# Patient Record
Sex: MALE | Race: WHITE | NOT HISPANIC OR LATINO | Employment: OTHER | URBAN - METROPOLITAN AREA
[De-identification: names, ages, dates, MRNs, and addresses within clinical notes are randomized per-mention and may not be internally consistent; named-entity substitution may affect disease eponyms.]

---

## 2023-06-14 LAB
ALANINE AMINOTRANSFERASE (SGPT) (U/L) IN SER/PLAS: 16 U/L (ref 10–52)
ALBUMIN (G/DL) IN SER/PLAS: 4 G/DL (ref 3.4–5)
ALKALINE PHOSPHATASE (U/L) IN SER/PLAS: 62 U/L (ref 33–136)
ANION GAP IN SER/PLAS: 14 MMOL/L (ref 10–20)
ASPARTATE AMINOTRANSFERASE (SGOT) (U/L) IN SER/PLAS: 18 U/L (ref 9–39)
BILIRUBIN TOTAL (MG/DL) IN SER/PLAS: 0.7 MG/DL (ref 0–1.2)
CALCIUM (MG/DL) IN SER/PLAS: 9.6 MG/DL (ref 8.6–10.6)
CARBON DIOXIDE, TOTAL (MMOL/L) IN SER/PLAS: 29 MMOL/L (ref 21–32)
CHLORIDE (MMOL/L) IN SER/PLAS: 103 MMOL/L (ref 98–107)
CHOLESTEROL (MG/DL) IN SER/PLAS: 139 MG/DL (ref 0–199)
CHOLESTEROL IN HDL (MG/DL) IN SER/PLAS: 35 MG/DL
CHOLESTEROL/HDL RATIO: 4
CREATININE (MG/DL) IN SER/PLAS: 1.12 MG/DL (ref 0.5–1.3)
ERYTHROCYTE DISTRIBUTION WIDTH (RATIO) BY AUTOMATED COUNT: 15.9 % (ref 11.5–14.5)
ERYTHROCYTE MEAN CORPUSCULAR HEMOGLOBIN CONCENTRATION (G/DL) BY AUTOMATED: 31 G/DL (ref 32–36)
ERYTHROCYTE MEAN CORPUSCULAR VOLUME (FL) BY AUTOMATED COUNT: 88 FL (ref 80–100)
ERYTHROCYTES (10*6/UL) IN BLOOD BY AUTOMATED COUNT: 4.75 X10E12/L (ref 4.5–5.9)
GFR MALE: 63 ML/MIN/1.73M2
GLUCOSE (MG/DL) IN SER/PLAS: 99 MG/DL (ref 74–99)
HEMATOCRIT (%) IN BLOOD BY AUTOMATED COUNT: 42 % (ref 41–52)
HEMOGLOBIN (G/DL) IN BLOOD: 13 G/DL (ref 13.5–17.5)
LDL: 80 MG/DL (ref 0–99)
LEUKOCYTES (10*3/UL) IN BLOOD BY AUTOMATED COUNT: 8 X10E9/L (ref 4.4–11.3)
MAGNESIUM (MG/DL) IN SER/PLAS: 1.96 MG/DL (ref 1.6–2.4)
NRBC (PER 100 WBCS) BY AUTOMATED COUNT: 0 /100 WBC (ref 0–0)
PLATELETS (10*3/UL) IN BLOOD AUTOMATED COUNT: 180 X10E9/L (ref 150–450)
POTASSIUM (MMOL/L) IN SER/PLAS: 4.1 MMOL/L (ref 3.5–5.3)
PROTEIN TOTAL: 6.9 G/DL (ref 6.4–8.2)
SODIUM (MMOL/L) IN SER/PLAS: 142 MMOL/L (ref 136–145)
THYROTROPIN (MIU/L) IN SER/PLAS BY DETECTION LIMIT <= 0.05 MIU/L: 4.13 MIU/L (ref 0.44–3.98)
TRIGLYCERIDE (MG/DL) IN SER/PLAS: 122 MG/DL (ref 0–149)
UREA NITROGEN (MG/DL) IN SER/PLAS: 29 MG/DL (ref 6–23)
VLDL: 24 MG/DL (ref 0–40)

## 2023-09-28 DIAGNOSIS — I48.91 ATRIAL FIBRILLATION, UNSPECIFIED TYPE (MULTI): Primary | ICD-10-CM

## 2023-09-28 LAB
INR IN PPP BY COAGULATION ASSAY EXTERNAL: 2.6
PROTHROMBIN TIME (PT) IN PPP BY COAGULATION ASSAY EXTERNAL: NORMAL SECONDS

## 2023-10-11 PROBLEM — E63.8 NUTRITIONAL INTAKE LESS THAN BODY REQUIREMENTS: Status: ACTIVE | Noted: 2023-10-11

## 2023-10-11 PROBLEM — C44.311 BASAL CELL CARCINOMA OF SKIN OF NOSE: Status: ACTIVE | Noted: 2023-03-15

## 2023-10-11 PROBLEM — J96.90 RESPIRATORY FAILURE (MULTI): Status: ACTIVE | Noted: 2023-10-11

## 2023-10-11 PROBLEM — J18.9 PNEUMONIA: Status: ACTIVE | Noted: 2023-10-11

## 2023-10-11 PROBLEM — I25.10 ATHEROSCLEROSIS OF NATIVE CORONARY ARTERY OF NATIVE HEART WITHOUT ANGINA PECTORIS: Status: ACTIVE | Noted: 2023-10-11

## 2023-10-11 PROBLEM — I42.9 CHF WITH CARDIOMYOPATHY (MULTI): Status: ACTIVE | Noted: 2023-10-11

## 2023-10-11 PROBLEM — I35.0 NONRHEUMATIC AORTIC VALVE STENOSIS: Status: ACTIVE | Noted: 2023-10-11

## 2023-10-11 PROBLEM — R04.2 HEMOPTYSIS: Status: ACTIVE | Noted: 2023-10-11

## 2023-10-11 PROBLEM — J44.9 CHRONIC OBSTRUCTIVE PULMONARY DISEASE (MULTI): Status: ACTIVE | Noted: 2023-10-11

## 2023-10-11 PROBLEM — L40.50 PSORIATIC ARTHRITIS (MULTI): Status: ACTIVE | Noted: 2023-10-11

## 2023-10-11 PROBLEM — N18.30 STAGE 3 CHRONIC KIDNEY DISEASE (MULTI): Status: ACTIVE | Noted: 2023-10-11

## 2023-10-11 PROBLEM — Z95.810 CARDIAC DEFIBRILLATOR IN PLACE: Status: ACTIVE | Noted: 2023-10-11

## 2023-10-11 PROBLEM — I99.9 CIRCULATION PROBLEM: Status: ACTIVE | Noted: 2023-10-11

## 2023-10-11 PROBLEM — R91.8 ABNORMAL FINDINGS ON DIAGNOSTIC IMAGING OF LUNG: Status: ACTIVE | Noted: 2023-10-11

## 2023-10-11 PROBLEM — R06.89 AIRWAY CLEARANCE IMPAIRMENT: Status: ACTIVE | Noted: 2023-10-11

## 2023-10-11 PROBLEM — I50.9 CHF WITH CARDIOMYOPATHY (MULTI): Status: ACTIVE | Noted: 2023-10-11

## 2023-10-11 PROBLEM — R06.00 DYSPNEA: Status: ACTIVE | Noted: 2023-10-11

## 2023-10-11 PROBLEM — R68.89 ACTIVITY INTOLERANCE: Status: ACTIVE | Noted: 2023-10-11

## 2023-10-11 PROBLEM — I42.9 CARDIOMYOPATHY (MULTI): Status: ACTIVE | Noted: 2023-10-11

## 2023-10-11 PROBLEM — Z95.2 S/P TAVR (TRANSCATHETER AORTIC VALVE REPLACEMENT): Status: ACTIVE | Noted: 2023-10-11

## 2023-10-11 PROBLEM — R52 PAIN: Status: ACTIVE | Noted: 2023-10-11

## 2023-10-11 PROBLEM — R09.02 SEVERE HYPOXEMIA: Status: ACTIVE | Noted: 2023-10-11

## 2023-10-11 PROBLEM — E11.9 TYPE 2 DIABETES MELLITUS (MULTI): Status: ACTIVE | Noted: 2023-10-11

## 2023-10-11 PROBLEM — E78.5 HYPERLIPIDEMIA: Status: ACTIVE | Noted: 2023-10-11

## 2023-10-11 PROBLEM — L03.119 CELLULITIS, LEG: Status: ACTIVE | Noted: 2023-10-11

## 2023-10-11 PROBLEM — E26.09: Status: ACTIVE | Noted: 2023-10-11

## 2023-10-11 PROBLEM — Z79.899 ON AMIODARONE THERAPY: Status: ACTIVE | Noted: 2023-10-11

## 2023-10-11 PROBLEM — K21.9 ACID REFLUX: Status: ACTIVE | Noted: 2023-10-11

## 2023-10-11 PROBLEM — I10 HYPERTENSION: Status: ACTIVE | Noted: 2023-10-11

## 2023-10-11 PROBLEM — E66.89 OTHER OBESITY: Status: ACTIVE | Noted: 2023-10-11

## 2023-10-11 PROBLEM — C44.41 BASAL CELL CARCINOMA OF SKIN OF SCALP AND NECK: Status: ACTIVE | Noted: 2023-03-15

## 2023-10-11 PROBLEM — I82.90 VENOUS THROMBOSIS: Status: ACTIVE | Noted: 2023-10-11

## 2023-10-11 PROBLEM — R09.02 HYPOXIA: Status: ACTIVE | Noted: 2023-10-11

## 2023-10-11 PROBLEM — R60.9 PERIPHERAL EDEMA: Status: ACTIVE | Noted: 2023-10-11

## 2023-10-11 PROBLEM — E87.8 FLUID VOLUME DISORDER: Status: ACTIVE | Noted: 2023-10-11

## 2023-10-11 PROBLEM — E66.8 OTHER OBESITY: Status: ACTIVE | Noted: 2023-10-11

## 2023-10-11 PROBLEM — J90 PLEURAL EFFUSION: Status: ACTIVE | Noted: 2023-10-11

## 2023-10-11 PROBLEM — R06.89 IMPAIRED GAS EXCHANGE: Status: ACTIVE | Noted: 2023-10-11

## 2023-10-11 PROBLEM — R06.03 RESPIRATORY DISTRESS: Status: ACTIVE | Noted: 2023-10-11

## 2023-10-11 PROBLEM — R60.0 PERIPHERAL EDEMA: Status: ACTIVE | Noted: 2023-10-11

## 2023-10-11 PROBLEM — Z91.89 AT RISK FOR BLEEDING: Status: ACTIVE | Noted: 2023-10-11

## 2023-10-11 PROBLEM — C44.319 BASAL CELL CARCINOMA OF SKIN OF OTHER PARTS OF FACE: Status: ACTIVE | Noted: 2023-03-15

## 2023-10-11 PROBLEM — R73.09 BLOOD GLUCOSE ABNORMAL: Status: ACTIVE | Noted: 2023-10-11

## 2023-10-11 RX ORDER — ATORVASTATIN CALCIUM 80 MG/1
80 TABLET, FILM COATED ORAL DAILY
COMMUNITY
Start: 2013-11-18 | End: 2023-10-19 | Stop reason: HOSPADM

## 2023-10-11 RX ORDER — WARFARIN 3 MG/1
6 TABLET ORAL
COMMUNITY
End: 2023-10-19 | Stop reason: HOSPADM

## 2023-10-11 RX ORDER — OMEPRAZOLE 20 MG/1
20 TABLET, DELAYED RELEASE ORAL
COMMUNITY
End: 2023-10-18 | Stop reason: ENTERED-IN-ERROR

## 2023-10-11 RX ORDER — TRIAMCINOLONE ACETONIDE 1 MG/G
CREAM TOPICAL
COMMUNITY
Start: 2022-04-18 | End: 2023-10-19 | Stop reason: HOSPADM

## 2023-10-11 RX ORDER — POLYETHYLENE GLYCOL 3350 17 G/17G
17 POWDER, FOR SOLUTION ORAL DAILY
COMMUNITY
Start: 2022-10-24 | End: 2023-10-18 | Stop reason: ENTERED-IN-ERROR

## 2023-10-11 RX ORDER — INSULIN ASPART 100 [IU]/ML
INJECTION, SOLUTION INTRAVENOUS; SUBCUTANEOUS
COMMUNITY
End: 2023-10-19 | Stop reason: HOSPADM

## 2023-10-11 RX ORDER — WARFARIN 3 MG/1
3 TABLET ORAL 2 TIMES WEEKLY
COMMUNITY
Start: 2014-12-01 | End: 2023-10-19 | Stop reason: HOSPADM

## 2023-10-11 RX ORDER — MULTIVITAMIN
1 TABLET ORAL DAILY
COMMUNITY
End: 2023-10-18 | Stop reason: ALTCHOICE

## 2023-10-11 RX ORDER — BISACODYL 10 MG/1
10 SUPPOSITORY RECTAL DAILY
COMMUNITY
End: 2023-10-18 | Stop reason: ENTERED-IN-ERROR

## 2023-10-11 RX ORDER — ASPIRIN 81 MG/1
81 TABLET ORAL DAILY
COMMUNITY
End: 2023-10-19 | Stop reason: HOSPADM

## 2023-10-11 RX ORDER — ATORVASTATIN CALCIUM 40 MG/1
80 TABLET, FILM COATED ORAL NIGHTLY
COMMUNITY
End: 2023-10-18 | Stop reason: ENTERED-IN-ERROR

## 2023-10-11 RX ORDER — GUAIFENESIN 100 MG/5ML
200 SOLUTION ORAL EVERY 4 HOURS PRN
COMMUNITY
End: 2023-10-18 | Stop reason: ENTERED-IN-ERROR

## 2023-10-11 RX ORDER — LISINOPRIL 10 MG/1
10 TABLET ORAL DAILY
COMMUNITY
End: 2023-10-18 | Stop reason: ENTERED-IN-ERROR

## 2023-10-11 RX ORDER — SALINE NASAL SPRAY 1.5 OZ
2 SOLUTION NASAL EVERY 4 HOURS PRN
COMMUNITY
End: 2023-10-19 | Stop reason: HOSPADM

## 2023-10-11 RX ORDER — PSEUDOEPHEDRINE HCL 30 MG
TABLET ORAL
COMMUNITY
End: 2023-10-18 | Stop reason: ENTERED-IN-ERROR

## 2023-10-11 RX ORDER — INSULIN GLARGINE 100 [IU]/ML
INJECTION, SOLUTION SUBCUTANEOUS
COMMUNITY
End: 2023-10-18 | Stop reason: ENTERED-IN-ERROR

## 2023-10-11 RX ORDER — TRAMADOL HYDROCHLORIDE 50 MG/1
50 TABLET ORAL EVERY 6 HOURS PRN
COMMUNITY
End: 2023-10-18 | Stop reason: ENTERED-IN-ERROR

## 2023-10-11 RX ORDER — ISOSORBIDE MONONITRATE 20 MG/1
20 TABLET ORAL 2 TIMES DAILY
COMMUNITY
End: 2023-10-18 | Stop reason: ENTERED-IN-ERROR

## 2023-10-11 RX ORDER — METRONIDAZOLE 500 MG/1
500 TABLET ORAL 3 TIMES DAILY
COMMUNITY
End: 2023-10-18 | Stop reason: ENTERED-IN-ERROR

## 2023-10-11 RX ORDER — DOCUSATE SODIUM 100 MG/1
100 CAPSULE, LIQUID FILLED ORAL DAILY
COMMUNITY
End: 2023-10-18 | Stop reason: ENTERED-IN-ERROR

## 2023-10-11 RX ORDER — OMEPRAZOLE 20 MG/1
20 CAPSULE, DELAYED RELEASE ORAL
COMMUNITY
Start: 2013-11-18 | End: 2023-10-19 | Stop reason: HOSPADM

## 2023-10-11 RX ORDER — ISOSORBIDE MONONITRATE 30 MG/1
1 TABLET, EXTENDED RELEASE ORAL DAILY
COMMUNITY
Start: 2013-11-18

## 2023-10-11 RX ORDER — METOPROLOL TARTRATE 25 MG/1
25 TABLET, FILM COATED ORAL 2 TIMES DAILY
COMMUNITY
Start: 2013-11-18

## 2023-10-11 RX ORDER — GABAPENTIN 300 MG/1
300 CAPSULE ORAL 3 TIMES DAILY
COMMUNITY
End: 2023-10-18 | Stop reason: ENTERED-IN-ERROR

## 2023-10-11 RX ORDER — ACETAMINOPHEN 325 MG/1
650 TABLET ORAL EVERY 6 HOURS PRN
COMMUNITY
End: 2023-10-18 | Stop reason: ENTERED-IN-ERROR

## 2023-10-11 RX ORDER — DOXYCYCLINE HYCLATE 100 MG
100 TABLET ORAL 2 TIMES DAILY
COMMUNITY
End: 2023-10-18 | Stop reason: ALTCHOICE

## 2023-10-11 RX ORDER — FUROSEMIDE 40 MG/1
40 TABLET ORAL 2 TIMES DAILY
COMMUNITY
Start: 2013-11-18 | End: 2023-10-19 | Stop reason: HOSPADM

## 2023-10-11 RX ORDER — ADHESIVE BANDAGE
5 BANDAGE TOPICAL EVERY 6 HOURS PRN
COMMUNITY
End: 2023-10-18 | Stop reason: ENTERED-IN-ERROR

## 2023-10-11 RX ORDER — SENNOSIDES 8.6 MG/1
2 TABLET ORAL NIGHTLY
COMMUNITY
End: 2023-10-18 | Stop reason: ENTERED-IN-ERROR

## 2023-10-11 RX ORDER — VIT C/E/ZN/COPPR/LUTEIN/ZEAXAN 250MG-90MG
1 CAPSULE ORAL DAILY
COMMUNITY
End: 2023-10-19 | Stop reason: HOSPADM

## 2023-10-11 RX ORDER — CARBOXYMETHYLCELLULOSE SODIUM 5 MG/ML
1 SOLUTION/ DROPS OPHTHALMIC 3 TIMES DAILY
COMMUNITY
End: 2023-10-19 | Stop reason: HOSPADM

## 2023-10-11 RX ORDER — IPRATROPIUM BROMIDE AND ALBUTEROL SULFATE 2.5; .5 MG/3ML; MG/3ML
1 SOLUTION RESPIRATORY (INHALATION)
COMMUNITY
End: 2023-10-18 | Stop reason: ENTERED-IN-ERROR

## 2023-10-11 RX ORDER — INSULIN ASPART 100 [IU]/ML
INJECTION, SOLUTION INTRAVENOUS; SUBCUTANEOUS
COMMUNITY
End: 2023-10-18 | Stop reason: ENTERED-IN-ERROR

## 2023-10-11 RX ORDER — LISINOPRIL 5 MG/1
1 TABLET ORAL DAILY
COMMUNITY
Start: 2013-11-18

## 2023-10-11 RX ORDER — PANTOPRAZOLE SODIUM 40 MG/1
1 TABLET, DELAYED RELEASE ORAL DAILY
COMMUNITY
End: 2023-10-18 | Stop reason: ENTERED-IN-ERROR

## 2023-10-11 RX ORDER — INSULIN GLARGINE 100 [IU]/ML
45 INJECTION, SOLUTION SUBCUTANEOUS NIGHTLY
COMMUNITY
Start: 2018-03-05

## 2023-10-11 RX ORDER — BIOTIN 10 MG
TABLET ORAL
COMMUNITY
End: 2023-10-18 | Stop reason: ENTERED-IN-ERROR

## 2023-10-11 RX ORDER — CALCIUM CARBONATE/VITAMIN D3 500-10/5ML
1 LIQUID (ML) ORAL DAILY
Status: ON HOLD | COMMUNITY
Start: 2013-11-18 | End: 2023-10-20 | Stop reason: SDUPTHER

## 2023-10-12 ENCOUNTER — ANTICOAGULATION - WARFARIN VISIT (OUTPATIENT)
Dept: CARDIOLOGY | Facility: CLINIC | Age: 88
End: 2023-10-12
Payer: MEDICARE

## 2023-10-12 DIAGNOSIS — I48.91 ATRIAL FIBRILLATION, UNSPECIFIED TYPE (MULTI): ICD-10-CM

## 2023-10-12 LAB
POC INR: 2.1 (ref 2–3)
POC PROTHROMBIN TIME: NORMAL

## 2023-10-12 PROCEDURE — 85610 PROTHROMBIN TIME: CPT | Mod: PO

## 2023-10-12 PROCEDURE — 99211 OFF/OP EST MAY X REQ PHY/QHP: CPT | Mod: 27 | Performed by: INTERNAL MEDICINE

## 2023-10-12 NOTE — PROGRESS NOTES
Patient identification verified with 2 identifiers.    Location: [facility] - [address] [address] [place], [place] [address] [phone]     Referring Physician: Melvin Bahena  Enrollment/ Re-enrollment date: 2024   INR Goal: 2.0-3.0  INR monitoring is per [facility] protocol.  Anticoagulation Medication: warfarin  Indication: atrial fibrillation    Subjective   Bleeding signs/symptoms: No    Bruising: No   Major bleeding event: No  Thrombosis signs/symptoms: No  Thromboembolic event: No  Missed doses: No  Extra doses: No  Medication changes: Yes  Tradjenta diabetic medication started 3 wks ago  Dietary changes: No  Change in health: No  Change in activity: No  Alcohol: No  Other concerns: No    Upcoming Surgeries:  Does the Patient Have any upcoming surgeries that require interruption in anticoagulation therapy? no  Does the patient require bridging? no      Anticoagulation Summary  As of 10/12/2023      INR goal:  2.0-3.0   TTR:  100.0 % (4 d)   INR used for dosin.10 (10/12/2023)   Weekly warfarin total:  39 mg               Assessment/Plan   Therapeutic     1. New dose: no change    2. Next INR: 1 month      Education provided to patient during the visit:  Patient instructed to call in interim with questions, concerns and changes.   Patient educated on interactions between medications and warfarin.

## 2023-10-18 ENCOUNTER — APPOINTMENT (OUTPATIENT)
Dept: RADIOLOGY | Facility: HOSPITAL | Age: 88
DRG: 291 | End: 2023-10-18
Payer: MEDICARE

## 2023-10-18 ENCOUNTER — APPOINTMENT (OUTPATIENT)
Dept: CARDIOLOGY | Facility: CLINIC | Age: 88
DRG: 291 | End: 2023-10-18
Payer: MEDICARE

## 2023-10-18 ENCOUNTER — HOSPITAL ENCOUNTER (INPATIENT)
Facility: HOSPITAL | Age: 88
LOS: 1 days | Discharge: NURSING, REHAB OR LONG-TERM CARE | DRG: 291 | End: 2023-10-19
Attending: EMERGENCY MEDICINE | Admitting: INTERNAL MEDICINE
Payer: MEDICARE

## 2023-10-18 DIAGNOSIS — Z95.810 PRESENCE OF AUTOMATIC (IMPLANTABLE) CARDIAC DEFIBRILLATOR: ICD-10-CM

## 2023-10-18 DIAGNOSIS — I50.9 ACUTE CONGESTIVE HEART FAILURE, UNSPECIFIED HEART FAILURE TYPE (MULTI): Primary | ICD-10-CM

## 2023-10-18 DIAGNOSIS — I42.0 DILATED CARDIOMYOPATHY (MULTI): ICD-10-CM

## 2023-10-18 DIAGNOSIS — R09.02 SEVERE HYPOXEMIA: ICD-10-CM

## 2023-10-18 DIAGNOSIS — I50.21 ACUTE SYSTOLIC CONGESTIVE HEART FAILURE (MULTI): ICD-10-CM

## 2023-10-18 LAB
ALBUMIN SERPL-MCNC: 3.4 G/DL (ref 3.5–5)
ALP BLD-CCNC: 65 U/L (ref 35–125)
ALT SERPL-CCNC: 14 U/L (ref 5–40)
ANION GAP SERPL CALC-SCNC: 11 MMOL/L
APTT PPP: 36.5 SECONDS (ref 22–32.5)
AST SERPL-CCNC: 18 U/L (ref 5–40)
BASOPHILS # BLD AUTO: 0.04 X10*3/UL (ref 0–0.1)
BASOPHILS NFR BLD AUTO: 0.4 %
BILIRUB SERPL-MCNC: 0.5 MG/DL (ref 0.1–1.2)
BUN SERPL-MCNC: 26 MG/DL (ref 8–25)
CALCIUM SERPL-MCNC: 8.6 MG/DL (ref 8.5–10.4)
CHLORIDE SERPL-SCNC: 106 MMOL/L (ref 97–107)
CO2 SERPL-SCNC: 24 MMOL/L (ref 24–31)
CREAT SERPL-MCNC: 1.2 MG/DL (ref 0.4–1.6)
EOSINOPHIL # BLD AUTO: 0.11 X10*3/UL (ref 0–0.4)
EOSINOPHIL NFR BLD AUTO: 1.2 %
ERYTHROCYTE [DISTWIDTH] IN BLOOD BY AUTOMATED COUNT: 15.9 % (ref 11.5–14.5)
GFR SERPL CREATININE-BSD FRML MDRD: 57 ML/MIN/1.73M*2
GLUCOSE BLD MANUAL STRIP-MCNC: 172 MG/DL (ref 74–99)
GLUCOSE SERPL-MCNC: 140 MG/DL (ref 65–99)
HCT VFR BLD AUTO: 34.2 % (ref 41–52)
HGB BLD-MCNC: 10.6 G/DL (ref 13.5–17.5)
IMM GRANULOCYTES # BLD AUTO: 0.04 X10*3/UL (ref 0–0.5)
IMM GRANULOCYTES NFR BLD AUTO: 0.4 % (ref 0–0.9)
INR PPP: 2.3 (ref 0.9–1.2)
LACTATE BLDV-SCNC: 1.9 MMOL/L (ref 0.4–2)
LYMPHOCYTES # BLD AUTO: 1.57 X10*3/UL (ref 0.8–3)
LYMPHOCYTES NFR BLD AUTO: 17.5 %
MCH RBC QN AUTO: 27.3 PG (ref 26–34)
MCHC RBC AUTO-ENTMCNC: 31 G/DL (ref 32–36)
MCV RBC AUTO: 88 FL (ref 80–100)
MONOCYTES # BLD AUTO: 0.71 X10*3/UL (ref 0.05–0.8)
MONOCYTES NFR BLD AUTO: 7.9 %
NEUTROPHILS # BLD AUTO: 6.48 X10*3/UL (ref 1.6–5.5)
NEUTROPHILS NFR BLD AUTO: 72.6 %
NRBC BLD-RTO: 0 /100 WBCS (ref 0–0)
NT-PROBNP SERPL-MCNC: 1268 PG/ML (ref 0–852)
PLATELET # BLD AUTO: 157 X10*3/UL (ref 150–450)
PMV BLD AUTO: 10.3 FL (ref 7.5–11.5)
POTASSIUM SERPL-SCNC: 3.9 MMOL/L (ref 3.4–5.1)
PROT SERPL-MCNC: 5.8 G/DL (ref 5.9–7.9)
PROTHROMBIN TIME: 23.5 SECONDS (ref 9.3–12.7)
RBC # BLD AUTO: 3.88 X10*6/UL (ref 4.5–5.9)
SODIUM SERPL-SCNC: 141 MMOL/L (ref 133–145)
TROPONIN T SERPL-MCNC: 44 NG/L
WBC # BLD AUTO: 9 X10*3/UL (ref 4.4–11.3)

## 2023-10-18 PROCEDURE — 1200000002 HC GENERAL ROOM WITH TELEMETRY DAILY

## 2023-10-18 PROCEDURE — 93010 ELECTROCARDIOGRAM REPORT: CPT | Performed by: INTERNAL MEDICINE

## 2023-10-18 PROCEDURE — 2500000002 HC RX 250 W HCPCS SELF ADMINISTERED DRUGS (ALT 637 FOR MEDICARE OP, ALT 636 FOR OP/ED): Performed by: EMERGENCY MEDICINE

## 2023-10-18 PROCEDURE — 36415 COLL VENOUS BLD VENIPUNCTURE: CPT | Performed by: EMERGENCY MEDICINE

## 2023-10-18 PROCEDURE — 85610 PROTHROMBIN TIME: CPT | Performed by: EMERGENCY MEDICINE

## 2023-10-18 PROCEDURE — 84484 ASSAY OF TROPONIN QUANT: CPT | Performed by: EMERGENCY MEDICINE

## 2023-10-18 PROCEDURE — 99285 EMERGENCY DEPT VISIT HI MDM: CPT | Performed by: EMERGENCY MEDICINE

## 2023-10-18 PROCEDURE — 85730 THROMBOPLASTIN TIME PARTIAL: CPT | Performed by: EMERGENCY MEDICINE

## 2023-10-18 PROCEDURE — 83880 ASSAY OF NATRIURETIC PEPTIDE: CPT | Performed by: EMERGENCY MEDICINE

## 2023-10-18 PROCEDURE — 83605 ASSAY OF LACTIC ACID: CPT | Performed by: EMERGENCY MEDICINE

## 2023-10-18 PROCEDURE — 82947 ASSAY GLUCOSE BLOOD QUANT: CPT

## 2023-10-18 PROCEDURE — 71045 X-RAY EXAM CHEST 1 VIEW: CPT | Mod: FY

## 2023-10-18 PROCEDURE — 84075 ASSAY ALKALINE PHOSPHATASE: CPT | Performed by: EMERGENCY MEDICINE

## 2023-10-18 PROCEDURE — 85025 COMPLETE CBC W/AUTO DIFF WBC: CPT | Performed by: EMERGENCY MEDICINE

## 2023-10-18 RX ORDER — INSULIN GLARGINE-YFGN 100 [IU]/ML
45 INJECTION, SOLUTION SUBCUTANEOUS NIGHTLY
COMMUNITY
Start: 2023-06-23 | End: 2023-10-18 | Stop reason: ENTERED-IN-ERROR

## 2023-10-18 RX ORDER — FLASH GLUCOSE SENSOR
KIT MISCELLANEOUS
COMMUNITY
Start: 2022-12-19

## 2023-10-18 RX ORDER — INSULIN GLARGINE 100 [IU]/ML
44 INJECTION, SOLUTION SUBCUTANEOUS ONCE
Status: COMPLETED | OUTPATIENT
Start: 2023-10-18 | End: 2023-10-18

## 2023-10-18 RX ORDER — INSULIN GLARGINE 100 [IU]/ML
45 INJECTION, SOLUTION SUBCUTANEOUS NIGHTLY
Status: DISCONTINUED | OUTPATIENT
Start: 2023-10-18 | End: 2023-10-19 | Stop reason: HOSPADM

## 2023-10-18 RX ORDER — TALC
3 POWDER (GRAM) TOPICAL NIGHTLY PRN
Status: DISCONTINUED | OUTPATIENT
Start: 2023-10-18 | End: 2023-10-19 | Stop reason: HOSPADM

## 2023-10-18 RX ORDER — ACETAMINOPHEN 325 MG/1
325 TABLET ORAL EVERY 6 HOURS PRN
COMMUNITY
Start: 2015-07-14 | End: 2023-10-18 | Stop reason: ENTERED-IN-ERROR

## 2023-10-18 RX ORDER — CHOLECALCIFEROL (VITAMIN D3) 25 MCG
1000 TABLET ORAL DAILY
Status: DISCONTINUED | OUTPATIENT
Start: 2023-10-19 | End: 2023-10-19 | Stop reason: HOSPADM

## 2023-10-18 RX ORDER — PANTOPRAZOLE SODIUM 20 MG/1
20 TABLET, DELAYED RELEASE ORAL
Status: DISCONTINUED | OUTPATIENT
Start: 2023-10-19 | End: 2023-10-18

## 2023-10-18 RX ORDER — METOPROLOL TARTRATE 25 MG/1
25 TABLET, FILM COATED ORAL 2 TIMES DAILY
Status: DISCONTINUED | OUTPATIENT
Start: 2023-10-18 | End: 2023-10-19 | Stop reason: HOSPADM

## 2023-10-18 RX ORDER — POLYETHYLENE GLYCOL 3350 17 G/17G
17 POWDER, FOR SOLUTION ORAL DAILY PRN
Status: DISCONTINUED | OUTPATIENT
Start: 2023-10-18 | End: 2023-10-19 | Stop reason: HOSPADM

## 2023-10-18 RX ORDER — ATORVASTATIN CALCIUM 80 MG/1
80 TABLET, FILM COATED ORAL DAILY
Status: DISCONTINUED | OUTPATIENT
Start: 2023-10-19 | End: 2023-10-19 | Stop reason: HOSPADM

## 2023-10-18 RX ORDER — ACETAMINOPHEN 325 MG/1
650 TABLET ORAL EVERY 6 HOURS PRN
Status: DISCONTINUED | OUTPATIENT
Start: 2023-10-18 | End: 2023-10-19 | Stop reason: HOSPADM

## 2023-10-18 RX ORDER — LISINOPRIL 5 MG/1
5 TABLET ORAL DAILY
Status: DISCONTINUED | OUTPATIENT
Start: 2023-10-19 | End: 2023-10-19 | Stop reason: HOSPADM

## 2023-10-18 RX ORDER — VIT C/E/ZN/COPPR/LUTEIN/ZEAXAN 250MG-90MG
25 CAPSULE ORAL DAILY
Status: DISCONTINUED | OUTPATIENT
Start: 2023-10-19 | End: 2023-10-18

## 2023-10-18 RX ORDER — DEXTROSE 50 % IN WATER (D50W) INTRAVENOUS SYRINGE
25
Status: DISCONTINUED | OUTPATIENT
Start: 2023-10-18 | End: 2023-10-19 | Stop reason: HOSPADM

## 2023-10-18 RX ORDER — PANTOPRAZOLE SODIUM 40 MG/1
40 TABLET, DELAYED RELEASE ORAL
Status: DISCONTINUED | OUTPATIENT
Start: 2023-10-19 | End: 2023-10-19 | Stop reason: HOSPADM

## 2023-10-18 RX ORDER — NEOMYCIN AND POLYMYXIN B SULFATES AND BACITRACIN ZINC 400; 3.5; 1 [USP'U]/G; MG/G; [USP'U]/G
OINTMENT OPHTHALMIC
COMMUNITY
Start: 2023-06-26 | End: 2023-10-19 | Stop reason: HOSPADM

## 2023-10-18 RX ORDER — PRAMOXINE HYDROCHLORIDE 10 MG/ML
LOTION TOPICAL
COMMUNITY
Start: 2023-06-02 | End: 2023-10-19 | Stop reason: HOSPADM

## 2023-10-18 RX ORDER — TRIAMCINOLONE ACETONIDE 1 MG/G
CREAM TOPICAL 2 TIMES DAILY
Status: DISCONTINUED | OUTPATIENT
Start: 2023-10-18 | End: 2023-10-19 | Stop reason: HOSPADM

## 2023-10-18 RX ORDER — ISOSORBIDE MONONITRATE 30 MG/1
30 TABLET, EXTENDED RELEASE ORAL DAILY
Status: DISCONTINUED | OUTPATIENT
Start: 2023-10-19 | End: 2023-10-19 | Stop reason: HOSPADM

## 2023-10-18 RX ORDER — WARFARIN 3 MG/1
3 TABLET ORAL
Status: DISCONTINUED | OUTPATIENT
Start: 2023-10-19 | End: 2023-10-19 | Stop reason: HOSPADM

## 2023-10-18 RX ORDER — LANOLIN ALCOHOL/MO/W.PET/CERES
400 CREAM (GRAM) TOPICAL DAILY
Status: DISCONTINUED | OUTPATIENT
Start: 2023-10-19 | End: 2023-10-19 | Stop reason: HOSPADM

## 2023-10-18 RX ORDER — KETOCONAZOLE 20 MG/G
CREAM TOPICAL
COMMUNITY
Start: 2023-01-09 | End: 2023-10-18 | Stop reason: ALTCHOICE

## 2023-10-18 RX ORDER — NEOMYCIN AND POLYMYXIN B SULFATES AND BACITRACIN ZINC 400; 3.5; 1 [USP'U]/G; MG/G; [USP'U]/G
0.5 OINTMENT OPHTHALMIC DAILY
Status: DISCONTINUED | OUTPATIENT
Start: 2023-10-19 | End: 2023-10-19 | Stop reason: HOSPADM

## 2023-10-18 RX ORDER — ASPIRIN 81 MG/1
81 TABLET ORAL DAILY
Status: DISCONTINUED | OUTPATIENT
Start: 2023-10-19 | End: 2023-10-19 | Stop reason: HOSPADM

## 2023-10-18 RX ORDER — INSULIN LISPRO 100 [IU]/ML
0-10 INJECTION, SOLUTION INTRAVENOUS; SUBCUTANEOUS
Status: DISCONTINUED | OUTPATIENT
Start: 2023-10-19 | End: 2023-10-19 | Stop reason: HOSPADM

## 2023-10-18 RX ORDER — WARFARIN 3 MG/1
6 TABLET ORAL
Status: DISCONTINUED | OUTPATIENT
Start: 2023-10-20 | End: 2023-10-19 | Stop reason: HOSPADM

## 2023-10-18 RX ORDER — FUROSEMIDE 10 MG/ML
40 INJECTION INTRAMUSCULAR; INTRAVENOUS EVERY 12 HOURS
Status: DISCONTINUED | OUTPATIENT
Start: 2023-10-18 | End: 2023-10-19 | Stop reason: HOSPADM

## 2023-10-18 RX ORDER — DEXTROSE MONOHYDRATE 100 MG/ML
0.3 INJECTION, SOLUTION INTRAVENOUS ONCE AS NEEDED
Status: DISCONTINUED | OUTPATIENT
Start: 2023-10-18 | End: 2023-10-19 | Stop reason: HOSPADM

## 2023-10-18 RX ADMIN — INSULIN GLARGINE 44 UNITS: 100 INJECTION, SOLUTION SUBCUTANEOUS at 21:09

## 2023-10-18 ASSESSMENT — ACTIVITIES OF DAILY LIVING (ADL)
GROOMING: NEEDS ASSISTANCE
ADEQUATE_TO_COMPLETE_ADL: YES
LACK_OF_TRANSPORTATION: NO
TOILETING: NEEDS ASSISTANCE
ASSISTIVE_DEVICE: WALKER
HEARING - LEFT EAR: FUNCTIONAL
DRESSING YOURSELF: NEEDS ASSISTANCE
JUDGMENT_ADEQUATE_SAFELY_COMPLETE_DAILY_ACTIVITIES: YES
WALKS IN HOME: NEEDS ASSISTANCE
FEEDING YOURSELF: INDEPENDENT
BATHING: NEEDS ASSISTANCE
PATIENT'S MEMORY ADEQUATE TO SAFELY COMPLETE DAILY ACTIVITIES?: YES
HEARING - RIGHT EAR: FUNCTIONAL

## 2023-10-18 ASSESSMENT — COLUMBIA-SUICIDE SEVERITY RATING SCALE - C-SSRS
1. IN THE PAST MONTH, HAVE YOU WISHED YOU WERE DEAD OR WISHED YOU COULD GO TO SLEEP AND NOT WAKE UP?: NO
2. HAVE YOU ACTUALLY HAD ANY THOUGHTS OF KILLING YOURSELF?: NO
6. HAVE YOU EVER DONE ANYTHING, STARTED TO DO ANYTHING, OR PREPARED TO DO ANYTHING TO END YOUR LIFE?: NO

## 2023-10-18 ASSESSMENT — COGNITIVE AND FUNCTIONAL STATUS - GENERAL
PATIENT BASELINE BEDBOUND: NO
DRESSING REGULAR LOWER BODY CLOTHING: A LITTLE
MOBILITY SCORE: 13
MOVING FROM LYING ON BACK TO SITTING ON SIDE OF FLAT BED WITH BEDRAILS: A LITTLE
STANDING UP FROM CHAIR USING ARMS: A LOT
TURNING FROM BACK TO SIDE WHILE IN FLAT BAD: A LITTLE
CLIMB 3 TO 5 STEPS WITH RAILING: TOTAL
TOILETING: A LOT
DAILY ACTIVITIY SCORE: 18
DRESSING REGULAR UPPER BODY CLOTHING: A LITTLE
WALKING IN HOSPITAL ROOM: A LOT
MOVING TO AND FROM BED TO CHAIR: A LOT
HELP NEEDED FOR BATHING: A LOT

## 2023-10-18 ASSESSMENT — LIFESTYLE VARIABLES
HAVE PEOPLE ANNOYED YOU BY CRITICIZING YOUR DRINKING: NO
EVER HAD A DRINK FIRST THING IN THE MORNING TO STEADY YOUR NERVES TO GET RID OF A HANGOVER: NO
HAVE YOU EVER FELT YOU SHOULD CUT DOWN ON YOUR DRINKING: NO
REASON UNABLE TO ASSESS: NO
EVER FELT BAD OR GUILTY ABOUT YOUR DRINKING: NO

## 2023-10-18 ASSESSMENT — PAIN SCALES - GENERAL
PAINLEVEL_OUTOF10: 0 - NO PAIN
PAINLEVEL_OUTOF10: 0 - NO PAIN

## 2023-10-18 ASSESSMENT — PAIN - FUNCTIONAL ASSESSMENT: PAIN_FUNCTIONAL_ASSESSMENT: 0-10

## 2023-10-18 NOTE — ED TRIAGE NOTES
Pt arrived to the ER with SOB for 3 months. Called EMS today because SOB is starting to become worse when walking and doing other activities. Pt states his HR went to 50 bph last night, and pacemaker is set to 70 bph. Pt denies any pain at this time. O2 level is 95% on RA

## 2023-10-18 NOTE — ED PROVIDER NOTES
HPI   Chief Complaint   Patient presents with    Shortness of Breath     Pt arrived to the ER with SOB for 3 months. Called EMS today because SOB is starting to become worse when walking. Pt states his HR went to 50 bph, and pacemaker is set to 70 bph. Pt denies any pain at this time. O2 level is 95% on RA        89-year-old male from home for chief complaint of shortness of breath with exertion.  Patient states has been going on for the last couple weeks but more so in the last couple days.  Patient states he cannot walk more than 10 steps without huffing and puffing.  He has a pacemaker and this is his third 1 and he states the battery is pretty low.  He sees Dr. Bahena.  Patient denies any chest pain fevers chills or night sweats or coughing.    Point review of systems was reviewed and is negative unless otherwise stated                          Belinda Coma Scale Score: 15         NIH Stroke Scale: 0          Patient History   Past Medical History:   Diagnosis Date    Atherosclerotic heart disease of native coronary artery without angina pectoris 12/12/2022    CAD (coronary artery disease)    Essential (primary) hypertension 12/12/2022    Hypertension    Presence of automatic (implantable) cardiac defibrillator 11/14/2022    Cardiac defibrillator in place     Past Surgical History:   Procedure Laterality Date    APPENDECTOMY  12/01/2014    Appendectomy    CORONARY ARTERY BYPASS GRAFT  12/01/2014    CABG    CT ABDOMEN PELVIS ANGIOGRAM W AND/OR WO IV CONTRAST  3/17/2020    CT ABDOMEN PELVIS ANGIOGRAM W AND/OR WO IV CONTRAST 3/17/2020 [facility]CILLARY LEGAC[facility]    OTHER SURGICAL HISTORY  12/01/2014    Cardio-defib Pulse Generator Implantation Date     Family History   Problem Relation Name Age of Onset    Drug abuse Mother          OVERDOSE     Social History     Tobacco Use    Smoking status: Not on file    Smokeless tobacco: Not on file   Substance Use Topics    Alcohol use: Not on file    Drug use: Not on file        Physical Exam   ED Triage Vitals [10/18/23 0836]   Temp Heart Rate Resp BP   36.8 °C (98.2 °F) 70 23 129/66      SpO2 Temp Source Heart Rate Source Patient Position   94 % Oral Monitor Lying      BP Location FiO2 (%)     Right arm --       Physical Exam  Vitals and nursing note reviewed.   Constitutional:       Appearance: Normal appearance.   HENT:      Head: Normocephalic and atraumatic.      Nose: Nose normal.      Mouth/Throat:      Mouth: Mucous membranes are moist.   Eyes:      Extraocular Movements: Extraocular movements intact.      Pupils: Pupils are equal, round, and reactive to light.   Cardiovascular:      Rate and Rhythm: Normal rate and regular rhythm.   Pulmonary:      Effort: Pulmonary effort is normal.      Breath sounds: Examination of the right-lower field reveals decreased breath sounds. Examination of the left-lower field reveals decreased breath sounds. Decreased breath sounds present.   Abdominal:      General: Abdomen is flat.      Palpations: Abdomen is soft.   Musculoskeletal:         General: Normal range of motion.      Cervical back: Normal range of motion.      Comments: Patient does have compression stockings on his lower extremities   Skin:     General: Skin is warm and dry.      Capillary Refill: Capillary refill takes less than 2 seconds.   Neurological:      General: No focal deficit present.      Mental Status: He is alert.   Psychiatric:         Mood and Affect: Mood normal.         ED Course & MDM   Diagnoses as of 10/18/23 1417   Acute congestive heart failure, unspecified heart failure type (CMS/HCC)       Medical Decision Making    Medical Decision Making: Patient was fully worked up and found to have a BNP over thousand, chest x-ray showing edema, BUN and creatinine are stable at this time, troponin is 44x2.  INR 2.3.  At this time I spoke with Dr. Villa who is on-call for Dr. Bahena.  We do feel this patient will have to be transferred over to [facility].  We are going  to hold Lasix at this time.  We will await a transfer bed.    EKG interpreted by myself (ED attending physician): Heart rate is 79 normal sinus rhythm ventricular paced rhythm with occasional PVCs, left axis deviation, QTc is 5 6 6 ms QRS duration 1 8 6 ms    Differential Diagnoses Considered: Heart failure COVID flu RSV pneumonia NSTEMI    Chronic Medical Conditions Significantly Affecting Care: Chronic pacemaker    External Records Reviewed: I reviewed recent and relevant outside records including: Previous labs    Social Determinants of Health Significantly Affecting Care: Lives with family    Diagnostic testing considered: CT chest but not indicated at this time    Carol Jones D.O.  Emergency Medicine          Procedure  Procedures     Carol Jones,   10/18/23 1413

## 2023-10-19 ENCOUNTER — TELEPHONE (OUTPATIENT)
Dept: CARDIOLOGY | Facility: CLINIC | Age: 88
End: 2023-10-19

## 2023-10-19 ENCOUNTER — HOSPITAL ENCOUNTER (INPATIENT)
Facility: HOSPITAL | Age: 88
LOS: 5 days | Discharge: NURSING, REHAB OR LONG-TERM CARE | DRG: 291 | End: 2023-10-24
Attending: INTERNAL MEDICINE | Admitting: INTERNAL MEDICINE
Payer: MEDICARE

## 2023-10-19 VITALS
WEIGHT: 192 LBS | HEIGHT: 70 IN | HEART RATE: 74 BPM | TEMPERATURE: 98.2 F | DIASTOLIC BLOOD PRESSURE: 70 MMHG | BODY MASS INDEX: 27.49 KG/M2 | SYSTOLIC BLOOD PRESSURE: 147 MMHG | RESPIRATION RATE: 18 BRPM | OXYGEN SATURATION: 96 %

## 2023-10-19 DIAGNOSIS — I50.31 ACUTE DIASTOLIC CONGESTIVE HEART FAILURE (MULTI): ICD-10-CM

## 2023-10-19 DIAGNOSIS — I50.9 ACUTE CONGESTIVE HEART FAILURE, UNSPECIFIED HEART FAILURE TYPE (MULTI): Primary | ICD-10-CM

## 2023-10-19 DIAGNOSIS — J96.22 ACUTE ON CHRONIC RESPIRATORY FAILURE WITH HYPOXIA AND HYPERCAPNIA (MULTI): ICD-10-CM

## 2023-10-19 DIAGNOSIS — I50.31 ACUTE DIASTOLIC (CONGESTIVE) HEART FAILURE (MULTI): ICD-10-CM

## 2023-10-19 DIAGNOSIS — K59.03 DRUG-INDUCED CONSTIPATION: ICD-10-CM

## 2023-10-19 DIAGNOSIS — R55 SYNCOPE AND COLLAPSE: ICD-10-CM

## 2023-10-19 DIAGNOSIS — R00.1 SYMPTOMATIC BRADYCARDIA: ICD-10-CM

## 2023-10-19 DIAGNOSIS — Z79.899 ON AMIODARONE THERAPY: ICD-10-CM

## 2023-10-19 DIAGNOSIS — K59.01 CONSTIPATION BY DELAYED COLONIC TRANSIT: ICD-10-CM

## 2023-10-19 DIAGNOSIS — K21.9 GASTROESOPHAGEAL REFLUX DISEASE, UNSPECIFIED WHETHER ESOPHAGITIS PRESENT: ICD-10-CM

## 2023-10-19 DIAGNOSIS — J96.21 ACUTE ON CHRONIC RESPIRATORY FAILURE WITH HYPOXIA AND HYPERCAPNIA (MULTI): ICD-10-CM

## 2023-10-19 DIAGNOSIS — E78.2 MODERATE MIXED HYPERLIPIDEMIA NOT REQUIRING STATIN THERAPY: ICD-10-CM

## 2023-10-19 LAB
ALBUMIN SERPL BCP-MCNC: 3.9 G/DL (ref 3.4–5)
ALP SERPL-CCNC: 72 U/L (ref 33–136)
ALT SERPL W P-5'-P-CCNC: 13 U/L (ref 10–52)
ANION GAP SERPL CALC-SCNC: 10 MMOL/L
ANION GAP SERPL CALC-SCNC: 16 MMOL/L (ref 10–20)
AST SERPL W P-5'-P-CCNC: 16 U/L (ref 9–39)
BILIRUB SERPL-MCNC: 0.8 MG/DL (ref 0–1.2)
BNP SERPL-MCNC: 275 PG/ML (ref 0–99)
BUN SERPL-MCNC: 23 MG/DL (ref 8–25)
BUN SERPL-MCNC: 25 MG/DL (ref 6–23)
CALCIUM SERPL-MCNC: 9.4 MG/DL (ref 8.5–10.4)
CALCIUM SERPL-MCNC: 9.5 MG/DL (ref 8.6–10.6)
CHLORIDE SERPL-SCNC: 104 MMOL/L (ref 98–107)
CHLORIDE SERPL-SCNC: 105 MMOL/L (ref 97–107)
CO2 SERPL-SCNC: 25 MMOL/L (ref 21–32)
CO2 SERPL-SCNC: 25 MMOL/L (ref 24–31)
CREAT SERPL-MCNC: 1.1 MG/DL (ref 0.4–1.6)
CREAT SERPL-MCNC: 1.4 MG/DL (ref 0.5–1.3)
ERYTHROCYTE [DISTWIDTH] IN BLOOD BY AUTOMATED COUNT: 15.9 % (ref 11.5–14.5)
ERYTHROCYTE [DISTWIDTH] IN BLOOD BY AUTOMATED COUNT: 16 % (ref 11.5–14.5)
EST. AVERAGE GLUCOSE BLD GHB EST-MCNC: 180 MG/DL
GFR SERPL CREATININE-BSD FRML MDRD: 48 ML/MIN/1.73M*2
GFR SERPL CREATININE-BSD FRML MDRD: 64 ML/MIN/1.73M*2
GLUCOSE BLD MANUAL STRIP-MCNC: 105 MG/DL (ref 74–99)
GLUCOSE BLD MANUAL STRIP-MCNC: 132 MG/DL (ref 74–99)
GLUCOSE BLD MANUAL STRIP-MCNC: 151 MG/DL (ref 74–99)
GLUCOSE BLD MANUAL STRIP-MCNC: 179 MG/DL (ref 74–99)
GLUCOSE SERPL-MCNC: 131 MG/DL (ref 74–99)
GLUCOSE SERPL-MCNC: 139 MG/DL (ref 65–99)
HBA1C MFR BLD: 7.9 %
HCT VFR BLD AUTO: 37.7 % (ref 41–52)
HCT VFR BLD AUTO: 39.1 % (ref 41–52)
HGB BLD-MCNC: 11.3 G/DL (ref 13.5–17.5)
HGB BLD-MCNC: 11.6 G/DL (ref 13.5–17.5)
INR PPP: 2.5 (ref 0.9–1.2)
INR PPP: 2.6 (ref 0.9–1.1)
MCH RBC QN AUTO: 26.9 PG (ref 26–34)
MCH RBC QN AUTO: 27.1 PG (ref 26–34)
MCHC RBC AUTO-ENTMCNC: 29.7 G/DL (ref 32–36)
MCHC RBC AUTO-ENTMCNC: 30 G/DL (ref 32–36)
MCV RBC AUTO: 90 FL (ref 80–100)
MCV RBC AUTO: 91 FL (ref 80–100)
NRBC BLD-RTO: 0 /100 WBCS (ref 0–0)
NRBC BLD-RTO: 0 /100 WBCS (ref 0–0)
PLATELET # BLD AUTO: 159 X10*3/UL (ref 150–450)
PLATELET # BLD AUTO: 169 X10*3/UL (ref 150–450)
PMV BLD AUTO: 10.6 FL (ref 7.5–11.5)
PMV BLD AUTO: 11.2 FL (ref 7.5–11.5)
POTASSIUM SERPL-SCNC: 4 MMOL/L (ref 3.5–5.3)
POTASSIUM SERPL-SCNC: 4.1 MMOL/L (ref 3.4–5.1)
PROT SERPL-MCNC: 6.6 G/DL (ref 6.4–8.2)
PROTHROMBIN TIME: 24.9 SECONDS (ref 9.3–12.7)
PROTHROMBIN TIME: 29.4 SECONDS (ref 9.8–12.8)
RBC # BLD AUTO: 4.17 X10*6/UL (ref 4.5–5.9)
RBC # BLD AUTO: 4.32 X10*6/UL (ref 4.5–5.9)
SODIUM SERPL-SCNC: 140 MMOL/L (ref 133–145)
SODIUM SERPL-SCNC: 141 MMOL/L (ref 136–145)
WBC # BLD AUTO: 8.7 X10*3/UL (ref 4.4–11.3)
WBC # BLD AUTO: 9.6 X10*3/UL (ref 4.4–11.3)

## 2023-10-19 PROCEDURE — 36415 COLL VENOUS BLD VENIPUNCTURE: CPT

## 2023-10-19 PROCEDURE — 4B02XSZ MEASUREMENT OF CARDIAC PACEMAKER, EXTERNAL APPROACH: ICD-10-PCS | Performed by: INTERNAL MEDICINE

## 2023-10-19 PROCEDURE — 80053 COMPREHEN METABOLIC PANEL: CPT | Performed by: INTERNAL MEDICINE

## 2023-10-19 PROCEDURE — 83880 ASSAY OF NATRIURETIC PEPTIDE: CPT | Mod: CMCLAB

## 2023-10-19 PROCEDURE — 36415 COLL VENOUS BLD VENIPUNCTURE: CPT | Performed by: INTERNAL MEDICINE

## 2023-10-19 PROCEDURE — 96372 THER/PROPH/DIAG INJ SC/IM: CPT

## 2023-10-19 PROCEDURE — 85610 PROTHROMBIN TIME: CPT | Performed by: INTERNAL MEDICINE

## 2023-10-19 PROCEDURE — 85610 PROTHROMBIN TIME: CPT | Mod: CMCLAB

## 2023-10-19 PROCEDURE — 85027 COMPLETE CBC AUTOMATED: CPT | Performed by: INTERNAL MEDICINE

## 2023-10-19 PROCEDURE — 80048 BASIC METABOLIC PNL TOTAL CA: CPT

## 2023-10-19 PROCEDURE — 2500000004 HC RX 250 GENERAL PHARMACY W/ HCPCS (ALT 636 FOR OP/ED): Performed by: INTERNAL MEDICINE

## 2023-10-19 PROCEDURE — 1100000001 HC PRIVATE ROOM DAILY

## 2023-10-19 PROCEDURE — 83036 HEMOGLOBIN GLYCOSYLATED A1C: CPT | Performed by: INTERNAL MEDICINE

## 2023-10-19 PROCEDURE — 85027 COMPLETE CBC AUTOMATED: CPT | Mod: CMCLAB

## 2023-10-19 PROCEDURE — 2500000001 HC RX 250 WO HCPCS SELF ADMINISTERED DRUGS (ALT 637 FOR MEDICARE OP)

## 2023-10-19 PROCEDURE — 2500000004 HC RX 250 GENERAL PHARMACY W/ HCPCS (ALT 636 FOR OP/ED)

## 2023-10-19 PROCEDURE — 82947 ASSAY GLUCOSE BLOOD QUANT: CPT

## 2023-10-19 PROCEDURE — 2500000002 HC RX 250 W HCPCS SELF ADMINISTERED DRUGS (ALT 637 FOR MEDICARE OP, ALT 636 FOR OP/ED)

## 2023-10-19 PROCEDURE — 93010 ELECTROCARDIOGRAM REPORT: CPT | Performed by: INTERNAL MEDICINE

## 2023-10-19 PROCEDURE — 82947 ASSAY GLUCOSE BLOOD QUANT: CPT | Mod: CMCLAB

## 2023-10-19 PROCEDURE — 2500000001 HC RX 250 WO HCPCS SELF ADMINISTERED DRUGS (ALT 637 FOR MEDICARE OP): Performed by: INTERNAL MEDICINE

## 2023-10-19 PROCEDURE — 99222 1ST HOSP IP/OBS MODERATE 55: CPT

## 2023-10-19 RX ORDER — WARFARIN 3 MG/1
3 TABLET ORAL
Status: CANCELLED | OUTPATIENT
Start: 2023-10-23

## 2023-10-19 RX ORDER — INSULIN GLARGINE 100 [IU]/ML
45 INJECTION, SOLUTION SUBCUTANEOUS NIGHTLY
Status: CANCELLED | OUTPATIENT
Start: 2023-10-19

## 2023-10-19 RX ORDER — ASPIRIN 81 MG/1
81 TABLET ORAL DAILY
Status: CANCELLED | OUTPATIENT
Start: 2023-10-20

## 2023-10-19 RX ORDER — DEXTROSE 50 % IN WATER (D50W) INTRAVENOUS SYRINGE
25
Status: DISCONTINUED | OUTPATIENT
Start: 2023-10-19 | End: 2023-10-24 | Stop reason: HOSPADM

## 2023-10-19 RX ORDER — FUROSEMIDE 10 MG/ML
40 INJECTION INTRAMUSCULAR; INTRAVENOUS EVERY 12 HOURS
Status: CANCELLED | OUTPATIENT
Start: 2023-10-19

## 2023-10-19 RX ORDER — DEXTROSE 50 % IN WATER (D50W) INTRAVENOUS SYRINGE
25
Status: CANCELLED | OUTPATIENT
Start: 2023-10-19

## 2023-10-19 RX ORDER — ENOXAPARIN SODIUM 100 MG/ML
40 INJECTION SUBCUTANEOUS EVERY 24 HOURS
Status: DISCONTINUED | OUTPATIENT
Start: 2023-10-19 | End: 2023-10-20

## 2023-10-19 RX ORDER — ACETAMINOPHEN 325 MG/1
650 TABLET ORAL EVERY 6 HOURS PRN
Status: CANCELLED | OUTPATIENT
Start: 2023-10-19

## 2023-10-19 RX ORDER — WARFARIN 3 MG/1
TABLET ORAL
Start: 2023-10-19 | End: 2024-10-18

## 2023-10-19 RX ORDER — ATORVASTATIN CALCIUM 80 MG/1
80 TABLET, FILM COATED ORAL DAILY
Status: CANCELLED | OUTPATIENT
Start: 2023-10-19

## 2023-10-19 RX ORDER — FUROSEMIDE 10 MG/ML
40 INJECTION INTRAMUSCULAR; INTRAVENOUS ONCE
Status: COMPLETED | OUTPATIENT
Start: 2023-10-19 | End: 2023-10-19

## 2023-10-19 RX ORDER — LANOLIN ALCOHOL/MO/W.PET/CERES
400 CREAM (GRAM) TOPICAL DAILY
Status: CANCELLED | OUTPATIENT
Start: 2023-10-20

## 2023-10-19 RX ORDER — POLYETHYLENE GLYCOL 3350 17 G/17G
17 POWDER, FOR SOLUTION ORAL DAILY PRN
Status: CANCELLED | OUTPATIENT
Start: 2023-10-19

## 2023-10-19 RX ORDER — POLYETHYLENE GLYCOL 3350 17 G/17G
17 POWDER, FOR SOLUTION ORAL DAILY
Status: DISCONTINUED | OUTPATIENT
Start: 2023-10-19 | End: 2023-10-24 | Stop reason: HOSPADM

## 2023-10-19 RX ORDER — PANTOPRAZOLE SODIUM 40 MG/1
40 TABLET, DELAYED RELEASE ORAL
Status: CANCELLED | OUTPATIENT
Start: 2023-10-20

## 2023-10-19 RX ORDER — ATORVASTATIN CALCIUM 80 MG/1
80 TABLET, FILM COATED ORAL NIGHTLY
Status: DISCONTINUED | OUTPATIENT
Start: 2023-10-19 | End: 2023-10-24 | Stop reason: HOSPADM

## 2023-10-19 RX ORDER — CHOLECALCIFEROL (VITAMIN D3) 25 MCG
1000 TABLET ORAL DAILY
Status: CANCELLED | OUTPATIENT
Start: 2023-10-20

## 2023-10-19 RX ORDER — NEOMYCIN AND POLYMYXIN B SULFATES AND BACITRACIN ZINC 400; 3.5; 1 [USP'U]/G; MG/G; [USP'U]/G
0.5 OINTMENT OPHTHALMIC DAILY
Start: 2023-10-19

## 2023-10-19 RX ORDER — DEXTROSE MONOHYDRATE 100 MG/ML
0.3 INJECTION, SOLUTION INTRAVENOUS ONCE AS NEEDED
Status: CANCELLED | OUTPATIENT
Start: 2023-10-19

## 2023-10-19 RX ORDER — CHOLECALCIFEROL (VITAMIN D3) 25 MCG
1000 TABLET ORAL DAILY
Start: 2023-10-20

## 2023-10-19 RX ORDER — FUROSEMIDE 10 MG/ML
40 INJECTION INTRAMUSCULAR; INTRAVENOUS EVERY 12 HOURS
Start: 2023-10-19 | End: 2023-10-24 | Stop reason: HOSPADM

## 2023-10-19 RX ORDER — WARFARIN 3 MG/1
TABLET ORAL
Status: ON HOLD
Start: 2023-10-20 | End: 2023-12-19 | Stop reason: SDUPTHER

## 2023-10-19 RX ORDER — DEXTROSE MONOHYDRATE 100 MG/ML
0.3 INJECTION, SOLUTION INTRAVENOUS ONCE AS NEEDED
Qty: 500 ML
Start: 2023-10-19 | End: 2023-10-24 | Stop reason: HOSPADM

## 2023-10-19 RX ORDER — TRIAMCINOLONE ACETONIDE 1 MG/G
CREAM TOPICAL 2 TIMES DAILY
Start: 2023-10-19

## 2023-10-19 RX ORDER — DEXTROSE MONOHYDRATE 100 MG/ML
0.3 INJECTION, SOLUTION INTRAVENOUS ONCE AS NEEDED
Status: DISCONTINUED | OUTPATIENT
Start: 2023-10-19 | End: 2023-10-24 | Stop reason: HOSPADM

## 2023-10-19 RX ORDER — ACETAMINOPHEN 325 MG/1
650 TABLET ORAL EVERY 6 HOURS PRN
Qty: 30 TABLET | Refills: 0
Start: 2023-10-19

## 2023-10-19 RX ORDER — TALC
3 POWDER (GRAM) TOPICAL NIGHTLY PRN
Refills: 0
Start: 2023-10-19

## 2023-10-19 RX ORDER — ISOSORBIDE MONONITRATE 30 MG/1
30 TABLET, EXTENDED RELEASE ORAL DAILY
Status: CANCELLED | OUTPATIENT
Start: 2023-10-20

## 2023-10-19 RX ORDER — INSULIN LISPRO 100 [IU]/ML
0-5 INJECTION, SOLUTION INTRAVENOUS; SUBCUTANEOUS
Status: DISCONTINUED | OUTPATIENT
Start: 2023-10-19 | End: 2023-10-24 | Stop reason: HOSPADM

## 2023-10-19 RX ORDER — PANTOPRAZOLE SODIUM 40 MG/1
40 TABLET, DELAYED RELEASE ORAL
Start: 2023-10-20 | End: 2023-10-24 | Stop reason: HOSPADM

## 2023-10-19 RX ORDER — TRIAMCINOLONE ACETONIDE 1 MG/G
CREAM TOPICAL 2 TIMES DAILY
Status: CANCELLED | OUTPATIENT
Start: 2023-10-19

## 2023-10-19 RX ORDER — POLYETHYLENE GLYCOL 3350 17 G/17G
17 POWDER, FOR SOLUTION ORAL DAILY
Start: 2023-10-19 | End: 2023-10-24 | Stop reason: HOSPADM

## 2023-10-19 RX ORDER — METOPROLOL TARTRATE 25 MG/1
25 TABLET, FILM COATED ORAL 2 TIMES DAILY
Status: CANCELLED | OUTPATIENT
Start: 2023-10-19

## 2023-10-19 RX ORDER — TALC
3 POWDER (GRAM) TOPICAL NIGHTLY PRN
Status: CANCELLED | OUTPATIENT
Start: 2023-10-19

## 2023-10-19 RX ORDER — LISINOPRIL 5 MG/1
5 TABLET ORAL DAILY
Status: CANCELLED | OUTPATIENT
Start: 2023-10-20

## 2023-10-19 RX ORDER — INSULIN LISPRO 100 [IU]/ML
0-10 INJECTION, SOLUTION INTRAVENOUS; SUBCUTANEOUS
Status: CANCELLED | OUTPATIENT
Start: 2023-10-19

## 2023-10-19 RX ORDER — LANOLIN ALCOHOL/MO/W.PET/CERES
400 CREAM (GRAM) TOPICAL DAILY
Start: 2023-10-20

## 2023-10-19 RX ADMIN — FUROSEMIDE 40 MG: 10 INJECTION, SOLUTION INTRAVENOUS at 19:17

## 2023-10-19 RX ADMIN — Medication 1000 UNITS: at 08:29

## 2023-10-19 RX ADMIN — INSULIN LISPRO 1 UNITS: 100 INJECTION, SOLUTION INTRAVENOUS; SUBCUTANEOUS at 18:40

## 2023-10-19 RX ADMIN — TRIAMCINOLONE ACETONIDE: 1 CREAM TOPICAL at 08:30

## 2023-10-19 RX ADMIN — ENOXAPARIN SODIUM 40 MG: 40 INJECTION SUBCUTANEOUS at 18:41

## 2023-10-19 RX ADMIN — TRIAMCINOLONE ACETONIDE: 1 CREAM TOPICAL at 00:29

## 2023-10-19 RX ADMIN — METOPROLOL TARTRATE 25 MG: 25 TABLET, FILM COATED ORAL at 00:18

## 2023-10-19 RX ADMIN — METOPROLOL TARTRATE 25 MG: 25 TABLET, FILM COATED ORAL at 08:29

## 2023-10-19 RX ADMIN — POLYETHYLENE GLYCOL 3350 17 G: 17 POWDER, FOR SOLUTION ORAL at 18:41

## 2023-10-19 RX ADMIN — ATORVASTATIN CALCIUM 80 MG: 80 TABLET, FILM COATED ORAL at 20:26

## 2023-10-19 RX ADMIN — PANTOPRAZOLE SODIUM 40 MG: 40 TABLET, DELAYED RELEASE ORAL at 06:31

## 2023-10-19 RX ADMIN — Medication 400 MG: at 08:29

## 2023-10-19 RX ADMIN — ISOSORBIDE MONONITRATE 30 MG: 30 TABLET, EXTENDED RELEASE ORAL at 08:29

## 2023-10-19 RX ADMIN — LISINOPRIL 5 MG: 5 TABLET ORAL at 08:29

## 2023-10-19 RX ADMIN — ASPIRIN 81 MG: 81 TABLET, COATED ORAL at 08:29

## 2023-10-19 RX ADMIN — FUROSEMIDE 40 MG: 10 INJECTION, SOLUTION INTRAMUSCULAR; INTRAVENOUS at 00:29

## 2023-10-19 SDOH — SOCIAL STABILITY: SOCIAL NETWORK: ARE YOU MARRIED, WIDOWED, DIVORCED, SEPARATED, NEVER MARRIED, OR LIVING WITH A PARTNER?: WIDOWED

## 2023-10-19 SDOH — HEALTH STABILITY: PHYSICAL HEALTH: ON AVERAGE, HOW MANY MINUTES DO YOU ENGAGE IN EXERCISE AT THIS LEVEL?: 0 MIN

## 2023-10-19 SDOH — SOCIAL STABILITY: SOCIAL INSECURITY: WITHIN THE LAST YEAR, HAVE YOU BEEN HUMILIATED OR EMOTIONALLY ABUSED IN OTHER WAYS BY YOUR PARTNER OR EX-PARTNER?: NO

## 2023-10-19 SDOH — HEALTH STABILITY: MENTAL HEALTH: HOW MANY STANDARD DRINKS CONTAINING ALCOHOL DO YOU HAVE ON A TYPICAL DAY?: 1 OR 2

## 2023-10-19 SDOH — SOCIAL STABILITY: SOCIAL NETWORK: HOW OFTEN DO YOU ATTEND CHURCH OR RELIGIOUS SERVICES?: NEVER

## 2023-10-19 SDOH — SOCIAL STABILITY: SOCIAL INSECURITY: DOES ANYONE TRY TO KEEP YOU FROM HAVING/CONTACTING OTHER FRIENDS OR DOING THINGS OUTSIDE YOUR HOME?: NO

## 2023-10-19 SDOH — SOCIAL STABILITY: SOCIAL NETWORK
DO YOU BELONG TO ANY CLUBS OR ORGANIZATIONS SUCH AS CHURCH GROUPS UNIONS, FRATERNAL OR ATHLETIC GROUPS, OR SCHOOL GROUPS?: NO

## 2023-10-19 SDOH — SOCIAL STABILITY: SOCIAL INSECURITY: ABUSE: ADULT

## 2023-10-19 SDOH — SOCIAL STABILITY: SOCIAL INSECURITY: HAVE YOU HAD THOUGHTS OF HARMING ANYONE ELSE?: NO

## 2023-10-19 SDOH — SOCIAL STABILITY: SOCIAL INSECURITY: HAS ANYONE EVER THREATENED TO HURT YOUR FAMILY OR YOUR PETS?: NO

## 2023-10-19 SDOH — HEALTH STABILITY: MENTAL HEALTH
STRESS IS WHEN SOMEONE FEELS TENSE, NERVOUS, ANXIOUS, OR CAN'T SLEEP AT NIGHT BECAUSE THEIR MIND IS TROUBLED. HOW STRESSED ARE YOU?: NOT AT ALL

## 2023-10-19 SDOH — SOCIAL STABILITY: SOCIAL INSECURITY: ARE YOU OR HAVE YOU BEEN THREATENED OR ABUSED PHYSICALLY, EMOTIONALLY, OR SEXUALLY BY ANYONE?: NO

## 2023-10-19 SDOH — ECONOMIC STABILITY: INCOME INSECURITY: IN THE LAST 12 MONTHS, WAS THERE A TIME WHEN YOU WERE NOT ABLE TO PAY THE MORTGAGE OR RENT ON TIME?: NO

## 2023-10-19 SDOH — SOCIAL STABILITY: SOCIAL INSECURITY
WITHIN THE LAST YEAR, HAVE YOU BEEN KICKED, HIT, SLAPPED, OR OTHERWISE PHYSICALLY HURT BY YOUR PARTNER OR EX-PARTNER?: NO

## 2023-10-19 SDOH — SOCIAL STABILITY: SOCIAL INSECURITY
WITHIN THE LAST YEAR, HAVE TO BEEN RAPED OR FORCED TO HAVE ANY KIND OF SEXUAL ACTIVITY BY YOUR PARTNER OR EX-PARTNER?: NO

## 2023-10-19 SDOH — SOCIAL STABILITY: SOCIAL INSECURITY: DO YOU FEEL ANYONE HAS EXPLOITED OR TAKEN ADVANTAGE OF YOU FINANCIALLY OR OF YOUR PERSONAL PROPERTY?: NO

## 2023-10-19 SDOH — SOCIAL STABILITY: SOCIAL INSECURITY: WITHIN THE LAST YEAR, HAVE YOU BEEN AFRAID OF YOUR PARTNER OR EX-PARTNER?: NO

## 2023-10-19 SDOH — ECONOMIC STABILITY: FOOD INSECURITY: WITHIN THE PAST 12 MONTHS, THE FOOD YOU BOUGHT JUST DIDN'T LAST AND YOU DIDN'T HAVE MONEY TO GET MORE.: NEVER TRUE

## 2023-10-19 SDOH — ECONOMIC STABILITY: INCOME INSECURITY: HOW HARD IS IT FOR YOU TO PAY FOR THE VERY BASICS LIKE FOOD, HOUSING, MEDICAL CARE, AND HEATING?: NOT HARD AT ALL

## 2023-10-19 SDOH — SOCIAL STABILITY: SOCIAL NETWORK: HOW OFTEN DO YOU ATTENT MEETINGS OF THE CLUB OR ORGANIZATION YOU BELONG TO?: NEVER

## 2023-10-19 SDOH — ECONOMIC STABILITY: INCOME INSECURITY: IN THE PAST 12 MONTHS, HAS THE ELECTRIC, GAS, OIL, OR WATER COMPANY THREATENED TO SHUT OFF SERVICE IN YOUR HOME?: NO

## 2023-10-19 SDOH — HEALTH STABILITY: MENTAL HEALTH: HOW OFTEN DO YOU HAVE 6 OR MORE DRINKS ON ONE OCCASION?: NEVER

## 2023-10-19 SDOH — ECONOMIC STABILITY: FOOD INSECURITY: WITHIN THE PAST 12 MONTHS, YOU WORRIED THAT YOUR FOOD WOULD RUN OUT BEFORE YOU GOT MONEY TO BUY MORE.: NEVER TRUE

## 2023-10-19 SDOH — SOCIAL STABILITY: SOCIAL NETWORK
IN A TYPICAL WEEK, HOW MANY TIMES DO YOU TALK ON THE PHONE WITH FAMILY, FRIENDS, OR NEIGHBORS?: MORE THAN THREE TIMES A WEEK

## 2023-10-19 SDOH — SOCIAL STABILITY: SOCIAL INSECURITY: WERE YOU ABLE TO COMPLETE ALL THE BEHAVIORAL HEALTH SCREENINGS?: YES

## 2023-10-19 SDOH — HEALTH STABILITY: PHYSICAL HEALTH: ON AVERAGE, HOW MANY DAYS PER WEEK DO YOU ENGAGE IN MODERATE TO STRENUOUS EXERCISE (LIKE A BRISK WALK)?: 0 DAYS

## 2023-10-19 SDOH — SOCIAL STABILITY: SOCIAL INSECURITY: ARE THERE ANY APPARENT SIGNS OF INJURIES/BEHAVIORS THAT COULD BE RELATED TO ABUSE/NEGLECT?: NO

## 2023-10-19 SDOH — ECONOMIC STABILITY: TRANSPORTATION INSECURITY
IN THE PAST 12 MONTHS, HAS THE LACK OF TRANSPORTATION KEPT YOU FROM MEDICAL APPOINTMENTS OR FROM GETTING MEDICATIONS?: NO

## 2023-10-19 SDOH — ECONOMIC STABILITY: HOUSING INSECURITY
IN THE LAST 12 MONTHS, WAS THERE A TIME WHEN YOU DID NOT HAVE A STEADY PLACE TO SLEEP OR SLEPT IN A SHELTER (INCLUDING NOW)?: NO

## 2023-10-19 SDOH — HEALTH STABILITY: MENTAL HEALTH: HOW OFTEN DO YOU HAVE A DRINK CONTAINING ALCOHOL?: MONTHLY OR LESS

## 2023-10-19 SDOH — ECONOMIC STABILITY: TRANSPORTATION INSECURITY
IN THE PAST 12 MONTHS, HAS LACK OF TRANSPORTATION KEPT YOU FROM MEETINGS, WORK, OR FROM GETTING THINGS NEEDED FOR DAILY LIVING?: NO

## 2023-10-19 SDOH — SOCIAL STABILITY: SOCIAL NETWORK: HOW OFTEN DO YOU GET TOGETHER WITH FRIENDS OR RELATIVES?: ONCE A WEEK

## 2023-10-19 SDOH — SOCIAL STABILITY: SOCIAL INSECURITY: DO YOU FEEL UNSAFE GOING BACK TO THE PLACE WHERE YOU ARE LIVING?: NO

## 2023-10-19 SDOH — ECONOMIC STABILITY: HOUSING INSECURITY: IN THE LAST 12 MONTHS, HOW MANY PLACES HAVE YOU LIVED?: 1

## 2023-10-19 ASSESSMENT — PAIN SCALES - PAIN ASSESSMENT IN ADVANCED DEMENTIA (PAINAD)
TOTALSCORE: 0
FACIALEXPRESSION: SMILING OR INEXPRESSIVE
BREATHING: NORMAL
BODYLANGUAGE: RELAXED
CONSOLABILITY: NO NEED TO CONSOLE

## 2023-10-19 ASSESSMENT — COGNITIVE AND FUNCTIONAL STATUS - GENERAL
STANDING UP FROM CHAIR USING ARMS: A LITTLE
CLIMB 3 TO 5 STEPS WITH RAILING: TOTAL
MOBILITY SCORE: 16
MOVING FROM LYING ON BACK TO SITTING ON SIDE OF FLAT BED WITH BEDRAILS: A LITTLE
TURNING FROM BACK TO SIDE WHILE IN FLAT BAD: A LITTLE
DAILY ACTIVITIY SCORE: 17
DRESSING REGULAR LOWER BODY CLOTHING: A LITTLE
CLIMB 3 TO 5 STEPS WITH RAILING: A LOT
TOILETING: A LOT
PATIENT BASELINE BEDBOUND: NO
DRESSING REGULAR UPPER BODY CLOTHING: A LITTLE
STANDING UP FROM CHAIR USING ARMS: A LOT
WALKING IN HOSPITAL ROOM: A LOT
HELP NEEDED FOR BATHING: A LITTLE
MOVING TO AND FROM BED TO CHAIR: A LITTLE
TURNING FROM BACK TO SIDE WHILE IN FLAT BAD: A LITTLE
PERSONAL GROOMING: A LOT
MOVING FROM LYING ON BACK TO SITTING ON SIDE OF FLAT BED WITH BEDRAILS: A LITTLE
WALKING IN HOSPITAL ROOM: A LOT
MOBILITY SCORE: 13
MOVING TO AND FROM BED TO CHAIR: A LOT

## 2023-10-19 ASSESSMENT — PAIN SCALES - GENERAL
PAINLEVEL_OUTOF10: 0 - NO PAIN

## 2023-10-19 ASSESSMENT — ACTIVITIES OF DAILY LIVING (ADL)
ADEQUATE_TO_COMPLETE_ADL: YES
LACK_OF_TRANSPORTATION: NO
BATHING: NEEDS ASSISTANCE
TOILETING: NEEDS ASSISTANCE
ASSISTIVE_DEVICE: WALKER
DRESSING YOURSELF: NEEDS ASSISTANCE
WALKS IN HOME: NEEDS ASSISTANCE
GROOMING: NEEDS ASSISTANCE
FEEDING YOURSELF: INDEPENDENT
JUDGMENT_ADEQUATE_SAFELY_COMPLETE_DAILY_ACTIVITIES: NO
LACK_OF_TRANSPORTATION: NO
HEARING - LEFT EAR: FUNCTIONAL
PATIENT'S MEMORY ADEQUATE TO SAFELY COMPLETE DAILY ACTIVITIES?: YES
HEARING - RIGHT EAR: FUNCTIONAL

## 2023-10-19 ASSESSMENT — PATIENT HEALTH QUESTIONNAIRE - PHQ9
SUM OF ALL RESPONSES TO PHQ9 QUESTIONS 1 & 2: 0
1. LITTLE INTEREST OR PLEASURE IN DOING THINGS: NOT AT ALL
SUM OF ALL RESPONSES TO PHQ9 QUESTIONS 1 & 2: 0
2. FEELING DOWN, DEPRESSED OR HOPELESS: NOT AT ALL
2. FEELING DOWN, DEPRESSED OR HOPELESS: NOT AT ALL
1. LITTLE INTEREST OR PLEASURE IN DOING THINGS: NOT AT ALL

## 2023-10-19 ASSESSMENT — LIFESTYLE VARIABLES
SKIP TO QUESTIONS 9-10: 1
HOW MANY STANDARD DRINKS CONTAINING ALCOHOL DO YOU HAVE ON A TYPICAL DAY: PATIENT DOES NOT DRINK
SKIP TO QUESTIONS 9-10: 1
SKIP TO QUESTIONS 9-10: 1
HOW MANY STANDARD DRINKS CONTAINING ALCOHOL DO YOU HAVE ON A TYPICAL DAY: 1 OR 2
HOW OFTEN DO YOU HAVE 6 OR MORE DRINKS ON ONE OCCASION: NEVER
AUDIT-C TOTAL SCORE: 1
SUBSTANCE_ABUSE_PAST_12_MONTHS: NO
AUDIT-C TOTAL SCORE: 1
AUDIT-C TOTAL SCORE: 0
PRESCIPTION_ABUSE_PAST_12_MONTHS: NO
AUDIT-C TOTAL SCORE: 0
AUDIT-C TOTAL SCORE: 1
HOW OFTEN DO YOU HAVE 6 OR MORE DRINKS ON ONE OCCASION: NEVER
HOW OFTEN DO YOU HAVE A DRINK CONTAINING ALCOHOL: MONTHLY OR LESS
HOW OFTEN DO YOU HAVE A DRINK CONTAINING ALCOHOL: NEVER

## 2023-10-19 ASSESSMENT — PAIN SCALES - WONG BAKER
WONGBAKER_NUMERICALRESPONSE: NO HURT
WONGBAKER_NUMERICALRESPONSE: NO HURT

## 2023-10-19 ASSESSMENT — COLUMBIA-SUICIDE SEVERITY RATING SCALE - C-SSRS
1. IN THE PAST MONTH, HAVE YOU WISHED YOU WERE DEAD OR WISHED YOU COULD GO TO SLEEP AND NOT WAKE UP?: NO
6. HAVE YOU EVER DONE ANYTHING, STARTED TO DO ANYTHING, OR PREPARED TO DO ANYTHING TO END YOUR LIFE?: NO
2. HAVE YOU ACTUALLY HAD ANY THOUGHTS OF KILLING YOURSELF?: NO

## 2023-10-19 ASSESSMENT — CHA2DS2 SCORE
SEX: MALE
AGE IN YEARS: 75+

## 2023-10-19 NOTE — CARE PLAN
Pt has a POA and Living Will; not on file; requested documents from pt    ADOD: pt will be transferred to [facility] today at 4 PM    Introduced self to pt in his room and explained the reason for the visit; pt agreed to answer questions for this assessment    Pt lives at home alone in a 2 story home with 1 step to enter the home; 6 steps to go the the lower level and 6 steps to the  upper level.  He uses both a cane and a walker; he had a fall last week, tripped while stepping from the sidewalk onto the grass; no injuries  He does not use home 02, no cpap/bipap, no nebulizer. He is a diabetic and he checks his B.S. TID or more. He also has a shower chair and a hand held shower.  He no longer drives. His dtr Simran drives, takes him shopping, she brings him food, does the housework, laundry. He has meals on wheels 5 days per week.   He wears readers, hearing aids, but he does not have the hearing aids with him.  He does not smoke, he has 1 beer every 6 months, and no drugs      PLAN: TRANSFER TO [facility] TODAY AT 4 PM--TOWER 5 ROOM 508-9O

## 2023-10-19 NOTE — PROGRESS NOTES
10/19/23 1515   Current Planned Discharge Disposition   Current Planned Discharge Disposition Short Term

## 2023-10-19 NOTE — PROGRESS NOTES
Spiritual Care Visit    Clinical Encounter Type  Visited With: Patient  Routine Visit: Introduction  Continue Visiting: No         Values/Beliefs  Spiritual Requests During Hospitalization: No sacraments wanted by patient    Sacramental Encounters  Communion: Does not want communion  Communion Given Indicator: No  Sacrament of Sick-Anointing: Patient declined anointing  Nate Powell

## 2023-10-19 NOTE — DISCHARGE SUMMARY
Discharge Diagnosis  Acute CHF    Issues Requiring Follow-Up  Cardiology at [facility]    Discharge Meds     Your medication list        START taking these medications        Instructions Last Dose Given Next Dose Due   acetaminophen 325 mg tablet  Commonly known as: Tylenol      Take 2 tablets (650 mg) by mouth every 6 hours if needed for mild pain (1 - 3).       cholecalciferol 25 MCG (1000 UT) tablet  Commonly known as: Vitamin D-3  Start taking on: October 20, 2023  Replaces: cholecalciferol 25 MCG (1000 UT) capsule      Take 1 tablet (1,000 Units) by mouth once daily. Do not start before October 20, 2023.       dextrose 10 % in water (D10W) 10 % infusion      Infuse 26.13 g/hr at 261.3 mL/hr into a venous catheter 1 time if needed (For blood glucose less than 70 mg/dL after 30 minutes of intervention.  Discontinue once blood glucose reaches 100 mg/dL.) for up to 1 dose.       furosemide 10 mg/mL injection  Commonly known as: Lasix  Replaces: furosemide 40 mg tablet      Infuse 4 mL (40 mg) into a venous catheter every 12 hours.       lubricating eye drops ophthalmic solution  Replaces: carboxymethylcellulose 0.5 % ophthalmic solution      Administer 1 drop into both eyes 2 times a day as needed for dry eyes.       melatonin 3 mg tablet      Take 1 tablet (3 mg) by mouth as needed at bedtime for sleep.       pantoprazole 40 mg EC tablet  Commonly known as: ProtoNix  Start taking on: October 20, 2023      Take 1 tablet (40 mg) by mouth once daily in the morning. Take before meals. Do not crush, chew, or split. Do not start before October 20, 2023.       polyethylene glycol packet  Commonly known as: Glycolax, Miralax      Take 17 g by mouth once daily.              CHANGE how you take these medications        Instructions Last Dose Given Next Dose Due   magnesium oxide 400 mg magnesium capsule  What changed: Another medication with the same name was added. Make sure you understand how and when to take each.            magnesium oxide 400 mg (241.3 mg magnesium) tablet  Commonly known as: Mag-Ox  Start taking on: October 20, 2023  What changed: You were already taking a medication with the same name, and this prescription was added. Make sure you understand how and when to take each.      Take 1 tablet (400 mg) by mouth once daily. Do not start before October 20, 2023.       neomycin-bacitracin-polymyxin ophthalmic ointment  Commonly known as: Polysporin  What changed: See the new instructions.      Apply 0.5 inches to both eyes once daily.       triamcinolone 0.1 % cream  Commonly known as: Kenalog  What changed: See the new instructions.      Apply topically 2 times a day.       warfarin 3 mg tablet  Commonly known as: Coumadin  What changed:   how much to take  how to take this  when to take this  additional instructions      Take as directed. If you are unsure how to take this medication, talk to your nurse or doctor.  Original instructions: Take as directed per After Visit Summary.       warfarin 3 mg tablet  Commonly known as: Coumadin  Start taking on: October 20, 2023  What changed:   how much to take  how to take this  when to take this  additional instructions      Take as directed. If you are unsure how to take this medication, talk to your nurse or doctor.  Original instructions: Take as directed per After Visit Summary. Do not start before October 20, 2023.              CONTINUE taking these medications        Instructions Last Dose Given Next Dose Due   FreeStyle Maria Elena 14 Day Sensor kit  Generic drug: FreeStyle Maria Elena sensor system           isosorbide mononitrate ER 30 mg 24 hr tablet  Commonly known as: Imdur           Lantus U-100 Insulin 100 unit/mL injection  Generic drug: insulin glargine           lisinopril 5 mg tablet           metoprolol tartrate 25 mg tablet  Commonly known as: Lopressor                  STOP taking these medications      adalimumab 40 mg/0.8 mL pen injector kit pen-injector  Commonly  known as: Humira Pen        aspirin 81 mg EC tablet        atorvastatin 80 mg tablet  Commonly known as: Lipitor        carboxymethylcellulose 0.5 % ophthalmic solution  Commonly known as: Refresh Plus  Replaced by: lubricating eye drops ophthalmic solution        cholecalciferol 25 MCG (1000 UT) capsule  Commonly known as: Vitamin D-3  Replaced by: cholecalciferol 25 MCG (1000 UT) tablet        Deep Sea Nasal 0.65 % nasal spray  Generic drug: sodium chloride        furosemide 40 mg tablet  Commonly known as: Lasix  Replaced by: furosemide 10 mg/mL injection        linaGLIPtin 5 mg tablet  Commonly known as: Tradjenta        NovoLOG FlexPen U-100 Insulin 100 unit/mL (3 mL) pen  Generic drug: insulin aspart        omeprazole 20 mg DR capsule  Commonly known as: PriLOSEC        pramoxine 1 % lotion  Commonly known as: Sarna Sensitive                  Where to Get Your Medications        Information about where to get these medications is not yet available    Ask your nurse or doctor about these medications  acetaminophen 325 mg tablet  cholecalciferol 25 MCG (1000 UT) tablet  dextrose 10 % in water (D10W) 10 % infusion  furosemide 10 mg/mL injection  lubricating eye drops ophthalmic solution  magnesium oxide 400 mg (241.3 mg magnesium) tablet  melatonin 3 mg tablet  neomycin-bacitracin-polymyxin ophthalmic ointment  pantoprazole 40 mg EC tablet  polyethylene glycol packet  triamcinolone 0.1 % cream  warfarin 3 mg tablet  warfarin 3 mg tablet         Test Results Pending At Discharge  Pending Labs       No current pending labs.            Hospital Course   Unremarkable  Good response to IV diuretics  Transferred to [facility]    Pertinent Physical Exam At Time of Discharge  Physical Exam    Outpatient Follow-Up  Future Appointments   Date Time Provider Department Center   11/9/2023  1:00 PM [facility] [id number] [facility]   11/13/2023  1:15 PM Melvin Bahena MD [facility]  MD Leno

## 2023-10-19 NOTE — PROGRESS NOTES
Navneet Thompson is a 89 y.o. male on day 1 of admission presenting with No Principal Problem: There is no principal problem currently on the Problem List. Please update the Problem List and refresh..      Subjective     Urinating every hour.  No longer requires O2.  Still wants to go to [facility].  Has been drinking 2 liters fluid daily.       Objective     Last Recorded Vitals  /75 (BP Location: Right arm, Patient Position: Lying)   Pulse 70   Temp 36.8 °C (98.2 °F) (Oral)   Resp 18   Wt 87.1 kg (192 lb)   SpO2 96%   Intake/Output last 3 Shifts:    Intake/Output Summary (Last 24 hours) at 10/19/2023 0828  Last data filed at 10/19/2023 0307  Gross per 24 hour   Intake --   Output 275 ml   Net -275 ml       Admission Weight  Weight: 87.1 kg (192 lb) (10/18/23 0836)    Daily Weight  10/18/23 : 87.1 kg (192 lb)    Image Results  XR chest 1 view  Narrative: Interpreted By:  Susan Virk,   STUDY:  XR CHEST 1 VIEW;  10/18/2023 9:20 am      INDICATION:  Signs/Symptoms:sob.      COMPARISON:  06/12/2023.      ACCESSION NUMBER(S):  [id number]      ORDERING CLINICIAN:  SANDRINE WHITEHEAD      FINDINGS:  No focal airspace consolidations. Mild bilateral pulmonary edema. No  effusions. Heart size is enlarged. Postsurgical changes involving the  mid to left mediastinal region. Right-sided pacemaker. No acute  osseous findings.      Impression: Cardiomegaly and mild bilateral pulmonary edema.          MACRO:  None      Signed by: Susan Virk 10/18/2023 10:16 AM  Dictation workstation:   [id number]      Physical Exam    Unremarkable    Relevant Results    Results for orders placed or performed during the hospital encounter of 10/18/23 (from the past 24 hour(s))   CBC and Auto Differential   Result Value Ref Range    WBC 9.0 4.4 - 11.3 x10*3/uL    nRBC 0.0 0.0 - 0.0 /100 WBCs    RBC 3.88 (L) 4.50 - 5.90 x10*6/uL    Hemoglobin 10.6 (L) 13.5 - 17.5 g/dL    Hematocrit 34.2 (L) 41.0 - 52.0 %    MCV 88 80 - 100 fL    MCH 27.3  26.0 - 34.0 pg    MCHC 31.0 (L) 32.0 - 36.0 g/dL    RDW 15.9 (H) 11.5 - 14.5 %    Platelets 157 150 - 450 x10*3/uL    MPV 10.3 7.5 - 11.5 fL    Neutrophils % 72.6 40.0 - 80.0 %    Immature Granulocytes %, Automated 0.4 0.0 - 0.9 %    Lymphocytes % 17.5 13.0 - 44.0 %    Monocytes % 7.9 2.0 - 10.0 %    Eosinophils % 1.2 0.0 - 6.0 %    Basophils % 0.4 0.0 - 2.0 %    Neutrophils Absolute 6.48 (H) 1.60 - 5.50 x10*3/uL    Immature Granulocytes Absolute, Automated 0.04 0.00 - 0.50 x10*3/uL    Lymphocytes Absolute 1.57 0.80 - 3.00 x10*3/uL    Monocytes Absolute 0.71 0.05 - 0.80 x10*3/uL    Eosinophils Absolute 0.11 0.00 - 0.40 x10*3/uL    Basophils Absolute 0.04 0.00 - 0.10 x10*3/uL   Comprehensive metabolic panel   Result Value Ref Range    Glucose 140 (H) 65 - 99 mg/dL    Sodium 141 133 - 145 mmol/L    Potassium 3.9 3.4 - 5.1 mmol/L    Chloride 106 97 - 107 mmol/L    Bicarbonate 24 24 - 31 mmol/L    Urea Nitrogen 26 (H) 8 - 25 mg/dL    Creatinine 1.20 0.40 - 1.60 mg/dL    eGFR 57 (L) >60 mL/min/1.73m*2    Calcium 8.6 8.5 - 10.4 mg/dL    Albumin 3.4 (L) 3.5 - 5.0 g/dL    Alkaline Phosphatase 65 35 - 125 U/L    Total Protein 5.8 (L) 5.9 - 7.9 g/dL    AST 18 5 - 40 U/L    Bilirubin, Total 0.5 0.1 - 1.2 mg/dL    ALT 14 5 - 40 U/L    Anion Gap 11 <=19 mmol/L   Protime-INR   Result Value Ref Range    Protime 23.5 (H) 9.3 - 12.7 seconds    INR 2.3 (H) 0.9 - 1.2   APTT   Result Value Ref Range    aPTT 36.5 (H) 22.0 - 32.5 seconds   NT Pro-BNP   Result Value Ref Range    PROBNP 1,268 (H) 0 - 852 pg/mL   BLOOD GAS LACTIC ACID, VENOUS   Result Value Ref Range    POCT Lactate, Venous 1.9 0.4 - 2.0 mmol/L   Serial Troponin, Initial ([facility])   Result Value Ref Range    Troponin T, High Sensitivity 44 (H) <=15 ng/L   Serial Troponin, 2 Hour ([facility])   Result Value Ref Range    Troponin T, High Sensitivity 44 (H) <=15 ng/L   Serial Troponin, 6 Hour ([facility])   Result Value Ref Range    Troponin T, High Sensitivity 44 (H) <=15 ng/L   POCT  GLUCOSE   Result Value Ref Range    POCT Glucose 172 (H) 74 - 99 mg/dL   Basic Metabolic Panel   Result Value Ref Range    Glucose 139 (H) 65 - 99 mg/dL    Sodium 140 133 - 145 mmol/L    Potassium 4.1 3.4 - 5.1 mmol/L    Chloride 105 97 - 107 mmol/L    Bicarbonate 25 24 - 31 mmol/L    Urea Nitrogen 23 8 - 25 mg/dL    Creatinine 1.10 0.40 - 1.60 mg/dL    eGFR 64 >60 mL/min/1.73m*2    Calcium 9.4 8.5 - 10.4 mg/dL    Anion Gap 10 <=19 mmol/L   CBC   Result Value Ref Range    WBC 9.6 4.4 - 11.3 x10*3/uL    nRBC 0.0 0.0 - 0.0 /100 WBCs    RBC 4.32 (L) 4.50 - 5.90 x10*6/uL    Hemoglobin 11.6 (L) 13.5 - 17.5 g/dL    Hematocrit 39.1 (L) 41.0 - 52.0 %    MCV 91 80 - 100 fL    MCH 26.9 26.0 - 34.0 pg    MCHC 29.7 (L) 32.0 - 36.0 g/dL    RDW 16.0 (H) 11.5 - 14.5 %    Platelets 169 150 - 450 x10*3/uL    MPV 10.6 7.5 - 11.5 fL   Protime-INR   Result Value Ref Range    Protime 24.9 (H) 9.3 - 12.7 seconds    INR 2.5 (H) 0.9 - 1.2   Hemoglobin A1C   Result Value Ref Range    Hemoglobin A1C 7.9 (H) See below %    Estimated Average Glucose 180 Not Established mg/dL   POCT GLUCOSE   Result Value Ref Range    POCT Glucose 105 (H) 74 - 99 mg/dL                Assessment/Plan   This patient currently has cardiac telemetry ordered; if you would like to modify or discontinue the telemetry order, click here to go to the orders activity to modify/discontinue the order.              Active Problems:    Acute congestive heart failure (CMS/HCC)    1) Acute on chronic systolic heart failure  Continue IV furosemide.  2D echocardiogram.  Cardiology consult     2) Fluid overload  Fluid overload with pulmonary edema on chest x-ray secondary to congestive heart failure  Continue IV Lasix monitor fluid intake and output     3) Presence of cardiac defibrillator  Stable     4) Long term anticoagulant use  On warfarin for anticoagulation because of atrial fibrillation.  INR therapeutic.  Continue warfarin     5) Diabetes mellitus type 2 with  hyperglycemia  Continue Lantus and Humalog.  Hemoglobin A1c.  Hypoglycemia protocol     6) Coronary artery disease  Stable.  Continue aspirin and metoprolol and lisinopril     Code status was discussed, he is full code          10/19  Await [facility].    Joe Burr MD

## 2023-10-19 NOTE — NURSING NOTE
Report given to Avinash JACKSON at [facility] tower 5 for  room 507-2A. Per patient and family request iv lasix held for transport comfort

## 2023-10-19 NOTE — PROGRESS NOTES
10/19/23 1507   Physical Activity   On average, how many days per week do you engage in moderate to strenuous exercise (like a brisk walk)? 0 days   On average, how many minutes do you engage in exercise at this level? 0 min   Financial Resource Strain   How hard is it for you to pay for the very basics like food, housing, medical care, and heating? Not hard   Housing Stability   In the last 12 months, was there a time when you were not able to pay the mortgage or rent on time? N   In the last 12 months, how many places have you lived? 1   In the last 12 months, was there a time when you did not have a steady place to sleep or slept in a shelter (including now)? N   Transportation Needs   In the past 12 months, has lack of transportation kept you from medical appointments or from getting medications? no   In the past 12 months, has lack of transportation kept you from meetings, work, or from getting things needed for daily living? No   Food Insecurity   Within the past 12 months, you worried that your food would run out before you got the money to buy more. Never true   Within the past 12 months, the food you bought just didn't last and you didn't have money to get more. Never true   Stress   Do you feel stress - tense, restless, nervous, or anxious, or unable to sleep at night because your mind is troubled all the time - these days? Not at all   Social Connections   In a typical week, how many times do you talk on the phone with family, friends, or neighbors? More than 3   How often do you get together with friends or relatives? Once  (Pt said he does not get together with family except on holidays, birthdays, picnics)   How often do you attend Confucianism or Orthodox services? Never   Do you belong to any clubs or organizations such as Confucianism groups, unions, fraternal or athletic groups, or school groups? No   How often do you attend meetings of the clubs or organizations you belong to? Never   Are you ,  , , , never , or living with a partner?    Intimate Partner Violence   Within the last year, have you been afraid of your partner or ex-partner? No   Within the last year, have you been humiliated or emotionally abused in other ways by your partner or ex-partner? No   Within the last year, have you been kicked, hit, slapped, or otherwise physically hurt by your partner or ex-partner? No   Within the last year, have you been raped or forced to have any kind of sexual activity by your partner or ex-partner? No   Alcohol Use   Q1: How often do you have a drink containing alcohol? Monthly or l   Q2: How many drinks containing alcohol do you have on a typical day when you are drinking? 1 or 2   Q3: How often do you have six or more drinks on one occasion? Never   Utilities   In the past 12 months has the electric, gas, oil, or water company threatened to shut off services in your home? No

## 2023-10-19 NOTE — NURSING NOTE
Patient arrived on unit calm and cooperative,  oriented to room, phone and call within reach. A&O x4,  skin intact with reddened area to left groin, redness to sacrum, small area to L and R toes. States that he has a  pacemaker and it isn't working correctly, Patient waiting to be transferred to [facility] when bed becomes available. No open areas, denies any pain at this time. 2 assist for ambulation waiting Physical therapy evaluation.

## 2023-10-19 NOTE — PROGRESS NOTES
Who was seen and evaluated in this emergency department with plan to transfer to [facility] where he undergoes his care for CHF exacerbation.  Patient was endorsed to me pending transfer, however per transfer center there are no beds available at [facility] this evening therefore will admit to hospitalist service while awaiting bed assignment.  Discussed with Dr. Gutierrez who accepted for further care.

## 2023-10-19 NOTE — PROGRESS NOTES
10/19/23 1510   Discharge Planning   Living Arrangements Alone   Support Systems Family members   Type of Residence Private residence   Number of Stairs to Enter Residence 1  (Pt lives in a split leve; 6 steps up; 6 steps down)   Number of Stairs Within Residence 12  (Pt lives in a split level; 6 steps up; 6 steps down)   Do you have animals or pets at home? No   Who is requesting discharge planning? Provider   Home or Post Acute Services Other (Comment)  (Pt is transferred today to [facility]; discharge plan is unknown at this time)   Patient expects to be discharged to: D   Does the patient need discharge transport arranged? Yes   RoundTrip coordination needed? No   Has discharge transport been arranged? Yes   What day is the transport expected? 10/19/23   What time is the transport expected? 1600   Financial Resource Strain   How hard is it for you to pay for the very basics like food, housing, medical care, and heating? Not hard   Housing Stability   In the last 12 months, was there a time when you were not able to pay the mortgage or rent on time? N   In the last 12 months, how many places have you lived? 1   In the last 12 months, was there a time when you did not have a steady place to sleep or slept in a shelter (including now)? N   Transportation Needs   In the past 12 months, has lack of transportation kept you from medical appointments or from getting medications? no   In the past 12 months, has lack of transportation kept you from meetings, work, or from getting things needed for daily living? No   Patient Choice   Provider Choice list and CMS website (https://medicare.gov/care-compare#search) for post-acute Quality and Resource Measure Data were provided and reviewed with: Other (Comment)  ([place] to be [place] [facility] [place]; d/c plan unknown at this time)   Patient / Family choosing to utilize agency / facility established prior to hospitalization No

## 2023-10-19 NOTE — PROGRESS NOTES
Patient  is known to have HFrEF , CAD S\P CABG, TAVR, Pacemaker, Afib, DM. He presented to [facility] with worsening shortness of breath.  Physical exam was remarkable for bilateral crackles, no JVD elevation or LL edema. Vital signs WNL (normal O2 sat on roomair)  CXR showed pulmonary edema, EKG no acute changes or ST changes, transferred to  for continuity of care.    Heart Failure exacerbation on top of Ddx, in setting of EF 35% last Echo July 2023 , ACS less likely as patient is not having chest pain and troponins negative with no EKG changes. PE is less likely as patient is on warfarin with therapeutic INR.   To R\O arrhythmia in setting of pacemaker giving low battery sign.  Respiratory infection is possible, however patient denied symptoms of cough or fever.    #ADHF  #Afib  #ICD  # CAD S\P CABG, TAVR  #DM     Plan:  - start Lasix 40mg IV BID  - Strict I\O  - Keep on telemetry  - Resume home medications : Atorvastatin, Metoprolol (if tolerated), ISS and glargine, Warfarin 3mg Daily, ASA 81mg  - INR, BNP, CBC, CMP  - Echo  - EP consult at am   - DVT prophylaxis held (on warfarin)  - Cardiac Diet    Full Code  NOK: Daughter Jaz [phone]

## 2023-10-19 NOTE — PROGRESS NOTES
10/19/23 1513   [facility] Disability Status   Are you deaf or do you have serious difficulty hearing? Y  (Pt wears hearing aids)   Are you blind or do you have serious difficulty seeing, even when wearing glasses? N   Because of a physical, mental, or emotional condition, do you have serious difficulty concentrating, remembering, or making decisions? (5 years old or older) N   Do you have serious difficulty walking or climbing stairs? N  (Pt uses a walker and a cane; 1 recent fall outside without injury)   Do you have serious difficulty dressing or bathing? N   Because of a physical, mental, or emotional condition, do you have serious difficulty doing errands alone such as visiting the doctor? N

## 2023-10-19 NOTE — H&P
History Of Present Illness  Navneet Thompson is a 89 y.o. male presenting with a 2-week history of shortness of breath, worse with exertion.  He has a history of chronic systolic heart failure ejection fraction 35 to 40% in November 2022.  Over the past 2 weeks, the shortness of breath has gotten worse and he now gets short of breath after taking just a few steps.  He has felt dizzy a couple of times.  Today he woke up at 4 AM very short of breath and breathing rapidly.  He has had no cough, chest pain, fever, chills, nausea or vomiting.  He has had swelling of the legs which has become slightly worse.  He was wearing compression stockings at the time of this evaluation.     Past Medical History  Past Medical History:   Diagnosis Date    Aortic stenosis     Status post transcatheter aortic valve replacement    Atherosclerotic heart disease of native coronary artery without angina pectoris 12/12/2022    CAD (coronary artery disease)    Atrial fibrillation (CMS/HCC)     Chronic systolic heart failure (CMS/HCC)     Complete heart block (CMS/HCC)     status post dual chamber pacemaker placemtn in 01/2008    Diabetes mellitus, type 2 (CMS/HCC)     Essential (primary) hypertension 12/12/2022    Hypertension    Presence of automatic (implantable) cardiac defibrillator 11/14/2022    Cardiac defibrillator in place, upgraded from pacemaker in 05/2014       Surgical History  Past Surgical History:   Procedure Laterality Date    AORTIC VALVE REPLACEMENT  04/07/2022    Transcatheter aortic valve replacement (TAVR) for aortic valve stenosis    APPENDECTOMY  12/01/2014    Appendectomy    CORONARY ARTERY BYPASS GRAFT  12/01/2014    CABG    CT ABDOMEN PELVIS ANGIOGRAM W AND/OR WO IV CONTRAST  03/17/2020    CT ABDOMEN PELVIS ANGIOGRAM W AND/OR WO [place]AST [place] [facility] [place]    OTHER SURGICAL HISTORY  12/01/2014    Cardio-defib Pulse Generator Implantation Date        Social History  He reports that he has never smoked.  "He does not have any smokeless tobacco history on file. No history on file for alcohol use and drug use.    Family History  Family History   Problem Relation Name Age of Onset    Drug abuse Mother          OVERDOSE        Allergies  Dicloxacillin, Doxycycline, and Scopolamine    Review of Systems   General: Negative for fever,  chills or fatigue.    HEENT: Negative for headache, blurring of vision or double vision.    Cardiovascular: Negative for chest pain, palpitations or orthopnea.    Respiratory: As in the HPI   Gastrointestinal: Negative for nausea, vomiting, hematemesis, abdominal pain or diarrhea.   Genitourinary: Negative for dysuria, hematuria, frequency or nocturia.   Musculoskeletal: Negative for joint pain, joint swelling or deformity.   Skin: Negative for rash, itching, or jaundice.   Hematologic: Negative for bleeding or bruising.   Neurologic: Negative for headache, loss of consciousness. syncope or seizures       Physical Exam   General.: Pleasant, awake alert well-developed, responsive   HEENT: Normocephalic, not icteric, not pale, no facial asymmetry, no pharyngeal erythema.   Neck: Supple, no carotid bruit, no thyroid enlargement.   Cardiovascular: Regular heart rate and rhythm normal S1 and S2.   Respiratory: Bibasilar crackles on auscultation of the lungs.    Abdomen: Soft, nontender to palpation, bowel sounds present and normoactive.   Extremities: Bilateral pitting pedal and lower leg edema   Neurologic: Alert and oriented to self, place and date, muscle strength 5/5 in all extremities.   Dermatologic: No rash, ecchymosis, or jaundice.   Psychological: Appropriate affect and behavior.       Last Recorded Vitals  Blood pressure 126/64, pulse 70, temperature 36.6 °C (97.9 °F), resp. rate 20, height 1.778 m (5' 10\"), weight 87.1 kg (192 lb), SpO2 99 %.    Relevant Results  Results for orders placed or performed during the hospital encounter of 10/18/23 (from the past 24 hour(s))   CBC and Auto " Differential   Result Value Ref Range    WBC 9.0 4.4 - 11.3 x10*3/uL    nRBC 0.0 0.0 - 0.0 /100 WBCs    RBC 3.88 (L) 4.50 - 5.90 x10*6/uL    Hemoglobin 10.6 (L) 13.5 - 17.5 g/dL    Hematocrit 34.2 (L) 41.0 - 52.0 %    MCV 88 80 - 100 fL    MCH 27.3 26.0 - 34.0 pg    MCHC 31.0 (L) 32.0 - 36.0 g/dL    RDW 15.9 (H) 11.5 - 14.5 %    Platelets 157 150 - 450 x10*3/uL    MPV 10.3 7.5 - 11.5 fL    Neutrophils % 72.6 40.0 - 80.0 %    Immature Granulocytes %, Automated 0.4 0.0 - 0.9 %    Lymphocytes % 17.5 13.0 - 44.0 %    Monocytes % 7.9 2.0 - 10.0 %    Eosinophils % 1.2 0.0 - 6.0 %    Basophils % 0.4 0.0 - 2.0 %    Neutrophils Absolute 6.48 (H) 1.60 - 5.50 x10*3/uL    Immature Granulocytes Absolute, Automated 0.04 0.00 - 0.50 x10*3/uL    Lymphocytes Absolute 1.57 0.80 - 3.00 x10*3/uL    Monocytes Absolute 0.71 0.05 - 0.80 x10*3/uL    Eosinophils Absolute 0.11 0.00 - 0.40 x10*3/uL    Basophils Absolute 0.04 0.00 - 0.10 x10*3/uL   Comprehensive metabolic panel   Result Value Ref Range    Glucose 140 (H) 65 - 99 mg/dL    Sodium 141 133 - 145 mmol/L    Potassium 3.9 3.4 - 5.1 mmol/L    Chloride 106 97 - 107 mmol/L    Bicarbonate 24 24 - 31 mmol/L    Urea Nitrogen 26 (H) 8 - 25 mg/dL    Creatinine 1.20 0.40 - 1.60 mg/dL    eGFR 57 (L) >60 mL/min/1.73m*2    Calcium 8.6 8.5 - 10.4 mg/dL    Albumin 3.4 (L) 3.5 - 5.0 g/dL    Alkaline Phosphatase 65 35 - 125 U/L    Total Protein 5.8 (L) 5.9 - 7.9 g/dL    AST 18 5 - 40 U/L    Bilirubin, Total 0.5 0.1 - 1.2 mg/dL    ALT 14 5 - 40 U/L    Anion Gap 11 <=19 mmol/L   Protime-INR   Result Value Ref Range    Protime 23.5 (H) 9.3 - 12.7 seconds    INR 2.3 (H) 0.9 - 1.2   APTT   Result Value Ref Range    aPTT 36.5 (H) 22.0 - 32.5 seconds   NT Pro-BNP   Result Value Ref Range    PROBNP 1,268 (H) 0 - 852 pg/mL   BLOOD GAS LACTIC ACID, VENOUS   Result Value Ref Range    POCT Lactate, Venous 1.9 0.4 - 2.0 mmol/L   Serial Troponin, Initial ([facility])   Result Value Ref Range    Troponin T, High  Sensitivity 44 (H) <=15 ng/L   Serial Troponin, 2 Hour ([facility])   Result Value Ref Range    Troponin T, High Sensitivity 44 (H) <=15 ng/L   Serial Troponin, 6 Hour ([facility])   Result Value Ref Range    Troponin T, High Sensitivity 44 (H) <=15 ng/L   POCT GLUCOSE   Result Value Ref Range    POCT Glucose 172 (H) 74 - 99 mg/dL     XR chest 1 view    Result Date: 10/18/2023  Interpreted By:  Susan Virk, STUDY: XR CHEST 1 VIEW;  10/18/2023 9:20 am   INDICATION: Signs/Symptoms:sob.   COMPARISON: 06/12/2023.   ACCESSION NUMBER(S): [id number]   ORDERING CLINICIAN: SANDRINE WHITEHEAD   FINDINGS: No focal airspace consolidations. Mild bilateral pulmonary edema. No effusions. Heart size is enlarged. Postsurgical changes involving the mid to left mediastinal region. Right-sided pacemaker. No acute osseous findings.       Cardiomegaly and mild bilateral pulmonary edema.     MACRO: None   Signed by: Susan Virk 10/18/2023 10:16 AM Dictation workstation:   [id number]        Assessment/Plan   Active Problems:    Acute congestive heart failure (CMS/[facility])    1) Acute on chronic systolic heart failure  Continue IV furosemide.  2D echocardiogram.  Cardiology consult    2) Fluid overload  Fluid overload with pulmonary edema on chest x-ray secondary to congestive heart failure  Continue IV Lasix monitor fluid intake and output    3) Presence of cardiac defibrillator  Stable    4) Long term anticoagulant use  On warfarin for anticoagulation because of atrial fibrillation.  INR therapeutic.  Continue warfarin    5) Diabetes mellitus type 2 with hyperglycemia  Continue Lantus and Humalog.  Hemoglobin A1c.  Hypoglycemia protocol    6) Coronary artery disease  Stable.  Continue aspirin and metoprolol and lisinopril    Code status was discussed, he is full code         I spent 50 minutes in the professional and overall care of this patient.      Lucie Gutierrez MD

## 2023-10-19 NOTE — NURSING NOTE
Pt pleasant, took HS meds, insulin was given at ER, before coming up, denies discomfort, VSS, assist with urinal, bed lowest position, call light within reach.

## 2023-10-19 NOTE — CARE PLAN
The patient's goals for the shift include      The clinical goals for the shift include Pt will not have SOB

## 2023-10-19 NOTE — NURSING NOTE
Pt assist to BSC, voided and BM, denies SOB, chest pain, or resp distress, back in bed, call light within reach.

## 2023-10-19 NOTE — H&P
History Of Present Illness  Navneet Thompson is a 89 y.o. male presenting with a two week history of shortness of breath that is particularly worse with exertion. He has a prior hx of HFrEP with last ECHO in 07/2023 showing EF 30-35%. Status post TAVR and CABG. Of note the patient presented to the ED at [facility] [place] with SOB that has been particularly worse over the past few days and his SOB is present even after walking a few steps. 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 He denies any chest pain, fever, night sweats or weight loss. Pt. Discussed with Dr. Moyer and transferred to  for further management.       At , the patient is stable and breathing comfortably on room air, no additional respiratory effort. Talks in full sentences.      Past Medical History  Past Medical History:   Diagnosis Date    Aortic stenosis     Status post transcatheter aortic valve replacement    Atherosclerotic heart disease of native coronary artery without angina pectoris 12/12/2022    CAD (coronary artery disease)    Atrial fibrillation (CMS/HCC)     Chronic systolic heart failure (CMS/HCC)     Complete heart block (CMS/HCC)     status post dual chamber pacemaker placemtn in 01/2008    Diabetes mellitus, type 2 (CMS/HCC)     Essential (primary) hypertension 12/12/2022    Hypertension    Presence of automatic (implantable) cardiac defibrillator 11/14/2022    Cardiac defibrillator in place, upgraded from pacemaker in 05/2014       Surgical History  Past Surgical History:   Procedure Laterality Date    AORTIC VALVE REPLACEMENT  04/07/2022    Transcatheter aortic valve replacement (TAVR) for aortic valve stenosis    APPENDECTOMY  12/01/2014    Appendectomy    CORONARY ARTERY BYPASS GRAFT  12/01/2014    CABG    CT ABDOMEN PELVIS ANGIOGRAM W AND/OR WO IV CONTRAST  03/17/2020    CT ABDOMEN PELVIS ANGIOGRAM W AND/OR WO IV CONTRAST 3/17/2020 [facility] [place]CILLARY LEGACY    OTHER SURGICAL HISTORY  12/01/2014    Cardio-defib Pulse Generator Implantation Date        Social  History  He reports that he has never smoked. He does not have any smokeless tobacco history on file. No history on file for alcohol use and drug use.    Family History  Family History   Problem Relation Name Age of Onset    Drug abuse Mother          OVERDOSE        Allergies  Dicloxacillin, Doxycycline, and Scopolamine    Review of Systems     Physical Exam  General.: Pleasant, awake alert well-developed, responsive   HEENT: Normocephalic, not icteric, not pale, no facial asymmetry, no pharyngeal erythema.   Neck: Supple, no carotid bruit, no thyroid enlargement.   Cardiovascular: Regular heart rate and rhythm normal S1 and S2.   Respiratory: Bibasilar crackles on auscultation of the lungs.    Abdomen: Soft, nontender to palpation, bowel sounds present and normoactive.   Extremities: Bilateral pitting pedal and lower leg edema   Neurologic: Alert and oriented to self, place and date, muscle strength 5/5 in all extremities.   Dermatologic: No rash, ecchymosis, or jaundice.   Psychological: Appropriate affect and behavior.  Last Recorded Vitals  Blood pressure 132/75, pulse 69, temperature 35.8 °C (96.5 °F), resp. rate 18, weight 88.1 kg (194 lb 3.6 oz), SpO2 96 %.    Relevant Results  Results for orders placed or performed during the hospital encounter of 10/19/23 (from the past 24 hour(s))   POCT GLUCOSE   Result Value Ref Range    POCT Glucose 151 (H) 74 - 99 mg/dL   Comprehensive metabolic panel   Result Value Ref Range    Glucose 131 (H) 74 - 99 mg/dL    Sodium 141 136 - 145 mmol/L    Potassium 4.0 3.5 - 5.3 mmol/L    Chloride 104 98 - 107 mmol/L    Bicarbonate 25 21 - 32 mmol/L    Anion Gap 16 10 - 20 mmol/L    Urea Nitrogen 25 (H) 6 - 23 mg/dL    Creatinine 1.40 (H) 0.50 - 1.30 mg/dL    eGFR 48 (L) >60 mL/min/1.73m*2    Calcium 9.5 8.6 - 10.6 mg/dL    Albumin 3.9 3.4 - 5.0 g/dL    Alkaline Phosphatase 72 33 - 136 U/L    Total Protein 6.6 6.4 - 8.2 g/dL    AST 16 9 - 39 U/L    Bilirubin, Total 0.8 0.0 - 1.2  mg/dL    ALT 13 10 - 52 U/L   CBC   Result Value Ref Range    WBC 8.7 4.4 - 11.3 x10*3/uL    nRBC 0.0 0.0 - 0.0 /100 WBCs    RBC 4.17 (L) 4.50 - 5.90 x10*6/uL    Hemoglobin 11.3 (L) 13.5 - 17.5 g/dL    Hematocrit 37.7 (L) 41.0 - 52.0 %    MCV 90 80 - 100 fL    MCH 27.1 26.0 - 34.0 pg    MCHC 30.0 (L) 32.0 - 36.0 g/dL    RDW 15.9 (H) 11.5 - 14.5 %    Platelets 159 150 - 450 x10*3/uL    MPV 11.2 7.5 - 11.5 fL   B-type natriuretic peptide   Result Value Ref Range     (H) 0 - 99 pg/mL   Protime-INR   Result Value Ref Range    Protime 29.4 (H) 9.8 - 12.8 seconds    INR 2.6 (H) 0.9 - 1.1   POCT GLUCOSE   Result Value Ref Range    POCT Glucose 179 (H) 74 - 99 mg/dL   POCT GLUCOSE   Result Value Ref Range    POCT Glucose 152 (H) 74 - 99 mg/dL   Transthoracic Echo (TTE) Complete   Result Value Ref Range    LV A4C EF 30.1    POCT GLUCOSE   Result Value Ref Range    POCT Glucose 169 (H) 74 - 99 mg/dL            Assessment/Plan   Active Problems:  There are no active Hospital Problems.  Navneet Thompson is a 89 y.o. male presenting with a two week history of shortness of breath that is particularly worse with exertion. He has a prior hx of HFrEP with last ECHO in 07/2023 showing EF 30-35%. Status post TAVR and CABG. Of note the patient presented to the ED at [facility] with SOB that has been particularly worse over the past few days and his SOB is present even after walking a few steps. 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 He denies any chest pain, fever, night sweats or weight loss. Pt. Discussed with Dr. Moyer and transferred to  for diuresis. Follows up with Dr. Bahena.     #ADHF  Patient presents with a 2 wk history of shortness of breath that particularly got worse 2 days ago at 4am, prompting him to sit up in bed and be concerned. No chest pain or any other constitutional symptoms.   - Hx of heart failure with an EF 30-35% in July 2023; he is on 40mg lasix BID at home  - Normal renal function and produces adequate urine  -  "CXR: Cardiomegaly and mild bilateral pulmonary edema.   - Troponins peaked at 40s, no chest pain, s/p CABG   - No concerning EKG changed that would allude to an ACS picture, rendering ACS less likely.   Plan  - Diuresis: lasix 40mg STAT, followed by 40mg BID  - Strict I/o  -restriction fluid intake to 1.5L/24 hrs, low Na+ diet  - TTE in the AM to assess for any changes to EF/cardiac pump fxn since last ECHO in July 2023    #CAD S/p CABG   - Multivessel CAD with CABG  - C/w home meds for it, including aspirin, atorvastatin 80    #Chronic Afib with ICD placement  - NSR with HR in 60s on metoprolol 25mg BID. C/w with this.   - Consult with EP in the AM since the patient says the ICD is \"low on battery\".   - On warfarin for A/c with therapeutic INR. C/w with it.       #Diabetes mellitus type 2 with hyperglycemia  - C/w Lantus and Humalog.    -Hemoglobin A1c.  Hypoglycemia protocol       CODE status: Full code  NOK: Pao (daughter): [phone]  Nata Thompson MD  Internal Medicine     "

## 2023-10-19 NOTE — CARE PLAN
The patient's goals for the shift include      The clinical goals for the shift include pt will remain free from injury this shift    Problem: Fall/Injury  Goal: Not fall by end of shift  Outcome: Progressing  Goal: Be free from injury by end of the shift  Outcome: Progressing  Goal: Verbalize understanding of personal risk factors for fall in the hospital  Outcome: Progressing  Goal: Verbalize understanding of risk factor reduction measures to prevent injury from fall in the home  Outcome: Progressing  Goal: Use assistive devices by end of the shift  Outcome: Progressing  Goal: Pace activities to prevent fatigue by end of the shift  Outcome: Progressing

## 2023-10-20 ENCOUNTER — APPOINTMENT (OUTPATIENT)
Dept: CARDIOLOGY | Facility: HOSPITAL | Age: 88
DRG: 291 | End: 2023-10-20
Payer: MEDICARE

## 2023-10-20 LAB
ALBUMIN SERPL BCP-MCNC: 3.5 G/DL (ref 3.4–5)
ANION GAP SERPL CALC-SCNC: 16 MMOL/L (ref 10–20)
BUN SERPL-MCNC: 28 MG/DL (ref 6–23)
CALCIUM SERPL-MCNC: 9.3 MG/DL (ref 8.6–10.6)
CHLORIDE SERPL-SCNC: 104 MMOL/L (ref 98–107)
CO2 SERPL-SCNC: 27 MMOL/L (ref 21–32)
CREAT SERPL-MCNC: 1.19 MG/DL (ref 0.5–1.3)
EJECTION FRACTION APICAL 4 CHAMBER: 30.1
ERYTHROCYTE [DISTWIDTH] IN BLOOD BY AUTOMATED COUNT: 15.7 % (ref 11.5–14.5)
GFR SERPL CREATININE-BSD FRML MDRD: 58 ML/MIN/1.73M*2
GLUCOSE BLD MANUAL STRIP-MCNC: 152 MG/DL (ref 74–99)
GLUCOSE BLD MANUAL STRIP-MCNC: 162 MG/DL (ref 74–99)
GLUCOSE BLD MANUAL STRIP-MCNC: 169 MG/DL (ref 74–99)
GLUCOSE BLD MANUAL STRIP-MCNC: 172 MG/DL (ref 74–99)
GLUCOSE BLD MANUAL STRIP-MCNC: 193 MG/DL (ref 74–99)
GLUCOSE BLD MANUAL STRIP-MCNC: 284 MG/DL (ref 74–99)
GLUCOSE SERPL-MCNC: 157 MG/DL (ref 74–99)
HCT VFR BLD AUTO: 37.3 % (ref 41–52)
HGB BLD-MCNC: 11.3 G/DL (ref 13.5–17.5)
INR PPP: 1.9 (ref 0.9–1.1)
MCH RBC QN AUTO: 27.8 PG (ref 26–34)
MCHC RBC AUTO-ENTMCNC: 30.3 G/DL (ref 32–36)
MCV RBC AUTO: 92 FL (ref 80–100)
NRBC BLD-RTO: 0 /100 WBCS (ref 0–0)
PHOSPHATE SERPL-MCNC: 3.3 MG/DL (ref 2.5–4.9)
PLATELET # BLD AUTO: 180 X10*3/UL (ref 150–450)
PMV BLD AUTO: 11 FL (ref 7.5–11.5)
POTASSIUM SERPL-SCNC: 3.9 MMOL/L (ref 3.5–5.3)
PROTHROMBIN TIME: 21.6 SECONDS (ref 9.8–12.8)
RBC # BLD AUTO: 4.06 X10*6/UL (ref 4.5–5.9)
SODIUM SERPL-SCNC: 143 MMOL/L (ref 136–145)
WBC # BLD AUTO: 7.7 X10*3/UL (ref 4.4–11.3)

## 2023-10-20 PROCEDURE — 82947 ASSAY GLUCOSE BLOOD QUANT: CPT | Mod: CMCLAB

## 2023-10-20 PROCEDURE — 93306 TTE W/DOPPLER COMPLETE: CPT | Performed by: INTERNAL MEDICINE

## 2023-10-20 PROCEDURE — 85027 COMPLETE CBC AUTOMATED: CPT

## 2023-10-20 PROCEDURE — 2500000001 HC RX 250 WO HCPCS SELF ADMINISTERED DRUGS (ALT 637 FOR MEDICARE OP)

## 2023-10-20 PROCEDURE — 2500000004 HC RX 250 GENERAL PHARMACY W/ HCPCS (ALT 636 FOR OP/ED)

## 2023-10-20 PROCEDURE — 85610 PROTHROMBIN TIME: CPT

## 2023-10-20 PROCEDURE — 93306 TTE W/DOPPLER COMPLETE: CPT

## 2023-10-20 PROCEDURE — 36415 COLL VENOUS BLD VENIPUNCTURE: CPT | Mod: CMCLAB

## 2023-10-20 PROCEDURE — 80069 RENAL FUNCTION PANEL: CPT | Mod: CMCLAB

## 2023-10-20 PROCEDURE — 36415 COLL VENOUS BLD VENIPUNCTURE: CPT

## 2023-10-20 PROCEDURE — 2500000004 HC RX 250 GENERAL PHARMACY W/ HCPCS (ALT 636 FOR OP/ED): Performed by: STUDENT IN AN ORGANIZED HEALTH CARE EDUCATION/TRAINING PROGRAM

## 2023-10-20 PROCEDURE — 1100000001 HC PRIVATE ROOM DAILY

## 2023-10-20 RX ORDER — ATORVASTATIN CALCIUM 80 MG/1
80 TABLET, FILM COATED ORAL DAILY
COMMUNITY
End: 2023-10-24 | Stop reason: HOSPADM

## 2023-10-20 RX ORDER — ASPIRIN 81 MG/1
81 TABLET ORAL DAILY
Status: DISCONTINUED | OUTPATIENT
Start: 2023-10-20 | End: 2023-10-24 | Stop reason: HOSPADM

## 2023-10-20 RX ORDER — WARFARIN 3 MG/1
6 TABLET ORAL DAILY
Status: DISCONTINUED | OUTPATIENT
Start: 2023-10-20 | End: 2023-10-24 | Stop reason: HOSPADM

## 2023-10-20 RX ORDER — METOPROLOL TARTRATE 25 MG/1
25 TABLET, FILM COATED ORAL 2 TIMES DAILY
Status: DISCONTINUED | OUTPATIENT
Start: 2023-10-20 | End: 2023-10-24 | Stop reason: HOSPADM

## 2023-10-20 RX ORDER — ADHESIVE BANDAGE
30 BANDAGE TOPICAL NIGHTLY
Status: COMPLETED | OUTPATIENT
Start: 2023-10-20 | End: 2023-10-20

## 2023-10-20 RX ORDER — FUROSEMIDE 10 MG/ML
40 INJECTION INTRAMUSCULAR; INTRAVENOUS EVERY 12 HOURS
Status: DISCONTINUED | OUTPATIENT
Start: 2023-10-20 | End: 2023-10-21

## 2023-10-20 RX ORDER — WARFARIN 3 MG/1
3 TABLET ORAL DAILY
Status: DISCONTINUED | OUTPATIENT
Start: 2023-10-20 | End: 2023-10-20

## 2023-10-20 RX ORDER — ASPIRIN 81 MG/1
81 TABLET ORAL DAILY
COMMUNITY

## 2023-10-20 RX ADMIN — INSULIN LISPRO 1 UNITS: 100 INJECTION, SOLUTION INTRAVENOUS; SUBCUTANEOUS at 10:36

## 2023-10-20 RX ADMIN — MAGNESIUM HYDROXIDE 30 ML: 400 SUSPENSION ORAL at 22:08

## 2023-10-20 RX ADMIN — FUROSEMIDE 40 MG: 10 INJECTION, SOLUTION INTRAVENOUS at 07:59

## 2023-10-20 RX ADMIN — PERFLUTREN 1.5 ML OF DILUTION: 6.52 INJECTION, SUSPENSION INTRAVENOUS at 09:21

## 2023-10-20 RX ADMIN — METOPROLOL TARTRATE 25 MG: 25 TABLET, FILM COATED ORAL at 21:12

## 2023-10-20 RX ADMIN — METOPROLOL TARTRATE 25 MG: 25 TABLET, FILM COATED ORAL at 10:10

## 2023-10-20 RX ADMIN — INSULIN LISPRO 1 UNITS: 100 INJECTION, SOLUTION INTRAVENOUS; SUBCUTANEOUS at 13:11

## 2023-10-20 RX ADMIN — FUROSEMIDE 40 MG: 10 INJECTION, SOLUTION INTRAVENOUS at 18:33

## 2023-10-20 RX ADMIN — ASPIRIN 81 MG: 81 TABLET, COATED ORAL at 10:10

## 2023-10-20 RX ADMIN — POLYETHYLENE GLYCOL 3350 17 G: 17 POWDER, FOR SOLUTION ORAL at 10:10

## 2023-10-20 RX ADMIN — INSULIN LISPRO 1 UNITS: 100 INJECTION, SOLUTION INTRAVENOUS; SUBCUTANEOUS at 17:23

## 2023-10-20 RX ADMIN — ATORVASTATIN CALCIUM 80 MG: 80 TABLET, FILM COATED ORAL at 21:12

## 2023-10-20 RX ADMIN — WARFARIN SODIUM 6 MG: 3 TABLET ORAL at 18:33

## 2023-10-20 ASSESSMENT — PAIN SCALES - GENERAL
PAINLEVEL_OUTOF10: 0 - NO PAIN

## 2023-10-20 ASSESSMENT — COGNITIVE AND FUNCTIONAL STATUS - GENERAL
PERSONAL GROOMING: A LOT
WALKING IN HOSPITAL ROOM: A LOT
TOILETING: A LOT
MOBILITY SCORE: 16
WALKING IN HOSPITAL ROOM: A LOT
CLIMB 3 TO 5 STEPS WITH RAILING: A LOT
MOVING FROM LYING ON BACK TO SITTING ON SIDE OF FLAT BED WITH BEDRAILS: A LITTLE
TURNING FROM BACK TO SIDE WHILE IN FLAT BAD: A LITTLE
HELP NEEDED FOR BATHING: A LITTLE
DRESSING REGULAR UPPER BODY CLOTHING: A LITTLE
MOBILITY SCORE: 16
PERSONAL GROOMING: A LOT
DRESSING REGULAR LOWER BODY CLOTHING: A LITTLE
DAILY ACTIVITIY SCORE: 17
DRESSING REGULAR LOWER BODY CLOTHING: A LITTLE
TOILETING: A LOT
CLIMB 3 TO 5 STEPS WITH RAILING: A LOT
MOVING FROM LYING ON BACK TO SITTING ON SIDE OF FLAT BED WITH BEDRAILS: A LITTLE
STANDING UP FROM CHAIR USING ARMS: A LITTLE
HELP NEEDED FOR BATHING: A LITTLE
DAILY ACTIVITIY SCORE: 17
MOVING TO AND FROM BED TO CHAIR: A LITTLE
DRESSING REGULAR UPPER BODY CLOTHING: A LITTLE
STANDING UP FROM CHAIR USING ARMS: A LITTLE
MOVING TO AND FROM BED TO CHAIR: A LITTLE
TURNING FROM BACK TO SIDE WHILE IN FLAT BAD: A LITTLE

## 2023-10-20 ASSESSMENT — PAIN - FUNCTIONAL ASSESSMENT
PAIN_FUNCTIONAL_ASSESSMENT: 0-10
PAIN_FUNCTIONAL_ASSESSMENT: 0-10

## 2023-10-20 NOTE — CARE PLAN
Problem: Fall/Injury  Goal: Not fall by end of shift  Outcome: Progressing  Goal: Be free from injury by end of the shift  Outcome: Progressing  Goal: Verbalize understanding of personal risk factors for fall in the hospital  Outcome: Progressing  Goal: Verbalize understanding of risk factor reduction measures to prevent injury from fall in the home  Outcome: Progressing  Goal: Use assistive devices by end of the shift  Outcome: Progressing  Goal: Pace activities to prevent fatigue by end of the shift  Outcome: Progressing     Problem: Not Adhereing To Sodium Restrictions  Goal: 2000mg or less of sodium per day over the next 30 days  Outcome: Progressing     Problem: Not Adhereing To Fluid Restrictions  Goal: Patient will verbalize understanding of fluid restrictions  Outcome: Progressing   The patient's goals for the shift include      The clinical goals for the shift include Patient will keep oxgen saturations above 92% with no shortness of breath.

## 2023-10-20 NOTE — PROGRESS NOTES
Transitional Care Coordination Progress Note:  Patient discussed during interdisciplinary rounds.   Team members present: Kale Dominguez RN TCC, [facility] Team, Nurse manager  Plan per Medical/Surgical team: monitor SOB, chest X-ray, ECHO, diuresis, EP consult for reported low ICD battery, Monitor INR, PT/OT  Payer: Traditional Medicare/ O Supplemental  Status: inpatient  Discharge disposition: Home vs SNF  Potential Barriers: None at this time  ADOD: Early next week pending hospital course  This TCC spoke with patients daughter regarding discharge plans. Daughter states patient had recent fall at home and is considering patient may need rehab on discharge. This TCC requested team to order PT/OT.   Kale Dominguez RN Transitional Care Coordinator [phone]

## 2023-10-20 NOTE — NURSING NOTE
"Message sent to [facility] team regarding Mr. Barrera's left pinky toenail. The entire nail is falling off, hanging by a small piece of skin. It appears as if it needs clipped off completely.   Patient states \"The podiatrist clipped off a little too much last time\" and was aware of that, but not that the nail was fallen off.  Ramez stocking removed from left leg, site cleansed with normal saline, and left open to air at this time..   Bed low, call light in reach, no acute distress noted.  "

## 2023-10-20 NOTE — CARE PLAN
Problem: Fall/Injury  Goal: Not fall by end of shift  Outcome: Progressing  Goal: Be free from injury by end of the shift  Outcome: Progressing  Goal: Verbalize understanding of personal risk factors for fall in the hospital  Outcome: Progressing  Goal: Verbalize understanding of risk factor reduction measures to prevent injury from fall in the home  Outcome: Progressing  Goal: Use assistive devices by end of the shift  Outcome: Progressing  Goal: Pace activities to prevent fatigue by end of the shift  Outcome: Progressing     Problem: Not Adhereing To Sodium Restrictions  Goal: 2000mg or less of sodium per day over the next 30 days  Outcome: Progressing     Problem: Not Adhereing To Fluid Restrictions  Goal: Patient will verbalize understanding of fluid restrictions  Outcome: Progressing     Problem: Not Completing Daily Weights  Goal: Patient will do weights every morning and call doctor for an increase of +3lbs overnight/+5Ibs over a week over next 30 days  Outcome: Progressing     Problem: Not Taking Heart Failure Medication  Goal: Patient will verbalize understanding of all prescribed heart failure medications and take routinely over next 30 days  Outcome: Progressing     Problem: Poor Understanding of Medications  Goal: Patient will verbalize understanding of all prescribed heart failure medications and take routinely over next 30 days  Outcome: Progressing     Problem: Unawere Of When To Call MD  Goal: Patient Will Verbalize Understanding Of Heart Failure Flare-Up Symptoms Over Netxt 30 Days  Outcome: Progressing  Goal: Patient Will Verbalize Understanding Of Heart Failure Flare-Up Symptoms Over Netxt 30 Days  Outcome: Progressing

## 2023-10-20 NOTE — PROGRESS NOTES
Throughout today's shift, patient remains a&o x4. VSS, denies pain. Uses call light appropriately. Echo completed, pacemaker device check completed. Education provided re: 1500mL fluid restriction, falls precautions, bleeding risk. BG monitored ACHS. Family at bedside is supportive and helpful to patient. He & family updated on POC. PT/OT consulted. During bedside shift report with MANUEL Alford, patient is resting and denies needs.

## 2023-10-20 NOTE — PROGRESS NOTES
Pharmacy Medication History Review    Navneet Thompson is a 89 y.o. male admitted for Acute congestive heart failure, unspecified heart failure type (CMS/[facility]). Pharmacy reviewed the patient's [id number] medications and allergies for accuracy.    The list below reflects the updated PTA list. Please review each medication in order reconciliation for additional clarification and justification.  Medications Prior to Admission   Medication Sig Dispense Refill Last Dose    acetaminophen (Tylenol) 325 mg tablet Take 2 tablets (650 mg) by mouth every 6 hours if needed for mild pain (1 - 3). 30 tablet 0 10/19/2023    aspirin 81 mg EC tablet Take 1 tablet (81 mg) by mouth once daily.       atorvastatin (Lipitor) 80 mg tablet Take 1 tablet (80 mg) by mouth once daily.       cholecalciferol (Vitamin D-3) 25 MCG (1000 UT) tablet Take 1 tablet (1,000 Units) by mouth once daily. Do not start before October 20, 2023.       dextrose 10 % in water, D10W, 10 % infusion Infuse 26.13 g/hr at 261.3 mL/hr into a venous catheter 1 time if needed (For blood glucose less than 70 mg/dL after 30 minutes of intervention.  Discontinue once blood glucose reaches 100 mg/dL.) for up to 1 dose. 500 mL      FreeStyle Maria Elena 14 Day Sensor kit USE AS DIRECTED EVERY 14 DAYS       furosemide (Lasix) 10 mg/mL injection Infuse 4 mL (40 mg) into a venous catheter every 12 hours.       insulin glargine (Lantus U-100 Insulin) 100 unit/mL injection Inject 45 Units under the skin once daily at bedtime.   10/19/2023    isosorbide mononitrate ER (Imdur) 30 mg 24 hr tablet Take 1 tablet (30 mg) by mouth once daily.   10/19/2023    lisinopril 5 mg tablet Take 1 tablet (5 mg) by mouth once daily.   10/19/2023    lubricating eye drops ophthalmic solution Administer 1 drop into both eyes 2 times a day as needed for dry eyes.   10/19/2023    magnesium oxide (Mag-Ox) 400 mg (241.3 mg magnesium) tablet Take 1 tablet (400 mg) by mouth once daily. Do not start before  October 20, 2023.   10/19/2023    melatonin 3 mg tablet Take 1 tablet (3 mg) by mouth as needed at bedtime for sleep.  0 10/19/2023    metoprolol tartrate (Lopressor) 25 mg tablet Take 1 tablet (25 mg) by mouth 2 times a day.   10/19/2023    neomycin-bacitracin-polymyxin (Polysporin) ophthalmic ointment Apply 0.5 inches to both eyes once daily.   10/19/2023    pantoprazole (ProtoNix) 40 mg EC tablet Take 1 tablet (40 mg) by mouth once daily in the morning. Take before meals. Do not crush, chew, or split. Do not start before October 20, 2023.   10/19/2023    polyethylene glycol (Glycolax, Miralax) packet Take 17 g by mouth once daily.   10/19/2023    triamcinolone (Kenalog) 0.1 % cream Apply topically 2 times a day.   10/19/2023    warfarin (Coumadin) 3 mg tablet Take as directed per After Visit Summary. Do not start before October 20, 2023.   10/19/2023    warfarin (Coumadin) 3 mg tablet Take as directed per After Visit Summary.   10/19/2023        The list below reflects the updated allergy list. Please review each documented allergy for additional clarification and justification.  Allergies  Reviewed by Susanna Wray, GatoD on 10/20/2023        Severity Reactions Comments    Dicloxacillin Not Specified Unknown     Doxycycline Not Specified Nausea/vomiting VIOLENTLY SICK    Scopolamine Not Specified Syncope PASSED OUT, ALTERED MENTAL STATE            Below are additional concerns with the patient's PTA list.  Patient was minimally engaged with interview, most [facility]    Outpatient warfarin regimen (per anticoag note from 10/12/23): patient has 3mg tabs at home.  1.5 tabs (4.5mg) on Monday and Thursday; 2 tabs (6mg) all other days    Susanna Wray, Shanel  Transitions of Care Pharmacist   Meds [facility] [employer]  Please reach out via secure chat for questions, or if no response call [employer] or "[employer]

## 2023-10-21 ENCOUNTER — APPOINTMENT (OUTPATIENT)
Dept: RADIOLOGY | Facility: HOSPITAL | Age: 88
DRG: 291 | End: 2023-10-21
Payer: MEDICARE

## 2023-10-21 LAB
ALBUMIN SERPL BCP-MCNC: 3.9 G/DL (ref 3.4–5)
ANION GAP SERPL CALC-SCNC: 14 MMOL/L (ref 10–20)
BUN SERPL-MCNC: 30 MG/DL (ref 6–23)
CALCIUM SERPL-MCNC: 9.8 MG/DL (ref 8.6–10.6)
CHLORIDE SERPL-SCNC: 101 MMOL/L (ref 98–107)
CO2 SERPL-SCNC: 29 MMOL/L (ref 21–32)
CREAT SERPL-MCNC: 1.17 MG/DL (ref 0.5–1.3)
GFR SERPL CREATININE-BSD FRML MDRD: 59 ML/MIN/1.73M*2
GLUCOSE BLD MANUAL STRIP-MCNC: 213 MG/DL (ref 74–99)
GLUCOSE BLD MANUAL STRIP-MCNC: 226 MG/DL (ref 74–99)
GLUCOSE BLD MANUAL STRIP-MCNC: 228 MG/DL (ref 74–99)
GLUCOSE BLD MANUAL STRIP-MCNC: 279 MG/DL (ref 74–99)
GLUCOSE SERPL-MCNC: 227 MG/DL (ref 74–99)
INR PPP: 1.7 (ref 0.9–1.1)
MAGNESIUM SERPL-MCNC: 2.79 MG/DL (ref 1.6–2.4)
PHOSPHATE SERPL-MCNC: 3 MG/DL (ref 2.5–4.9)
POTASSIUM SERPL-SCNC: 4.3 MMOL/L (ref 3.5–5.3)
PROTHROMBIN TIME: 18.7 SECONDS (ref 9.8–12.8)
SODIUM SERPL-SCNC: 140 MMOL/L (ref 136–145)

## 2023-10-21 PROCEDURE — 83735 ASSAY OF MAGNESIUM: CPT | Mod: CMCLAB | Performed by: STUDENT IN AN ORGANIZED HEALTH CARE EDUCATION/TRAINING PROGRAM

## 2023-10-21 PROCEDURE — 36415 COLL VENOUS BLD VENIPUNCTURE: CPT | Performed by: STUDENT IN AN ORGANIZED HEALTH CARE EDUCATION/TRAINING PROGRAM

## 2023-10-21 PROCEDURE — 99222 1ST HOSP IP/OBS MODERATE 55: CPT

## 2023-10-21 PROCEDURE — 2500000004 HC RX 250 GENERAL PHARMACY W/ HCPCS (ALT 636 FOR OP/ED)

## 2023-10-21 PROCEDURE — 82947 ASSAY GLUCOSE BLOOD QUANT: CPT

## 2023-10-21 PROCEDURE — 80069 RENAL FUNCTION PANEL: CPT | Performed by: STUDENT IN AN ORGANIZED HEALTH CARE EDUCATION/TRAINING PROGRAM

## 2023-10-21 PROCEDURE — 2500000001 HC RX 250 WO HCPCS SELF ADMINISTERED DRUGS (ALT 637 FOR MEDICARE OP): Performed by: STUDENT IN AN ORGANIZED HEALTH CARE EDUCATION/TRAINING PROGRAM

## 2023-10-21 PROCEDURE — 71045 X-RAY EXAM CHEST 1 VIEW: CPT | Mod: FY

## 2023-10-21 PROCEDURE — 71045 X-RAY EXAM CHEST 1 VIEW: CPT | Performed by: RADIOLOGY

## 2023-10-21 PROCEDURE — 2500000001 HC RX 250 WO HCPCS SELF ADMINISTERED DRUGS (ALT 637 FOR MEDICARE OP)

## 2023-10-21 PROCEDURE — 1100000001 HC PRIVATE ROOM DAILY

## 2023-10-21 PROCEDURE — 36415 COLL VENOUS BLD VENIPUNCTURE: CPT | Mod: CMCLAB | Performed by: STUDENT IN AN ORGANIZED HEALTH CARE EDUCATION/TRAINING PROGRAM

## 2023-10-21 PROCEDURE — 2500000004 HC RX 250 GENERAL PHARMACY W/ HCPCS (ALT 636 FOR OP/ED): Performed by: STUDENT IN AN ORGANIZED HEALTH CARE EDUCATION/TRAINING PROGRAM

## 2023-10-21 PROCEDURE — 85610 PROTHROMBIN TIME: CPT | Performed by: STUDENT IN AN ORGANIZED HEALTH CARE EDUCATION/TRAINING PROGRAM

## 2023-10-21 RX ORDER — FUROSEMIDE 10 MG/ML
80 INJECTION INTRAMUSCULAR; INTRAVENOUS ONCE
Status: COMPLETED | OUTPATIENT
Start: 2023-10-21 | End: 2023-10-21

## 2023-10-21 RX ORDER — POTASSIUM CHLORIDE 20 MEQ/1
20 TABLET, EXTENDED RELEASE ORAL ONCE
Status: COMPLETED | OUTPATIENT
Start: 2023-10-21 | End: 2023-10-21

## 2023-10-21 RX ORDER — ADHESIVE BANDAGE
30 BANDAGE TOPICAL NIGHTLY
Status: COMPLETED | OUTPATIENT
Start: 2023-10-21 | End: 2023-10-21

## 2023-10-21 RX ORDER — AMOXICILLIN 250 MG
1 CAPSULE ORAL 2 TIMES DAILY
Status: DISCONTINUED | OUTPATIENT
Start: 2023-10-21 | End: 2023-10-24 | Stop reason: HOSPADM

## 2023-10-21 RX ORDER — BISACODYL 10 MG/1
10 SUPPOSITORY RECTAL DAILY
Status: DISPENSED | OUTPATIENT
Start: 2023-10-21 | End: 2023-10-24

## 2023-10-21 RX ORDER — SORBITOL SOLUTION 70 %
15 SOLUTION, ORAL MISCELLANEOUS ONCE
Status: COMPLETED | OUTPATIENT
Start: 2023-10-21 | End: 2023-10-21

## 2023-10-21 RX ORDER — FUROSEMIDE 10 MG/ML
40 INJECTION INTRAMUSCULAR; INTRAVENOUS EVERY 12 HOURS
Status: DISCONTINUED | OUTPATIENT
Start: 2023-10-22 | End: 2023-10-22

## 2023-10-21 RX ADMIN — INSULIN LISPRO 2 UNITS: 100 INJECTION, SOLUTION INTRAVENOUS; SUBCUTANEOUS at 09:26

## 2023-10-21 RX ADMIN — METOPROLOL TARTRATE 25 MG: 25 TABLET, FILM COATED ORAL at 20:47

## 2023-10-21 RX ADMIN — INSULIN LISPRO 2 UNITS: 100 INJECTION, SOLUTION INTRAVENOUS; SUBCUTANEOUS at 19:43

## 2023-10-21 RX ADMIN — ASPIRIN 81 MG: 81 TABLET, COATED ORAL at 08:48

## 2023-10-21 RX ADMIN — WARFARIN SODIUM 6 MG: 3 TABLET ORAL at 18:29

## 2023-10-21 RX ADMIN — METOPROLOL TARTRATE 25 MG: 25 TABLET, FILM COATED ORAL at 08:48

## 2023-10-21 RX ADMIN — FUROSEMIDE 80 MG: 10 INJECTION, SOLUTION INTRAVENOUS at 18:29

## 2023-10-21 RX ADMIN — FUROSEMIDE 40 MG: 10 INJECTION, SOLUTION INTRAVENOUS at 08:48

## 2023-10-21 RX ADMIN — SENNOSIDES AND DOCUSATE SODIUM 1 TABLET: 8.6; 5 TABLET ORAL at 18:29

## 2023-10-21 RX ADMIN — SORBITOL SOLUTION (BULK) 15 ML: 70 SOLUTION at 23:46

## 2023-10-21 RX ADMIN — POTASSIUM CHLORIDE 20 MEQ: 20 TABLET, EXTENDED RELEASE ORAL at 03:55

## 2023-10-21 RX ADMIN — MAGNESIUM HYDROXIDE 30 ML: 400 SUSPENSION ORAL at 15:30

## 2023-10-21 RX ADMIN — INSULIN LISPRO 2 UNITS: 100 INJECTION, SOLUTION INTRAVENOUS; SUBCUTANEOUS at 13:07

## 2023-10-21 RX ADMIN — ATORVASTATIN CALCIUM 80 MG: 80 TABLET, FILM COATED ORAL at 20:47

## 2023-10-21 ASSESSMENT — PAIN SCALES - GENERAL: PAINLEVEL_OUTOF10: 0 - NO PAIN

## 2023-10-21 NOTE — CONSULTS
ELECTROPHYSIOLOGY   BRIEF CONSULT NOTE     Subjective Data:  Contacted regarding patient/family desire to speak with EP re generator replacement and functioning of pacemaker.      Objective Data:  Last Recorded Vitals:  Vitals:    10/21/23 0351 10/21/23 0720 10/21/23 1130 10/21/23 1602   BP: 134/71 136/73 131/70 131/75   BP Location: Left arm Right arm Right arm Right arm   Patient Position: Lying Lying Lying Lying   Pulse: 72 70 72 70   Resp: 18 18 18 18   Temp: 35.9 °C (96.6 °F) 35.9 °C (96.6 °F) 35.9 °C (96.7 °F) 35.9 °C (96.7 °F)   TempSrc: Temporal Temporal Temporal Temporal   SpO2: 93% 96% 94% 95%   Weight: 83.7 kg (184 lb 8.4 oz)      Height:           Last Labs:  CBC - 10/20/2023:  4:46 PM  7.7 11.3 180    37.3      CMP - 10/20/2023:  4:46 PM  9.3 6.6 16 --- 0.8   3.3 3.5 13 72      PTT - 10/18/2023:  9:01 AM  1.7   18.7 36.5     TROPHS   Date/Time Value Ref Range Status   11/07/2022 06:02 PM 37 0 - 20 ng/L Final     Comment:     .  Less than 99th percentile of normal range cutoff-  Female and children under 18 years old <14 ng/L; Male <21 ng/L: Negative  Repeat testing should be performed if clinically indicated.   .  Female and children under 18 years old 14-50 ng/L; Male 21-50 ng/L:  Consistent with possible cardiac damage and possible increased clinical   risk. Serial measurements may help to assess extent of myocardial damage.   .  >50 ng/L: Consistent with cardiac damage, increased clinical risk and  myocardial infarction. Serial measurements may help assess extent of   myocardial damage.   .   NOTE: Children less than 1 year old may have higher baseline troponin   levels and results should be interpreted in conjunction with the overall   clinical context.   .  NOTE: Troponin I testing is performed using a different   testing methodology at [facility] than at other   [facility] hospitals. Direct result comparisons should only   be made within the same method.     11/07/2022 05:04 PM 33 0 - 20  ng/L Final     Comment:     .  Less than 99th percentile of normal range cutoff-  Female and children under 18 years old <14 ng/L; Male <21 ng/L: Negative  Repeat testing should be performed if clinically indicated.   .  Female and children under 18 years old 14-50 ng/L; Male 21-50 ng/L:  Consistent with possible cardiac damage and possible increased clinical   risk. Serial measurements may help to assess extent of myocardial damage.   .  >50 ng/L: Consistent with cardiac damage, increased clinical risk and  myocardial infarction. Serial measurements may help assess extent of   myocardial damage.   .   NOTE: Children less than 1 year old may have higher baseline troponin   levels and results should be interpreted in conjunction with the overall   clinical context.   .  NOTE: Troponin I testing is performed using a different   testing methodology at [facility] than at other   [facility]. Direct result comparisons should only   be made within the same method.       BNP   Date/Time Value Ref Range Status   10/19/2023 07:52  0 - 99 pg/mL Final   05/26/2020 03:48  0 - 99 pg/mL Final     Comment:     .  <100 pg/mL - Heart failure unlikely  100-299 pg/mL - Intermediate probability of acute heart  .               failure exacerbation. Correlate with clinical  .               context and patient history.    >=300 pg/mL - Heart Failure likely. Correlate with clinical  .               context and patient history.  BNP testing is performed using different testing   methodology at [facility] than at other   [facility]. Direct result comparisons should   only be made within the same method.       HGBA1C   Date/Time Value Ref Range Status   10/19/2023 05:59 AM 7.9 See below % Final     VLDL   Date/Time Value Ref Range Status   06/14/2023 09:40 AM 24 0 - 40 mg/dL Final      Last I/O:  I/O last 3 completed shifts:  In: 240 (2.9 mL/kg) [P.O.:240]  Out: 6500 (77.7 mL/kg) [Urine:6500  "(2.2 mL/kg/hr)]  Weight: 83.7 kg     Past Cardiology Tests (Last 3 Years):  EKG:  No results found for this or any previous visit from the past 1095 days.    Echo:  Transthoracic Echo (TTE) Complete 10/20/2023    Ejection Fractions:  No results found for: \"EF\"  Cath:  No results found for this or any previous visit from the past 1095 days.    Stress Test:  No results found for this or any previous visit from the past 1095 days.    Cardiac Imaging:  -reviewed inpatient and recent diagnostics including labs, telemetry, EKGs, echocardiography, CXR    Inpatient Medications:  Scheduled medications   Medication Dose Route Frequency    aspirin  81 mg oral Daily    atorvastatin  80 mg oral Nightly    bisacodyl  10 mg rectal Daily    [START ON 10/22/2023] furosemide  40 mg intravenous q12h    insulin lispro  0-5 Units subcutaneous TID with meals    metoprolol tartrate  25 mg oral BID    polyethylene glycol  17 g oral Daily    sennosides-docusate sodium  1 tablet oral BID    sorbitol  15 mL oral Once    warfarin  6 mg oral Daily     PRN medications   Medication    dextrose 10 % in water (D10W)    dextrose    glucagon     Continuous Medications   Medication Dose Last Rate       Physical Exam:  Physical Exam  Vitals and nursing note reviewed.   Constitutional:       Appearance: Normal appearance. He is normal weight. He is not ill-appearing.   HENT:      Head: Normocephalic and atraumatic.      Nose: Nose normal. No congestion or rhinorrhea.      Mouth/Throat:      Mouth: Mucous membranes are moist.      Pharynx: Oropharynx is clear. No oropharyngeal exudate or posterior oropharyngeal erythema.   Eyes:      Extraocular Movements: Extraocular movements intact.      Conjunctiva/sclera: Conjunctivae normal.      Pupils: Pupils are equal, round, and reactive to light.   Neck:      Vascular: No carotid bruit.   Cardiovascular:      Rate and Rhythm: Normal rate and regular rhythm.      Pulses: Normal pulses.      Heart sounds: Normal " heart sounds. No murmur heard.     No friction rub. No gallop.   Pulmonary:      Effort: Pulmonary effort is normal. No respiratory distress.      Breath sounds: Normal breath sounds. No wheezing or rales.   Abdominal:      General: Abdomen is flat. Bowel sounds are normal. There is no distension.      Palpations: Abdomen is soft.      Tenderness: There is no abdominal tenderness.   Musculoskeletal:         General: No swelling. Normal range of motion.      Cervical back: Normal range of motion.   Lymphadenopathy:      Cervical: No cervical adenopathy.   Skin:     General: Skin is warm and dry.      Capillary Refill: Capillary refill takes less than 2 seconds.      Findings: No erythema, lesion or rash.   Neurological:      General: No focal deficit present.      Mental Status: He is alert and oriented to person, place, and time. Mental status is at baseline.   Psychiatric:         Mood and Affect: Mood normal.         Behavior: Behavior normal.         Thought Content: Thought content normal.         Judgment: Judgment normal.            Assessment/Plan   Navneet Thompson is a 89 y.o. male with pmhx notable for ICM/HFrEF s/p BiV ICD, aortic stenosis s/p TAVR, CAD s/p CABG.     Patient was notified during routine device interrogation that device is nearing PALLAVI. Patient and family requested clarification with our service regarding the functioning of his device. I spoke with the patient and family (daughter, Pao) at length regarding his care. Her first concern was that his pacemaker was not functioning due to brief episodes with pulse <70bpm, his lower rate limit on CRT. I reiterated that PVCs may transiently alter his pacing rate, but his pacemaker is functioning appropriately and his symptoms (fatigue, dyspnea on minimal exertion, volume overload) are not driven by heart rhythm issues. I also addressed that there is no indication for generator replacement of his device until he reaches PALLAVI in the coming months, at  which point he will have months remaining of normal function. Her primary concerns are his inability to perform daily tasks, which I reiterated are unlikely related to pacemaker function but likely secondary to his cardiomyopathy. She understands this. Follow up with EP is already scheduled.     Peripheral IV 10/18/23 20 G Right Antecubital (Active)   Site Assessment Clean;Dry;Intact 10/21/23 0728   Dressing Type Transparent 10/21/23 0728   Line Status Flushed 10/21/23 0728   Dressing Status Clean;Dry;Occlusive 10/21/23 0728   Number of days: 3       Peripheral IV 10/18/23 20 G Proximal;Right;Anterior Forearm (Active)   Site Assessment Clean;Dry;Intact 10/21/23 0728   Dressing Type Transparent 10/21/23 0728   Line Status Flushed 10/21/23 0728   Dressing Status Clean;Dry;Occlusive 10/21/23 0728   Number of days: 3       Code Status:  Full Code    I spent 60 minutes in the professional and overall care of this patient.    Alexis Valente MD

## 2023-10-21 NOTE — PROGRESS NOTES
"Navneet Thompson is a 89 y.o. male on day 2 of admission presenting with Acute congestive heart failure, unspecified heart failure type (CMS/HCC).    Subjective   Saw the patient this AM. He appears comfortable in bed and is lying flat. He reports being \"perfectly fine\" overnight.       Objective     Physical Exam  /71 (BP Location: Left arm, Patient Position: Lying)   Pulse 72   Temp 35.9 °C (96.6 °F) (Temporal)   Resp 18   Ht 1.791 m (5' 10.5\")   Wt 83.7 kg (184 lb 8.4 oz)   SpO2 93%   BMI 26.10 kg/m²     General Appearance:  Alert, cooperative, no distress, appears stated age   Head:  Normocephalic, without obvious abnormality, atraumatic   Eyes:  PERRL, conjunctiva/corneas clear, EOM's intact, fundi benign, both eyes   Ears:  Normal TM's and external ear canals, both ears   Nose: Nares normal, septum midline, mucosa normal, no drainage or sinus tenderness   Throat: Lips, mucosa, and tongue normal; teeth and gums normal   Neck: Supple, symmetrical, trachea midline, no adenopathy, thyroid: not enlarged, symmetric, no tenderness/mass/nodules, no carotid bruit or JVD   Back:   Symmetric, no curvature, ROM normal, no CVA tenderness   Lungs:   Improved crackles bilaterally, no labored breathing    Chest Wall:  No tenderness or deformity   Heart:  Regular rate and rhythm, S1, S2 normal, no murmur, rub or gallop   Abdomen:   Soft, non-tender, bowel sounds active all four quadrants,  no masses, no organomegaly   Genitalia:  Normal male   Rectal:  Normal tone, normal prostate, no masses or tenderness;  guaiac negative stool   Extremities: Extremities normal, atraumatic, no cyanosis or edema   Pulses: 2+ and symmetric   Skin: Skin color, texture, turgor normal, no rashes or lesions   Lymph nodes: Cervical, supraclavicular, and axillary nodes normal   Neurologic: Normal       Last Recorded Vitals  Blood pressure 134/71, pulse 72, temperature 35.9 °C (96.6 °F), temperature source Temporal, resp. rate 18, height " "1.791 m (5' 10.5\"), weight 83.7 kg (184 lb 8.4 oz), SpO2 93 %.  Intake/Output last 3 Shifts:  I/O last 3 completed shifts:  In: 240 (2.9 mL/kg) [P.O.:240]  Out: 6500 (77.7 mL/kg) [Urine:6500 (2.2 mL/kg/hr)]  Weight: 83.7 kg     Relevant Results  Results for orders placed or performed during the hospital encounter of 10/19/23 (from the past 24 hour(s))   Transthoracic Echo (TTE) Complete   Result Value Ref Range    LV A4C EF 30.1    POCT GLUCOSE   Result Value Ref Range    POCT Glucose 169 (H) 74 - 99 mg/dL   POCT GLUCOSE   Result Value Ref Range    POCT Glucose 162 (H) 74 - 99 mg/dL   POCT GLUCOSE   Result Value Ref Range    POCT Glucose 193 (H) 74 - 99 mg/dL   Protime-INR   Result Value Ref Range    Protime 21.6 (H) 9.8 - 12.8 seconds    INR 1.9 (H) 0.9 - 1.1   CBC   Result Value Ref Range    WBC 7.7 4.4 - 11.3 x10*3/uL    nRBC 0.0 0.0 - 0.0 /100 WBCs    RBC 4.06 (L) 4.50 - 5.90 x10*6/uL    Hemoglobin 11.3 (L) 13.5 - 17.5 g/dL    Hematocrit 37.3 (L) 41.0 - 52.0 %    MCV 92 80 - 100 fL    MCH 27.8 26.0 - 34.0 pg    MCHC 30.3 (L) 32.0 - 36.0 g/dL    RDW 15.7 (H) 11.5 - 14.5 %    Platelets 180 150 - 450 x10*3/uL    MPV 11.0 7.5 - 11.5 fL   Renal function panel   Result Value Ref Range    Glucose 157 (H) 74 - 99 mg/dL    Sodium 143 136 - 145 mmol/L    Potassium 3.9 3.5 - 5.3 mmol/L    Chloride 104 98 - 107 mmol/L    Bicarbonate 27 21 - 32 mmol/L    Anion Gap 16 10 - 20 mmol/L    Urea Nitrogen 28 (H) 6 - 23 mg/dL    Creatinine 1.19 0.50 - 1.30 mg/dL    eGFR 58 (L) >60 mL/min/1.73m*2    Calcium 9.3 8.6 - 10.6 mg/dL    Phosphorus 3.3 2.5 - 4.9 mg/dL    Albumin 3.5 3.4 - 5.0 g/dL   POCT GLUCOSE   Result Value Ref Range    POCT Glucose 172 (H) 74 - 99 mg/dL   POCT GLUCOSE   Result Value Ref Range    POCT Glucose 284 (H) 74 - 99 mg/dL   POCT GLUCOSE   Result Value Ref Range    POCT Glucose 226 (H) 74 - 99 mg/dL                         Assessment/Plan   Principal Problem:    Acute congestive heart failure, unspecified heart " "failure type (CMS/HCC)  Navneet Thompson is a 89 y.o. male presenting with a two week history of shortness of breath that is particularly worse with exertion. He has a prior hx of HFrEP with last ECHO in 07/2023 showing EF 30-35%. Status post TAVR and CABG. Of note the patient presented to the ED at [facility] with SOB that has been particularly worse over the past few days and his SOB is present even after walking a few steps. 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 He denies any chest pain, fever, night sweats or weight loss. Pt. Discussed with Dr. Moyer and transferred to  for diuresis. Follows up with Dr. Bahena.      Updates 10/21  - Continue diureses, monitor I/O  - RFPs, creatinine  - EP consulted re pacemaker battery, said they will order device interrogation  #ADHF  Patient presents with a 2 wk history of shortness of breath that particularly got worse 2 days ago at 4am, prompting him to sit up in bed and be concerned. No chest pain or any other constitutional symptoms.   - Hx of heart failure with an EF 30-35% in July 2023; he is on 40mg lasix BID at home  - Normal renal function and produces adequate urine  - CXR: Cardiomegaly and mild bilateral pulmonary edema.   - Troponins peaked at 40s, no chest pain, s/p CABG   - No concerning EKG changed that would allude to an ACS picture, rendering ACS less likely.   Plan  - Diuresis: lasix 40mg STAT, followed by 40mg BID  - Strict I/o  -restriction fluid intake to 1.5L/24 hrs, low Na+ diet  - TTE in the AM to assess for any changes to EF/cardiac pump fxn since last ECHO in July 2023     #CAD S/p CABG   - Multivessel CAD with CABG  - C/w home meds for it, including aspirin, atorvastatin 80     #Chronic Afib with ICD placement  - NSR with HR in 60s on metoprolol 25mg BID. C/w with this.   - Consult with EP in the AM since the patient says the ICD is \"low on battery\".   - On warfarin for A/c with therapeutic INR. C/w with it.         #Diabetes mellitus type 2 with hyperglycemia  - " C/w Lantus and Humalog.    -Hemoglobin A1c.  Hypoglycemia protocol        CODE status: Full code  NOK: Pao (daughter): [phone]      Nata Thompson MD

## 2023-10-21 NOTE — CARE PLAN
Problem: Fall/Injury  Goal: Not fall by end of shift  Outcome: Progressing  Goal: Be free from injury by end of the shift  Outcome: Progressing  Goal: Verbalize understanding of personal risk factors for fall in the hospital  Outcome: Progressing  Goal: Verbalize understanding of risk factor reduction measures to prevent injury from fall in the home  Outcome: Progressing  Goal: Use assistive devices by end of the shift  Outcome: Progressing  Goal: Pace activities to prevent fatigue by end of the shift  Outcome: Progressing     Problem: Not Adhereing To Sodium Restrictions  Goal: 2000mg or less of sodium per day over the next 30 days  Outcome: Progressing     Problem: Not Adhereing To Fluid Restrictions  Goal: Patient will verbalize understanding of fluid restrictions  Outcome: Progressing     Problem: Not Completing Daily Weights  Goal: Patient will do weights every morning and call doctor for an increase of +3lbs overnight/+5Ibs over a week over next 30 days  Outcome: Progressing     Problem: Not Taking Heart Failure Medication  Goal: Patient will verbalize understanding of all prescribed heart failure medications and take routinely over next 30 days  Outcome: Progressing   The patient's goals for the shift include      The clinical goals for the shift include Patient will remain free from falls this shift    Over the shift, the patient did make progress toward the following goals. Barriers to progression include none. Recommendations to address these barriers include continue to monitor and use call light appropriately.

## 2023-10-22 LAB
ALBUMIN SERPL BCP-MCNC: 3.7 G/DL (ref 3.4–5)
ANION GAP SERPL CALC-SCNC: 15 MMOL/L (ref 10–20)
BUN SERPL-MCNC: 35 MG/DL (ref 6–23)
CALCIUM SERPL-MCNC: 9.7 MG/DL (ref 8.6–10.6)
CHLORIDE SERPL-SCNC: 103 MMOL/L (ref 98–107)
CO2 SERPL-SCNC: 27 MMOL/L (ref 21–32)
CREAT SERPL-MCNC: 1.1 MG/DL (ref 0.5–1.3)
ERYTHROCYTE [DISTWIDTH] IN BLOOD BY AUTOMATED COUNT: 15.9 % (ref 11.5–14.5)
GFR SERPL CREATININE-BSD FRML MDRD: 64 ML/MIN/1.73M*2
GLUCOSE BLD MANUAL STRIP-MCNC: 266 MG/DL (ref 74–99)
GLUCOSE BLD MANUAL STRIP-MCNC: 271 MG/DL (ref 74–99)
GLUCOSE BLD MANUAL STRIP-MCNC: 318 MG/DL (ref 74–99)
GLUCOSE BLD MANUAL STRIP-MCNC: 367 MG/DL (ref 74–99)
GLUCOSE SERPL-MCNC: 294 MG/DL (ref 74–99)
HCT VFR BLD AUTO: 41.5 % (ref 41–52)
HGB BLD-MCNC: 12.3 G/DL (ref 13.5–17.5)
MCH RBC QN AUTO: 27 PG (ref 26–34)
MCHC RBC AUTO-ENTMCNC: 29.6 G/DL (ref 32–36)
MCV RBC AUTO: 91 FL (ref 80–100)
NRBC BLD-RTO: 0 /100 WBCS (ref 0–0)
PHOSPHATE SERPL-MCNC: 3.5 MG/DL (ref 2.5–4.9)
PLATELET # BLD AUTO: 193 X10*3/UL (ref 150–450)
PMV BLD AUTO: 10.9 FL (ref 7.5–11.5)
POTASSIUM SERPL-SCNC: 4.1 MMOL/L (ref 3.5–5.3)
RBC # BLD AUTO: 4.55 X10*6/UL (ref 4.5–5.9)
SODIUM SERPL-SCNC: 141 MMOL/L (ref 136–145)
WBC # BLD AUTO: 8.2 X10*3/UL (ref 4.4–11.3)

## 2023-10-22 PROCEDURE — 36415 COLL VENOUS BLD VENIPUNCTURE: CPT | Mod: CMCLAB

## 2023-10-22 PROCEDURE — 97161 PT EVAL LOW COMPLEX 20 MIN: CPT | Mod: GP

## 2023-10-22 PROCEDURE — 2500000004 HC RX 250 GENERAL PHARMACY W/ HCPCS (ALT 636 FOR OP/ED): Performed by: STUDENT IN AN ORGANIZED HEALTH CARE EDUCATION/TRAINING PROGRAM

## 2023-10-22 PROCEDURE — 80069 RENAL FUNCTION PANEL: CPT | Mod: CMCLAB

## 2023-10-22 PROCEDURE — 97165 OT EVAL LOW COMPLEX 30 MIN: CPT | Mod: GO

## 2023-10-22 PROCEDURE — 2500000001 HC RX 250 WO HCPCS SELF ADMINISTERED DRUGS (ALT 637 FOR MEDICARE OP): Performed by: STUDENT IN AN ORGANIZED HEALTH CARE EDUCATION/TRAINING PROGRAM

## 2023-10-22 PROCEDURE — 99222 1ST HOSP IP/OBS MODERATE 55: CPT

## 2023-10-22 PROCEDURE — 2500000001 HC RX 250 WO HCPCS SELF ADMINISTERED DRUGS (ALT 637 FOR MEDICARE OP)

## 2023-10-22 PROCEDURE — 97535 SELF CARE MNGMENT TRAINING: CPT | Mod: GO

## 2023-10-22 PROCEDURE — 2500000004 HC RX 250 GENERAL PHARMACY W/ HCPCS (ALT 636 FOR OP/ED)

## 2023-10-22 PROCEDURE — 82947 ASSAY GLUCOSE BLOOD QUANT: CPT | Mod: CMCLAB

## 2023-10-22 PROCEDURE — 85027 COMPLETE CBC AUTOMATED: CPT | Mod: CMCLAB

## 2023-10-22 PROCEDURE — 36415 COLL VENOUS BLD VENIPUNCTURE: CPT

## 2023-10-22 PROCEDURE — 1100000001 HC PRIVATE ROOM DAILY

## 2023-10-22 RX ORDER — FUROSEMIDE 40 MG/1
40 TABLET ORAL 2 TIMES DAILY
Qty: 60 TABLET | Refills: 2 | Status: SHIPPED | OUTPATIENT
Start: 2023-10-22 | End: 2023-10-24 | Stop reason: HOSPADM

## 2023-10-22 RX ORDER — FUROSEMIDE 10 MG/ML
80 INJECTION INTRAMUSCULAR; INTRAVENOUS EVERY 12 HOURS
Status: DISCONTINUED | OUTPATIENT
Start: 2023-10-22 | End: 2023-10-23

## 2023-10-22 RX ORDER — OMEPRAZOLE 20 MG/1
20 CAPSULE, DELAYED RELEASE ORAL DAILY
Start: 2023-10-22

## 2023-10-22 RX ADMIN — ATORVASTATIN CALCIUM 80 MG: 80 TABLET, FILM COATED ORAL at 20:48

## 2023-10-22 RX ADMIN — SENNOSIDES AND DOCUSATE SODIUM 1 TABLET: 8.6; 5 TABLET ORAL at 09:13

## 2023-10-22 RX ADMIN — FUROSEMIDE 80 MG: 10 INJECTION, SOLUTION INTRAVENOUS at 09:14

## 2023-10-22 RX ADMIN — METOPROLOL TARTRATE 25 MG: 25 TABLET, FILM COATED ORAL at 09:13

## 2023-10-22 RX ADMIN — INSULIN LISPRO 3 UNITS: 100 INJECTION, SOLUTION INTRAVENOUS; SUBCUTANEOUS at 12:41

## 2023-10-22 RX ADMIN — ASPIRIN 81 MG: 81 TABLET, COATED ORAL at 09:13

## 2023-10-22 RX ADMIN — SENNOSIDES AND DOCUSATE SODIUM 1 TABLET: 8.6; 5 TABLET ORAL at 20:49

## 2023-10-22 RX ADMIN — POLYETHYLENE GLYCOL 3350 17 G: 17 POWDER, FOR SOLUTION ORAL at 09:14

## 2023-10-22 RX ADMIN — INSULIN LISPRO 4 UNITS: 100 INJECTION, SOLUTION INTRAVENOUS; SUBCUTANEOUS at 17:02

## 2023-10-22 RX ADMIN — BISACODYL 10 MG: 10 SUPPOSITORY RECTAL at 09:14

## 2023-10-22 RX ADMIN — WARFARIN SODIUM 6 MG: 3 TABLET ORAL at 17:05

## 2023-10-22 RX ADMIN — FUROSEMIDE 80 MG: 10 INJECTION, SOLUTION INTRAVENOUS at 20:49

## 2023-10-22 RX ADMIN — INSULIN LISPRO 3 UNITS: 100 INJECTION, SOLUTION INTRAVENOUS; SUBCUTANEOUS at 08:46

## 2023-10-22 RX ADMIN — METOPROLOL TARTRATE 25 MG: 25 TABLET, FILM COATED ORAL at 20:48

## 2023-10-22 SDOH — SOCIAL STABILITY: SOCIAL INSECURITY: ARE THERE ANY APPARENT SIGNS OF INJURIES/BEHAVIORS THAT COULD BE RELATED TO ABUSE/NEGLECT?: NO

## 2023-10-22 SDOH — SOCIAL STABILITY: SOCIAL INSECURITY: ABUSE: ADULT

## 2023-10-22 SDOH — SOCIAL STABILITY: SOCIAL INSECURITY: DO YOU FEEL UNSAFE GOING BACK TO THE PLACE WHERE YOU ARE LIVING?: NO

## 2023-10-22 SDOH — SOCIAL STABILITY: SOCIAL INSECURITY: WERE YOU ABLE TO COMPLETE ALL THE BEHAVIORAL HEALTH SCREENINGS?: YES

## 2023-10-22 SDOH — SOCIAL STABILITY: SOCIAL INSECURITY: DO YOU FEEL ANYONE HAS EXPLOITED OR TAKEN ADVANTAGE OF YOU FINANCIALLY OR OF YOUR PERSONAL PROPERTY?: NO

## 2023-10-22 SDOH — SOCIAL STABILITY: SOCIAL INSECURITY: HAS ANYONE EVER THREATENED TO HURT YOUR FAMILY OR YOUR PETS?: NO

## 2023-10-22 SDOH — SOCIAL STABILITY: SOCIAL INSECURITY: HAVE YOU HAD THOUGHTS OF HARMING ANYONE ELSE?: NO

## 2023-10-22 SDOH — SOCIAL STABILITY: SOCIAL INSECURITY: DOES ANYONE TRY TO KEEP YOU FROM HAVING/CONTACTING OTHER FRIENDS OR DOING THINGS OUTSIDE YOUR HOME?: NO

## 2023-10-22 ASSESSMENT — COGNITIVE AND FUNCTIONAL STATUS - GENERAL
CLIMB 3 TO 5 STEPS WITH RAILING: A LOT
PERSONAL GROOMING: A LOT
MOVING TO AND FROM BED TO CHAIR: A LITTLE
HELP NEEDED FOR BATHING: A LITTLE
DRESSING REGULAR LOWER BODY CLOTHING: A LITTLE
TURNING FROM BACK TO SIDE WHILE IN FLAT BAD: A LITTLE
DRESSING REGULAR LOWER BODY CLOTHING: A LITTLE
WALKING IN HOSPITAL ROOM: A LOT
MOBILITY SCORE: 17
WALKING IN HOSPITAL ROOM: A LITTLE
MOVING TO AND FROM BED TO CHAIR: A LITTLE
STANDING UP FROM CHAIR USING ARMS: A LITTLE
STANDING UP FROM CHAIR USING ARMS: A LITTLE
MOVING FROM LYING ON BACK TO SITTING ON SIDE OF FLAT BED WITH BEDRAILS: A LITTLE
TOILETING: A LOT
MOVING FROM LYING ON BACK TO SITTING ON SIDE OF FLAT BED WITH BEDRAILS: A LITTLE
TOILETING: A LOT
DRESSING REGULAR UPPER BODY CLOTHING: A LITTLE
DAILY ACTIVITIY SCORE: 16
CLIMB 3 TO 5 STEPS WITH RAILING: A LOT
DRESSING REGULAR UPPER BODY CLOTHING: A LITTLE
PERSONAL GROOMING: A LITTLE
DAILY ACTIVITIY SCORE: 17
EATING MEALS: A LITTLE
TURNING FROM BACK TO SIDE WHILE IN FLAT BAD: A LITTLE
MOBILITY SCORE: 16
HELP NEEDED FOR BATHING: A LOT

## 2023-10-22 ASSESSMENT — PAIN SCALES - GENERAL
PAINLEVEL_OUTOF10: 0 - NO PAIN

## 2023-10-22 ASSESSMENT — PAIN - FUNCTIONAL ASSESSMENT
PAIN_FUNCTIONAL_ASSESSMENT: 0-10

## 2023-10-22 ASSESSMENT — PAIN SCALES - PAIN ASSESSMENT IN ADVANCED DEMENTIA (PAINAD): BREATHING: NORMAL

## 2023-10-22 ASSESSMENT — LIFESTYLE VARIABLES
AUDIT-C TOTAL SCORE: 0
SKIP TO QUESTIONS 9-10: 1
AUDIT-C TOTAL SCORE: 0
PRESCIPTION_ABUSE_PAST_12_MONTHS: NO
HOW OFTEN DO YOU HAVE 6 OR MORE DRINKS ON ONE OCCASION: NEVER
HOW OFTEN DO YOU HAVE A DRINK CONTAINING ALCOHOL: NEVER
SUBSTANCE_ABUSE_PAST_12_MONTHS: NO
HOW MANY STANDARD DRINKS CONTAINING ALCOHOL DO YOU HAVE ON A TYPICAL DAY: PATIENT DOES NOT DRINK

## 2023-10-22 ASSESSMENT — PATIENT HEALTH QUESTIONNAIRE - PHQ9
1. LITTLE INTEREST OR PLEASURE IN DOING THINGS: NOT AT ALL
2. FEELING DOWN, DEPRESSED OR HOPELESS: NOT AT ALL
SUM OF ALL RESPONSES TO PHQ9 QUESTIONS 1 & 2: 0

## 2023-10-22 ASSESSMENT — ACTIVITIES OF DAILY LIVING (ADL)
BATHING_ASSISTANCE: STAND BY
LACK_OF_TRANSPORTATION: NO
HOME_MANAGEMENT_TIME_ENTRY: 10

## 2023-10-22 NOTE — PROGRESS NOTES
Physical Therapy    Physical Therapy Evaluation    Patient Name: Navneet Thompson  MRN: [id number]  Today's Date: 10/22/2023   Time Calculation  Start Time: 0941  Stop Time: 1009  Time Calculation (min): 28 min    Assessment/Plan   PT Assessment  PT Assessment Results: Decreased strength, Decreased endurance, Impaired balance, Decreased mobility  Rehab Prognosis: Good  Barriers to Discharge: none  Evaluation/Treatment Tolerance: Patient limited by fatigue  Strengths: Attitude of self  End of Session Communication: Bedside nurse  End of Session Patient Position: Up in chair, Alarm off, not on at start of session  IP OR SWING BED PT PLAN  Inpatient or Swing Bed: Inpatient  PT Plan  Treatment/Interventions: Bed mobility, Transfer training, Gait training, Stair training, Balance training, Strengthening, Endurance training, Therapeutic exercise, Therapeutic activity  PT Plan: Skilled PT  PT Frequency: 3 times per week  PT Discharge Recommendations: Moderate intensity level of continued care  PT Recommended Transfer Status: Stand by assist  PT - OK to Discharge: Yes      Subjective   General Visit Information:  General  Reason for Referral: SOB due to acute CHF  Past Medical History Relevant to Rehab: Chronic systolic heart failure with EF(35-40% 11/22), aortic stenosis, CAD, A-fib, complete heart block, DM II, HTN, automatic cardiac defibrillator/pacemaker.  Prior to Session Communication: Bedside nurse  Patient Position Received: Up in chair, Alarm off, not on at start of session  Preferred Learning Style: verbal, visual, written  Home Living:  Home Living  Type of Home: House  Lives With: Alone  Home Adaptive Equipment: Walker rolling or standard, Cane (rollator)  Home Layout: Two level, Laundry in basement  Alternate Level Stairs-Rails: Both  Alternate Level Stairs-Number of Steps: 6  Home Access: Stairs to enter with rails  Entrance Stairs-Rails: Both  Entrance Stairs-Number of Steps: 1  Home Living Comments: good  family support  Prior Level of Function:  Prior Function Per Pt/Caregiver Report  Level of [place]: Independent with homemaking with ambulation  Receives Help From: Family  Ambulatory Assistance: Independent  Vocational: Retired  Prior Function Comments: pt uses a rollator  Precautions:  Precautions  Medical Precautions: Fall precautions  Vital Signs:  Vital Signs  Heart Rate: 70  SpO2: 98 %    Objective   Pain:  Pain Assessment  Pain Assessment: 0-10  Pain Score: 0 - No pain  Cognition:  Cognition  Overall Cognitive Status: Within Functional Limits       Activity Tolerance  Endurance: Decreased tolerance for upright activites    Coordination  Movements are Fluid and Coordinated: Yes    Postural Control  Postural Control: Within Functional Limits    Static Sitting Balance  Static Sitting-Balance Support: No upper extremity supported  Static Sitting-Level of Assistance: Distant supervision  Dynamic Sitting Balance  Dynamic Sitting-Balance Support: Bilateral upper extremity supported    Static Standing Balance  Static Standing-Balance Support: Bilateral upper extremity supported (using a wheeled walker)  Static Standing-Level of Assistance: Contact guard  Dynamic Standing Balance  Dynamic Standing-Balance Support: Bilateral upper extremity supported (using a wheeled walker)  Functional Assessments:  Transfers  Transfer: Yes  Transfer 1  Transfer From 1: Sit to  Transfer to 1: Stand  Transfer Device 1: Walker  Transfer Level of Assistance 1: Close supervision  Transfers 2  Transfer From 2: Stand to  Transfer to 2: Sit  Transfer Device 2: Walker  Transfer Level of Assistance 2: Contact guard    Ambulation/Gait Training  Ambulation/Gait Training Performed: Yes  Ambulation/Gait Training 1  Surface 1: Carpet  Device 1: Rolling walker  Assistance 1: Contact guard  Quality of Gait 1:  (unsteady, decreased endurance)  Comments/Distance (ft) 1: 75ft    Stairs  Stairs: Yes  Stairs  Rails 1: Bilateral  Device 1:  Railing  Assistance 1: Contact guard  Comment/Number of Steps 1: 6  Extremity/Trunk Assessments:        RUE   RUE : Exceptions to WFL  RUE PROM (degrees)  RUE PROM Comment: WFL  RUE Strength  R Shoulder Flexion: 4-/5  R Elbow Flexion: 4+/5  R Elbow Extension: 4+/5  R Wrist Flexion: 5/5  R Wrist Extension: 5/5  LUE   LUE: Exceptions to WFL  LUE PROM (degrees)  LUE PROM Comment: WFL  LUE Strength  L Shoulder Flexion: 4/5  L Elbow Flexion: 4/5  L Elbow Extension: 4/5  L Wrist Flexion: 5/5  L Wrist Extension: 5/5  RLE   RLE : Exceptions to WFL  PROM RLE (degrees)  RLE PROM Comment: WFL  Strength RLE  R Hip Flexion: 4-/5  R Knee Flexion: 4/5  R Knee Extension: 4/5  R Ankle Dorsiflexion: 4+/5  R Ankle Plantar Flexion: 4+/5  LLE   LLE : Exceptions to WFL  PROM LLE (degrees)  LLE PROM Comment: WFL  Strength LLE  L Hip Flexion: 4-/5  L Knee Flexion: 4/5  L Knee Extension: 4/5  L Ankle Dorsiflexion: 3-/5  L Ankle Plantar Flexion: 4-/5  Outcome Measures:  [facility] Basic Mobility  Turning from your back to your side while in a flat bed without using bedrails: A little  Moving from lying on your back to sitting on the side of a flat bed without using bedrails: A little  Moving to and from bed to chair (including a wheelchair): A little  Standing up from a chair using your arms (e.g. wheelchair or bedside chair): A little  To walk in hospital room: A little  Climbing 3-5 steps with railing: A lot  Basic Mobility - Total Score: 17    Encounter Problems       Encounter Problems (Active)       Balance       STG - Maintains supervision assist dynamic standing balance with upper extremity support using a wheeled walker. (Progressing)       Start:  10/22/23    Expected End:  11/05/23            STG - Maintains supervision assist static standing balance with upper extremity support using a wheeled walker. (Progressing)       Start:  10/22/23    Expected End:  11/05/23               Mobility       STG - Patient will ambulate 125ft using a  wheeled walker with supervision assistance. (Progressing)       Start:  10/22/23    Expected End:  11/05/23            STG - Patient will ascend and descend four to six stairs with supervision assistance using one rail. (Progressing)       Start:  10/22/23    Expected End:  11/05/23               Transfers       STG - Transfer from bed to chair with supervision assistance using a wheeled walker. (Progressing)       Start:  10/22/23    Expected End:  11/05/23            STG - Patient to transfer to and from sit to supine, independently. (Progressing)       Start:  10/22/23    Expected End:  11/05/23            STG - Patient will transfer sit to and from stand with supervision assistance using a wheeled walker. (Progressing)       Start:  10/22/23    Expected End:  11/05/23                   Education Documentation  Precautions, taught by Lang Mendez PT at 10/22/2023 10:53 AM.  Learner: Patient  Readiness: Acceptance  Method: Explanation, Demonstration, Handout  Response: Verbalizes Understanding, Demonstrated Understanding    Body Mechanics, taught by Lang Mendez PT at 10/22/2023 10:53 AM.  Learner: Patient  Readiness: Acceptance  Method: Explanation, Demonstration, Handout  Response: Verbalizes Understanding, Demonstrated Understanding    Home Exercise Program, taught by Lang Mendez PT at 10/22/2023 10:53 AM.  Learner: Patient  Readiness: Acceptance  Method: Explanation, Demonstration, Handout  Response: Verbalizes Understanding, Demonstrated Understanding    Mobility Training, taught by Lang Mendez PT at 10/22/2023 10:53 AM.  Learner: Patient  Readiness: Acceptance  Method: Explanation, Demonstration, Handout  Response: Verbalizes Understanding, Demonstrated Understanding    Education Comments  No comments found.

## 2023-10-22 NOTE — PROGRESS NOTES
"Navneet Thompson is a 89 y.o. male on day 3 of admission presenting with Acute congestive heart failure, unspecified heart failure type (CMS/HCC).    Subjective   Saw the patient this AM. He appears comfortable in bed and is lying flat. He reports getting a great night sleep overnight.       Objective     Physical Exam  Constitutional:       General: He is not in acute distress.     Appearance: Normal appearance.   HENT:      Head: Normocephalic and atraumatic.      Nose: Nose normal.      Mouth/Throat:      Mouth: Mucous membranes are moist.      Pharynx: Oropharynx is clear.   Eyes:      General: No scleral icterus.     Extraocular Movements: Extraocular movements intact.   Cardiovascular:      Rate and Rhythm: Normal rate and regular rhythm.      Pulses: Normal pulses.      Heart sounds: Normal heart sounds. No murmur heard.  Pulmonary:      Effort: Pulmonary effort is normal.      Breath sounds: Normal breath sounds. No wheezing.   Abdominal:      General: Bowel sounds are normal.      Palpations: Abdomen is soft.      Tenderness: There is no abdominal tenderness.   Musculoskeletal:         General: Swelling present. Normal range of motion.      Cervical back: Normal range of motion.      Right lower leg: Edema present.      Left lower leg: Edema present.   Skin:     General: Skin is warm and dry.   Neurological:      General: No focal deficit present.      Mental Status: He is alert and oriented to person, place, and time.   Psychiatric:         Mood and Affect: Mood normal.         Behavior: Behavior normal.       /76   Pulse 70   Temp 35.8 °C (96.4 °F) (Temporal)   Resp 18   Ht 1.791 m (5' 10.5\")   Wt 81.6 kg (179 lb 14.3 oz)   SpO2 98%   BMI 25.45 kg/m²       Last Recorded Vitals  Blood pressure 131/76, pulse 70, temperature 35.8 °C (96.4 °F), temperature source Temporal, resp. rate 18, height 1.791 m (5' 10.5\"), weight 81.6 kg (179 lb 14.3 oz), SpO2 98 %.  Intake/Output last 3 Shifts:  I/O last " 3 completed shifts:  In: 840 (10.3 mL/kg) [P.O.:840]  Out: 4825 (59.1 mL/kg) [Urine:4825 (1.6 mL/kg/hr)]  Weight: 81.6 kg     Relevant Results  Results for orders placed or performed during the hospital encounter of 10/19/23 (from the past 24 hour(s))   POCT GLUCOSE   Result Value Ref Range    POCT Glucose 228 (H) 74 - 99 mg/dL   POCT GLUCOSE   Result Value Ref Range    POCT Glucose 213 (H) 74 - 99 mg/dL   Renal function panel   Result Value Ref Range    Glucose 227 (H) 74 - 99 mg/dL    Sodium 140 136 - 145 mmol/L    Potassium 4.3 3.5 - 5.3 mmol/L    Chloride 101 98 - 107 mmol/L    Bicarbonate 29 21 - 32 mmol/L    Anion Gap 14 10 - 20 mmol/L    Urea Nitrogen 30 (H) 6 - 23 mg/dL    Creatinine 1.17 0.50 - 1.30 mg/dL    eGFR 59 (L) >60 mL/min/1.73m*2    Calcium 9.8 8.6 - 10.6 mg/dL    Phosphorus 3.0 2.5 - 4.9 mg/dL    Albumin 3.9 3.4 - 5.0 g/dL   Magnesium   Result Value Ref Range    Magnesium 2.79 (H) 1.60 - 2.40 mg/dL   POCT GLUCOSE   Result Value Ref Range    POCT Glucose 279 (H) 74 - 99 mg/dL   POCT GLUCOSE   Result Value Ref Range    POCT Glucose 271 (H) 74 - 99 mg/dL   CBC   Result Value Ref Range    WBC 8.2 4.4 - 11.3 x10*3/uL    nRBC 0.0 0.0 - 0.0 /100 WBCs    RBC 4.55 4.50 - 5.90 x10*6/uL    Hemoglobin 12.3 (L) 13.5 - 17.5 g/dL    Hematocrit 41.5 41.0 - 52.0 %    MCV 91 80 - 100 fL    MCH 27.0 26.0 - 34.0 pg    MCHC 29.6 (L) 32.0 - 36.0 g/dL    RDW 15.9 (H) 11.5 - 14.5 %    Platelets 193 150 - 450 x10*3/uL    MPV 10.9 7.5 - 11.5 fL   Renal function panel   Result Value Ref Range    Glucose 294 (H) 74 - 99 mg/dL    Sodium 141 136 - 145 mmol/L    Potassium 4.1 3.5 - 5.3 mmol/L    Chloride 103 98 - 107 mmol/L    Bicarbonate 27 21 - 32 mmol/L    Anion Gap 15 10 - 20 mmol/L    Urea Nitrogen 35 (H) 6 - 23 mg/dL    Creatinine 1.10 0.50 - 1.30 mg/dL    eGFR 64 >60 mL/min/1.73m*2    Calcium 9.7 8.6 - 10.6 mg/dL    Phosphorus 3.5 2.5 - 4.9 mg/dL    Albumin 3.7 3.4 - 5.0 g/dL             Assessment/Plan   Principal  Problem:    Acute congestive heart failure, unspecified heart failure type (CMS/HCC)  Navneet Thompson is a 89 y.o. male presenting with a two week history of shortness of breath that is particularly worse with exertion. He has a prior hx of HFrEP with last ECHO in 07/2023 showing EF 30-35%. Status post TAVR and CABG. Of note the patient presented to the ED at [facility] with SOB that has been particularly worse over the past few days and his SOB is present even after walking a few steps. 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 He denies any chest pain, fever, night sweats or weight loss. Pt. Discussed with Dr. Moyer and transferred to  for diuresis. Follows up with Dr. Bahena.      Updates 10/22  - Continue diureses, monitor I/O  - RFPs, creatinine  - Devise nearing PALLAVI, EP aware and informed patient that symptoms are not pacemaker related and has outpatient follow up scheduled    #ADHF  Patient presents with a 2 wk history of shortness of breath that particularly got worse 2 days ago at 4am, prompting him to sit up in bed and be concerned. No chest pain or any other constitutional symptoms.   - Hx of heart failure with an EF 30-35% in July 2023; he is on 40mg lasix BID at home  - Normal renal function and produces adequate urine  - CXR: Cardiomegaly and mild bilateral pulmonary edema.   - Troponins peaked at 40s, no chest pain, s/p CABG   - No concerning EKG changed that would allude to an ACS picture, rendering ACS less likely.   -TTE 10/19 shows Mod-severely decreased LV systolic function of 30-35% EF. Mildly reduced RV systolic function. Mod-severely dilated LA and RA. Mildly elevated RVSP. Global hypokinesis of LV  Plan  - Diuresis with lasix 80mg BID  - Strict I/o  -restriction fluid intake to 1.5L/24 hrs, low Na+ diet       #CAD S/p CABG   - Multivessel CAD with CABG  - C/w home meds for it, including aspirin, atorvastatin 80     #Chronic Afib with ICD placement  - NSR with HR in 60s on metoprolol 25mg BID. C/w with this.    - EP consulted, recommend outpatient follow up when ICD has reached PALLAVI, not likely the cause of current symptoms.  - On warfarin for A/c with therapeutic INR. C/w with it.         #Diabetes mellitus type 2 with hyperglycemia  - C/w Lantus and Humalog.    -Hemoglobin A1c.  Hypoglycemia protocol        CODE status: Full code  NOK: Pao (daughter): [phone]      Gary Ellison MD

## 2023-10-22 NOTE — PROGRESS NOTES
Occupational Therapy    Evaluation/Treatment    Patient Name: Navneet Thompson  MRN: [id number]  : 1933  Today's Date: 10/22/23  Time Calculation  Start Time: 903  Stop Time: 928  Time Calculation (min): 25 min       Assessment:  OT Assessment: Pt required min assist with funcitonal bed mobility, required CGA with functional transfers and ambulation usign a wheeled walker and min assist with lower body dressing activity.  Prognosis: Good  Barriers to Discharge: None  Evaluation/Treatment Tolerance: Patient tolerated treatment well  Medical Staff Made Aware: Yes  End of Session Communication: Bedside nurse  End of Session Patient Position: Up in chair  OT Assessment Results: Decreased ADL status  Prognosis: Good  Barriers to Discharge: None  Evaluation/Treatment Tolerance: Patient tolerated treatment well  Medical Staff Made Aware: Yes  Plan:  Treatment Interventions: ADL retraining, Functional transfer training  OT Frequency: 3 times per week  OT Recommended Transfer Status:  (CGA)  OT - OK to Discharge: Yes  Treatment Interventions: ADL retraining, Functional transfer training    Subjective   Current Problem:  1. Acute congestive heart failure, unspecified heart failure type (CMS/HCC)  Transthoracic Echo (TTE) Complete    Transthoracic Echo (TTE) Complete    Referral to Cardiology    Renal function panel    Magnesium      2. Syncope and collapse  Transthoracic Echo (TTE) Complete    Transthoracic Echo (TTE) Complete      3. Acute diastolic (congestive) heart failure (CMS/HCC)  Transthoracic Echo (TTE) Complete      4. On amiodarone therapy  Cardiac device check - Inpatient    Cardiac device check - Inpatient        General:   OT Received On: 10/22/23  General  Reason for Referral: SOB due to acute CHF  Past Medical History Relevant to Rehab: Chronic systolic heart failure with EF(35-40% ), aortic stenosis, CAD, A-fib, complete heart block, DM II, HTN, automatic cardiac defibrillator/pacemaker.  Prior to  Session Communication: Bedside nurse  Patient Position Received: Up in chair, Alarm off, not on at start of session  Preferred Learning Style: verbal, visual, written  Precautions:  Medical Precautions: Fall precautions  Vital Signs:     Pain:  Pain Assessment  Pain Assessment: 0-10  Pain Score: 0 - No pain    Objective   Cognition:  Overall Cognitive Status: Within Functional Limits           Home Living:  Type of Home: House  Lives With: Alone  Home Adaptive Equipment: Walker rolling or standard, Cane (rollator)  Home Layout: Two level, Laundry in basement  Alternate Level Stairs-Rails: Both  Home Access: Stairs to enter with rails  Entrance Stairs-Rails: Both  Home Living Comments: pt's daughter can assist with laundry and driving to Desk.  Prior Function:  Level of [place]: Independent with homemaking with ambulation  Receives Help From: Family  Ambulatory Assistance: Independent  Vocational: Retired  Prior Function Comments: pt uses a rollator  IADL History:     ADL:  Bathing Assistance: Stand by  Bathing Deficit: Setup (anticipate)  UE Dressing Assistance: Minimal  UE Dressing Deficit:  (donning and doffing a gown while sitting EOB)  LE Dressing Assistance: Minimal  LE Dressing Deficit:  (donning pants initiated while sitting EOB and completed in standing.)  Toileting Assistance with Device:  (Denied the need to use the bathroom.)  Functional Deficit:  (Pt required CGA with functional ambulation usign a wheeled walker.)  Activities of Daily Living:                UE Dressing  UE Dressing Level of Assistance: Minimum assistance  UE Dressing Where Assessed: Edge of bed    LE Dressing  Pants Level of Assistance: Minimum assistance       Activity Tolerance:  Endurance: Tolerates less than 10 min exercise with changes in vital signs  Functional Standing Tolerance:     Bed Mobility/Transfers: Bed Mobility  Bed Mobility: Yes  Bed Mobility 1  Bed Mobility 1: Supine to sitting, Log roll  Level of  Assistance 1: Minimum assistance  Bed Mobility Comments 1: HOB slightly elevated.    Transfers  Transfer: Yes  Transfer 1  Transfer From 1: Sit to  Transfer to 1: Stand  Technique 1: Stand pivot  Transfer Device 1: Walker  Transfer Level of Assistance 1: Contact guard       Vision:Vision - Basic Assessment  Current Vision: No visual deficits       Extremities: RUE   RUE : Within Functional Limits  RUE PROM (degrees)  RUE PROM Comment: WFL  RUE Strength  RUE Overall Strength: Within Functional Limits - able to perform ADL tasks with strength and LUE   LUE: Within Functional Limits  LUE PROM (degrees)  LUE PROM Comment: WFL  LUE Strength  LUE Overall Strength: Within Functional Limits - strength 5/5      Outcome Measures: [facility] Daily Activity  Putting on and taking off regular lower body clothing: A little  Bathing (including washing, rinsing, drying): A lot  Putting on and taking off regular upper body clothing: A little  Toileting, which includes using toilet, bedpan or urinal: A lot  Taking care of personal grooming such as brushing teeth: A little  Eating Meals: A little  Daily Activity - Total Score: 16        Education Documentation  ADL Training, taught by Dixie Rea OT at 10/22/2023  1:06 PM.  Learner: Patient  Readiness: Acceptance  Method: Explanation  Response: Verbalizes Understanding    Education Comments  No comments found.             Goals:  Encounter Problems       Encounter Problems (Active)       ADLs       Patient will perform UB and LB bathing  with modified independent level of assistance and raised toilet seat, grab bars, and bedside commode. (Progressing)       Start:  10/22/23    Expected End:  11/12/23            Patient with complete lower body dressing with modified independent level of assistance donning and doffing all LE clothes  with PRN adaptive equipment while edge of bed  and standing (Progressing)       Start:  10/22/23    Expected End:  11/12/23            Patient will  complete toileting including hygiene clothing management/hygiene with modified independent level of assistance and raised toilet seat, grab bars, and bedside commode. (Progressing)       Start:  10/22/23    Expected End:  11/12/23               BALANCE       Pt will maintain dynamic standing balance during ADL task with modified independent level of assistance in order to demonstrate decreased risk of falling and improved postural control. (Progressing)       Start:  10/22/23    Expected End:  11/12/23                 MOBILITY       Patient will perform Functional mobility mod  Household distances/Community Distances with modified independent level of assistance and least restrictive device in order to improve safety and functional mobility. (Progressing)       Start:  10/22/23    Expected End:  11/12/23                       TRANSFERS       Patient will perform bed mobility modified independent level of assistance and bed rails in order to improve safety and independence with mobility (Progressing)       Start:  10/22/23    Expected End:  11/12/23            Patient will complete sit to stand transfer with modified independent level of assistance and least restrictive device in order to improve safety and prepare for out of bed mobility. (Progressing)       Start:  10/22/23    Expected End:  11/12/23

## 2023-10-23 ENCOUNTER — PHARMACY VISIT (OUTPATIENT)
Dept: PHARMACY | Facility: CLINIC | Age: 88
End: 2023-10-23

## 2023-10-23 LAB
ALBUMIN SERPL BCP-MCNC: 4.2 G/DL (ref 3.4–5)
ANION GAP SERPL CALC-SCNC: 18 MMOL/L (ref 10–20)
BUN SERPL-MCNC: 35 MG/DL (ref 6–23)
CALCIUM SERPL-MCNC: 10.1 MG/DL (ref 8.6–10.6)
CHLORIDE SERPL-SCNC: 100 MMOL/L (ref 98–107)
CO2 SERPL-SCNC: 27 MMOL/L (ref 21–32)
CREAT SERPL-MCNC: 1.24 MG/DL (ref 0.5–1.3)
ERYTHROCYTE [DISTWIDTH] IN BLOOD BY AUTOMATED COUNT: 15.8 % (ref 11.5–14.5)
GFR SERPL CREATININE-BSD FRML MDRD: 55 ML/MIN/1.73M*2
GLUCOSE BLD MANUAL STRIP-MCNC: 255 MG/DL (ref 74–99)
GLUCOSE BLD MANUAL STRIP-MCNC: 268 MG/DL (ref 74–99)
GLUCOSE BLD MANUAL STRIP-MCNC: 323 MG/DL (ref 74–99)
GLUCOSE BLD MANUAL STRIP-MCNC: 385 MG/DL (ref 74–99)
GLUCOSE SERPL-MCNC: 389 MG/DL (ref 74–99)
HCT VFR BLD AUTO: 45.7 % (ref 41–52)
HGB BLD-MCNC: 14 G/DL (ref 13.5–17.5)
MAGNESIUM SERPL-MCNC: 2.38 MG/DL (ref 1.6–2.4)
MCH RBC QN AUTO: 27.4 PG (ref 26–34)
MCHC RBC AUTO-ENTMCNC: 30.6 G/DL (ref 32–36)
MCV RBC AUTO: 89 FL (ref 80–100)
NRBC BLD-RTO: 0 /100 WBCS (ref 0–0)
PHOSPHATE SERPL-MCNC: 3.7 MG/DL (ref 2.5–4.9)
PLATELET # BLD AUTO: 229 X10*3/UL (ref 150–450)
PMV BLD AUTO: 11.1 FL (ref 7.5–11.5)
POTASSIUM SERPL-SCNC: 4.3 MMOL/L (ref 3.5–5.3)
RBC # BLD AUTO: 5.11 X10*6/UL (ref 4.5–5.9)
SODIUM SERPL-SCNC: 141 MMOL/L (ref 136–145)
WBC # BLD AUTO: 9.8 X10*3/UL (ref 4.4–11.3)

## 2023-10-23 PROCEDURE — 2500000004 HC RX 250 GENERAL PHARMACY W/ HCPCS (ALT 636 FOR OP/ED)

## 2023-10-23 PROCEDURE — 80069 RENAL FUNCTION PANEL: CPT | Mod: CMCLAB

## 2023-10-23 PROCEDURE — 36415 COLL VENOUS BLD VENIPUNCTURE: CPT | Mod: CMCLAB

## 2023-10-23 PROCEDURE — 36415 COLL VENOUS BLD VENIPUNCTURE: CPT

## 2023-10-23 PROCEDURE — 2500000004 HC RX 250 GENERAL PHARMACY W/ HCPCS (ALT 636 FOR OP/ED): Performed by: STUDENT IN AN ORGANIZED HEALTH CARE EDUCATION/TRAINING PROGRAM

## 2023-10-23 PROCEDURE — 2500000001 HC RX 250 WO HCPCS SELF ADMINISTERED DRUGS (ALT 637 FOR MEDICARE OP)

## 2023-10-23 PROCEDURE — 2500000001 HC RX 250 WO HCPCS SELF ADMINISTERED DRUGS (ALT 637 FOR MEDICARE OP): Performed by: STUDENT IN AN ORGANIZED HEALTH CARE EDUCATION/TRAINING PROGRAM

## 2023-10-23 PROCEDURE — 82947 ASSAY GLUCOSE BLOOD QUANT: CPT

## 2023-10-23 PROCEDURE — 1100000001 HC PRIVATE ROOM DAILY

## 2023-10-23 PROCEDURE — 85027 COMPLETE CBC AUTOMATED: CPT | Mod: CMCLAB

## 2023-10-23 PROCEDURE — 99232 SBSQ HOSP IP/OBS MODERATE 35: CPT | Performed by: INTERNAL MEDICINE

## 2023-10-23 PROCEDURE — 83735 ASSAY OF MAGNESIUM: CPT | Mod: CMCLAB

## 2023-10-23 RX ORDER — FUROSEMIDE 40 MG/1
40 TABLET ORAL
Status: DISCONTINUED | OUTPATIENT
Start: 2023-10-24 | End: 2023-10-24

## 2023-10-23 RX ADMIN — ATORVASTATIN CALCIUM 80 MG: 80 TABLET, FILM COATED ORAL at 20:28

## 2023-10-23 RX ADMIN — INSULIN LISPRO 1 UNITS: 100 INJECTION, SOLUTION INTRAVENOUS; SUBCUTANEOUS at 17:37

## 2023-10-23 RX ADMIN — INSULIN LISPRO 5 UNITS: 100 INJECTION, SOLUTION INTRAVENOUS; SUBCUTANEOUS at 08:40

## 2023-10-23 RX ADMIN — EMPAGLIFLOZIN 10 MG: 10 TABLET, FILM COATED ORAL at 08:40

## 2023-10-23 RX ADMIN — WARFARIN SODIUM 6 MG: 3 TABLET ORAL at 17:36

## 2023-10-23 RX ADMIN — INSULIN LISPRO 4 UNITS: 100 INJECTION, SOLUTION INTRAVENOUS; SUBCUTANEOUS at 12:56

## 2023-10-23 RX ADMIN — SENNOSIDES AND DOCUSATE SODIUM 1 TABLET: 8.6; 5 TABLET ORAL at 20:28

## 2023-10-23 RX ADMIN — SENNOSIDES AND DOCUSATE SODIUM 1 TABLET: 8.6; 5 TABLET ORAL at 08:40

## 2023-10-23 RX ADMIN — METOPROLOL TARTRATE 25 MG: 25 TABLET, FILM COATED ORAL at 08:40

## 2023-10-23 RX ADMIN — METOPROLOL TARTRATE 25 MG: 25 TABLET, FILM COATED ORAL at 20:28

## 2023-10-23 RX ADMIN — ASPIRIN 81 MG: 81 TABLET, COATED ORAL at 08:40

## 2023-10-23 RX ADMIN — POLYETHYLENE GLYCOL 3350 17 G: 17 POWDER, FOR SOLUTION ORAL at 08:40

## 2023-10-23 ASSESSMENT — COGNITIVE AND FUNCTIONAL STATUS - GENERAL
STANDING UP FROM CHAIR USING ARMS: A LITTLE
MOVING TO AND FROM BED TO CHAIR: A LITTLE
MOVING FROM LYING ON BACK TO SITTING ON SIDE OF FLAT BED WITH BEDRAILS: A LITTLE
DAILY ACTIVITIY SCORE: 17
DRESSING REGULAR UPPER BODY CLOTHING: A LITTLE
TOILETING: A LOT
TURNING FROM BACK TO SIDE WHILE IN FLAT BAD: A LITTLE
PERSONAL GROOMING: A LITTLE
WALKING IN HOSPITAL ROOM: A LITTLE
CLIMB 3 TO 5 STEPS WITH RAILING: A LOT
HELP NEEDED FOR BATHING: A LOT
DRESSING REGULAR LOWER BODY CLOTHING: A LITTLE
WALKING IN HOSPITAL ROOM: A LITTLE
MOVING FROM LYING ON BACK TO SITTING ON SIDE OF FLAT BED WITH BEDRAILS: A LITTLE
STANDING UP FROM CHAIR USING ARMS: A LITTLE
CLIMB 3 TO 5 STEPS WITH RAILING: A LOT
MOBILITY SCORE: 17
HELP NEEDED FOR BATHING: A LOT
DRESSING REGULAR UPPER BODY CLOTHING: A LITTLE
TOILETING: A LOT
DRESSING REGULAR LOWER BODY CLOTHING: A LITTLE
TURNING FROM BACK TO SIDE WHILE IN FLAT BAD: A LITTLE
DAILY ACTIVITIY SCORE: 17
PERSONAL GROOMING: A LITTLE
MOVING TO AND FROM BED TO CHAIR: A LITTLE
MOBILITY SCORE: 17

## 2023-10-23 ASSESSMENT — PAIN - FUNCTIONAL ASSESSMENT: PAIN_FUNCTIONAL_ASSESSMENT: 0-10

## 2023-10-23 ASSESSMENT — PAIN SCALES - GENERAL
PAINLEVEL_OUTOF10: 0 - NO PAIN
PAINLEVEL_OUTOF10: 0 - NO PAIN

## 2023-10-23 NOTE — CARE PLAN
Problem: Fall/Injury  Goal: Not fall by end of shift  Outcome: Progressing  Goal: Be free from injury by end of the shift  Outcome: Progressing  Goal: Verbalize understanding of personal risk factors for fall in the hospital  Outcome: Progressing  Goal: Verbalize understanding of risk factor reduction measures to prevent injury from fall in the home  Outcome: Progressing  Goal: Use assistive devices by end of the shift  Outcome: Progressing  Goal: Pace activities to prevent fatigue by end of the shift  Outcome: Progressing   The patient's goals for the shift include      The clinical goals for the shift include Patient will remain free of falls this shift.         Impression: Myopia, bilateral: H52.13.  Plan: Glasses Rx given

## 2023-10-23 NOTE — HOSPITAL COURSE
Navneet Thompson is a 89 y.o. male presented with a two week history of shortness of breath that is particularly worse with exertion. He has a prior hx of HFrEP with last ECHO in 07/2023 showing EF 30-35%. Status post TAVR and CABG.  He denies any chest pain, fever, night sweats or weight loss. Pt. Discussed with Dr. Moyer and transferred to  for diuresis. Follows up with Dr. Bahena.    He was diuresed during his stay with IV lasix, at 80 BID with total 9 L net negative and improvement in symptoms. He was monitored on tele with no significant ectopy.     During hospital stay his RV pacemaker was interrogated by EP was found to be functional without any issues with 3 month + battery life.    On discharge his home lasix dose was increased to 40 BID from 40 daily. Patient was given follow up with Debo Louie NP with [facility] order to eval for need for diuresis dose adjustment.    Follow up:  1- EP followup with Dr. Bahena  2- Cardiology follow up with TY louie

## 2023-10-23 NOTE — CARE PLAN
The patient's goals for the shift include      The clinical goals for the shift include patient to diuresis 1 liter negative today    Patient negative continue to monitor strict intake and output    Problem: Fall/Injury  Goal: Not fall by end of shift  Outcome: Progressing  Goal: Be free from injury by end of the shift  Outcome: Progressing     Problem: Not Adhereing To Sodium Restrictions  Goal: 2000mg or less of sodium per day over the next 30 days  Outcome: Progressing

## 2023-10-23 NOTE — PROGRESS NOTES
"Navneet Thompson is a 89 y.o. male on day 4 of admission presenting with Acute congestive heart failure, unspecified heart failure type (CMS/HCC).    Subjective   Saw the patient this AM. He appears comfortable in bed and is lying flat. He reports getting a great night sleep overnight.       Objective     Physical Exam  Constitutional:       General: He is not in acute distress.     Appearance: Normal appearance.   HENT:      Head: Normocephalic and atraumatic.      Nose: Nose normal.      Mouth/Throat:      Mouth: Mucous membranes are moist.      Pharynx: Oropharynx is clear.   Eyes:      General: No scleral icterus.     Extraocular Movements: Extraocular movements intact.   Cardiovascular:      Rate and Rhythm: Normal rate and regular rhythm.      Pulses: Normal pulses.      Heart sounds: Normal heart sounds. No murmur heard.  Pulmonary:      Effort: Pulmonary effort is normal.      Breath sounds: Normal breath sounds. No wheezing.   Abdominal:      General: Bowel sounds are normal.      Palpations: Abdomen is soft.      Tenderness: There is no abdominal tenderness.   Musculoskeletal:         General: Swelling present. Normal range of motion.      Cervical back: Normal range of motion.      Right lower leg: Edema present.      Left lower leg: Edema present.   Skin:     General: Skin is warm and dry.   Neurological:      General: No focal deficit present.      Mental Status: He is alert and oriented to person, place, and time.   Psychiatric:         Mood and Affect: Mood normal.         Behavior: Behavior normal.       /72 (BP Location: Right arm, Patient Position: Lying)   Pulse 72   Temp 35.7 °C (96.2 °F) (Temporal)   Resp 18   Ht 1.791 m (5' 10.5\")   Wt 80.2 kg (176 lb 12.9 oz)   SpO2 95%   BMI 25.01 kg/m²       Last Recorded Vitals  Blood pressure 139/72, pulse 72, temperature 35.7 °C (96.2 °F), temperature source Temporal, resp. rate 18, height 1.791 m (5' 10.5\"), weight 80.2 kg (176 lb 12.9 oz), " SpO2 95 %.  Intake/Output last 3 Shifts:  I/O last 3 completed shifts:  In: 1080 (13.5 mL/kg) [P.O.:1080]  Out: 5000 (62.3 mL/kg) [Urine:5000 (1.7 mL/kg/hr)]  Weight: 80.2 kg     Relevant Results  Results for orders placed or performed during the hospital encounter of 10/19/23 (from the past 24 hour(s))   POCT GLUCOSE   Result Value Ref Range    POCT Glucose 318 (H) 74 - 99 mg/dL   POCT GLUCOSE   Result Value Ref Range    POCT Glucose 367 (H) 74 - 99 mg/dL   POCT GLUCOSE   Result Value Ref Range    POCT Glucose 385 (H) 74 - 99 mg/dL   CBC   Result Value Ref Range    WBC 9.8 4.4 - 11.3 x10*3/uL    nRBC 0.0 0.0 - 0.0 /100 WBCs    RBC 5.11 4.50 - 5.90 x10*6/uL    Hemoglobin 14.0 13.5 - 17.5 g/dL    Hematocrit 45.7 41.0 - 52.0 %    MCV 89 80 - 100 fL    MCH 27.4 26.0 - 34.0 pg    MCHC 30.6 (L) 32.0 - 36.0 g/dL    RDW 15.8 (H) 11.5 - 14.5 %    Platelets 229 150 - 450 x10*3/uL    MPV 11.1 7.5 - 11.5 fL   Renal function panel   Result Value Ref Range    Glucose 389 (H) 74 - 99 mg/dL    Sodium 141 136 - 145 mmol/L    Potassium 4.3 3.5 - 5.3 mmol/L    Chloride 100 98 - 107 mmol/L    Bicarbonate 27 21 - 32 mmol/L    Anion Gap 18 10 - 20 mmol/L    Urea Nitrogen 35 (H) 6 - 23 mg/dL    Creatinine 1.24 0.50 - 1.30 mg/dL    eGFR 55 (L) >60 mL/min/1.73m*2    Calcium 10.1 8.6 - 10.6 mg/dL    Phosphorus 3.7 2.5 - 4.9 mg/dL    Albumin 4.2 3.4 - 5.0 g/dL   Magnesium   Result Value Ref Range    Magnesium 2.38 1.60 - 2.40 mg/dL   POCT GLUCOSE   Result Value Ref Range    POCT Glucose 323 (H) 74 - 99 mg/dL             Assessment/Plan   Principal Problem:    Acute congestive heart failure, unspecified heart failure type (CMS/HCC)  Navneet Thompson is a 89 y.o. male presenting with a two week history of shortness of breath that is particularly worse with exertion. He has a prior hx of HFrEP with last ECHO in 07/2023 showing EF 30-35%. Status post TAVR and CABG. Of note the patient presented to the ED at [facility] with SOB that has been  particularly worse over the past few days and his SOB is present even after walking a few steps. 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 He denies any chest pain, fever, night sweats or weight loss. Pt. Discussed with Dr. Moyer and transferred to  for diuresis. Follows up with Dr. Bahena.      Updates 10/23  - Medically optimised and ready for discharge pending SNF placement  - Given his recent hospitalisation, we will increase his lasix to 40mg BID.    #ADHF  Patient presents with a 2 wk history of shortness of breath that particularly got worse 2 days ago at 4am, prompting him to sit up in bed and be concerned. No chest pain or any other constitutional symptoms.   - Hx of heart failure with an EF 30-35% in July 2023; he is on 40mg lasix BID at home  - Normal renal function and produces adequate urine  - CXR: Cardiomegaly and mild bilateral pulmonary edema.   - Troponins peaked at 40s, no chest pain, s/p CABG   - No concerning EKG changed that would allude to an ACS picture, rendering ACS less likely.   -TTE 10/19 shows Mod-severely decreased LV systolic function of 30-35% EF. Mildly reduced RV systolic function. Mod-severely dilated LA and RA. Mildly elevated RVSP. Global hypokinesis of LV  Plan  - Diuresis with lasix 80mg BID  - Strict I/o  -restriction fluid intake to 1.5L/24 hrs, low Na+ diet       #CAD S/p CABG   - Multivessel CAD with CABG  - C/w home meds for it, including aspirin, atorvastatin 80     #Chronic Afib with ICD placement  - NSR with HR in 60s on metoprolol 25mg BID. C/w with this.   - EP consulted, recommend outpatient follow up when ICD has reached PALLAIV, not likely the cause of current symptoms.  - On warfarin for A/c with therapeutic INR. C/w with it.         #Diabetes mellitus type 2 with hyperglycemia  - C/w Lantus and Humalog.    -Hemoglobin A1c.  Hypoglycemia protocol        CODE status: Full code  NOK: Pao (daughter): [phone]      Nata Thompson MD

## 2023-10-23 NOTE — PROGRESS NOTES
10/23/23 PCN note:   pt and dtr agreeable to SNF placement, recommendation for moderate intensity, pt has been to [facility] and would like to return if they can accept, referral sent in [facility] for review.     Kaylee De La Cruz

## 2023-10-24 ENCOUNTER — PHARMACY VISIT (OUTPATIENT)
Dept: PHARMACY | Facility: CLINIC | Age: 88
End: 2023-10-24
Payer: COMMERCIAL

## 2023-10-24 ENCOUNTER — APPOINTMENT (OUTPATIENT)
Dept: CARDIOLOGY | Facility: HOSPITAL | Age: 88
DRG: 291 | End: 2023-10-24
Payer: MEDICARE

## 2023-10-24 VITALS
RESPIRATION RATE: 18 BRPM | HEART RATE: 70 BPM | TEMPERATURE: 97.4 F | HEIGHT: 71 IN | DIASTOLIC BLOOD PRESSURE: 63 MMHG | SYSTOLIC BLOOD PRESSURE: 125 MMHG | WEIGHT: 179.45 LBS | BODY MASS INDEX: 25.12 KG/M2 | OXYGEN SATURATION: 95 %

## 2023-10-24 LAB
ALBUMIN SERPL BCP-MCNC: 3.6 G/DL (ref 3.4–5)
ANION GAP SERPL CALC-SCNC: 18 MMOL/L (ref 10–20)
ATRIAL RATE: 69 BPM
BUN SERPL-MCNC: 41 MG/DL (ref 6–23)
CALCIUM SERPL-MCNC: 9 MG/DL (ref 8.6–10.6)
CHLORIDE SERPL-SCNC: 102 MMOL/L (ref 98–107)
CO2 SERPL-SCNC: 24 MMOL/L (ref 21–32)
CREAT SERPL-MCNC: 1.62 MG/DL (ref 0.5–1.3)
ERYTHROCYTE [DISTWIDTH] IN BLOOD BY AUTOMATED COUNT: 15.8 % (ref 11.5–14.5)
GFR SERPL CREATININE-BSD FRML MDRD: 40 ML/MIN/1.73M*2
GLUCOSE BLD MANUAL STRIP-MCNC: 267 MG/DL (ref 74–99)
GLUCOSE SERPL-MCNC: 225 MG/DL (ref 74–99)
HCT VFR BLD AUTO: 39.2 % (ref 41–52)
HGB BLD-MCNC: 12 G/DL (ref 13.5–17.5)
MAGNESIUM SERPL-MCNC: 2.4 MG/DL (ref 1.6–2.4)
MCH RBC QN AUTO: 27.3 PG (ref 26–34)
MCHC RBC AUTO-ENTMCNC: 30.6 G/DL (ref 32–36)
MCV RBC AUTO: 89 FL (ref 80–100)
NRBC BLD-RTO: 0 /100 WBCS (ref 0–0)
PHOSPHATE SERPL-MCNC: 4 MG/DL (ref 2.5–4.9)
PLATELET # BLD AUTO: 196 X10*3/UL (ref 150–450)
PMV BLD AUTO: 10.7 FL (ref 7.5–11.5)
POTASSIUM SERPL-SCNC: 3.9 MMOL/L (ref 3.5–5.3)
Q ONSET: 184 MS
QRS COUNT: 13 BEATS
QRS DURATION: 186 MS
QT INTERVAL: 494 MS
QTC CALCULATION(BAZETT): 566 MS
QTC FREDERICIA: 541 MS
R AXIS: 234 DEGREES
RBC # BLD AUTO: 4.39 X10*6/UL (ref 4.5–5.9)
SODIUM SERPL-SCNC: 140 MMOL/L (ref 136–145)
T AXIS: 72 DEGREES
T OFFSET: 431 MS
VENTRICULAR RATE: 79 BPM
WBC # BLD AUTO: 9.2 X10*3/UL (ref 4.4–11.3)

## 2023-10-24 PROCEDURE — 2500000004 HC RX 250 GENERAL PHARMACY W/ HCPCS (ALT 636 FOR OP/ED)

## 2023-10-24 PROCEDURE — 97110 THERAPEUTIC EXERCISES: CPT | Mod: GP,CQ

## 2023-10-24 PROCEDURE — 85027 COMPLETE CBC AUTOMATED: CPT | Mod: CMCLAB

## 2023-10-24 PROCEDURE — 99239 HOSP IP/OBS DSCHRG MGMT >30: CPT

## 2023-10-24 PROCEDURE — 93005 ELECTROCARDIOGRAM TRACING: CPT

## 2023-10-24 PROCEDURE — 82947 ASSAY GLUCOSE BLOOD QUANT: CPT

## 2023-10-24 PROCEDURE — 2500000001 HC RX 250 WO HCPCS SELF ADMINISTERED DRUGS (ALT 637 FOR MEDICARE OP): Performed by: STUDENT IN AN ORGANIZED HEALTH CARE EDUCATION/TRAINING PROGRAM

## 2023-10-24 PROCEDURE — 83735 ASSAY OF MAGNESIUM: CPT | Mod: CMCLAB

## 2023-10-24 PROCEDURE — 36415 COLL VENOUS BLD VENIPUNCTURE: CPT

## 2023-10-24 PROCEDURE — 97116 GAIT TRAINING THERAPY: CPT | Mod: GP,CQ

## 2023-10-24 PROCEDURE — 2500000001 HC RX 250 WO HCPCS SELF ADMINISTERED DRUGS (ALT 637 FOR MEDICARE OP)

## 2023-10-24 PROCEDURE — 80069 RENAL FUNCTION PANEL: CPT | Mod: CMCLAB

## 2023-10-24 PROCEDURE — 2500000004 HC RX 250 GENERAL PHARMACY W/ HCPCS (ALT 636 FOR OP/ED): Performed by: STUDENT IN AN ORGANIZED HEALTH CARE EDUCATION/TRAINING PROGRAM

## 2023-10-24 RX ORDER — FUROSEMIDE 40 MG/1
40 TABLET ORAL DAILY
Qty: 30 TABLET | Refills: 0 | Status: SHIPPED | OUTPATIENT
Start: 2023-10-25 | End: 2023-12-19

## 2023-10-24 RX ORDER — ATORVASTATIN CALCIUM 80 MG/1
80 TABLET, FILM COATED ORAL NIGHTLY
Qty: 30 TABLET | Refills: 0 | Status: SHIPPED | OUTPATIENT
Start: 2023-10-24 | End: 2023-12-19

## 2023-10-24 RX ORDER — POLYETHYLENE GLYCOL 3350 17 G/17G
17 POWDER, FOR SOLUTION ORAL DAILY
Qty: 30 PACKET | Refills: 0 | Status: SHIPPED | OUTPATIENT
Start: 2023-10-25 | End: 2023-11-24

## 2023-10-24 RX ORDER — FUROSEMIDE 40 MG/1
40 TABLET ORAL DAILY
Status: DISCONTINUED | OUTPATIENT
Start: 2023-10-25 | End: 2023-10-24 | Stop reason: HOSPADM

## 2023-10-24 RX ORDER — MAGNESIUM HYDROXIDE 2400 MG/10ML
10 SUSPENSION ORAL DAILY
Status: DISCONTINUED | OUTPATIENT
Start: 2023-10-24 | End: 2023-10-24 | Stop reason: HOSPADM

## 2023-10-24 RX ADMIN — POLYETHYLENE GLYCOL 3350 17 G: 17 POWDER, FOR SOLUTION ORAL at 09:11

## 2023-10-24 RX ADMIN — SODIUM CHLORIDE, POTASSIUM CHLORIDE, SODIUM LACTATE AND CALCIUM CHLORIDE 500 ML: 600; 310; 30; 20 INJECTION, SOLUTION INTRAVENOUS at 10:24

## 2023-10-24 RX ADMIN — FUROSEMIDE 40 MG: 40 TABLET ORAL at 09:11

## 2023-10-24 RX ADMIN — WARFARIN SODIUM 6 MG: 3 TABLET ORAL at 17:17

## 2023-10-24 RX ADMIN — ASPIRIN 81 MG: 81 TABLET, COATED ORAL at 09:11

## 2023-10-24 RX ADMIN — METOPROLOL TARTRATE 25 MG: 25 TABLET, FILM COATED ORAL at 09:11

## 2023-10-24 RX ADMIN — INSULIN LISPRO 3 UNITS: 100 INJECTION, SOLUTION INTRAVENOUS; SUBCUTANEOUS at 13:34

## 2023-10-24 RX ADMIN — SENNOSIDES AND DOCUSATE SODIUM 1 TABLET: 8.6; 5 TABLET ORAL at 09:11

## 2023-10-24 RX ADMIN — INSULIN LISPRO 2 UNITS: 100 INJECTION, SOLUTION INTRAVENOUS; SUBCUTANEOUS at 09:12

## 2023-10-24 RX ADMIN — EMPAGLIFLOZIN 10 MG: 10 TABLET, FILM COATED ORAL at 09:11

## 2023-10-24 ASSESSMENT — COGNITIVE AND FUNCTIONAL STATUS - GENERAL
MOVING TO AND FROM BED TO CHAIR: A LITTLE
CLIMB 3 TO 5 STEPS WITH RAILING: A LOT
HELP NEEDED FOR BATHING: A LOT
CLIMB 3 TO 5 STEPS WITH RAILING: A LOT
TOILETING: A LITTLE
DRESSING REGULAR UPPER BODY CLOTHING: A LITTLE
MOBILITY SCORE: 17
STANDING UP FROM CHAIR USING ARMS: A LITTLE
MOVING FROM LYING ON BACK TO SITTING ON SIDE OF FLAT BED WITH BEDRAILS: A LITTLE
TURNING FROM BACK TO SIDE WHILE IN FLAT BAD: A LITTLE
DAILY ACTIVITIY SCORE: 18
PERSONAL GROOMING: A LITTLE
TURNING FROM BACK TO SIDE WHILE IN FLAT BAD: A LITTLE
DRESSING REGULAR LOWER BODY CLOTHING: A LITTLE
MOVING TO AND FROM BED TO CHAIR: A LITTLE
MOBILITY SCORE: 17
STANDING UP FROM CHAIR USING ARMS: A LITTLE
WALKING IN HOSPITAL ROOM: A LITTLE
WALKING IN HOSPITAL ROOM: A LITTLE
MOVING FROM LYING ON BACK TO SITTING ON SIDE OF FLAT BED WITH BEDRAILS: A LITTLE

## 2023-10-24 ASSESSMENT — PAIN - FUNCTIONAL ASSESSMENT
PAIN_FUNCTIONAL_ASSESSMENT: 0-10
PAIN_FUNCTIONAL_ASSESSMENT: 0-10

## 2023-10-24 ASSESSMENT — PAIN SCALES - GENERAL
PAINLEVEL_OUTOF10: 0 - NO PAIN
PAINLEVEL_OUTOF10: 0 - NO PAIN

## 2023-10-24 ASSESSMENT — PAIN SCALES - WONG BAKER: WONGBAKER_NUMERICALRESPONSE: NO HURT

## 2023-10-24 NOTE — PROGRESS NOTES
10/24/23 PCN note:   [facility] unable to accept, pt and dtr Pao notified, dtr provided [facility] and [facility] as additional choices, referral sent in [facility] for review.     PCN update note @ 1:49pm:   both facilities able to accept, dtr Pao would like [facility] as [facility], , they can accept pt today, [facility] stretcher transport set for 5pm , TCC, pt, bed side RN , dtr and facility notified, DC slip provided to RN with report #, completed GR sent in [facility], request sent to [facility] for 7000 to be completed in [facility].     Kaylee De La Cruz

## 2023-10-24 NOTE — PROGRESS NOTES
Physical Therapy    Physical Therapy Treatment    Patient Name: Navneet Thompson  MRN: [id number]  Today's Date: 10/24/2023  Time Calculation  Start Time: 1112  Stop Time: 1141  Time Calculation (min): 29 min       Assessment/Plan   PT Assessment  PT Assessment Results: Decreased strength, Decreased endurance, Impaired balance, Decreased mobility  Rehab Prognosis: Good  Barriers to Discharge: none  Evaluation/Treatment Tolerance: Patient limited by fatigue  Strengths: Attitude of self  End of Session Communication: Bedside nurse  Assessment Comment: Pt tolerated PT session well, pt tolerated ambulatoin in hallway, required CGA and cues for posture throughout. Pt performed seated exercises at end of session however reports some fatigue towards end of session. Pt continues to remain appropriate for moderate intensity PT upon D/C from hospital.  End of Session Patient Position: Bed, 3 rail up, Alarm off, not on at start of session  PT Plan  Inpatient/Swing Bed or Outpatient: Inpatient  PT Plan  Treatment/Interventions: Bed mobility, Transfer training, Gait training, Stair training, Balance training, Strengthening, Endurance training, Therapeutic exercise, Therapeutic activity  PT Plan: Skilled PT  PT Frequency: 3 times per week  PT Discharge Recommendations: Moderate intensity level of continued care  PT Recommended Transfer Status: Stand by assist  PT - OK to Discharge: Yes      General Visit Information:   PT  Visit  PT Received On: 10/24/23  General  Prior to Session Communication: Bedside nurse  Patient Position Received: Bed, 3 rail up, Alarm off, not on at start of session  General Comment: Pt supine in bed on arrival, agreeable to work with PT.    Subjective   Precautions:  Precautions  Medical Precautions: Fall precautions  Vital Signs:  Vital Signs  Heart Rate:  (Pre:70 Durin, Post:70)  Heart Rate Source: Monitor    Objective   Pain:  Pain Assessment  Pain Assessment: 0-10  Pain Score: 0 - No pain    Activity  Tolerance:  Activity Tolerance  Endurance: Tolerates 10 - 20 min exercise with multiple rests  Treatments:  Therapeutic Exercise  Therapeutic Exercise Performed: Yes  Therapeutic Exercise Activity 1: Pt performed the following seated exercises: AP, LAQ, Marching, Hip ADD/ABD x15 BLE  Therapeutic Exercise Activity 2: 2 x5 STS transfers from EOB CGA however with increased fatigue Markie.    Bed Mobility  Bed Mobility: Yes  Bed Mobility 1  Bed Mobility 1: Supine to sitting  Level of Assistance 1: Contact guard  Bed Mobility Comments 1: HOB elevated, increased time to transfer to EOB  Bed Mobility 2  Bed Mobility  2: Sitting to supine  Level of Assistance 2: Contact guard    Ambulation/Gait Training  Ambulation/Gait Training Performed: Yes  Ambulation/Gait Training 1  Surface 1: Carpet  Device 1: Rolling walker  Gait Support Devices:  (Pt reports normally having brace/AFO on LLE however does not have here in room.,)  Assistance 1: Contact guard, Minimal verbal cues  Quality of Gait 1: Wide base of support (Decreased chuy, step/stride length, slight shuffle like steps, flexed posture and downward gaze)  Comments/Distance (ft) 1: 2 x75ft  Transfers  Transfer: Yes  Transfer 1  Transfer From 1: Bed to  Transfer to 1: Stand  Technique 1: Sit to stand, Stand to sit  Transfer Device 1: Walker  Transfer Level of Assistance 1: Contact guard, Minimal verbal cues  Trials/Comments 1: Cues for proper hand placment, avoid pulling upon FWW. Increased time to rise.  Transfers 2  Transfer From 2: Chair without arms to  Transfer to 2: Stand  Technique 2: Sit to stand, Stand to sit  Transfer Device 2: Walker  Transfer Level of Assistance 2: Contact guard, Minimal verbal cues  Trials/Comments 2: Cues for proper hand placement, slow to rise,    Stairs  Stairs: No    Outcome Measures:  [facility] Basic Mobility  Turning from your back to your side while in a flat bed without using bedrails: A little  Moving from lying on your back to sitting on  the side of a flat bed without using bedrails: A little  Moving to and from bed to chair (including a wheelchair): A little  Standing up from a chair using your arms (e.g. wheelchair or bedside chair): A little  To walk in hospital room: A little  Climbing 3-5 steps with railing: A lot  Basic Mobility - Total Score: 17    Education Documentation  Body Mechanics, taught by Betsy Huff PTA at 10/24/2023 12:49 PM.  Learner: Patient  Readiness: Acceptance  Method: Explanation  Response: Verbalizes Understanding, Needs Reinforcement    Mobility Training, taught by Betsy Huff PTA at 10/24/2023 12:49 PM.  Learner: Patient  Readiness: Acceptance  Method: Explanation  Response: Verbalizes Understanding, Needs Reinforcement    Education Comments  No comments found.        OP EDUCATION:       Encounter Problems       Encounter Problems (Active)       Balance       STG - Maintains supervision assist dynamic standing balance with upper extremity support using a wheeled walker. (Progressing)       Start:  10/22/23    Expected End:  11/05/23            STG - Maintains supervision assist static standing balance with upper extremity support using a wheeled walker. (Progressing)       Start:  10/22/23    Expected End:  11/05/23               Mobility       STG - Patient will ambulate 125ft using a wheeled walker with supervision assistance. (Progressing)       Start:  10/22/23    Expected End:  11/05/23            STG - Patient will ascend and descend four to six stairs with supervision assistance using one rail. (Progressing)       Start:  10/22/23    Expected End:  11/05/23               Transfers       STG - Transfer from bed to chair with supervision assistance using a wheeled walker. (Progressing)       Start:  10/22/23    Expected End:  11/05/23            STG - Patient to transfer to and from sit to supine, independently. (Progressing)       Start:  10/22/23    Expected End:  11/05/23            STG - Patient  will transfer sit to and from stand with supervision assistance using a wheeled walker. (Progressing)       Start:  10/22/23    Expected End:  11/05/23

## 2023-10-24 NOTE — DISCHARGE SUMMARY
Discharge Diagnosis  Acute congestive heart failure, unspecified heart failure type (CMS/HCC)    Issues Requiring Follow-Up      Test Results Pending At Discharge  Pending Labs       No current pending labs.            Hospital Course  Navneet Thompson is a 89 y.o. male presented with a two week history of shortness of breath that is particularly worse with exertion. He has a prior hx of HFrEP with last ECHO in 07/2023 showing EF 30-35%. Status post TAVR and CABG.  He denies any chest pain, fever, night sweats or weight loss. Pt. Discussed with Dr. Moyer and transferred to  for diuresis. Follows up with Dr. Bahena.    He was diuresed during his stay with IV lasix, at 80 BID with total 9 L net negative and improvement in symptoms. He was monitored on tele with no significant ectopy.     During hospital stay his RV pacemaker was interrogated by EP was found to be functional without any issues with 3 month + battery life.    On discharge his home lasix dose was increased to 40 BID from 40 daily. Patient was given follow up with Debo Louie NP with [facility] order to eval for need for diuresis dose adjustment.    Follow up:  1- EP followup with Dr. Bahena  2- Cardiology follow up with NP debo louie    Pertinent Physical Exam At Time of Discharge  Physical Exam  Constitutional:       General: He is not in acute distress.     Appearance: Normal appearance.   HENT:      Head: Normocephalic and atraumatic.      Nose: Nose normal.      Mouth/Throat:      Mouth: Mucous membranes are moist.      Pharynx: Oropharynx is clear.   Eyes:      General: No scleral icterus.     Extraocular Movements: Extraocular movements intact.   Cardiovascular:      Rate and Rhythm: Normal rate and regular rhythm.      Pulses: Normal pulses.      Heart sounds: Normal heart sounds. No murmur heard.  Pulmonary:      Effort: Pulmonary effort is normal.      Breath sounds: Normal breath sounds. No wheezing.   Abdominal:      General: Bowel  sounds are normal.      Palpations: Abdomen is soft.      Tenderness: There is no abdominal tenderness.   Musculoskeletal:         General: Normal range of motion.      Cervical back: Normal range of motion.      Right lower leg: improved edema     Left lower leg: improved edema  Skin:     General: Skin is warm and dry.   Neurological:      General: No focal deficit present.      Mental Status: He is alert and oriented to person, place, and time.   Psychiatric:         Mood and Affect: Mood normal.         Behavior: Behavior normal.   Home Medications     Medication List      START taking these medications     empagliflozin 10 mg; Commonly known as: Jardiance; Take 1 tablet (10 mg)   by mouth once daily. Do not start before October 23, 2023.   furosemide 40 mg tablet; Commonly known as: Lasix; Take 1 tablet (40 mg)   by mouth 2 times a day.; Replaces: furosemide 10 mg/mL injection   omeprazole 20 mg DR capsule; Commonly known as: PriLOSEC; Take 1 capsule   (20 mg) by mouth once daily. Do not crush or chew.     CONTINUE taking these medications     acetaminophen 325 mg tablet; Commonly known as: Tylenol; Take 2 tablets   (650 mg) by mouth every 6 hours if needed for mild pain (1 - 3).   aspirin 81 mg EC tablet   atorvastatin 80 mg tablet; Commonly known as: Lipitor   cholecalciferol 25 MCG (1000 UT) tablet; Commonly known as: Vitamin D-3;   Take 1 tablet (1,000 Units) by mouth once daily. Do not start before   October 20, 2023.   FreeStyle Maria Elena 14 Day Sensor kit; Generic drug: FreeStyle Maria Elena sensor   system   isosorbide mononitrate ER 30 mg 24 hr tablet; Commonly known as: Imdur   Lantus U-100 Insulin 100 unit/mL injection; Generic drug: insulin   glargine   lisinopril 5 mg tablet   lubricating eye drops ophthalmic solution; Administer 1 drop into both   eyes 2 times a day as needed for dry eyes.   magnesium oxide 400 mg (241.3 mg magnesium) tablet; Commonly known as:   Mag-Ox; Take 1 tablet (400 mg) by mouth once  daily. Do not start before   October 20, 2023.   melatonin 3 mg tablet; Take 1 tablet (3 mg) by mouth as needed at   bedtime for sleep.   metoprolol tartrate 25 mg tablet; Commonly known as: Lopressor   neomycin-bacitracin-polymyxin ophthalmic ointment; Commonly known as:   Polysporin; Apply 0.5 inches to both eyes once daily.   polyethylene glycol packet; Commonly known as: Glycolax, Miralax; Take   17 g by mouth once daily.   triamcinolone 0.1 % cream; Commonly known as: Kenalog; Apply topically 2   times a day.   * warfarin 3 mg tablet; Commonly known as: Coumadin; Take as directed.   If you are unsure how to take this medication, talk to your nurse or   doctor.; Original instructions: Take as directed per After Visit Summary.   * warfarin 3 mg tablet; Commonly known as: Coumadin; Take as directed.   If you are unsure how to take this medication, talk to your nurse or   doctor.; Original instructions: Take as directed per After Visit Summary.   Do not start before October 20, 2023.  * This list has 2 medication(s) that are the same as other medications   prescribed for you. Read the directions carefully, and ask your doctor or   other care provider to review them with you.     STOP taking these medications     dextrose 10 % in water (D10W) 10 % infusion   furosemide 10 mg/mL injection; Commonly known as: Lasix; Replaced by:   furosemide 40 mg tablet   pantoprazole 40 mg EC tablet; Commonly known as: ProtoNix       Outpatient Follow-Up  Future Appointments   Date Time Provider Department Center   11/9/2023  1:00 PM [id number]   11/13/2023  1:15 PM Melvin Bahena MD [address]       Nata Thompson MD

## 2023-10-24 NOTE — CARE PLAN
Problem: Fall/Injury  Goal: Not fall by end of shift  Outcome: Progressing  Goal: Be free from injury by end of the shift  Outcome: Progressing  Goal: Verbalize understanding of personal risk factors for fall in the hospital  Outcome: Progressing  Goal: Verbalize understanding of risk factor reduction measures to prevent injury from fall in the home  Outcome: Progressing  Goal: Use assistive devices by end of the shift  Outcome: Progressing  Goal: Pace activities to prevent fatigue by end of the shift  Outcome: Progressing     Problem: Not Adhereing To Fluid Restrictions  Goal: Patient will verbalize understanding of fluid restrictions  Outcome: Progressing     Problem: Not Completing Daily Weights  Goal: Patient will do weights every morning and call doctor for an increase of +3lbs overnight/+5Ibs over a week over next 30 days  Outcome: Progressing   The patient's goals for the shift include      The clinical goals for the shift include Pt will remain free from fall's throughout this shift    Over the shift, the patient did make progress toward the following goals. Barriers to progression include none. Recommendations to address these barriers include continue to monitor and use of call light.

## 2023-10-24 NOTE — PROGRESS NOTES
"Navneet Thompson is a 89 y.o. male on day 5 of admission presenting with Acute congestive heart failure, unspecified heart failure type (CMS/HCC).    Subjective   Saw the patient this AM. He reports getting a great night sleep overnight but does complain about not being able to get his milk of magnesia.    Objective     Physical Exam  Constitutional:       General: He is not in acute distress.     Appearance: Normal appearance.   HENT:      Head: Normocephalic and atraumatic.      Nose: Nose normal.      Mouth/Throat:      Mouth: Mucous membranes are moist.      Pharynx: Oropharynx is clear.   Eyes:      General: No scleral icterus.     Extraocular Movements: Extraocular movements intact.   Cardiovascular:      Rate and Rhythm: Normal rate and regular rhythm.      Pulses: Normal pulses.      Heart sounds: Normal heart sounds. No murmur heard.  Pulmonary:      Effort: Pulmonary effort is normal.      Breath sounds: Normal breath sounds. No wheezing.   Abdominal:      General: Bowel sounds are normal.      Palpations: Abdomen is soft.      Tenderness: There is no abdominal tenderness.   Musculoskeletal:         General: Swelling present. Normal range of motion.      Cervical back: Normal range of motion.      Right lower leg: Edema present.      Left lower leg: Edema present.   Skin:     General: Skin is warm and dry.   Neurological:      General: No focal deficit present.      Mental Status: He is alert and oriented to person, place, and time.   Psychiatric:         Mood and Affect: Mood normal.         Behavior: Behavior normal.       /70 (BP Location: Right arm, Patient Position: Lying)   Pulse 74   Temp 36.3 °C (97.4 °F) (Temporal)   Resp 18   Ht 1.791 m (5' 10.5\")   Wt 81.4 kg (179 lb 7.3 oz)   SpO2 95%   BMI 25.39 kg/m²       Last Recorded Vitals  Blood pressure 129/70, pulse 74, temperature 36.3 °C (97.4 °F), temperature source Temporal, resp. rate 18, height 1.791 m (5' 10.5\"), weight 81.4 kg (179 " lb 7.3 oz), SpO2 95 %.  Intake/Output last 3 Shifts:  I/O last 3 completed shifts:  In: 1080 (13.3 mL/kg) [P.O.:1080]  Out: 4600 (56.5 mL/kg) [Urine:4600 (1.6 mL/kg/hr)]  Weight: 81.4 kg     Relevant Results  Results for orders placed or performed during the hospital encounter of 10/19/23 (from the past 24 hour(s))   POCT GLUCOSE   Result Value Ref Range    POCT Glucose 323 (H) 74 - 99 mg/dL   POCT GLUCOSE   Result Value Ref Range    POCT Glucose 268 (H) 74 - 99 mg/dL   POCT GLUCOSE   Result Value Ref Range    POCT Glucose 255 (H) 74 - 99 mg/dL   CBC   Result Value Ref Range    WBC 9.2 4.4 - 11.3 x10*3/uL    nRBC 0.0 0.0 - 0.0 /100 WBCs    RBC 4.39 (L) 4.50 - 5.90 x10*6/uL    Hemoglobin 12.0 (L) 13.5 - 17.5 g/dL    Hematocrit 39.2 (L) 41.0 - 52.0 %    MCV 89 80 - 100 fL    MCH 27.3 26.0 - 34.0 pg    MCHC 30.6 (L) 32.0 - 36.0 g/dL    RDW 15.8 (H) 11.5 - 14.5 %    Platelets 196 150 - 450 x10*3/uL    MPV 10.7 7.5 - 11.5 fL   Renal function panel   Result Value Ref Range    Glucose 225 (H) 74 - 99 mg/dL    Sodium 140 136 - 145 mmol/L    Potassium 3.9 3.5 - 5.3 mmol/L    Chloride 102 98 - 107 mmol/L    Bicarbonate 24 21 - 32 mmol/L    Anion Gap 18 10 - 20 mmol/L    Urea Nitrogen 41 (H) 6 - 23 mg/dL    Creatinine 1.62 (H) 0.50 - 1.30 mg/dL    eGFR 40 (L) >60 mL/min/1.73m*2    Calcium 9.0 8.6 - 10.6 mg/dL    Phosphorus 4.0 2.5 - 4.9 mg/dL    Albumin 3.6 3.4 - 5.0 g/dL   Magnesium   Result Value Ref Range    Magnesium 2.40 1.60 - 2.40 mg/dL             Assessment/Plan   Principal Problem:    Acute congestive heart failure, unspecified heart failure type (CMS/HCC)  Navneet Thompson is a 89 y.o. male presenting with a two week history of shortness of breath that is particularly worse with exertion. He has a prior hx of HFrEP with last ECHO in 07/2023 showing EF 30-35%. Status post TAVR and CABG. Of note the patient presented to the ED at [facility] with SOB that has been particularly worse over the past few days and his SOB is  present even after walking a few steps. 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 He denies any chest pain, fever, night sweats or weight loss. Pt. Discussed with Dr. Moyer and transferred to  for diuresis. Follows up with Dr. Bahena.      Updates 10/24  - BETH today, creatinine uptrended to 1.62 from 1.24. Since admission, he has been diuresed to around 11L. In the setting of this aggressive diuresis, his BETH is likely to be volume-induced and pre-renal in etiology.   Plan:  Lasix 40 BID changed to lasix 40 daily; 500 ml fluid repleted. Will follow up PM RFPs to trend creatinine  - Medically optimised and ready for discharge pending SNF placement  - Given his recent hospitalisation, we will increase his lasix to 40mg BID.    #ADHF  Patient presents with a 2 wk history of shortness of breath that particularly got worse 2 days ago at 4am, prompting him to sit up in bed and be concerned. No chest pain or any other constitutional symptoms.   - Hx of heart failure with an EF 30-35% in July 2023; he is on 40mg lasix BID at home  - Normal renal function and produces adequate urine  - CXR: Cardiomegaly and mild bilateral pulmonary edema.   - Troponins peaked at 40s, no chest pain, s/p CABG   - No concerning EKG changed that would allude to an ACS picture, rendering ACS less likely.   -TTE 10/19 shows Mod-severely decreased LV systolic function of 30-35% EF. Mildly reduced RV systolic function. Mod-severely dilated LA and RA. Mildly elevated RVSP. Global hypokinesis of LV  Plan  - Diuresis with lasix 80mg BID  - Strict I/o  -restriction fluid intake to 1.5L/24 hrs, low Na+ diet       #CAD S/p CABG   - Multivessel CAD with CABG  - C/w home meds for it, including aspirin, atorvastatin 80     #Chronic Afib with ICD placement  - NSR with HR in 60s on metoprolol 25mg BID. C/w with this.   - EP consulted, recommend outpatient follow up when ICD has reached PALLAVI, not likely the cause of current symptoms.  - On warfarin for A/c with therapeutic  INR. C/w with it.         #Diabetes mellitus type 2 with hyperglycemia  - C/w Lantus and Humalog.    -Hemoglobin A1c.  Hypoglycemia protocol        CODE status: Full code  NOK: Pao (daughter): [phone]      Nata Thompson MD

## 2023-10-30 ENCOUNTER — HOSPITAL ENCOUNTER (OUTPATIENT)
Dept: CARDIOLOGY | Facility: HOSPITAL | Age: 88
Discharge: HOME | End: 2023-10-30
Payer: MEDICARE

## 2023-10-30 LAB
ATRIAL RATE: 76 BPM
Q ONSET: 184 MS
QRS COUNT: 13 BEATS
QRS DURATION: 148 MS
QT INTERVAL: 446 MS
QTC CALCULATION(BAZETT): 501 MS
QTC FREDERICIA: 482 MS
R AXIS: 132 DEGREES
T AXIS: -47 DEGREES
T OFFSET: 407 MS
VENTRICULAR RATE: 76 BPM

## 2023-10-30 PROCEDURE — 93005 ELECTROCARDIOGRAM TRACING: CPT

## 2023-11-09 ENCOUNTER — ANTICOAGULATION - WARFARIN VISIT (OUTPATIENT)
Dept: CARDIOLOGY | Facility: CLINIC | Age: 88
End: 2023-11-09
Payer: MEDICARE

## 2023-11-09 DIAGNOSIS — I48.91 ATRIAL FIBRILLATION, UNSPECIFIED TYPE (MULTI): Primary | ICD-10-CM

## 2023-11-09 LAB
POC INR: 1.9
POC PROTHROMBIN TIME: NORMAL

## 2023-11-09 PROCEDURE — 85610 PROTHROMBIN TIME: CPT | Mod: QW

## 2023-11-09 NOTE — PROGRESS NOTES
Patient identification verified with 2 identifiers.    Location: [facility] - [address] [id number] [facility]. [place], [place] [address] [phone]     Referring Physician: Melvin Bahena  Enrollment/ Re-enrollment date: 2024   INR Goal: 2.0-3.0  INR monitoring is per [facility] protocol.  Anticoagulation Medication: warfarin  Indication: atrial fibrillation    Subjective   Bleeding signs/symptoms: No    Bruising: No   Major bleeding event: No  Thrombosis signs/symptoms: No  Thromboembolic event: No  Missed doses: No  Extra doses: No  Medication changes: Yes  PT. STOPPED PANTOPRAZOLE ON  PER DISCHARGE NOTE  Dietary changes: No  Change in health: No  Change in activity: No  Alcohol: No  Other concerns: No    Upcoming Surgeries:  Does the Patient Have any upcoming surgeries that require interruption in anticoagulation therapy? no  Does the patient require bridging? no      Anticoagulation Summary  As of 2023      INR goal:  2.0-3.0   TTR:  100.0 % (1.6 wk)   INR used for dosin.90 (2023)   Weekly warfarin total:  40.5 mg               Assessment/Plan   SUB THERAPEUTIC    1. New dose: INCREASE PER PROTOCOL   2. Next INR: 1 week      Education provided to patient during the visit:  Patient instructed to call in interim with questions, concerns and changes.   Patient educated on interactions between medications and warfarin.   Patient educated on compliance with dosing, follow up appointments, and prescribed plan of care.

## 2023-11-13 ENCOUNTER — OFFICE VISIT (OUTPATIENT)
Dept: CARDIOLOGY | Facility: CLINIC | Age: 88
End: 2023-11-13
Payer: MEDICARE

## 2023-11-13 VITALS
WEIGHT: 189.2 LBS | HEART RATE: 72 BPM | HEIGHT: 70 IN | DIASTOLIC BLOOD PRESSURE: 65 MMHG | SYSTOLIC BLOOD PRESSURE: 110 MMHG | BODY MASS INDEX: 27.09 KG/M2

## 2023-11-13 DIAGNOSIS — I48.91 ATRIAL FIBRILLATION, UNSPECIFIED TYPE (MULTI): Primary | ICD-10-CM

## 2023-11-13 PROCEDURE — 1111F DSCHRG MED/CURRENT MED MERGE: CPT | Performed by: INTERNAL MEDICINE

## 2023-11-13 PROCEDURE — 99214 OFFICE O/P EST MOD 30 MIN: CPT | Performed by: INTERNAL MEDICINE

## 2023-11-13 PROCEDURE — 93010 ELECTROCARDIOGRAM REPORT: CPT | Performed by: INTERNAL MEDICINE

## 2023-11-13 PROCEDURE — 99214 OFFICE O/P EST MOD 30 MIN: CPT | Mod: PO | Performed by: INTERNAL MEDICINE

## 2023-11-13 PROCEDURE — 1160F RVW MEDS BY RX/DR IN RCRD: CPT | Performed by: INTERNAL MEDICINE

## 2023-11-13 PROCEDURE — 1159F MED LIST DOCD IN RCRD: CPT | Performed by: INTERNAL MEDICINE

## 2023-11-13 PROCEDURE — 3078F DIAST BP <80 MM HG: CPT | Performed by: INTERNAL MEDICINE

## 2023-11-13 PROCEDURE — 1036F TOBACCO NON-USER: CPT | Performed by: INTERNAL MEDICINE

## 2023-11-13 PROCEDURE — 1126F AMNT PAIN NOTED NONE PRSNT: CPT | Performed by: INTERNAL MEDICINE

## 2023-11-13 PROCEDURE — 93005 ELECTROCARDIOGRAM TRACING: CPT | Mod: PO | Performed by: INTERNAL MEDICINE

## 2023-11-13 PROCEDURE — 3074F SYST BP LT 130 MM HG: CPT | Performed by: INTERNAL MEDICINE

## 2023-11-13 ASSESSMENT — ENCOUNTER SYMPTOMS
DEPRESSION: 0
OCCASIONAL FEELINGS OF UNSTEADINESS: 1
LOSS OF SENSATION IN FEET: 0

## 2023-11-13 NOTE — PROGRESS NOTES
Subjective:  Patient returns for a hospital follow-up.  He is a pleasant but frail 89-year-old gentleman.  He has a host of cardiovascular problems.  He has known coronary disease status post very remote CABG surgery.  More recently, he has had a TAVR valve placed for aortic stenosis.  He also has chronic atrial fibrillation.  He also has an ICD in place.  He unfortunately was hospitalized for about 6 days in October with congestive heart failure in the setting of dietary noncompliance.  It appears that he was diuresed about 15 pounds.  He is feeling significantly better at this time and is accompanied by a supportive daughter.  He has been monitoring his weights closely and has been watching his salt and fluid intake much more diligently.  His weight appears to have stabilized at around 180.  His breathing has been significantly better at this weight.  He has not had much in the way of edema.  His current furosemide dosing is 40 mg twice a day.  It appears that his ICD battery is also near end-of-life, and this will probably need to be replaced in the near future.  He denies any fevers or chills.  Of note his echocardiogram while in the hospital revealed a normally functioning TAVR valve.  He has a persistently reduced ejection fraction of 30 to 35%.    Objective:  General: Alert, usual self.  HEENT: Unchanged.  Lungs: Reasonably clear without kirit crackles or wheezing.  Cardiac: Normal S1 and S2 with soft systolic murmur.  Abdomen: Nontender.  Extremities: Minimal residual edema.  Skin: No rash.  Neuro: Unchanged.    EKG: Atrial fibrillation.  Ventricular paced rhythm.    Impression/plan:  Patient is doing somewhat better at this time and appears to be near euvolemic.  Given his advanced age I did tell him I was inclined to try to minimize his medications as much as possible.  Given this, I will defer on getting him started on Jardiance.  I will focus on trying to maintain him at a euvolemic state.  He will  monitor his pressures very carefully and let me know should he have significant swings in either direction so I can probably adjust his diuretic dosing.  He will have his ICD reinterrogated next Monday and he will keep me updated regarding the battery replacement.  I will see him back in 1 month.  I will plan on rechecking some blood work when he returns in a month.  He did not need any prescriptions renewed.  He and his daughter know to call for any intercurrent concerns.    Patient instructions:    Continue current medications unchanged.    Continue to watch your salt and fluid intake and monitor your daily weights closely.  Call for any abrupt changes in your weight.    Return to clinic in 1 month.

## 2023-11-14 ENCOUNTER — APPOINTMENT (OUTPATIENT)
Dept: CARDIOLOGY | Facility: HOSPITAL | Age: 88
End: 2023-11-14
Payer: MEDICARE

## 2023-11-14 LAB
ATRIAL RATE: 250 BPM
Q ONSET: 206 MS
QRS COUNT: 12 BEATS
QRS DURATION: 176 MS
QT INTERVAL: 462 MS
QTC CALCULATION(BAZETT): 505 MS
QTC FREDERICIA: 491 MS
R AXIS: 223 DEGREES
T AXIS: 68 DEGREES
T OFFSET: 437 MS
VENTRICULAR RATE: 72 BPM

## 2023-11-20 ENCOUNTER — ANTICOAGULATION - WARFARIN VISIT (OUTPATIENT)
Dept: CARDIOLOGY | Facility: CLINIC | Age: 88
End: 2023-11-20
Payer: MEDICARE

## 2023-11-20 DIAGNOSIS — I48.91 ATRIAL FIBRILLATION, UNSPECIFIED TYPE (MULTI): Primary | ICD-10-CM

## 2023-11-20 LAB
POC INR: 2.4
POC PROTHROMBIN TIME: NORMAL

## 2023-11-20 PROCEDURE — 99211 OFF/OP EST MAY X REQ PHY/QHP: CPT | Mod: 27 | Performed by: INTERNAL MEDICINE

## 2023-11-20 PROCEDURE — 85610 PROTHROMBIN TIME: CPT | Mod: QW

## 2023-11-20 NOTE — PROGRESS NOTES
Patient identification verified with 2 identifiers.    Location: [facility] - [address] [address] [facility]. [place], [place] [address] [phone]     Referring Physician: Melvin Bahena  Enrollment/ Re-enrollment date: 2024   INR Goal: 2.0-3.0  INR monitoring is per [facility] protocol.  Anticoagulation Medication: warfarin 3 mg tabs  Indication: atrial fibrillation  Subjective   Bleeding signs/symptoms: No    Bruising: No   Major bleeding event: No  Thrombosis signs/symptoms: No  Thromboembolic event: No  Missed doses: No  Extra doses: No  Medication changes: No  Dietary changes: No  Change in health: No  Change in activity: No  Alcohol: No  Other concerns: No    Upcoming Surgeries:  Does the Patient Have any upcoming surgeries that require interruption in anticoagulation therapy? no  Does the patient require bridging? no      Anticoagulation Summary  As of 2023      INR goal:  2.0-3.0   TTR:  90.1 % (3.1 wk)   INR used for dosin.40 (2023)   Weekly warfarin total:  40.5 mg               Assessment/Plan   Therapeutic     1. New dose: no change    2. Next INR: 4 weeks      Education provided to patient during the visit:  Patient instructed to call in interim with questions, concerns and changes.   Patient educated on interactions between medications and warfarin.   Patient educated on compliance with dosing, follow up appointments, and prescribed plan of care.

## 2023-11-21 ENCOUNTER — HOSPITAL ENCOUNTER (OUTPATIENT)
Dept: CARDIOLOGY | Facility: CLINIC | Age: 88
Discharge: HOME | End: 2023-11-21
Payer: MEDICARE

## 2023-11-21 DIAGNOSIS — Z95.810 PRESENCE OF AUTOMATIC (IMPLANTABLE) CARDIAC DEFIBRILLATOR: ICD-10-CM

## 2023-11-21 DIAGNOSIS — I42.0 DILATED CARDIOMYOPATHY (MULTI): ICD-10-CM

## 2023-11-21 DIAGNOSIS — I49.8 ATRIAL ARRHYTHMIA: Primary | ICD-10-CM

## 2023-11-21 PROCEDURE — 93296 REM INTERROG EVL PM/IDS: CPT

## 2023-11-21 PROCEDURE — 93295 DEV INTERROG REMOTE 1/2/MLT: CPT | Performed by: INTERNAL MEDICINE

## 2023-11-24 ENCOUNTER — HOSPITAL ENCOUNTER (OUTPATIENT)
Dept: CARDIOLOGY | Facility: HOSPITAL | Age: 88
Discharge: HOME | End: 2023-11-24
Payer: MEDICARE

## 2023-11-24 DIAGNOSIS — Z95.810 PRESENCE OF AUTOMATIC (IMPLANTABLE) CARDIAC DEFIBRILLATOR: ICD-10-CM

## 2023-11-24 DIAGNOSIS — I42.0 DILATED CARDIOMYOPATHY (MULTI): Primary | ICD-10-CM

## 2023-11-24 DIAGNOSIS — I44.2 ATRIOVENTRICULAR BLOCK, COMPLETE (MULTI): ICD-10-CM

## 2023-12-13 ENCOUNTER — LAB (OUTPATIENT)
Dept: LAB | Facility: LAB | Age: 88
End: 2023-12-13
Payer: MEDICARE

## 2023-12-13 DIAGNOSIS — I49.8 ATRIAL ARRHYTHMIA: ICD-10-CM

## 2023-12-13 LAB
ANION GAP SERPL CALC-SCNC: 14 MMOL/L (ref 10–20)
BUN SERPL-MCNC: 28 MG/DL (ref 6–23)
CALCIUM SERPL-MCNC: 9.1 MG/DL (ref 8.6–10.6)
CHLORIDE SERPL-SCNC: 104 MMOL/L (ref 98–107)
CO2 SERPL-SCNC: 30 MMOL/L (ref 21–32)
CREAT SERPL-MCNC: 1.36 MG/DL (ref 0.5–1.3)
ERYTHROCYTE [DISTWIDTH] IN BLOOD BY AUTOMATED COUNT: 17.2 % (ref 11.5–14.5)
GFR SERPL CREATININE-BSD FRML MDRD: 49 ML/MIN/1.73M*2
GLUCOSE SERPL-MCNC: 106 MG/DL (ref 74–99)
HCT VFR BLD AUTO: 39.2 % (ref 41–52)
HGB BLD-MCNC: 11.6 G/DL (ref 13.5–17.5)
INR PPP: 2.9 (ref 0.9–1.1)
MCH RBC QN AUTO: 26.5 PG (ref 26–34)
MCHC RBC AUTO-ENTMCNC: 29.6 G/DL (ref 32–36)
MCV RBC AUTO: 90 FL (ref 80–100)
NRBC BLD-RTO: 0 /100 WBCS (ref 0–0)
PLATELET # BLD AUTO: 208 X10*3/UL (ref 150–450)
POTASSIUM SERPL-SCNC: 4.2 MMOL/L (ref 3.5–5.3)
PROTHROMBIN TIME: 33.2 SECONDS (ref 9.8–12.8)
RBC # BLD AUTO: 4.37 X10*6/UL (ref 4.5–5.9)
SODIUM SERPL-SCNC: 144 MMOL/L (ref 136–145)
WBC # BLD AUTO: 8.6 X10*3/UL (ref 4.4–11.3)

## 2023-12-13 PROCEDURE — 80048 BASIC METABOLIC PNL TOTAL CA: CPT

## 2023-12-13 PROCEDURE — 85027 COMPLETE CBC AUTOMATED: CPT

## 2023-12-13 PROCEDURE — 85610 PROTHROMBIN TIME: CPT

## 2023-12-13 PROCEDURE — 36415 COLL VENOUS BLD VENIPUNCTURE: CPT

## 2023-12-18 ENCOUNTER — OFFICE VISIT (OUTPATIENT)
Dept: CARDIOLOGY | Facility: CLINIC | Age: 88
DRG: 245 | End: 2023-12-18
Payer: MEDICARE

## 2023-12-18 ENCOUNTER — ANTICOAGULATION - WARFARIN VISIT (OUTPATIENT)
Dept: CARDIOLOGY | Facility: CLINIC | Age: 88
DRG: 245 | End: 2023-12-18
Payer: MEDICARE

## 2023-12-18 VITALS
BODY MASS INDEX: 26.05 KG/M2 | HEART RATE: 71 BPM | OXYGEN SATURATION: 95 % | DIASTOLIC BLOOD PRESSURE: 67 MMHG | WEIGHT: 182 LBS | SYSTOLIC BLOOD PRESSURE: 118 MMHG | HEIGHT: 70 IN

## 2023-12-18 DIAGNOSIS — I48.91 ATRIAL FIBRILLATION, UNSPECIFIED TYPE (MULTI): ICD-10-CM

## 2023-12-18 DIAGNOSIS — I10 PRIMARY HYPERTENSION: ICD-10-CM

## 2023-12-18 DIAGNOSIS — I48.91 ATRIAL FIBRILLATION, UNSPECIFIED TYPE (MULTI): Primary | ICD-10-CM

## 2023-12-18 DIAGNOSIS — Z95.2 S/P TAVR (TRANSCATHETER AORTIC VALVE REPLACEMENT): ICD-10-CM

## 2023-12-18 DIAGNOSIS — E78.2 MIXED HYPERLIPIDEMIA: ICD-10-CM

## 2023-12-18 DIAGNOSIS — I42.8 OTHER CARDIOMYOPATHY (MULTI): ICD-10-CM

## 2023-12-18 LAB
ATRIAL RATE: 72 BPM
POC INR: 1.9
POC PROTHROMBIN TIME: NORMAL
Q ONSET: 193 MS
QRS COUNT: 12 BEATS
QRS DURATION: 178 MS
QT INTERVAL: 478 MS
QTC CALCULATION(BAZETT): 519 MS
QTC FREDERICIA: 505 MS
R AXIS: 235 DEGREES
T AXIS: 16 DEGREES
T OFFSET: 432 MS
VENTRICULAR RATE: 71 BPM

## 2023-12-18 PROCEDURE — 93010 ELECTROCARDIOGRAM REPORT: CPT | Performed by: INTERNAL MEDICINE

## 2023-12-18 PROCEDURE — 3074F SYST BP LT 130 MM HG: CPT | Performed by: NURSE PRACTITIONER

## 2023-12-18 PROCEDURE — 99214 OFFICE O/P EST MOD 30 MIN: CPT | Performed by: NURSE PRACTITIONER

## 2023-12-18 PROCEDURE — 1159F MED LIST DOCD IN RCRD: CPT | Performed by: NURSE PRACTITIONER

## 2023-12-18 PROCEDURE — 1036F TOBACCO NON-USER: CPT | Performed by: NURSE PRACTITIONER

## 2023-12-18 PROCEDURE — 85610 PROTHROMBIN TIME: CPT | Mod: QW

## 2023-12-18 PROCEDURE — 1160F RVW MEDS BY RX/DR IN RCRD: CPT | Performed by: NURSE PRACTITIONER

## 2023-12-18 PROCEDURE — 1126F AMNT PAIN NOTED NONE PRSNT: CPT | Performed by: NURSE PRACTITIONER

## 2023-12-18 PROCEDURE — 93005 ELECTROCARDIOGRAM TRACING: CPT | Mod: PO | Performed by: NURSE PRACTITIONER

## 2023-12-18 PROCEDURE — 3078F DIAST BP <80 MM HG: CPT | Performed by: NURSE PRACTITIONER

## 2023-12-18 PROCEDURE — 99214 OFFICE O/P EST MOD 30 MIN: CPT | Mod: PO | Performed by: NURSE PRACTITIONER

## 2023-12-18 PROCEDURE — 99211 OFF/OP EST MAY X REQ PHY/QHP: CPT | Mod: PO | Performed by: INTERNAL MEDICINE

## 2023-12-18 ASSESSMENT — PAIN SCALES - GENERAL: PAINLEVEL: 0-NO PAIN

## 2023-12-18 ASSESSMENT — ENCOUNTER SYMPTOMS: HYPERTENSION: 1

## 2023-12-18 NOTE — PROGRESS NOTES
Patient identification verified with 2 identifiers.    Location: [facility] - [address] [address] [facility]. [place], [place] [address] [phone]     Referring Physician: Melvin Bahena  Enrollment/ Re-enrollment date: 2024   INR Goal: 2.0-3.0  INR monitoring is per [facility] protocol.  Anticoagulation Medication: warfarin 3 mg tabs  Indication: atrial fibrillation      Subjective   Bleeding signs/symptoms: No    Bruising: No   Major bleeding event: No  Thrombosis signs/symptoms: No  Thromboembolic event: No  Missed doses: No  Extra doses: No  Medication changes: Yes holding warfarin for procedure on   Dietary changes: No  Change in health: No  Change in activity: No  Alcohol: No  Other concerns: No    Upcoming Surgeries:  Does the Patient Have any upcoming surgeries that require interruption in anticoagulation therapy? yes  Does the patient require bridging? No       Anticoagulation Summary  As of 2023      INR goal:  2.0-3.0   TTR:  94.7 % (1.6 mo)   INR used for dosin.90 (2023)   Weekly warfarin total:  40.5 mg               Assessment/Plan   Subtherapeutic     1. New dose: no change    2. Next INR:   d/t holiday , schedule , and patient availability       Education provided to patient during the visit:  Patient instructed to call in interim with questions, concerns and changes.   Patient educated on interactions between medications and warfarin.   Patient educated on dietary consistency in vitamin k consumption.   Patient educated on signs of bleeding/clotting.   Patient educated on compliance with dosing, follow up appointments, and prescribed plan of care.

## 2023-12-18 NOTE — PROGRESS NOTES
Subjective   Navneet Thompson is a 89 y.o. male.    Chief Complaint:  Hypertension, Hyperlipidemia, Coronary Artery Disease, and Atrial Fibrillation    Mr. Thompson returns for a one month follow up. He is seen in collaboration with Dr. Bahena. He has been feeling well from a cardiac standpoint. He has remained compliant with his medications, denying any intolerances. His weight has been remaining stable around 180 lbs. He seems to be getting around reasonably well with only intermittent dyspnea on exertion. He is scheduled for BIV ICD upgrade tomorrow. He offers no new complaints or concerns today. He denies any complaints of chest pain, shortness of breath, lightheadedness, dizziness, palpitations, syncope, orthopnea, paroxysmal nocturnal dyspnea, lower extremity swelling or bleeding concerns.      Hypertension    Hyperlipidemia    Coronary Artery Disease  Risk factors include hyperlipidemia.   Atrial Fibrillation  Past medical history includes atrial fibrillation, CAD and hyperlipidemia.       Review of Systems   All other systems reviewed and are negative.      Objective   Physical Exam  Constitutional:       Appearance: Healthy appearance. In no distress  Pulmonary:      Effort: Pulmonary effort is normal.      Breath sounds: Normal breath sounds.   Cardiovascular:      Normal rate. Regular rhythm. Normal S1. Normal S2.       Murmurs: There is soft systolic murmur.      Carotids: right carotid pulse +2, no bruit heard over the right carotid. left carotid pulse +2, no bruit heard over the left carotid.  Pacemaker/ICD Implant site has healed without signs of infection.  Edema:     Peripheral edema absent.   Abdominal:      Palpations: Abdomen is soft.   Musculoskeletal:       Cervical back: Normal range of motion.   Skin:     General: Skin is warm and dry. Normal color and pigmentation   Neurological:      Mental Status: Alert and oriented to person, place and time.   Psychiatric:     Mood and Affect:  appropriate mood and appropriate affect.     EKG obtained and reviewed. V-paced. Nonspecific IVC block. Lateral infarct, age undetermined, inferior infarct, age undetermined. HR 71    Lab Review:   Lab Results   Component Value Date     12/13/2023    K 4.2 12/13/2023     12/13/2023    CO2 30 12/13/2023    BUN 28 (H) 12/13/2023    CREATININE 1.36 (H) 12/13/2023    GLUCOSE 106 (H) 12/13/2023    CALCIUM 9.1 12/13/2023     Lab Results   Component Value Date    WBC 8.6 12/13/2023    HGB 11.6 (L) 12/13/2023    HCT 39.2 (L) 12/13/2023    MCV 90 12/13/2023     12/13/2023     Lab Results   Component Value Date    CHOL 139 06/14/2023    TRIG 122 06/14/2023    HDL 35.0 (A) 06/14/2023       Assessment/Plan   Mr. Thompson is a pleasant 89 year old  male with a past medical history significant for CAD status post CABG using LIMA-LAD, SVG-OM3 and IV-[id number], patent by heart catheterization 2/2020, atrial fibrillation status post several cardioversions on warfarin anticoagulation, PPM placed in 2008 for complete heart block upgraded to BIV/ICD in 2012 with generator change out in 2018, cardiomyopathy, diabetes and severe aortic stenosis status post Evolut Pro Plus 26 mm valve 4/2020. He also has a mildly dilated ascending aorta measuring 4.1 cm by CT scan 11/2022. Echocardiogram 6/2023 showed a stable EF of 30-35% with mild MR and mild-moderate TR. He presents today for routine follow up stable from a cardiac standpoint. His VS and EKG remain stable. He is scheduled for BIV ICD upgrade tomorrow. I will have him continue all medications unchanged. He will follow up with us in clinic in 3 months. He knows to call for any concerns.

## 2023-12-19 ENCOUNTER — APPOINTMENT (OUTPATIENT)
Dept: CARDIOLOGY | Facility: HOSPITAL | Age: 88
DRG: 245 | End: 2023-12-19
Payer: MEDICARE

## 2023-12-19 ENCOUNTER — HOSPITAL ENCOUNTER (INPATIENT)
Facility: HOSPITAL | Age: 88
LOS: 1 days | Discharge: HOME | DRG: 245 | End: 2023-12-20
Attending: INTERNAL MEDICINE | Admitting: INTERNAL MEDICINE
Payer: MEDICARE

## 2023-12-19 ENCOUNTER — APPOINTMENT (OUTPATIENT)
Dept: RADIOLOGY | Facility: HOSPITAL | Age: 88
DRG: 245 | End: 2023-12-19
Payer: MEDICARE

## 2023-12-19 DIAGNOSIS — I44.2 ATRIOVENTRICULAR BLOCK, COMPLETE (MULTI): ICD-10-CM

## 2023-12-19 DIAGNOSIS — I42.9 CARDIOMYOPATHY, UNSPECIFIED TYPE (MULTI): ICD-10-CM

## 2023-12-19 DIAGNOSIS — Z95.810 PRESENCE OF AUTOMATIC (IMPLANTABLE) CARDIAC DEFIBRILLATOR: ICD-10-CM

## 2023-12-19 DIAGNOSIS — I50.9 ACUTE CONGESTIVE HEART FAILURE, UNSPECIFIED HEART FAILURE TYPE (MULTI): Primary | ICD-10-CM

## 2023-12-19 DIAGNOSIS — I44.2 CHB (COMPLETE HEART BLOCK) (MULTI): ICD-10-CM

## 2023-12-19 DIAGNOSIS — Z95.810 CARDIAC DEFIBRILLATOR IN PLACE: Primary | ICD-10-CM

## 2023-12-19 LAB — GLUCOSE BLD MANUAL STRIP-MCNC: 152 MG/DL (ref 74–99)

## 2023-12-19 PROCEDURE — 2780000003 HC OR 278 NO HCPCS: Performed by: INTERNAL MEDICINE

## 2023-12-19 PROCEDURE — 2500000001 HC RX 250 WO HCPCS SELF ADMINISTERED DRUGS (ALT 637 FOR MEDICARE OP): Performed by: INTERNAL MEDICINE

## 2023-12-19 PROCEDURE — 33210 INSERT ELECTRD/PM CATH SNGL: CPT | Performed by: INTERNAL MEDICINE

## 2023-12-19 PROCEDURE — C1769 GUIDE WIRE: HCPCS | Performed by: INTERNAL MEDICINE

## 2023-12-19 PROCEDURE — C1887 CATHETER, GUIDING: HCPCS | Performed by: INTERNAL MEDICINE

## 2023-12-19 PROCEDURE — 99152 MOD SED SAME PHYS/QHP 5/>YRS: CPT | Performed by: INTERNAL MEDICINE

## 2023-12-19 PROCEDURE — 2750000001 HC OR 275 NO HCPCS: Performed by: INTERNAL MEDICINE

## 2023-12-19 PROCEDURE — 0JH609Z INSERTION OF CARDIAC RESYNCHRONIZATION DEFIBRILLATOR PULSE GENERATOR INTO CHEST SUBCUTANEOUS TISSUE AND FASCIA, OPEN APPROACH: ICD-10-PCS | Performed by: INTERNAL MEDICINE

## 2023-12-19 PROCEDURE — 2500000005 HC RX 250 GENERAL PHARMACY W/O HCPCS: Performed by: INTERNAL MEDICINE

## 2023-12-19 PROCEDURE — 82947 ASSAY GLUCOSE BLOOD QUANT: CPT

## 2023-12-19 PROCEDURE — 99153 MOD SED SAME PHYS/QHP EA: CPT | Performed by: INTERNAL MEDICINE

## 2023-12-19 PROCEDURE — 33225 L VENTRIC PACING LEAD ADD-ON: CPT | Performed by: INTERNAL MEDICINE

## 2023-12-19 PROCEDURE — 71045 X-RAY EXAM CHEST 1 VIEW: CPT | Performed by: RADIOLOGY

## 2023-12-19 PROCEDURE — 2500000004 HC RX 250 GENERAL PHARMACY W/ HCPCS (ALT 636 FOR OP/ED): Performed by: INTERNAL MEDICINE

## 2023-12-19 PROCEDURE — C1898 LEAD, PMKR, OTHER THAN TRANS: HCPCS | Performed by: INTERNAL MEDICINE

## 2023-12-19 PROCEDURE — 1100000001 HC PRIVATE ROOM DAILY

## 2023-12-19 PROCEDURE — 33263 RMVL & RPLCMT DFB GEN 2 LEAD: CPT | Performed by: INTERNAL MEDICINE

## 2023-12-19 PROCEDURE — 2720000007 HC OR 272 NO HCPCS: Performed by: INTERNAL MEDICINE

## 2023-12-19 PROCEDURE — 0JPT0PZ REMOVAL OF CARDIAC RHYTHM RELATED DEVICE FROM TRUNK SUBCUTANEOUS TISSUE AND FASCIA, OPEN APPROACH: ICD-10-PCS | Performed by: INTERNAL MEDICINE

## 2023-12-19 PROCEDURE — 71045 X-RAY EXAM CHEST 1 VIEW: CPT

## 2023-12-19 PROCEDURE — 2500000002 HC RX 250 W HCPCS SELF ADMINISTERED DRUGS (ALT 637 FOR MEDICARE OP, ALT 636 FOR OP/ED): Performed by: INTERNAL MEDICINE

## 2023-12-19 PROCEDURE — C2621 PMKR, OTHER THAN SING/DUAL: HCPCS | Performed by: INTERNAL MEDICINE

## 2023-12-19 DEVICE — CAP 5867-3M STERILE INTL USA: Type: IMPLANTABLE DEVICE | Status: FUNCTIONAL

## 2023-12-19 DEVICE — LEAD 383069 SELECT SECURE US EN FPA
Type: IMPLANTABLE DEVICE | Status: FUNCTIONAL
Brand: SELECTSECURE™

## 2023-12-19 DEVICE — CRTP W1TR01 PERCEPTA CRTP MRI US
Type: IMPLANTABLE DEVICE | Status: FUNCTIONAL
Brand: PERCEPTA™ CRT-P MRI SURESCAN™

## 2023-12-19 RX ORDER — CEFADROXIL 1000 MG/1
1 TABLET ORAL 2 TIMES DAILY
Status: DISCONTINUED | OUTPATIENT
Start: 2023-12-19 | End: 2023-12-19

## 2023-12-19 RX ORDER — NEOMYCIN AND POLYMYXIN B SULFATES AND BACITRACIN ZINC 400; 3.5; 1 [USP'U]/G; MG/G; [USP'U]/G
0.5 OINTMENT OPHTHALMIC DAILY
Status: DISCONTINUED | OUTPATIENT
Start: 2023-12-19 | End: 2023-12-20 | Stop reason: HOSPADM

## 2023-12-19 RX ORDER — ISOSORBIDE MONONITRATE 30 MG/1
30 TABLET, EXTENDED RELEASE ORAL DAILY
Status: DISCONTINUED | OUTPATIENT
Start: 2023-12-19 | End: 2023-12-20 | Stop reason: HOSPADM

## 2023-12-19 RX ORDER — ACETAMINOPHEN 325 MG/1
650 TABLET ORAL EVERY 4 HOURS PRN
Status: DISCONTINUED | OUTPATIENT
Start: 2023-12-19 | End: 2023-12-20 | Stop reason: HOSPADM

## 2023-12-19 RX ORDER — FUROSEMIDE 20 MG/1
40 TABLET ORAL DAILY
Status: DISCONTINUED | OUTPATIENT
Start: 2023-12-19 | End: 2023-12-20 | Stop reason: HOSPADM

## 2023-12-19 RX ORDER — LIDOCAINE HYDROCHLORIDE 20 MG/ML
INJECTION, SOLUTION INFILTRATION; PERINEURAL AS NEEDED
Status: DISCONTINUED | OUTPATIENT
Start: 2023-12-19 | End: 2023-12-20 | Stop reason: HOSPADM

## 2023-12-19 RX ORDER — METOPROLOL TARTRATE 25 MG/1
25 TABLET, FILM COATED ORAL 2 TIMES DAILY
Status: DISCONTINUED | OUTPATIENT
Start: 2023-12-19 | End: 2023-12-20 | Stop reason: HOSPADM

## 2023-12-19 RX ORDER — ACETAMINOPHEN 650 MG/1
650 SUPPOSITORY RECTAL EVERY 4 HOURS PRN
Status: DISCONTINUED | OUTPATIENT
Start: 2023-12-19 | End: 2023-12-20 | Stop reason: HOSPADM

## 2023-12-19 RX ORDER — VANCOMYCIN HYDROCHLORIDE 1 G/200ML
INJECTION, SOLUTION INTRAVENOUS CONTINUOUS PRN
Status: DISCONTINUED | OUTPATIENT
Start: 2023-12-19 | End: 2023-12-20 | Stop reason: HOSPADM

## 2023-12-19 RX ORDER — FENTANYL CITRATE 50 UG/ML
INJECTION, SOLUTION INTRAMUSCULAR; INTRAVENOUS AS NEEDED
Status: DISCONTINUED | OUTPATIENT
Start: 2023-12-19 | End: 2023-12-20 | Stop reason: HOSPADM

## 2023-12-19 RX ORDER — INSULIN GLARGINE 100 [IU]/ML
45 INJECTION, SOLUTION SUBCUTANEOUS NIGHTLY
Status: DISCONTINUED | OUTPATIENT
Start: 2023-12-19 | End: 2023-12-20 | Stop reason: HOSPADM

## 2023-12-19 RX ORDER — PANTOPRAZOLE SODIUM 20 MG/1
20 TABLET, DELAYED RELEASE ORAL
Status: DISCONTINUED | OUTPATIENT
Start: 2023-12-20 | End: 2023-12-20 | Stop reason: HOSPADM

## 2023-12-19 RX ORDER — CHOLECALCIFEROL (VITAMIN D3) 25 MCG
1000 TABLET ORAL DAILY
Status: DISCONTINUED | OUTPATIENT
Start: 2023-12-19 | End: 2023-12-20 | Stop reason: HOSPADM

## 2023-12-19 RX ORDER — INSULIN ASPART 100 [IU]/ML
INJECTION, SOLUTION INTRAVENOUS; SUBCUTANEOUS
COMMUNITY

## 2023-12-19 RX ORDER — ACETAMINOPHEN 160 MG/5ML
650 SOLUTION ORAL EVERY 4 HOURS PRN
Status: DISCONTINUED | OUTPATIENT
Start: 2023-12-19 | End: 2023-12-20 | Stop reason: HOSPADM

## 2023-12-19 RX ORDER — CEFAZOLIN SODIUM 2 G/50ML
SOLUTION INTRAVENOUS CONTINUOUS PRN
Status: DISCONTINUED | OUTPATIENT
Start: 2023-12-19 | End: 2023-12-20 | Stop reason: HOSPADM

## 2023-12-19 RX ORDER — TRIAMCINOLONE ACETONIDE 1 MG/G
CREAM TOPICAL 2 TIMES DAILY
Status: DISCONTINUED | OUTPATIENT
Start: 2023-12-19 | End: 2023-12-20 | Stop reason: HOSPADM

## 2023-12-19 RX ORDER — TALC
3 POWDER (GRAM) TOPICAL NIGHTLY PRN
Status: DISCONTINUED | OUTPATIENT
Start: 2023-12-19 | End: 2023-12-20 | Stop reason: HOSPADM

## 2023-12-19 RX ORDER — ADALIMUMAB 40MG/0.8ML
40 KIT SUBCUTANEOUS
COMMUNITY

## 2023-12-19 RX ORDER — MIDAZOLAM HYDROCHLORIDE 1 MG/ML
INJECTION INTRAMUSCULAR; INTRAVENOUS AS NEEDED
Status: DISCONTINUED | OUTPATIENT
Start: 2023-12-19 | End: 2023-12-20 | Stop reason: HOSPADM

## 2023-12-19 RX ORDER — ATORVASTATIN CALCIUM 80 MG/1
80 TABLET, FILM COATED ORAL NIGHTLY
Status: DISCONTINUED | OUTPATIENT
Start: 2023-12-19 | End: 2023-12-20 | Stop reason: HOSPADM

## 2023-12-19 RX ORDER — LISINOPRIL 5 MG/1
5 TABLET ORAL DAILY
Status: DISCONTINUED | OUTPATIENT
Start: 2023-12-19 | End: 2023-12-20 | Stop reason: HOSPADM

## 2023-12-19 RX ORDER — SULFAMETHOXAZOLE AND TRIMETHOPRIM 800; 160 MG/1; MG/1
160 TABLET ORAL EVERY 12 HOURS SCHEDULED
Status: DISCONTINUED | OUTPATIENT
Start: 2023-12-19 | End: 2023-12-20 | Stop reason: HOSPADM

## 2023-12-19 RX ORDER — LANOLIN ALCOHOL/MO/W.PET/CERES
400 CREAM (GRAM) TOPICAL DAILY
Status: DISCONTINUED | OUTPATIENT
Start: 2023-12-19 | End: 2023-12-20 | Stop reason: HOSPADM

## 2023-12-19 RX ORDER — ACETAMINOPHEN 325 MG/1
650 TABLET ORAL EVERY 6 HOURS PRN
Status: DISCONTINUED | OUTPATIENT
Start: 2023-12-19 | End: 2023-12-20 | Stop reason: HOSPADM

## 2023-12-19 RX ORDER — ASPIRIN 81 MG/1
81 TABLET ORAL DAILY
Status: DISCONTINUED | OUTPATIENT
Start: 2023-12-19 | End: 2023-12-20 | Stop reason: HOSPADM

## 2023-12-19 RX ORDER — CEFADROXIL 500 MG/1
1000 CAPSULE ORAL EVERY 12 HOURS SCHEDULED
Status: DISCONTINUED | OUTPATIENT
Start: 2023-12-19 | End: 2023-12-20 | Stop reason: HOSPADM

## 2023-12-19 RX ADMIN — ISOSORBIDE MONONITRATE 30 MG: 30 TABLET, EXTENDED RELEASE ORAL at 18:50

## 2023-12-19 RX ADMIN — ASPIRIN 81 MG: 81 TABLET, COATED ORAL at 18:50

## 2023-12-19 RX ADMIN — ATORVASTATIN CALCIUM 80 MG: 80 TABLET, FILM COATED ORAL at 22:33

## 2023-12-19 RX ADMIN — EMPAGLIFLOZIN 10 MG: 10 TABLET, FILM COATED ORAL at 18:50

## 2023-12-19 RX ADMIN — CEFADROXIL 1000 MG: 500 CAPSULE ORAL at 22:32

## 2023-12-19 RX ADMIN — LISINOPRIL 5 MG: 5 TABLET ORAL at 18:50

## 2023-12-19 RX ADMIN — NEOMYCIN SULFATE, POLYMYXIN B SULFATE AND BACITRACIN ZINC 0.5 INCH: 3.5; 10000; 4 OINTMENT OPHTHALMIC at 22:31

## 2023-12-19 RX ADMIN — TRIAMCINOLONE ACETONIDE: 1 CREAM TOPICAL at 22:31

## 2023-12-19 RX ADMIN — Medication 1000 UNITS: at 18:50

## 2023-12-19 RX ADMIN — METOPROLOL TARTRATE 25 MG: 25 TABLET, FILM COATED ORAL at 22:33

## 2023-12-19 RX ADMIN — INSULIN GLARGINE 45 UNITS: 100 INJECTION, SOLUTION SUBCUTANEOUS at 22:32

## 2023-12-19 RX ADMIN — Medication 400 MG: at 18:50

## 2023-12-19 RX ADMIN — FUROSEMIDE 40 MG: 20 TABLET ORAL at 18:50

## 2023-12-19 RX ADMIN — SULFAMETHOXAZOLE AND TRIMETHOPRIM 160 MG: 800; 160 TABLET ORAL at 22:32

## 2023-12-19 RX ADMIN — ACETAMINOPHEN 650 MG: 325 TABLET ORAL at 18:50

## 2023-12-19 SDOH — SOCIAL STABILITY: SOCIAL INSECURITY: ABUSE: ADULT

## 2023-12-19 SDOH — SOCIAL STABILITY: SOCIAL INSECURITY: DO YOU FEEL ANYONE HAS EXPLOITED OR TAKEN ADVANTAGE OF YOU FINANCIALLY OR OF YOUR PERSONAL PROPERTY?: NO

## 2023-12-19 SDOH — SOCIAL STABILITY: SOCIAL INSECURITY: WERE YOU ABLE TO COMPLETE ALL THE BEHAVIORAL HEALTH SCREENINGS?: YES

## 2023-12-19 SDOH — SOCIAL STABILITY: SOCIAL INSECURITY: DO YOU FEEL UNSAFE GOING BACK TO THE PLACE WHERE YOU ARE LIVING?: NO

## 2023-12-19 SDOH — SOCIAL STABILITY: SOCIAL INSECURITY: HAVE YOU HAD THOUGHTS OF HARMING ANYONE ELSE?: NO

## 2023-12-19 SDOH — SOCIAL STABILITY: SOCIAL INSECURITY: HAS ANYONE EVER THREATENED TO HURT YOUR FAMILY OR YOUR PETS?: NO

## 2023-12-19 SDOH — SOCIAL STABILITY: SOCIAL INSECURITY: DOES ANYONE TRY TO KEEP YOU FROM HAVING/CONTACTING OTHER FRIENDS OR DOING THINGS OUTSIDE YOUR HOME?: NO

## 2023-12-19 SDOH — SOCIAL STABILITY: SOCIAL INSECURITY: ARE YOU OR HAVE YOU BEEN THREATENED OR ABUSED PHYSICALLY, EMOTIONALLY, OR SEXUALLY BY ANYONE?: NO

## 2023-12-19 SDOH — SOCIAL STABILITY: SOCIAL INSECURITY: ARE THERE ANY APPARENT SIGNS OF INJURIES/BEHAVIORS THAT COULD BE RELATED TO ABUSE/NEGLECT?: NO

## 2023-12-19 ASSESSMENT — COGNITIVE AND FUNCTIONAL STATUS - GENERAL
DAILY ACTIVITIY SCORE: 18
MOVING FROM LYING ON BACK TO SITTING ON SIDE OF FLAT BED WITH BEDRAILS: A LITTLE
DRESSING REGULAR LOWER BODY CLOTHING: A LITTLE
WALKING IN HOSPITAL ROOM: A LITTLE
DRESSING REGULAR LOWER BODY CLOTHING: A LITTLE
TOILETING: A LITTLE
EATING MEALS: A LITTLE
MOVING TO AND FROM BED TO CHAIR: A LITTLE
STANDING UP FROM CHAIR USING ARMS: A LITTLE
PERSONAL GROOMING: A LITTLE
MOVING FROM LYING ON BACK TO SITTING ON SIDE OF FLAT BED WITH BEDRAILS: A LITTLE
CLIMB 3 TO 5 STEPS WITH RAILING: A LITTLE
MOBILITY SCORE: 18
TURNING FROM BACK TO SIDE WHILE IN FLAT BAD: A LITTLE
STANDING UP FROM CHAIR USING ARMS: A LITTLE
HELP NEEDED FOR BATHING: A LITTLE
TURNING FROM BACK TO SIDE WHILE IN FLAT BAD: A LITTLE
MOBILITY SCORE: 18
EATING MEALS: A LITTLE
DRESSING REGULAR UPPER BODY CLOTHING: A LITTLE
MOVING TO AND FROM BED TO CHAIR: A LITTLE
HELP NEEDED FOR BATHING: A LITTLE
DAILY ACTIVITIY SCORE: 18
PERSONAL GROOMING: A LITTLE
PATIENT BASELINE BEDBOUND: NO
CLIMB 3 TO 5 STEPS WITH RAILING: A LITTLE
DRESSING REGULAR UPPER BODY CLOTHING: A LITTLE
TOILETING: A LITTLE
WALKING IN HOSPITAL ROOM: A LITTLE

## 2023-12-19 ASSESSMENT — ACTIVITIES OF DAILY LIVING (ADL)
FEEDING YOURSELF: INDEPENDENT
PATIENT'S MEMORY ADEQUATE TO SAFELY COMPLETE DAILY ACTIVITIES?: YES
WALKS IN HOME: INDEPENDENT
DRESSING YOURSELF: INDEPENDENT
LACK_OF_TRANSPORTATION: NO
ADEQUATE_TO_COMPLETE_ADL: YES
JUDGMENT_ADEQUATE_SAFELY_COMPLETE_DAILY_ACTIVITIES: YES
HEARING - LEFT EAR: FUNCTIONAL
HEARING - RIGHT EAR: FUNCTIONAL
TOILETING: INDEPENDENT
GROOMING: INDEPENDENT
BATHING: INDEPENDENT

## 2023-12-19 ASSESSMENT — LIFESTYLE VARIABLES
SKIP TO QUESTIONS 9-10: 1
HOW OFTEN DO YOU HAVE A DRINK CONTAINING ALCOHOL: NEVER
AUDIT-C TOTAL SCORE: 0
HOW MANY STANDARD DRINKS CONTAINING ALCOHOL DO YOU HAVE ON A TYPICAL DAY: PATIENT DOES NOT DRINK
AUDIT-C TOTAL SCORE: 0
HOW OFTEN DO YOU HAVE 6 OR MORE DRINKS ON ONE OCCASION: NEVER

## 2023-12-19 ASSESSMENT — PATIENT HEALTH QUESTIONNAIRE - PHQ9
2. FEELING DOWN, DEPRESSED OR HOPELESS: NOT AT ALL
1. LITTLE INTEREST OR PLEASURE IN DOING THINGS: NOT AT ALL
SUM OF ALL RESPONSES TO PHQ9 QUESTIONS 1 & 2: 0

## 2023-12-19 NOTE — H&P
eleHistory Of Present Illness  He is 84 year old with:     1.S/p dual chamber pacemaker (Lu[employer] Felipe Cateresev) for complete heart block in 2008. EF of 45% at that time  2. s/p upgrade to BIV/ICD (Veto Palacios) in 07/2012. The LV Lead could not be placed from the right side and in October of 2012 he had LV lead placed from the left side.      s/p battery change out 05/2014. He had another changed out 08/2018.      3. DM  4. AF - s/p several cardioversions in the past and in the past he was on Amiodarone . on warfarin  5. 10/2016 ECHO ef of 40-45%. 04/2021 unchanged EF 40-45%.   6. - Evolut Pro Plus 26 mm valve, primary access LFA on 4/7/20.     Presented for LV lear revesion.      Plan is to try to take it out If it does not come out then will try to place a new LV lead if not successful then will place LBP lead      Past Medical History  Past Medical History:   Diagnosis Date    Aortic stenosis     Status post transcatheter aortic valve replacement    Atherosclerotic heart disease of native coronary artery without angina pectoris 12/12/2022    CAD (coronary artery disease)    Atrial fibrillation (CMS/HCC)     Chronic systolic heart failure (CMS/HCC)     Complete heart block (CMS/HCC)     status post dual chamber pacemaker placemtn in 01/2008    Diabetes mellitus, type 2 (CMS/HCC)     Essential (primary) hypertension 12/12/2022    Hypertension    Presence of automatic (implantable) cardiac defibrillator 11/14/2022    Cardiac defibrillator in place, upgraded from pacemaker in 05/2014       Surgical History  Past Surgical History:   Procedure Laterality Date    AORTIC VALVE REPLACEMENT  04/07/2022    Transcatheter aortic valve replacement (TAVR) for aortic valve stenosis    APPENDECTOMY  12/01/2014    Appendectomy    CORONARY ARTERY BYPASS GRAFT  12/01/2014    CABG    CT ABDOMEN PELVIS ANGIOGRAM W AND/OR WO IV CONTRAST  03/17/2020    CT ABDOMEN PELVIS ANGIOGRAM W AND/OR WO IV CONTRAST 3/17/2020 [facility]    OTHER  SURGICAL HISTORY  12/01/2014    Cardio-defib Pulse Generator Implantation Date        Social History  He reports that he has never smoked. He has never used smokeless tobacco. He reports that he does not currently use alcohol. He reports that he does not use drugs.    Family History  Family History   Problem Relation Name Age of Onset    Drug abuse Mother          OVERDOSE        Allergies  Dicloxacillin, Doxycycline, and Scopolamine    Review of Systems   All other systems reviewed and are negative.       Physical Exam  Vitals reviewed.   Constitutional:       Appearance: Normal appearance. He is normal weight.   Cardiovascular:      Rate and Rhythm: Normal rate and regular rhythm.   Pulmonary:      Effort: Pulmonary effort is normal.      Breath sounds: Normal breath sounds.   Abdominal:      General: Abdomen is flat. Bowel sounds are normal.      Palpations: Abdomen is soft.   Neurological:      Mental Status: He is alert.          Last Recorded Vitals  There were no vitals taken for this visit.    Relevant Results             Assessment/Plan   Dilated cardiomyopathy pacemaker induced. Here for upgrade.              I spent 30 minutes in the professional and overall care of this patient.      Minor Quesada MD

## 2023-12-19 NOTE — PROGRESS NOTES
Pharmacy Medication History Review    Navneet Thompson is a 89 y.o. male admitted for No Principal Problem: There is no principal problem currently on the Problem List. Please update the Problem List and refresh.. Pharmacy reviewed the patient's annbj-[id number] medications and allergies for accuracy.    The list below reflects the updated PTA list. Comments regarding how patient may be taking medications differently can be found in the Admit Orders Activity  Prior to Admission Medications   Prescriptions Last Dose Informant Patient Reported? Taking?   FreeStyle Maria Elena 14 Day Sensor kit  Self, Spouse/Significant Other Yes No   Sig: USE AS DIRECTED EVERY 14 DAYS   acetaminophen (Tylenol) 325 mg tablet 12/18/2023 Self, Spouse/Significant Other No No   Sig: Take 2 tablets (650 mg) by mouth every 6 hours if needed for mild pain (1 - 3).   adalimumab (Humira) 40 mg/0.8 mL syringe kit prefilled syringe 12/9/2023 Self, Spouse/Significant Other Yes No   Sig: Inject 1 each (40 mg) under the skin every 14 (fourteen) days.   aspirin 81 mg EC tablet 12/18/2023 Self, Spouse/Significant Other Yes No   Sig: Take 1 tablet (81 mg) by mouth once daily.   atorvastatin (Lipitor) 80 mg tablet 12/19/2023 Self, Spouse/Significant Other No No   Sig: Take 1 tablet (80 mg) by mouth once daily at bedtime.   cholecalciferol (Vitamin D-3) 25 MCG (1000 UT) tablet 12/19/2023 Self, Spouse/Significant Other No No   Sig: Take 1 tablet (1,000 Units) by mouth once daily. Do not start before October 20, 2023.   empagliflozin (Jardiance) 10 mg 12/19/2023 Self, Spouse/Significant Other No No   Sig: Take 1 tablet (10 mg) by mouth once daily. Do not start before October 23, 2023.   furosemide (Lasix) 40 mg tablet 12/19/2023 Self, Spouse/Significant Other No No   Sig: Take 1 tablet (40 mg) by mouth once daily. Do not start before October 25, 2023.   insulin aspart (NovoLOG U-100 Insulin aspart) 100 unit/mL injection 12/18/2023 Self, Spouse/Significant Other  Yes No   Sig: INJECT 6 UNITS UNDER THE SKIN WITH BREAKFAST/LUNCH/DINNER, IF BLOOD GLUCOSE GREATER THAN 180 GIVE 8 UNITS, BLOOD GLUCOSE GREATER THAN 240 GIVE 10 UNITS AND GREATER THAN 300 GIVE 12 UNITS   insulin glargine (Lantus U-100 Insulin) 100 unit/mL injection 12/18/2023 Self, Spouse/Significant Other Yes No   Sig: Inject 45 Units under the skin once daily at bedtime.   isosorbide mononitrate ER (Imdur) 30 mg 24 hr tablet 12/19/2023 Self, Spouse/Significant Other Yes No   Sig: Take 1 tablet (30 mg) by mouth once daily.   lisinopril 5 mg tablet 12/19/2023 Self, Spouse/Significant Other Yes No   Sig: Take 1 tablet (5 mg) by mouth once daily.   lubricating eye drops ophthalmic solution 12/19/2023 Self, Spouse/Significant Other No No   Sig: Administer 1 drop into both eyes 2 times a day as needed for dry eyes.   magnesium oxide (Mag-Ox) 400 mg (241.3 mg magnesium) tablet 12/18/2023 Self, Spouse/Significant Other No No   Sig: Take 1 tablet (400 mg) by mouth once daily. Do not start before October 20, 2023.   melatonin 3 mg tablet  Self, Spouse/Significant Other No No   Sig: Take 1 tablet (3 mg) by mouth as needed at bedtime for sleep.   Patient not taking: Reported on 12/18/2023   metoprolol tartrate (Lopressor) 25 mg tablet 12/19/2023 Self, Spouse/Significant Other Yes No   Sig: Take 1 tablet (25 mg) by mouth 2 times a day.   neomycin-bacitracin-polymyxin (Polysporin) ophthalmic ointment 12/18/2023 Self, Spouse/Significant Other No No   Sig: Apply 0.5 inches to both eyes once daily.   omeprazole (PriLOSEC) 20 mg DR capsule 12/19/2023 Self, Spouse/Significant Other No No   Sig: Take 1 capsule (20 mg) by mouth once daily. Do not crush or chew.   triamcinolone (Kenalog) 0.1 % cream Past Month Self, Spouse/Significant Other No No   Sig: Apply topically 2 times a day.   warfarin (Coumadin) 3 mg tablet 12/15/2023 Self, Spouse/Significant Other No No   Sig: Take as directed per After Visit Summary.   Patient taking  differently: 1.5 tab (4.5mg) by mouth on Monday and 2 tabs (6mg) all other days      Facility-Administered Medications: None        The list below reflects the updated allergy list. Please review each documented allergy for additional clarification and justification.  Allergies  Reviewed by Susanna Wray PharmD on 12/19/2023        Severity Reactions Comments    Dicloxacillin Not Specified Nausea/vomiting     Doxycycline Not Specified Nausea/vomiting VIOLENTLY SICK    Scopolamine Not Specified Syncope PASSED OUT, ALTERED MENTAL STATE            Patient was unable to be assessed for M2B at discharge. Pharmacy has been updated to [facility].    Sources used to complete the med history include: Patient/Spouse interview - good historian of medications/ [facility]    Susanna Wray PharmD  Transitions of Care Pharmacist  [facility]  Please reach out via Secure Chat for questions, or if no response call Xention or [facility]

## 2023-12-19 NOTE — Clinical Note
Patient Clipped and Prepped: right brachial. Prepped with ChloraPrep, a minimum of 3 minute dry time, longer if needed, no pooling noted, patient draped in sterile fashion and Betadine and draped in sterile fashion. Chloraprep x2, duraprep x1

## 2023-12-19 NOTE — Clinical Note
The PACEMAKER, PERCEPTA BIPOLAR, CRT-P MRI - [id number] device was inserted. The leads were placed into the connector and visually verified to be in correct position. Injury current obtained.

## 2023-12-20 ENCOUNTER — APPOINTMENT (OUTPATIENT)
Dept: CARDIOLOGY | Facility: HOSPITAL | Age: 88
DRG: 245 | End: 2023-12-20
Payer: MEDICARE

## 2023-12-20 ENCOUNTER — APPOINTMENT (OUTPATIENT)
Dept: RADIOLOGY | Facility: HOSPITAL | Age: 88
DRG: 245 | End: 2023-12-20
Payer: MEDICARE

## 2023-12-20 VITALS
SYSTOLIC BLOOD PRESSURE: 112 MMHG | HEART RATE: 62 BPM | DIASTOLIC BLOOD PRESSURE: 52 MMHG | OXYGEN SATURATION: 97 % | RESPIRATION RATE: 15 BRPM | WEIGHT: 182.87 LBS | HEIGHT: 71 IN | BODY MASS INDEX: 25.6 KG/M2 | TEMPERATURE: 99 F

## 2023-12-20 LAB
BODY SURFACE AREA: 2.03 M2
GLUCOSE BLD MANUAL STRIP-MCNC: 129 MG/DL (ref 74–99)

## 2023-12-20 PROCEDURE — 2500000001 HC RX 250 WO HCPCS SELF ADMINISTERED DRUGS (ALT 637 FOR MEDICARE OP): Performed by: INTERNAL MEDICINE

## 2023-12-20 PROCEDURE — 71046 X-RAY EXAM CHEST 2 VIEWS: CPT

## 2023-12-20 PROCEDURE — 2500000004 HC RX 250 GENERAL PHARMACY W/ HCPCS (ALT 636 FOR OP/ED): Performed by: INTERNAL MEDICINE

## 2023-12-20 PROCEDURE — 82947 ASSAY GLUCOSE BLOOD QUANT: CPT

## 2023-12-20 PROCEDURE — 71046 X-RAY EXAM CHEST 2 VIEWS: CPT | Performed by: RADIOLOGY

## 2023-12-20 RX ORDER — SULFAMETHOXAZOLE AND TRIMETHOPRIM 800; 160 MG/1; MG/1
1 TABLET ORAL EVERY 12 HOURS SCHEDULED
Qty: 14 TABLET | Refills: 0 | Status: SHIPPED | OUTPATIENT
Start: 2023-12-20 | End: 2023-12-27

## 2023-12-20 RX ORDER — CEFADROXIL 500 MG/1
1000 CAPSULE ORAL EVERY 12 HOURS SCHEDULED
Qty: 28 CAPSULE | Refills: 0 | Status: SHIPPED | OUTPATIENT
Start: 2023-12-20 | End: 2023-12-27

## 2023-12-20 RX ADMIN — EMPAGLIFLOZIN 10 MG: 10 TABLET, FILM COATED ORAL at 09:02

## 2023-12-20 RX ADMIN — METOPROLOL TARTRATE 25 MG: 25 TABLET, FILM COATED ORAL at 08:56

## 2023-12-20 RX ADMIN — FUROSEMIDE 40 MG: 20 TABLET ORAL at 09:02

## 2023-12-20 RX ADMIN — Medication 1000 UNITS: at 08:56

## 2023-12-20 RX ADMIN — ISOSORBIDE MONONITRATE 30 MG: 30 TABLET, EXTENDED RELEASE ORAL at 08:56

## 2023-12-20 RX ADMIN — Medication 400 MG: at 08:56

## 2023-12-20 RX ADMIN — CARBOXYMETHYLCELLULOSE SODIUM 1 DROP: 5 SOLUTION/ DROPS OPHTHALMIC at 08:56

## 2023-12-20 RX ADMIN — LISINOPRIL 5 MG: 5 TABLET ORAL at 08:56

## 2023-12-20 RX ADMIN — ASPIRIN 81 MG: 81 TABLET, COATED ORAL at 08:55

## 2023-12-20 RX ADMIN — PANTOPRAZOLE SODIUM 20 MG: 20 TABLET, DELAYED RELEASE ORAL at 06:52

## 2023-12-20 RX ADMIN — CEFADROXIL 1000 MG: 500 CAPSULE ORAL at 08:56

## 2023-12-20 RX ADMIN — TRIAMCINOLONE ACETONIDE: 1 CREAM TOPICAL at 08:57

## 2023-12-20 RX ADMIN — SULFAMETHOXAZOLE AND TRIMETHOPRIM 160 MG: 800; 160 TABLET ORAL at 08:56

## 2023-12-20 ASSESSMENT — COGNITIVE AND FUNCTIONAL STATUS - GENERAL
WALKING IN HOSPITAL ROOM: A LITTLE
MOVING FROM LYING ON BACK TO SITTING ON SIDE OF FLAT BED WITH BEDRAILS: A LITTLE
EATING MEALS: A LITTLE
DRESSING REGULAR UPPER BODY CLOTHING: A LITTLE
PERSONAL GROOMING: A LITTLE
DAILY ACTIVITIY SCORE: 18
STANDING UP FROM CHAIR USING ARMS: A LITTLE
TURNING FROM BACK TO SIDE WHILE IN FLAT BAD: A LITTLE
CLIMB 3 TO 5 STEPS WITH RAILING: A LITTLE
MOVING TO AND FROM BED TO CHAIR: A LITTLE
DRESSING REGULAR LOWER BODY CLOTHING: A LITTLE
HELP NEEDED FOR BATHING: A LITTLE
MOBILITY SCORE: 18
TOILETING: A LITTLE

## 2023-12-20 ASSESSMENT — PAIN SCALES - WONG BAKER
WONGBAKER_NUMERICALRESPONSE: NO HURT
WONGBAKER_NUMERICALRESPONSE: NO HURT

## 2023-12-20 ASSESSMENT — PAIN SCALES - GENERAL: PAINLEVEL_OUTOF10: 0 - NO PAIN

## 2023-12-20 NOTE — DISCHARGE INSTRUCTIONS
Discharge Instructions for your Cardiac Device   CARDIAC DEVICE CLINIC  [phone]              Incision:   1.  Keep your incision clean and dry for 1 week.  2. May shower 7 days after the procedure. Do not submerge the incision in a tub, pool, hot tub, or lake for 4 weeks.  3. Your incision should look better each day. If you notice unusual swelling, redness, drainage or fever greater than 100 degrees, please call the Device Clinic or your Doctor's office.  4. Avoid using deodorants, powders, creams, lotions, etc on your incision for 4 weeks.   5. There are no stitches to be removed.  If you received a “glue” closure this may appear purple-gray and does not get removed but wears away slowly on its own.  Steri-strips (small white bandages) may be removed in one week or they may fall off on their own earlier.  Pain:  1. It is normal for the area around the incision to be tender for a few weeks following surgery. Pain relievers such as Tylenol or Motrin (whichever you can take) are usually sufficient for pain relief. If the pain gets worse instead of better than please call the Device Clinic or your Doctor.  Activity:  1. If you have a new device and new leads placed than avoid raising your arm above shoulder level for 4 weeks. Do no  anything weighing more than 15 pounds.   2. Avoid exercising with the arm on the side of your pacemaker.  So no golf, swimming, tennis, bowling etc for 4 weeks.      3. Driving: If you were driving prior to your procedure, you may resume driving in 1 week. If you experienced a loss of consciousness prior to your procedure, you should verify with your Doctor when you are able to resume driving.  ID CARD:  1. It is important to carry your device ID card with you at all times.   2. Inform doctors and health care providers that you have a pacemaker or defibrillator.  Electromagnetic Interference:  1. Microwave ovens are safe to use.  2. Cellular phones should be held to the  opposite ear from your device. Do not carry your phone in your shirt pocket. Some i-phones that self-charge can interfere with your device so be sure to keep it away from your pacemaker/defibrillator.   3. Read the Patient Booklet for more information. You may call either the Device Clinic [phone]  or the patient services of the device  with questions about specific electrical appliances and interference problems.    IT IS YOUR RESPONSIBILITY TO MAKE AND KEEP APPOINTMENTS.   Please refer to your Device clinic handout.

## 2023-12-20 NOTE — PROGRESS NOTES
12/20/2023 Care coordination  Discussed dispo with pt and daughter.  They denied any  HHC services or home 02.  No skilled needs identified.

## 2023-12-20 NOTE — DISCHARGE SUMMARY
Discharge Diagnosis  Acute congestive heart failure, unspecified heart failure type (CMS/HCC)    Issues Requiring Follow-Up  Follow up with EP and [facility]     Test Results Pending At Discharge  Pending Labs       No current pending labs.            Hospital Course   S/p LBP lead implant ( switch his device to CRTP from CRTD ( per patient request.       Pertinent Physical Exam At Time of Discharge  Physical Exam    Home Medications     Medication List      START taking these medications     cefadroxil 500 mg capsule; Commonly known as: Duricef; Take 2 capsules   (1,000 mg) by mouth every 12 hours for 7 days.   sulfamethoxazole-trimethoprim 800-160 mg tablet; Commonly known as:   Bactrim DS; Take 1 tablet by mouth every 12 hours for 7 days.     CHANGE how you take these medications     warfarin 3 mg tablet; Commonly known as: Coumadin; Take as directed. If   you are unsure how to take this medication, talk to your nurse or doctor.;   Original instructions: Take as directed per After Visit Summary.; What   changed: additional instructions     CONTINUE taking these medications     acetaminophen 325 mg tablet; Commonly known as: Tylenol; Take 2 tablets   (650 mg) by mouth every 6 hours if needed for mild pain (1 - 3).   aspirin 81 mg EC tablet   atorvastatin 80 mg tablet; Commonly known as: Lipitor; Take 1 tablet (80   mg) by mouth once daily at bedtime.   cholecalciferol 25 MCG (1000 UT) tablet; Commonly known as: Vitamin D-3;   Take 1 tablet (1,000 Units) by mouth once daily. Do not start before   October 20, 2023.   empagliflozin 10 mg; Commonly known as: Jardiance; Take 1 tablet (10 mg)   by mouth once daily. Do not start before October 23, 2023.   FreeStyle Maria Elena 14 Day Sensor kit; Generic drug: FreeStyle Maria Elena sensor   system   furosemide 40 mg tablet; Commonly known as: Lasix; Take 1 tablet (40 mg)   by mouth once daily. Do not start before October 25, 2023.   Humira 40 mg/0.8 mL syringe kit prefilled  syringe; Generic drug:   adalimumab   isosorbide mononitrate ER 30 mg 24 hr tablet; Commonly known as: Imdur   Lantus U-100 Insulin 100 unit/mL injection; Generic drug: insulin   glargine   lisinopril 5 mg tablet   lubricating eye drops ophthalmic solution; Administer 1 drop into both   eyes 2 times a day as needed for dry eyes.   magnesium oxide 400 mg (241.3 mg magnesium) tablet; Commonly known as:   Mag-Ox; Take 1 tablet (400 mg) by mouth once daily. Do not start before   October 20, 2023.   metoprolol tartrate 25 mg tablet; Commonly known as: Lopressor   neomycin-bacitracin-polymyxin ophthalmic ointment; Commonly known as:   Polysporin; Apply 0.5 inches to both eyes once daily.   NovoLOG U-100 Insulin aspart 100 unit/mL injection; Generic drug:   insulin aspart   omeprazole 20 mg DR capsule; Commonly known as: PriLOSEC; Take 1 capsule   (20 mg) by mouth once daily. Do not crush or chew.   triamcinolone 0.1 % cream; Commonly known as: Kenalog; Apply topically 2   times a day.     ASK your doctor about these medications     melatonin 3 mg tablet; Take 1 tablet (3 mg) by mouth as needed at   bedtime for sleep.       Outpatient Follow-Up  Future Appointments   Date Time Provider Department Center   [facility]28[facility]20[id number]       Minor Quesada MD

## 2023-12-28 ENCOUNTER — ANTICOAGULATION - WARFARIN VISIT (OUTPATIENT)
Dept: CARDIOLOGY | Facility: CLINIC | Age: 88
End: 2023-12-28
Payer: MEDICARE

## 2023-12-28 DIAGNOSIS — I48.91 ATRIAL FIBRILLATION, UNSPECIFIED TYPE (MULTI): Primary | ICD-10-CM

## 2023-12-28 LAB
POC INR: 3
POC PROTHROMBIN TIME: NORMAL

## 2023-12-28 PROCEDURE — 99211 OFF/OP EST MAY X REQ PHY/QHP: CPT | Mod: 27 | Performed by: INTERNAL MEDICINE

## 2023-12-28 PROCEDURE — 85610 PROTHROMBIN TIME: CPT | Mod: QW

## 2024-01-18 ENCOUNTER — HOSPITAL ENCOUNTER (OUTPATIENT)
Dept: CARDIOLOGY | Facility: CLINIC | Age: 89
Discharge: HOME | End: 2024-01-18
Payer: MEDICARE

## 2024-01-18 ENCOUNTER — APPOINTMENT (OUTPATIENT)
Dept: CARDIOLOGY | Facility: CLINIC | Age: 89
End: 2024-01-18
Payer: MEDICARE

## 2024-01-18 DIAGNOSIS — I42.0 DILATED CARDIOMYOPATHY (MULTI): ICD-10-CM

## 2024-01-18 DIAGNOSIS — Z95.810 PRESENCE OF AUTOMATIC (IMPLANTABLE) CARDIAC DEFIBRILLATOR: ICD-10-CM

## 2024-01-18 PROCEDURE — 93281 PM DEVICE PROGR EVAL MULTI: CPT

## 2024-01-18 PROCEDURE — 93290 INTERROG DEV EVAL ICPMS IP: CPT | Performed by: INTERNAL MEDICINE

## 2024-01-18 PROCEDURE — 93281 PM DEVICE PROGR EVAL MULTI: CPT | Performed by: INTERNAL MEDICINE

## 2024-01-25 ENCOUNTER — ANTICOAGULATION - WARFARIN VISIT (OUTPATIENT)
Dept: CARDIOLOGY | Facility: CLINIC | Age: 89
End: 2024-01-25
Payer: MEDICARE

## 2024-01-25 DIAGNOSIS — I48.91 ATRIAL FIBRILLATION, UNSPECIFIED TYPE (MULTI): ICD-10-CM

## 2024-01-25 LAB
POC INR: 2.2
POC PROTHROMBIN TIME: NORMAL

## 2024-01-25 PROCEDURE — 99211 OFF/OP EST MAY X REQ PHY/QHP: CPT | Performed by: INTERNAL MEDICINE

## 2024-01-25 PROCEDURE — 85610 PROTHROMBIN TIME: CPT | Mod: QW

## 2024-01-25 NOTE — PROGRESS NOTES
Patient identification verified with 2 identifiers.    Location: [facility] - [address] [address] [place], [place] [address] [phone]     Referring Physician: Dr. Melvin Bahena  Enrollment/ Re-enrollment date: 2024   INR Goal: 2.0-3.0  INR monitoring is per [facility] protocol.  Anticoagulation Medication: warfarin 3 mg tabs  Indication: Atrial Fibrillation      Subjective   Bleeding signs/symptoms: No    Bruising: No   Major bleeding event: No  Thrombosis signs/symptoms: No  Thromboembolic event: No  Missed doses: No  Extra doses: No  Medication changes: No  Dietary changes: No  Change in health: No  Change in activity: No  Alcohol: No  Other concerns: No    Upcoming Surgeries:  Does the Patient Have any upcoming surgeries that require interruption in anticoagulation therapy? no  Does the patient require bridging? no      Anticoagulation Summary  As of 2024      INR goal:  2.0-3.0   TTR:  95.9 % (2.9 mo)   INR used for dosin.20 (2024)   Weekly warfarin total:  40.5 mg               Assessment/Plan   Therapeutic     1. New dose: no change    2. Next INR: 1 month      Education provided to patient during the visit:  Patient instructed to call in interim with questions, concerns and changes.   Patient educated on compliance with dosing, follow up appointments, and prescribed plan of care.

## 2024-02-22 ENCOUNTER — ANTICOAGULATION - WARFARIN VISIT (OUTPATIENT)
Dept: CARDIOLOGY | Facility: CLINIC | Age: 89
End: 2024-02-22
Payer: MEDICARE

## 2024-02-22 DIAGNOSIS — I48.91 ATRIAL FIBRILLATION, UNSPECIFIED TYPE (MULTI): Primary | ICD-10-CM

## 2024-02-22 LAB
POC INR: 3
POC PROTHROMBIN TIME: NORMAL

## 2024-02-22 PROCEDURE — 99211 OFF/OP EST MAY X REQ PHY/QHP: CPT | Performed by: INTERNAL MEDICINE

## 2024-02-22 PROCEDURE — 85610 PROTHROMBIN TIME: CPT | Mod: QW

## 2024-02-22 NOTE — PROGRESS NOTES
Patient identification verified with 2 identifiers.    Location: [facility] - [address] [address] [facility]. [place], [place] [address] [phone]     Referring Physician: Dr. Melvin Bahena  Enrollment/ Re-enrollment date: 5/1/2024   INR Goal: 2.0-3.0  INR monitoring is per [facility] protocol.  Anticoagulation Medication: warfarin  Indication: Atrial Fibrillation/Atrial Flutter    Subjective   Bleeding signs/symptoms: No    Bruising: No   Major bleeding event: No  Thrombosis signs/symptoms: No  Thromboembolic event: No  Missed doses: No  Extra doses: No  Medication changes: No  Dietary changes: No  Change in health: No  Change in activity: No  Alcohol: No  Other concerns: No    Upcoming Procedures:  Does the Patient Have any upcoming procedures that require interruption in anticoagulation therapy? no  Does the patient require bridging? no      Anticoagulation Summary  As of 2/22/2024      INR goal:  2.0-3.0   TTR:  96.9 % (3.9 mo)   INR used for dosing:  3.00 (2/22/2024)   Weekly warfarin total:  40.5 mg               Assessment/Plan   Therapeutic     1. New dose: no change    2. Next INR: 1 month      Education provided to patient during the visit:  Patient instructed to call in interim with questions, concerns and changes.   Patient educated on interactions between medications and warfarin.   Patient educated on dietary consistency in vitamin k consumption.   Patient educated on affects of alcohol consumption while taking warfarin.   Patient educated on signs of bleeding/clotting.   Patient educated on compliance with dosing, follow up appointments, and prescribed plan of care.

## 2024-03-21 ENCOUNTER — ANTICOAGULATION - WARFARIN VISIT (OUTPATIENT)
Dept: CARDIOLOGY | Facility: CLINIC | Age: 89
End: 2024-03-21
Payer: MEDICARE

## 2024-03-21 DIAGNOSIS — I48.91 ATRIAL FIBRILLATION, UNSPECIFIED TYPE (MULTI): Primary | ICD-10-CM

## 2024-03-21 LAB
POC INR: 2.4
POC PROTHROMBIN TIME: NORMAL

## 2024-03-21 PROCEDURE — 99211 OFF/OP EST MAY X REQ PHY/QHP: CPT

## 2024-03-21 PROCEDURE — 85610 PROTHROMBIN TIME: CPT | Mod: QW

## 2024-03-21 NOTE — PROGRESS NOTES
Patient identification verified with 2 identifiers.    Location: [facility] - [address] [address] [place], [place] [address] [phone]     Referring Physician: Dr. Melvin Bahena  Enrollment/ Re-enrollment date: 2024   INR Goal: 2.0-3.0  INR monitoring is per [facility] protocol.  Anticoagulation Medication: warfarin  Indication: Atrial Fibrillation/Atrial Flutter    Subjective   Bleeding signs/symptoms: No    Bruising: No   Major bleeding event: No  Thrombosis signs/symptoms: No  Thromboembolic event: No  Missed doses: No  Extra doses: No  Medication changes: No  Dietary changes: No  Change in health: No  Change in activity: No  Alcohol: No  Other concerns: No    Upcoming Procedures:  Does the Patient Have any upcoming procedures that require interruption in anticoagulation therapy? no  Does the patient require bridging? no      Anticoagulation Summary  As of 3/21/2024      INR goal:  2.0-3.0   TTR:  97.5 % (4.8 mo)   INR used for dosin.40 (3/21/2024)   Weekly warfarin total:  40.5 mg               Assessment/Plan   Therapeutic     1. New dose: no change    2. Next INR: 1 month      Education provided to patient during the visit:  Patient instructed to call in interim with questions, concerns and changes.   Patient educated on compliance with dosing, follow up appointments, and prescribed plan of care.

## 2024-04-18 ENCOUNTER — ANTICOAGULATION - WARFARIN VISIT (OUTPATIENT)
Dept: CARDIOLOGY | Facility: CLINIC | Age: 89
End: 2024-04-18
Payer: MEDICARE

## 2024-04-18 DIAGNOSIS — I48.91 ATRIAL FIBRILLATION, UNSPECIFIED TYPE (MULTI): Primary | ICD-10-CM

## 2024-04-18 LAB
POC INR: 2
POC PROTHROMBIN TIME: NORMAL

## 2024-04-18 PROCEDURE — 85610 PROTHROMBIN TIME: CPT | Mod: QW

## 2024-04-18 PROCEDURE — 99211 OFF/OP EST MAY X REQ PHY/QHP: CPT

## 2024-04-18 NOTE — PROGRESS NOTES
Patient identification verified with 2 identifiers.    Location: [facility] - [address] [address] [place], [place] [address] [phone]     Referring Physician: Dr. Melvin Bahena  Enrollment/ Re-enrollment date: 2024   INR Goal: 2.0-3.0  INR monitoring is per [facility] protocol.  Anticoagulation Medication: warfarin  Indication: Atrial Fibrillation/Atrial Flutter    Subjective   Bleeding signs/symptoms: No    Bruising: No   Major bleeding event: No  Thrombosis signs/symptoms: No  Thromboembolic event: No  Missed doses: No  Extra doses: No  Medication changes: No  Dietary changes: No  Change in health: No  Change in activity: No  Alcohol: No  Other concerns: No    Upcoming Procedures:  Does the Patient Have any upcoming procedures that require interruption in anticoagulation therapy? no  Does the patient require bridging? no      Anticoagulation Summary  As of 2024      INR goal:  2.0-3.0   TTR:  97.9% (5.7 mo)   INR used for dosin.00 (2024)   Weekly warfarin total:  40.5 mg               Assessment/Plan   Therapeutic     1. New dose: no change    2. Next INR: 1 month      Education provided to patient during the visit:  Patient instructed to call in interim with questions, concerns and changes.   Patient educated on compliance with dosing, follow up appointments, and prescribed plan of care.

## 2024-04-22 ENCOUNTER — HOSPITAL ENCOUNTER (OUTPATIENT)
Dept: CARDIOLOGY | Facility: CLINIC | Age: 89
Discharge: HOME | End: 2024-04-22
Payer: MEDICARE

## 2024-04-22 DIAGNOSIS — I44.2 CHB (COMPLETE HEART BLOCK) (MULTI): ICD-10-CM

## 2024-04-22 DIAGNOSIS — Z95.810 CARDIAC DEFIBRILLATOR IN PLACE: ICD-10-CM

## 2024-04-22 DIAGNOSIS — I42.9 CARDIOMYOPATHY, UNSPECIFIED TYPE (MULTI): ICD-10-CM

## 2024-04-22 PROCEDURE — 93294 REM INTERROG EVL PM/LDLS PM: CPT | Performed by: INTERNAL MEDICINE

## 2024-04-22 PROCEDURE — 93296 REM INTERROG EVL PM/IDS: CPT

## 2024-05-16 ENCOUNTER — ANTICOAGULATION - WARFARIN VISIT (OUTPATIENT)
Dept: CARDIOLOGY | Facility: CLINIC | Age: 89
End: 2024-05-16
Payer: MEDICARE

## 2024-05-16 DIAGNOSIS — I48.91 ATRIAL FIBRILLATION, UNSPECIFIED TYPE (MULTI): Primary | ICD-10-CM

## 2024-05-16 DIAGNOSIS — I48.20 CHRONIC ATRIAL FIBRILLATION (MULTI): Primary | ICD-10-CM

## 2024-05-16 LAB
POC INR: 2.9
POC PROTHROMBIN TIME: NORMAL

## 2024-05-16 PROCEDURE — 99211 OFF/OP EST MAY X REQ PHY/QHP: CPT

## 2024-05-16 NOTE — PROGRESS NOTES
Patient identification verified with 2 identifiers.    Location: [facility] - [address] [address] [facility]. [place], [place] [address] [phone]     Referring Physician: Dr. Melvin Bahena  Enrollment/ Re-enrollment date: 2024.  Renewal order sent electronically to Dr. Bahena on 24.   INR Goal: 2.0-3.0  INR monitoring is per [facility] protocol.  Anticoagulation Medication: warfarin  Indication: Atrial Fibrillation/Atrial Flutter    Subjective   Bleeding signs/symptoms: No    Bruising: No   Major bleeding event: No  Thrombosis signs/symptoms: No  Thromboembolic event: No  Missed doses: No  Extra doses: No  Medication changes: No  Dietary changes: No  Change in health: No  Change in activity: No  Alcohol: No  Other concerns: No    Upcoming Procedures:  Does the Patient Have any upcoming procedures that require interruption in anticoagulation therapy? no  Does the patient require bridging? no      Anticoagulation Summary  As of 2024      INR goal:  2.0-3.0   TTR:  98.2% (6.6 mo)   INR used for dosin.90 (2024)   Weekly warfarin total:  40.5 mg               Assessment/Plan   Therapeutic     1. New dose: no change    2. Next INR: 1 month      Education provided to patient during the visit:  Patient instructed to call in interim with questions, concerns and changes.   Patient educated on interactions between medications and warfarin.   Patient educated on dietary consistency in vitamin k consumption.   Patient educated on affects of alcohol consumption while taking warfarin.   Patient educated on signs of bleeding/clotting.   Patient educated on compliance with dosing, follow up appointments, and prescribed plan of care.

## 2024-06-13 ENCOUNTER — ANTICOAGULATION - WARFARIN VISIT (OUTPATIENT)
Dept: CARDIOLOGY | Facility: CLINIC | Age: 89
End: 2024-06-13
Payer: MEDICARE

## 2024-06-13 DIAGNOSIS — I48.20 CHRONIC ATRIAL FIBRILLATION (MULTI): ICD-10-CM

## 2024-06-13 DIAGNOSIS — I48.91 ATRIAL FIBRILLATION, UNSPECIFIED TYPE (MULTI): Primary | ICD-10-CM

## 2024-06-13 LAB
POC INR: 2
POC PROTHROMBIN TIME: NORMAL

## 2024-06-13 PROCEDURE — 85610 PROTHROMBIN TIME: CPT | Mod: QW

## 2024-06-13 PROCEDURE — 99211 OFF/OP EST MAY X REQ PHY/QHP: CPT

## 2024-06-13 NOTE — PROGRESS NOTES
Patient identification verified with 2 identifiers.    Location: [facility] - [address] [address] [place], [place] [address] [phone]     Referring Physician: Dr. Melvin Bahena  Enrollment/ Re-enrollment date:  25  INR Goal: 2.0-3.0  INR monitoring is per [facility] protocol.  Anticoagulation Medication: warfarin  Indication: Atrial Fibrillation/Atrial Flutter    Subjective   Bleeding signs/symptoms: No    Bruising: No   Major bleeding event: No  Thrombosis signs/symptoms: No  Thromboembolic event: No  Missed doses: No  Extra doses: No  Medication changes: No  Dietary changes: No  Change in health: No  Change in activity: No  Alcohol: No  Other concerns: No    Upcoming Procedures:  Does the Patient Have any upcoming procedures that require interruption in anticoagulation therapy? no  Does the patient require bridging? no      Anticoagulation Summary  As of 2024      INR goal:  2.0-3.0   TTR:  98.4% (7.6 mo)   INR used for dosin.00 (2024)   Weekly warfarin total:  40.5 mg               Assessment/Plan   Therapeutic     1. New dose: no change    2. Next INR: 1 month      Education provided to patient during the visit:  Patient instructed to call in interim with questions, concerns and changes.   Patient educated on compliance with dosing, follow up appointments, and prescribed plan of care.

## 2024-06-17 ENCOUNTER — HOSPITAL ENCOUNTER (OUTPATIENT)
Dept: CARDIOLOGY | Facility: CLINIC | Age: 89
Discharge: HOME | End: 2024-06-17
Payer: MEDICARE

## 2024-06-17 DIAGNOSIS — I42.0 DILATED CARDIOMYOPATHY (MULTI): ICD-10-CM

## 2024-06-17 DIAGNOSIS — Z95.0 PRESENCE OF CARDIAC PACEMAKER: ICD-10-CM

## 2024-07-11 ENCOUNTER — ANTICOAGULATION - WARFARIN VISIT (OUTPATIENT)
Dept: CARDIOLOGY | Facility: CLINIC | Age: 89
End: 2024-07-11
Payer: MEDICARE

## 2024-07-11 DIAGNOSIS — I48.20 CHRONIC ATRIAL FIBRILLATION (MULTI): ICD-10-CM

## 2024-07-11 DIAGNOSIS — I48.91 ATRIAL FIBRILLATION, UNSPECIFIED TYPE (MULTI): Primary | ICD-10-CM

## 2024-07-11 LAB
POC INR: 2.3
POC PROTHROMBIN TIME: NORMAL

## 2024-07-11 PROCEDURE — 99211 OFF/OP EST MAY X REQ PHY/QHP: CPT

## 2024-07-11 PROCEDURE — 85610 PROTHROMBIN TIME: CPT | Mod: QW

## 2024-07-11 NOTE — PROGRESS NOTES
Patient identification verified with 2 identifiers.    Location: [facility] - [address] [address] [place], [place] [address] [phone]     Referring Physician: Dr. Melvin Bahena  Enrollment/ Re-enrollment date:  25  INR Goal: 2.0-3.0  INR monitoring is per [facility] protocol.  Anticoagulation Medication: warfarin  Indication: Atrial Fibrillation/Atrial Flutter    Subjective   Bleeding signs/symptoms: No    Bruising: No   Major bleeding event: No  Thrombosis signs/symptoms: No  Thromboembolic event: No  Missed doses: No  Extra doses: No  Medication changes: No  Dietary changes: No  Change in health: No  Change in activity: No  Alcohol: No  Other concerns: No    Upcoming Procedures:  Does the Patient Have any upcoming procedures that require interruption in anticoagulation therapy? no  Does the patient require bridging? no      Anticoagulation Summary  As of 2024      INR goal:  2.0-3.0   TTR:  98.6% (8.5 mo)   INR used for dosin.30 (2024)   Weekly warfarin total:  40.5 mg               Assessment/Plan   Therapeutic     1. New dose: no change    2. Next INR: 1 month      Education provided to patient during the visit:  Patient instructed to call in interim with questions, concerns and changes.   Patient educated on compliance with dosing, follow up appointments, and prescribed plan of care.

## 2024-07-23 ENCOUNTER — HOSPITAL ENCOUNTER (OUTPATIENT)
Dept: CARDIOLOGY | Facility: CLINIC | Age: 89
Discharge: HOME | End: 2024-07-23
Payer: MEDICARE

## 2024-07-23 DIAGNOSIS — Z95.810 CARDIAC DEFIBRILLATOR IN PLACE: ICD-10-CM

## 2024-07-23 DIAGNOSIS — I42.9 CARDIOMYOPATHY, UNSPECIFIED TYPE (MULTI): ICD-10-CM

## 2024-07-23 DIAGNOSIS — I44.2 CHB (COMPLETE HEART BLOCK) (MULTI): ICD-10-CM

## 2024-07-23 PROCEDURE — 93294 REM INTERROG EVL PM/LDLS PM: CPT | Performed by: INTERNAL MEDICINE

## 2024-07-23 PROCEDURE — 93296 REM INTERROG EVL PM/IDS: CPT

## 2024-08-08 ENCOUNTER — APPOINTMENT (OUTPATIENT)
Dept: CARDIOLOGY | Facility: CLINIC | Age: 89
End: 2024-08-08
Payer: MEDICARE

## 2024-08-12 ENCOUNTER — APPOINTMENT (OUTPATIENT)
Dept: CARDIOLOGY | Facility: CLINIC | Age: 89
End: 2024-08-12
Payer: MEDICARE

## 2024-08-12 DIAGNOSIS — I48.20 CHRONIC ATRIAL FIBRILLATION (MULTI): ICD-10-CM

## 2024-08-12 DIAGNOSIS — I48.91 ATRIAL FIBRILLATION, UNSPECIFIED TYPE (MULTI): Primary | ICD-10-CM

## 2024-08-12 LAB
POC INR: 2.4
POC PROTHROMBIN TIME: NORMAL

## 2024-08-12 PROCEDURE — 85610 PROTHROMBIN TIME: CPT | Mod: QW

## 2024-08-12 PROCEDURE — 99211 OFF/OP EST MAY X REQ PHY/QHP: CPT

## 2024-08-12 NOTE — PROGRESS NOTES
Patient identification verified with 2 identifiers.    Location: [facility] - [address] [address] [facility]. [place], [place] [address] [phone]     Referring Physician: Dr. Melvin Bahena  Enrollment/ Re-enrollment date:  25  INR Goal: 2.0-3.0  INR monitoring is per [facility] protocol.  Anticoagulation Medication: warfarin  Indication: Atrial Fibrillation/Atrial Flutter    Subjective   Bleeding signs/symptoms: No    Bruising: No   Major bleeding event: No  Thrombosis signs/symptoms: No  Thromboembolic event: No  Missed doses: No  Extra doses: No  Medication changes: Yes  PT STATES HE WAS TOLD TO STOP A MEDICATION WHEN SE[facility] BY [facility] ( [facility].  HE DOES NOT REMEMBER MEDICATION AND I DO NOT SEE IT IN HER NOTE.  Dietary changes: No  Change in health: No  Change in activity: No  Alcohol: No  Other concerns: No    Upcoming Procedures:  Does the Patient Have any upcoming procedures that require interruption in anticoagulation therapy? no  Does the patient require bridging? no      Anticoagulation Summary  As of 2024      INR goal:  2.0-3.0   TTR:  98.8% (9.6 mo)   INR used for dosin.40 (2024)   Weekly warfarin total:  40.5 mg               Assessment/Plan   Therapeutic     1. New dose: no change    2. Next INR: 1 month      Education provided to patient during the visit:  Patient instructed to call in interim with questions, concerns and changes.   Patient educated on compliance with dosing, follow up appointments, and prescribed plan of care.

## 2024-08-20 DIAGNOSIS — R09.02 SEVERE HYPOXEMIA: ICD-10-CM

## 2024-08-20 DIAGNOSIS — I48.91 ATRIAL FIBRILLATION, UNSPECIFIED TYPE (MULTI): Primary | ICD-10-CM

## 2024-08-21 RX ORDER — WARFARIN 3 MG/1
TABLET ORAL
Qty: 180 TABLET | Refills: 0 | Status: SHIPPED | OUTPATIENT
Start: 2024-08-21 | End: 2024-08-23 | Stop reason: SDUPTHER

## 2024-08-22 DIAGNOSIS — I48.91 ATRIAL FIBRILLATION, UNSPECIFIED TYPE (MULTI): ICD-10-CM

## 2024-08-23 RX ORDER — WARFARIN 3 MG/1
TABLET ORAL
Qty: 180 TABLET | Refills: 0 | Status: SHIPPED | OUTPATIENT
Start: 2024-08-23

## 2024-09-05 ENCOUNTER — DOCUMENTATION (OUTPATIENT)
Dept: CARDIOLOGY | Facility: CLINIC | Age: 89
End: 2024-09-05
Payer: MEDICARE

## 2024-09-05 NOTE — PROGRESS NOTES
Pt called to report he is starting Prednisone 40mg today.  He has POCT already scheduled on 9/9 @ 1330.

## 2024-09-09 ENCOUNTER — OFFICE VISIT (OUTPATIENT)
Dept: CARDIOLOGY | Facility: CLINIC | Age: 89
End: 2024-09-09
Payer: MEDICARE

## 2024-09-09 ENCOUNTER — ANTICOAGULATION - WARFARIN VISIT (OUTPATIENT)
Dept: CARDIOLOGY | Facility: CLINIC | Age: 89
End: 2024-09-09
Payer: MEDICARE

## 2024-09-09 ENCOUNTER — HOSPITAL ENCOUNTER (OUTPATIENT)
Dept: RADIOLOGY | Facility: CLINIC | Age: 89
Discharge: HOME | End: 2024-09-09
Payer: MEDICARE

## 2024-09-09 VITALS
WEIGHT: 190 LBS | DIASTOLIC BLOOD PRESSURE: 71 MMHG | OXYGEN SATURATION: 95 % | SYSTOLIC BLOOD PRESSURE: 127 MMHG | HEART RATE: 85 BPM | BODY MASS INDEX: 27.2 KG/M2 | HEIGHT: 70 IN

## 2024-09-09 DIAGNOSIS — I42.0 DILATED CARDIOMYOPATHY (MULTI): Primary | ICD-10-CM

## 2024-09-09 DIAGNOSIS — R06.02 SHORTNESS OF BREATH: ICD-10-CM

## 2024-09-09 DIAGNOSIS — J96.21 ACUTE ON CHRONIC RESPIRATORY FAILURE WITH HYPOXIA AND HYPERCAPNIA (MULTI): ICD-10-CM

## 2024-09-09 DIAGNOSIS — I48.20 CHRONIC ATRIAL FIBRILLATION (MULTI): ICD-10-CM

## 2024-09-09 DIAGNOSIS — J96.22 ACUTE ON CHRONIC RESPIRATORY FAILURE WITH HYPOXIA AND HYPERCAPNIA (MULTI): ICD-10-CM

## 2024-09-09 DIAGNOSIS — I48.91 ATRIAL FIBRILLATION, UNSPECIFIED TYPE (MULTI): Primary | ICD-10-CM

## 2024-09-09 LAB
POC INR: 5 (ref 2–3)
POC PROTHROMBIN TIME: ABNORMAL

## 2024-09-09 PROCEDURE — 1159F MED LIST DOCD IN RCRD: CPT | Performed by: NURSE PRACTITIONER

## 2024-09-09 PROCEDURE — 3074F SYST BP LT 130 MM HG: CPT | Performed by: NURSE PRACTITIONER

## 2024-09-09 PROCEDURE — 71046 X-RAY EXAM CHEST 2 VIEWS: CPT

## 2024-09-09 PROCEDURE — 3078F DIAST BP <80 MM HG: CPT | Performed by: NURSE PRACTITIONER

## 2024-09-09 PROCEDURE — 1160F RVW MEDS BY RX/DR IN RCRD: CPT | Performed by: NURSE PRACTITIONER

## 2024-09-09 PROCEDURE — 99215 OFFICE O/P EST HI 40 MIN: CPT | Performed by: NURSE PRACTITIONER

## 2024-09-09 PROCEDURE — 1036F TOBACCO NON-USER: CPT | Performed by: NURSE PRACTITIONER

## 2024-09-09 PROCEDURE — 85610 PROTHROMBIN TIME: CPT | Mod: QW

## 2024-09-09 PROCEDURE — 71046 X-RAY EXAM CHEST 2 VIEWS: CPT | Performed by: RADIOLOGY

## 2024-09-09 RX ORDER — PREDNISONE 20 MG/1
40 TABLET ORAL DAILY
COMMUNITY

## 2024-09-09 RX ORDER — HYDROCORTISONE 1 %
CREAM (GRAM) TOPICAL 2 TIMES DAILY
COMMUNITY

## 2024-09-09 RX ORDER — POTASSIUM CHLORIDE 20 MEQ/1
20 TABLET, EXTENDED RELEASE ORAL DAILY
Qty: 30 TABLET | Refills: 11 | Status: SHIPPED | OUTPATIENT
Start: 2024-09-09 | End: 2025-09-09

## 2024-09-09 RX ORDER — FUROSEMIDE 40 MG/1
40 TABLET ORAL DAILY
Qty: 30 TABLET | Refills: 0 | Status: SHIPPED | OUTPATIENT
Start: 2024-09-09 | End: 2024-10-09

## 2024-09-09 ASSESSMENT — PATIENT HEALTH QUESTIONNAIRE - PHQ9
SUM OF ALL RESPONSES TO PHQ9 QUESTIONS 1 & 2: 0
2. FEELING DOWN, DEPRESSED OR HOPELESS: NOT AT ALL
1. LITTLE INTEREST OR PLEASURE IN DOING THINGS: NOT AT ALL

## 2024-09-09 ASSESSMENT — ENCOUNTER SYMPTOMS
SHORTNESS OF BREATH: 1
DYSPNEA ON EXERTION: 1
WEIGHT GAIN: 1
OCCASIONAL FEELINGS OF UNSTEADINESS: 1
LOSS OF SENSATION IN FEET: 0
DEPRESSION: 0

## 2024-09-09 NOTE — PROGRESS NOTES
Subjective   Navneet Thompson is a 89 y.o. male.    Chief Complaint:  Shortness of Breath (Pt has had SOB for past few weeks and is increasing)    HPI  Mr. Thompson is seen in collaboration with Dr. Bahena.  He is accompanied by his daughter.  He was here for a Coumadin clinic appointment (INR above 5) and his daughter told us he has had some increased shortness of breath over the last few weeks.  He indicates over the last 1 month he has gained about 8 pounds.  He cannot lie flat and is using a wedge pillow.  He has self increased his dose of furosemide to 40 mg twice daily in the last several days.  This has not been particularly helpful until this morning when he noticed more urination than usual.  He denies any recent hospitalizations.  Dermatology for a rash and given prednisone.  He was seen at the [facility] for a routine follow-up and had lab work obtained which showed normal potassium and a creatinine of 1.3    Review of Systems   Constitutional: Positive for weight gain.   Cardiovascular:  Positive for dyspnea on exertion.   Respiratory:  Positive for shortness of breath.        Objective   Vitals reviewed.   Constitutional:       Appearance: Frail.   Neck:      Vascular: JVD elevated.   Pulmonary:      Effort: Increased respiratory effort.      Breath sounds: No wheezing. Bibasilar Rhonchi present. No rales.   Chest:      Chest wall: Not tender to palpatation.   Cardiovascular:      Normal rate. Irregular rhythm. Normal S1. Normal S2.       Murmurs: There is no murmur.      No gallop.  No click. No rub.   Edema:     Pretibial: bilateral 1+ edema of the pretibial area.     Ankle: bilateral 1+ edema of the ankle.  Abdominal:      Palpations: Abdomen is soft.      Tenderness: There is no abdominal tenderness.   Musculoskeletal: Normal range of motion.         General: No tenderness. Skin:     General: Skin is warm and dry.      Findings: Rash present.   Neurological:      General: No focal deficit  present.      Mental Status: Alert and oriented to person, place and time.       Lab Review:   Lab Results   Component Value Date     12/13/2023    K 4.2 12/13/2023     12/13/2023    CO2 30 12/13/2023    BUN 28 (H) 12/13/2023    CREATININE 1.36 (H) 12/13/2023    GLUCOSE 106 (H) 12/13/2023    CALCIUM 9.1 12/13/2023     Lab Results   Component Value Date    WBC 8.6 12/13/2023    HGB 11.6 (L) 12/13/2023    HCT 39.2 (L) 12/13/2023    MCV 90 12/13/2023     12/13/2023     Lab Results   Component Value Date    CHOL 139 06/14/2023    TRIG 122 06/14/2023    HDL 35.0 (A) 06/14/2023     ECG obtained and reviewed, shows AV paced rhythm with a rate of 85 bpm    Assessment/Plan   Mr. Thompson is a pleasant 89 year old  male with a past medical history significant for CAD status post CABG using LIMA-LAD, SVG-OM3 and [id number], patent by heart catheterization 2/2020, atrial fibrillation status post several cardioversions on warfarin anticoagulation, PPM placed in 2008 for complete heart block upgraded to BiVICD in 2012 with generator change in 2018, HFrEF, diabetes and severe aortic stenosis status post Evolute Pro Plus 26 mm valve 4/2020. He also has a mildly dilated ascending aorta measuring 4.1 cm by CT scan 11/2022. Echocardiogram 10/2023 showed a stable EF of 30-35% with biatrial dilatation and mildly elevated RVSP.  He presents for interim follow-up with exertional shortness of breath and weight gain.  Lung sounds are diminished primarily on the right.  I will send him for an echocardiogram.  Additionally he will increase his furosemide to 80 mg daily and take a 20 mill equivalent potassium daily.  He will be seen in Coumadin clinic on Wednesday and weighed.  We will see him in 1 week in the office.  He will likely need labs at that time.  He and his daughter know to call for any interim questions or call 911 for any worsening of his symptoms.

## 2024-09-09 NOTE — PROGRESS NOTES
Patient identification verified with 2 identifiers.    Location: [facility] - [address] [address] [address] [facility]. [place], [place] [address] [phone]     Referring Physician: Dr. Melvin Bahena  Enrollment/ Re-enrollment date:  5/16/25  INR Goal: 2.0-3.0  INR monitoring is per [facility] protocol.  Anticoagulation Medication: warfarin  Indication: Atrial Fibrillation/Atrial Flutter    Subjective   Bleeding signs/symptoms: No    Bruising: No   Major bleeding event: No  Thrombosis signs/symptoms: No  Thromboembolic event: No  Missed doses: No  Extra doses: No  Medication changes: Yes  Pt started prednisone on Wed 9/4.  Dietary changes: No  Change in health: No  Change in activity: No  Alcohol: No  Other concerns: No    Upcoming Procedures:  Does the Patient Have any upcoming procedures that require interruption in anticoagulation therapy? no  Does the patient require bridging? no      Anticoagulation Summary  As of 9/9/2024      INR goal:  2.0-3.0   TTR:  98.8% (9.6 mo)   INR used for dosing:  --               Assessment/Plan   Supratherapeutic  Dr. Bahena notified of INR 5.0.  Agrees with plan to hold warfarin and retest in 2 days.    1. New dose:  Hold x 2 days     2. Next INR:  2 days 9/11      Education provided to patient during the visit:  Patient instructed to call in interim with questions, concerns and changes.   Patient educated on interactions between medications and warfarin.   Patient educated on dietary consistency in vitamin k consumption.   Patient educated on signs of bleeding/clotting.   Patient educated on compliance with dosing, follow up appointments, and prescribed plan of care.

## 2024-09-11 ENCOUNTER — ANTICOAGULATION - WARFARIN VISIT (OUTPATIENT)
Dept: CARDIOLOGY | Facility: CLINIC | Age: 89
End: 2024-09-11
Payer: MEDICARE

## 2024-09-11 VITALS — WEIGHT: 189.5 LBS | BODY MASS INDEX: 27.19 KG/M2

## 2024-09-11 DIAGNOSIS — I48.91 ATRIAL FIBRILLATION, UNSPECIFIED TYPE (MULTI): Primary | ICD-10-CM

## 2024-09-11 DIAGNOSIS — I48.20 CHRONIC ATRIAL FIBRILLATION (MULTI): ICD-10-CM

## 2024-09-11 LAB
POC INR: 2.8
POC PROTHROMBIN TIME: NORMAL

## 2024-09-11 PROCEDURE — 85610 PROTHROMBIN TIME: CPT | Mod: QW

## 2024-09-11 PROCEDURE — 99211 OFF/OP EST MAY X REQ PHY/QHP: CPT

## 2024-09-11 NOTE — PROGRESS NOTES
Patient identification verified with 2 identifiers.    Location: [facility] - [address] [address] [address] [facility]. [place], [place] [address] [phone]     Referring Physician: Dr. Melvin Bahena  Enrollment/ Re-enrollment date:  25  INR Goal: 2.0-3.0  INR monitoring is per [facility] protocol.  Anticoagulation Medication: warfarin  Indication: Atrial Fibrillation/Atrial Flutter    Subjective   Bleeding signs/symptoms: No    Bruising: No   Major bleeding event: No  Thrombosis signs/symptoms: No  Thromboembolic event: No  Missed doses: No  Extra doses: No  Medication changes: Yes  PT ON PREDNISONE DAILY PER DERMATOLOGY . WILL F/U WITH DERM ON MONDAY AND MAY COME OFF PREDNISONE OR SWITCH MEDS AT TIME.  Dietary changes: No  Change in health: No  Change in activity: No  Alcohol: No  Other concerns: No    Upcoming Procedures:  Does the Patient Have any upcoming procedures that require interruption in anticoagulation therapy? no  Does the patient require bridging? no      Anticoagulation Summary  As of 2024      INR goal:  2.0-3.0   TTR:  91.5% (10.6 mo)   INR used for dosin.80 (2024)   Weekly warfarin total:  33 mg               Assessment/Plan   Therapeutic     1. New dose: WILL DECREASE APPROX. 20 % PER PROTOCOL DUE TO PATIENT REMAINING ON PREDNISONE. I HAVE ALSO INSTRUCTED PATIENT TO INCREASE HIS GREENS OVER THE NEXT SEVERAL DAYS AND REMINDED TO CALL 911 OR GO TO ED FOR ANY S/S OF BLEEDING.   2. Next INR: 5 DAYS      Education provided to patient during the visit:  Patient instructed to call in interim with questions, concerns and changes.   Patient educated on interactions between medications and warfarin.   Patient educated on dietary consistency in vitamin k consumption.   Patient educated on signs of bleeding/clotting.   Patient educated on compliance with dosing, follow up appointments, and prescribed plan of care.

## 2024-09-16 ENCOUNTER — LAB (OUTPATIENT)
Dept: LAB | Facility: LAB | Age: 89
End: 2024-09-16
Payer: MEDICARE

## 2024-09-16 ENCOUNTER — OFFICE VISIT (OUTPATIENT)
Dept: CARDIOLOGY | Facility: CLINIC | Age: 89
End: 2024-09-16
Payer: MEDICARE

## 2024-09-16 ENCOUNTER — ANTICOAGULATION - WARFARIN VISIT (OUTPATIENT)
Dept: CARDIOLOGY | Facility: CLINIC | Age: 89
End: 2024-09-16
Payer: MEDICARE

## 2024-09-16 VITALS — DIASTOLIC BLOOD PRESSURE: 69 MMHG | SYSTOLIC BLOOD PRESSURE: 126 MMHG | OXYGEN SATURATION: 93 % | HEART RATE: 98 BPM

## 2024-09-16 DIAGNOSIS — I48.91 ATRIAL FIBRILLATION, UNSPECIFIED TYPE (MULTI): Primary | ICD-10-CM

## 2024-09-16 DIAGNOSIS — Z95.2 S/P TAVR (TRANSCATHETER AORTIC VALVE REPLACEMENT): ICD-10-CM

## 2024-09-16 DIAGNOSIS — I42.0 DILATED CARDIOMYOPATHY (MULTI): ICD-10-CM

## 2024-09-16 DIAGNOSIS — I25.10 ATHEROSCLEROSIS OF NATIVE CORONARY ARTERY OF NATIVE HEART WITHOUT ANGINA PECTORIS: ICD-10-CM

## 2024-09-16 DIAGNOSIS — I25.10 ATHEROSCLEROSIS OF NATIVE CORONARY ARTERY OF NATIVE HEART WITHOUT ANGINA PECTORIS: Primary | ICD-10-CM

## 2024-09-16 DIAGNOSIS — I48.20 CHRONIC ATRIAL FIBRILLATION (MULTI): ICD-10-CM

## 2024-09-16 DIAGNOSIS — Z95.810 CARDIAC DEFIBRILLATOR IN PLACE: ICD-10-CM

## 2024-09-16 PROBLEM — Z79.899 ON AMIODARONE THERAPY: Status: RESOLVED | Noted: 2023-10-11 | Resolved: 2024-09-16

## 2024-09-16 LAB
ANION GAP SERPL CALC-SCNC: 15 MMOL/L (ref 10–20)
ATRIAL RATE: 94 BPM
BUN SERPL-MCNC: 32 MG/DL (ref 6–23)
CALCIUM SERPL-MCNC: 9.3 MG/DL (ref 8.6–10.6)
CHLORIDE SERPL-SCNC: 104 MMOL/L (ref 98–107)
CO2 SERPL-SCNC: 25 MMOL/L (ref 21–32)
CREAT SERPL-MCNC: 1.13 MG/DL (ref 0.5–1.3)
EGFRCR SERPLBLD CKD-EPI 2021: 62 ML/MIN/1.73M*2
GLUCOSE SERPL-MCNC: 191 MG/DL (ref 74–99)
MAGNESIUM SERPL-MCNC: 2.61 MG/DL (ref 1.6–2.4)
POC INR: 2.1
POC PROTHROMBIN TIME: NORMAL
POTASSIUM SERPL-SCNC: 5.1 MMOL/L (ref 3.5–5.3)
Q ONSET: 228 MS
QRS COUNT: 14 BEATS
QRS DURATION: 144 MS
QT INTERVAL: 404 MS
QTC CALCULATION(BAZETT): 486 MS
QTC FREDERICIA: 457 MS
R AXIS: 38 DEGREES
SODIUM SERPL-SCNC: 139 MMOL/L (ref 136–145)
T AXIS: -23 DEGREES
T OFFSET: 430 MS
VENTRICULAR RATE: 87 BPM

## 2024-09-16 PROCEDURE — 83735 ASSAY OF MAGNESIUM: CPT

## 2024-09-16 PROCEDURE — 1036F TOBACCO NON-USER: CPT | Performed by: NURSE PRACTITIONER

## 2024-09-16 PROCEDURE — 3078F DIAST BP <80 MM HG: CPT | Performed by: NURSE PRACTITIONER

## 2024-09-16 PROCEDURE — 93010 ELECTROCARDIOGRAM REPORT: CPT | Performed by: INTERNAL MEDICINE

## 2024-09-16 PROCEDURE — 99211 OFF/OP EST MAY X REQ PHY/QHP: CPT | Mod: 25

## 2024-09-16 PROCEDURE — 99213 OFFICE O/P EST LOW 20 MIN: CPT | Performed by: NURSE PRACTITIONER

## 2024-09-16 PROCEDURE — 36415 COLL VENOUS BLD VENIPUNCTURE: CPT

## 2024-09-16 PROCEDURE — 93005 ELECTROCARDIOGRAM TRACING: CPT | Performed by: NURSE PRACTITIONER

## 2024-09-16 PROCEDURE — 1159F MED LIST DOCD IN RCRD: CPT | Performed by: NURSE PRACTITIONER

## 2024-09-16 PROCEDURE — 99213 OFFICE O/P EST LOW 20 MIN: CPT | Mod: 25,27 | Performed by: NURSE PRACTITIONER

## 2024-09-16 PROCEDURE — 80048 BASIC METABOLIC PNL TOTAL CA: CPT

## 2024-09-16 PROCEDURE — 3074F SYST BP LT 130 MM HG: CPT | Performed by: NURSE PRACTITIONER

## 2024-09-16 PROCEDURE — 85610 PROTHROMBIN TIME: CPT | Mod: QW

## 2024-09-16 PROCEDURE — 1160F RVW MEDS BY RX/DR IN RCRD: CPT | Performed by: NURSE PRACTITIONER

## 2024-09-16 ASSESSMENT — ENCOUNTER SYMPTOMS
LOSS OF SENSATION IN FEET: 0
SHORTNESS OF BREATH: 1
DYSPNEA ON EXERTION: 1
DEPRESSION: 0
OCCASIONAL FEELINGS OF UNSTEADINESS: 1
CONSTITUTIONAL NEGATIVE: 1

## 2024-09-16 ASSESSMENT — PATIENT HEALTH QUESTIONNAIRE - PHQ9
1. LITTLE INTEREST OR PLEASURE IN DOING THINGS: NOT AT ALL
SUM OF ALL RESPONSES TO PHQ9 QUESTIONS 1 AND 2: 0
2. FEELING DOWN, DEPRESSED OR HOPELESS: NOT AT ALL

## 2024-09-16 NOTE — PROGRESS NOTES
Subjective   Navneet Thompson is a 89 y.o. male.    Chief Complaint:  Follow-up (1week)    HPI  Mr. Thompson is seen for a 1 week follow-up.  He is accompanied by his daughter.  They are seen in collaboration with Dr. Bahena.  Last week we had him take a daily dose of Lasix for volume overload and shortness of breath.  X-ray also showed a moderate right pleural effusion.  He has had no change on our office scale however at home he indicates a 7 pound weight loss from 189 to 182 pounds this morning.  He endorses that his breathing has improved although his shortness of breath has not resolved.  His daughter does believe he is more short of breath in the afternoon.  He has no new concerns.  He does indicate that his prednisone dose will be decreased starting tomorrow and for his dermatologic condition is considering biologic therapies.  He also notes a switch from warfarin to Jantoven from his pharmacy.     Review of Systems   Constitutional: Negative.   Cardiovascular:  Positive for dyspnea on exertion.   Respiratory:  Positive for shortness of breath.    All other systems reviewed and are negative.      Objective   Physical Exam  Vitals reviewed.   Constitutional:       Appearance: Frail.   Neck:      Vascular: JVD elevated.   Pulmonary:      Effort: Increased respiratory effort.      Breath sounds: No wheezing.  Crackles at right base to one fourth of the way up, improved from last week  Chest:      Chest wall: Not tender to palpitation.   Cardiovascular:      Normal rate. Irregular rhythm. Normal S1. Normal S2.       Murmurs: There is no murmur.      No gallop.  No click. No rub.   Edema:     Pretibial: bilateral 1+ edema of the pretibial area.     Ankle: bilateral 1+ edema of the ankle.     Bilateral compression stockings in place  Abdominal:        Tenderness: There is no abdominal tenderness.   Musculoskeletal: Normal range of motion.         General: No tenderness.   Skin:     General: Skin is warm and dry.       Findings: Rash present.   Neurological:      General: No focal deficit present.      Mental Status: Alert and oriented to person, place and time.     Lab Review:   Lab Results   Component Value Date     12/13/2023    K 4.2 12/13/2023     12/13/2023    CO2 30 12/13/2023    BUN 28 (H) 12/13/2023    CREATININE 1.36 (H) 12/13/2023    GLUCOSE 106 (H) 12/13/2023    CALCIUM 9.1 12/13/2023     Lab Results   Component Value Date    WBC 8.6 12/13/2023    HGB 11.6 (L) 12/13/2023    HCT 39.2 (L) 12/13/2023    MCV 90 12/13/2023     12/13/2023     Lab Results   Component Value Date    CHOL 139 06/14/2023    TRIG 122 06/14/2023    HDL 35.0 (A) 06/14/2023     ECG obtained and reviewed, shows a V paced rhythm with a rate of 87 bpm    Assessment/Plan   Mr. Thompson is a pleasant 89 year old  male with a past medical history significant for CAD status post CABG using LIMA-LAD, SVG-OM3 and [id number], patent by heart catheterization 2/2020, atrial fibrillation status post several cardioversion on warfarin anticoagulation, PPM placed in 2008 for complete heart block upgraded to BiVICD in 2012 with most recent generator change in 2024, HFrEF, diabetes and severe aortic stenosis status post Evolute Pro Plus 26 mm valve 4/2020. He also has a mildly dilated ascending aorta measuring 4.1 cm by CT scan 11/2022. Echocardiogram 10/2023 showed a stable EF of 30-35% with biatrial dilatation and mildly elevated RVSP.  He presents with improved shortness of breath and a 7 pound weight loss on his home scale.  He will be sent for a BMP and magnesium today.  We can discuss his results by phone.  For now we will continue his medication unchanged.  He will return in 1 month for follow-up and knows to call for any interim concerns.

## 2024-09-16 NOTE — PROGRESS NOTES
Patient identification verified with 2 identifiers.    Location: [facility] - [address] [address] [address] [address]. [place], [place] [address] [phone]     Referring Physician: Dr. Melvin Bahena  Enrollment/ Re-enrollment date:  25  INR Goal: 2.0-3.0  INR monitoring is per [facility] protocol.  Anticoagulation Medication: warfarin  Indication: Atrial Fibrillation/Atrial Flutter    Subjective   Bleeding signs/symptoms: No    Bruising: No   Major bleeding event: No  Thrombosis signs/symptoms: No  Thromboembolic event: No  Missed doses: No  Extra doses: No  Medication changes: Yes  PREDNISONE WILL BE TAPERING DOWN TO 20MG.  Dietary changes: No  Change in health: No  Change in activity: No  Alcohol: No  Other concerns: No    Upcoming Procedures:  Does the Patient Have any upcoming procedures that require interruption in anticoagulation therapy? no  Does the patient require bridging? no      Anticoagulation Summary  As of 2024      INR goal:  2.0-3.0   TTR:  91.7% (10.8 mo)   INR used for dosin.10 (2024)   Weekly warfarin total:  33 mg               Assessment/Plan   Therapeutic   Maintain weekly dose  RTC in 1 week      Education provided to patient during the visit:  Patient instructed to call in interim with questions, concerns and changes.   Patient educated on interactions between medications and warfarin.   Patient educated on dietary consistency in vitamin k consumption.   Patient educated on signs of bleeding/clotting.   Patient educated on compliance with dosing, follow up appointments, and prescribed plan of care.

## 2024-09-18 ENCOUNTER — TELEPHONE (OUTPATIENT)
Dept: CARDIOLOGY | Facility: HOSPITAL | Age: 89
End: 2024-09-18
Payer: MEDICARE

## 2024-09-18 NOTE — TELEPHONE ENCOUNTER
I spoke with Pao (dtr) who will reduce patients supplemental potassium to three times weekly.  In addition as he continues to have some shortness of breath, he may use an extra 1/2 dose of furosemide up to three times weekly.  She will call for any concerns prior to followup.

## 2024-09-23 ENCOUNTER — ANTICOAGULATION - WARFARIN VISIT (OUTPATIENT)
Dept: CARDIOLOGY | Facility: CLINIC | Age: 89
End: 2024-09-23
Payer: MEDICARE

## 2024-09-23 DIAGNOSIS — I48.91 ATRIAL FIBRILLATION, UNSPECIFIED TYPE (MULTI): Primary | ICD-10-CM

## 2024-09-23 DIAGNOSIS — I48.20 CHRONIC ATRIAL FIBRILLATION (MULTI): ICD-10-CM

## 2024-09-23 LAB
POC INR: 1.9
POC PROTHROMBIN TIME: NORMAL

## 2024-09-23 PROCEDURE — 85610 PROTHROMBIN TIME: CPT | Mod: QW

## 2024-09-23 PROCEDURE — 99211 OFF/OP EST MAY X REQ PHY/QHP: CPT

## 2024-09-23 NOTE — PROGRESS NOTES
Patient identification verified with 2 identifiers.    Location: [facility] - [address] [address] [address] [facility] [address] [facility]. [place], [place] [address] [phone]     Referring Physician: Dr. Melvin Bahena  Enrollment/ Re-enrollment date:  25  INR Goal: 2.0-3.0  INR monitoring is per [facility] protocol.  Anticoagulation Medication: warfarin  Indication: Atrial Fibrillation/Atrial Flutter    Subjective   Bleeding signs/symptoms: No  Bruising: No   Major bleeding event: No  Thrombosis signs/symptoms: No  Thromboembolic event: No  Missed doses: Yes  Pt reports having missed 2 half tablets over the last week. Says he found them on the table/floor a day later and realized they were missed.  Extra doses: No  Medication changes: No  Dietary changes: No  Change in health: No  Change in activity: No  Alcohol: No  Other concerns: No    Upcoming Procedures:  Does the Patient Have any upcoming procedures that require interruption in anticoagulation therapy? no  Does the patient require bridging? no      Anticoagulation Summary  As of 2024      INR goal:  2.0-3.0   TTR:  90.8% (11 mo)   INR used for dosin.90 (2024)   Weekly warfarin total:  33 mg               Assessment/Plan   Subtherapeutic   Maintain weekly dose for now due to missed tablets this week.   RTC in 1 week      Education provided to patient during the visit:  Patient instructed to call in interim with questions, concerns and changes.   Patient educated on interactions between medications and warfarin.   Patient educated on dietary consistency in vitamin k consumption.   Patient educated on affects of alcohol consumption while taking warfarin.   Patient educated on signs of bleeding/clotting.   Patient educated on compliance with dosing, follow up appointments, and prescribed plan of care.

## 2024-09-30 ENCOUNTER — ANTICOAGULATION - WARFARIN VISIT (OUTPATIENT)
Dept: CARDIOLOGY | Facility: CLINIC | Age: 89
End: 2024-09-30
Payer: MEDICARE

## 2024-09-30 DIAGNOSIS — I48.20 CHRONIC ATRIAL FIBRILLATION (MULTI): ICD-10-CM

## 2024-09-30 DIAGNOSIS — I48.91 ATRIAL FIBRILLATION, UNSPECIFIED TYPE (MULTI): Primary | ICD-10-CM

## 2024-09-30 LAB
POC INR: 2.3
POC PROTHROMBIN TIME: NORMAL

## 2024-09-30 PROCEDURE — 99211 OFF/OP EST MAY X REQ PHY/QHP: CPT

## 2024-09-30 PROCEDURE — 85610 PROTHROMBIN TIME: CPT | Mod: QW

## 2024-09-30 NOTE — PROGRESS NOTES
Patient identification verified with 2 identifiers.    Location: [facility] - [address] [address] [place], [place] [address] [phone]     Referring Physician: Dr. Melvin Bahena  Enrollment/ Re-enrollment date:  25  INR Goal: 2.0-3.0  INR monitoring is per [facility] protocol.  Anticoagulation Medication: warfarin  Indication: Atrial Fibrillation/Atrial Flutter    Subjective   Bleeding signs/symptoms: No    Bruising: No   Major bleeding event: No  Thrombosis signs/symptoms: No  Thromboembolic event: No  Missed doses: No  Extra doses: No  Medication changes: Yes  WILL SWITCH OFF HUMIRA AND WILL HAVE DUPIXENT INJECTIONS INSTEAD  Dietary changes: No  Change in health: No  Change in activity: No  Alcohol: No  Other concerns: No    Upcoming Procedures:  Does the Patient Have any upcoming procedures that require interruption in anticoagulation therapy? no  Does the patient require bridging? no      Anticoagulation Summary  As of 2024      INR goal:  2.0-3.0   TTR:  90.5% (11.2 mo)   INR used for dosin.30 (2024)   Weekly warfarin total:  33 mg               Assessment/Plan   Therapeutic     1. New dose: no change    2. Next INR: 1 week      Education provided to patient during the visit:  Patient instructed to call in interim with questions, concerns and changes.   Patient educated on interactions between medications and warfarin.   Patient educated on compliance with dosing, follow up appointments, and prescribed plan of care.

## 2024-10-07 ENCOUNTER — ANTICOAGULATION - WARFARIN VISIT (OUTPATIENT)
Dept: CARDIOLOGY | Facility: CLINIC | Age: 89
End: 2024-10-07
Payer: MEDICARE

## 2024-10-07 DIAGNOSIS — I48.20 CHRONIC ATRIAL FIBRILLATION (MULTI): ICD-10-CM

## 2024-10-07 DIAGNOSIS — I48.91 ATRIAL FIBRILLATION, UNSPECIFIED TYPE (MULTI): Primary | ICD-10-CM

## 2024-10-07 LAB
POC INR: 1.7 (ref 2–3)
POC PROTHROMBIN TIME: ABNORMAL

## 2024-10-07 PROCEDURE — 99211 OFF/OP EST MAY X REQ PHY/QHP: CPT

## 2024-10-07 PROCEDURE — 85610 PROTHROMBIN TIME: CPT | Mod: QW

## 2024-10-07 NOTE — PROGRESS NOTES
Patient identification verified with 2 identifiers.    Location: [facility] - [address] [address] [address] [facility] [facility]. [place], [place] [address] [phone]     Referring Physician: Dr. Melvin Bahena  Enrollment/ Re-enrollment date:  25  INR Goal: 2.0-3.0  INR monitoring is per [facility] protocol.  Anticoagulation Medication: warfarin  Indication: Atrial Fibrillation/Atrial Flutter    Subjective   Bleeding signs/symptoms: No    Bruising: No   Major bleeding event: No  Thrombosis signs/symptoms: No  Thromboembolic event: No  Missed doses: No  Extra doses: No  Medication changes: Yes  decrease in prednisone, change in lasix  Dietary changes: No  Change in health: No  Change in activity: No  Alcohol: No  Other concerns: No    Upcoming Procedures:  Does the Patient Have any upcoming procedures that require interruption in anticoagulation therapy? no  Does the patient require bridging? no      Anticoagulation Summary  As of 10/7/2024      INR goal:  2.0-3.0   TTR:  89.7% (11.4 mo)   INR used for dosin.70 (10/7/2024)   Weekly warfarin total:  36 mg               Assessment/Plan   Subtherapeutic     1. New dose:  Increase TWD approximately 10% per protocol     2. Next INR: 1 week      Education provided to patient during the visit:  Patient instructed to call in interim with questions, concerns and changes.   Patient educated on interactions between medications and warfarin.   Patient educated on dietary consistency in vitamin k consumption.   Patient educated on signs of bleeding/clotting.   Patient educated on compliance with dosing, follow up appointments, and prescribed plan of care.

## 2024-10-14 ENCOUNTER — ANTICOAGULATION - WARFARIN VISIT (OUTPATIENT)
Dept: CARDIOLOGY | Facility: CLINIC | Age: 89
End: 2024-10-14
Payer: MEDICARE

## 2024-10-14 DIAGNOSIS — I48.20 CHRONIC ATRIAL FIBRILLATION (MULTI): ICD-10-CM

## 2024-10-14 DIAGNOSIS — I48.91 ATRIAL FIBRILLATION, UNSPECIFIED TYPE (MULTI): Primary | ICD-10-CM

## 2024-10-14 LAB
POC INR: 2.3
POC PROTHROMBIN TIME: NORMAL

## 2024-10-14 PROCEDURE — 85610 PROTHROMBIN TIME: CPT | Mod: QW

## 2024-10-14 PROCEDURE — 99211 OFF/OP EST MAY X REQ PHY/QHP: CPT

## 2024-10-14 NOTE — PROGRESS NOTES
Patient identification verified with 2 identifiers.    Location: [facility] - [address] [address] [place], [place] [address] [phone]     Referring Physician: Dr. Melvin Bahena  Enrollment/ Re-enrollment date:  25  INR Goal: 2.0-3.0  INR monitoring is per [facility] protocol.  Anticoagulation Medication: warfarin  Indication: Atrial Fibrillation/Atrial Flutter    Subjective   Bleeding signs/symptoms: No    Bruising: No   Major bleeding event: No  Thrombosis signs/symptoms: No  Thromboembolic event: No  Missed doses: No  Extra doses: No  Medication changes: Yes  STARTED DUPIXENT  Dietary changes: No  Change in health: No  Change in activity: No  Alcohol: No  Other concerns: No    Upcoming Procedures:  Does the Patient Have any upcoming procedures that require interruption in anticoagulation therapy? no  Does the patient require bridging? no      Anticoagulation Summary  As of 10/14/2024      INR goal:  2.0-3.0   TTR:  88.9% (11.7 mo)   INR used for dosin.30 (10/14/2024)   Weekly warfarin total:  36 mg               Assessment/Plan   Therapeutic     1. New dose: no change    2. Next INR: 1 week      Education provided to patient during the visit:  Patient instructed to call in interim with questions, concerns and changes.   Patient educated on compliance with dosing, follow up appointments, and prescribed plan of care.

## 2024-10-21 ENCOUNTER — ANTICOAGULATION - WARFARIN VISIT (OUTPATIENT)
Dept: CARDIOLOGY | Facility: CLINIC | Age: 89
End: 2024-10-21
Payer: MEDICARE

## 2024-10-21 DIAGNOSIS — I48.91 ATRIAL FIBRILLATION, UNSPECIFIED TYPE (MULTI): Primary | ICD-10-CM

## 2024-10-21 DIAGNOSIS — I48.20 CHRONIC ATRIAL FIBRILLATION (MULTI): ICD-10-CM

## 2024-10-21 LAB
POC INR: 2.4
POC PROTHROMBIN TIME: NORMAL

## 2024-10-21 PROCEDURE — 99211 OFF/OP EST MAY X REQ PHY/QHP: CPT

## 2024-10-21 PROCEDURE — 85610 PROTHROMBIN TIME: CPT | Mod: QW

## 2024-10-21 NOTE — PROGRESS NOTES
Patient identification verified with 2 identifiers.    Location: [facility] - [address] [address] [place], [place] [address] [phone]     Referring Physician: Dr. Melvin Bahena  Enrollment/ Re-enrollment date:  25  INR Goal: 2.0-3.0  INR monitoring is per [facility] protocol.  Anticoagulation Medication: warfarin  Indication: Atrial Fibrillation/Atrial Flutter    Subjective   Bleeding signs/symptoms: No    Bruising: No   Major bleeding event: No  Thrombosis signs/symptoms: No  Thromboembolic event: No  Missed doses: No  Extra doses: No  Medication changes: No  Dietary changes: No  Change in health: No  Change in activity: No  Alcohol: No  Other concerns: No    Upcoming Procedures:  Does the Patient Have any upcoming procedures that require interruption in anticoagulation therapy? no  Does the patient require bridging? no      Anticoagulation Summary  As of 10/21/2024      INR goal:  2.0-3.0   TTR:  89.1% (11.9 mo)   INR used for dosin.40 (10/21/2024)   Weekly warfarin total:  36 mg               Assessment/Plan   Therapeutic     1. New dose: no change    2. Next INR: 2 weeks      Education provided to patient during the visit:  Patient instructed to call in interim with questions, concerns and changes.   Patient educated on compliance with dosing, follow up appointments, and prescribed plan of care.

## 2024-10-29 ENCOUNTER — HOSPITAL ENCOUNTER (OUTPATIENT)
Dept: CARDIOLOGY | Facility: CLINIC | Age: 89
Discharge: HOME | End: 2024-10-29
Payer: MEDICARE

## 2024-10-29 DIAGNOSIS — I44.2 CHB (COMPLETE HEART BLOCK): ICD-10-CM

## 2024-10-29 DIAGNOSIS — Z95.810 CARDIAC DEFIBRILLATOR IN PLACE: ICD-10-CM

## 2024-10-29 DIAGNOSIS — I42.9 CARDIOMYOPATHY, UNSPECIFIED TYPE (MULTI): ICD-10-CM

## 2024-10-29 PROCEDURE — 93296 REM INTERROG EVL PM/IDS: CPT

## 2024-10-29 PROCEDURE — 93294 REM INTERROG EVL PM/LDLS PM: CPT | Performed by: INTERNAL MEDICINE

## 2024-11-04 ENCOUNTER — ANTICOAGULATION - WARFARIN VISIT (OUTPATIENT)
Dept: CARDIOLOGY | Facility: CLINIC | Age: 89
End: 2024-11-04
Payer: MEDICARE

## 2024-11-04 DIAGNOSIS — I48.20 CHRONIC ATRIAL FIBRILLATION (MULTI): ICD-10-CM

## 2024-11-04 DIAGNOSIS — I48.91 ATRIAL FIBRILLATION, UNSPECIFIED TYPE (MULTI): Primary | ICD-10-CM

## 2024-11-04 LAB
POC INR: 1.9
POC PROTHROMBIN TIME: NORMAL

## 2024-11-04 PROCEDURE — 85610 PROTHROMBIN TIME: CPT | Mod: QW

## 2024-11-04 PROCEDURE — 99211 OFF/OP EST MAY X REQ PHY/QHP: CPT

## 2024-11-04 NOTE — PROGRESS NOTES
Patient identification verified with 2 identifiers.    Location: [facility] - [address] [address] [place], [place] [address] [phone]     Referring Physician: Dr. Melvin Bahena  Enrollment/ Re-enrollment date:  25  INR Goal: 2.0-3.0  INR monitoring is per [facility] protocol.  Anticoagulation Medication: warfarin  Indication: Atrial Fibrillation/Atrial Flutter    Subjective   Bleeding signs/symptoms: No    Bruising: No   Major bleeding event: No  Thrombosis signs/symptoms: No  Thromboembolic event: No  Missed doses: No  Extra doses: No  Medication changes: Yes  PT IS TAPERING OFF PREDNSIONE  Dietary changes: No  Change in health: No  Change in activity: No  Alcohol: No  Other concerns: No    Upcoming Procedures:  Does the Patient Have any upcoming procedures that require interruption in anticoagulation therapy? no  Does the patient require bridging? no      Anticoagulation Summary  As of 2024      INR goal:  2.0-3.0   TTR:  88.7% (1 y)   INR used for dosin.90 (2024)   Weekly warfarin total:  37.5 mg               Assessment/Plan   Subtherapeutic     1. New dose: INCREASE TWD PER PROTOCOL  2. Next INR: 1 week      Education provided to patient during the visit:  Patient instructed to call in interim with questions, concerns and changes.   Patient educated on compliance with dosing, follow up appointments, and prescribed plan of care.         Stable

## 2024-11-11 ENCOUNTER — ANTICOAGULATION - WARFARIN VISIT (OUTPATIENT)
Dept: CARDIOLOGY | Facility: CLINIC | Age: 89
End: 2024-11-11
Payer: MEDICARE

## 2024-11-11 DIAGNOSIS — I48.20 CHRONIC ATRIAL FIBRILLATION (MULTI): ICD-10-CM

## 2024-11-11 DIAGNOSIS — I48.91 ATRIAL FIBRILLATION, UNSPECIFIED TYPE (MULTI): Primary | ICD-10-CM

## 2024-11-11 LAB
POC INR: 1.8 (ref 2–3)
POC PROTHROMBIN TIME: ABNORMAL

## 2024-11-11 PROCEDURE — 85610 PROTHROMBIN TIME: CPT | Mod: QW

## 2024-11-11 PROCEDURE — 99211 OFF/OP EST MAY X REQ PHY/QHP: CPT

## 2024-11-11 NOTE — PROGRESS NOTES
Patient identification verified with 2 identifiers.    Location: [facility] - [address] [address] [facility]. [place], [place] [address] [phone]     Referring Physician: Dr. Melvin Bahena  Enrollment/ Re-enrollment date:  25  INR Goal: 2.0-3.0  INR monitoring is per [facility] protocol.  Anticoagulation Medication: warfarin  Indication: Atrial Fibrillation/Atrial Flutter    Subjective   Bleeding signs/symptoms: No    Bruising: No   Major bleeding event: No  Thrombosis signs/symptoms: No  Thromboembolic event: No  Missed doses: No  Extra doses: No  Medication changes: No  Dietary changes: No  Change in health: No  Change in activity: No  Alcohol: No  Other concerns: No    Upcoming Procedures:  Does the Patient Have any upcoming procedures that require interruption in anticoagulation therapy? no  Does the patient require bridging? no      Anticoagulation Summary  As of 2024      INR goal:  2.0-3.0   TTR:  87.2% (1 y)   INR used for dosin.80 (2024)   Weekly warfarin total:  39 mg               Assessment/Plan   Therapeutic     1. New dose: no change    2. Next INR: 2 weeks      Education provided to patient during the visit:  Patient instructed to call in interim with questions, concerns and changes.   Patient educated on interactions between medications and warfarin.   Patient educated on dietary consistency in vitamin k consumption.   Patient educated on affects of alcohol consumption while taking warfarin.   Patient educated on signs of bleeding/clotting.   Patient educated on compliance with dosing, follow up appointments, and prescribed plan of care.

## 2024-11-14 ENCOUNTER — TELEPHONE (OUTPATIENT)
Dept: CARDIOLOGY | Facility: HOSPITAL | Age: 89
End: 2024-11-14
Payer: MEDICARE

## 2024-11-15 DIAGNOSIS — I42.0 DILATED CARDIOMYOPATHY (MULTI): ICD-10-CM

## 2024-11-15 RX ORDER — POTASSIUM CHLORIDE 20 MEQ/1
20 TABLET, EXTENDED RELEASE ORAL DAILY
Qty: 30 TABLET | Refills: 5 | Status: SHIPPED | OUTPATIENT
Start: 2024-11-15 | End: 2025-11-15

## 2024-11-17 ENCOUNTER — HOSPITAL ENCOUNTER (EMERGENCY)
Facility: HOSPITAL | Age: 89
Discharge: HOME | End: 2024-11-17
Payer: MEDICARE

## 2024-11-17 VITALS
DIASTOLIC BLOOD PRESSURE: 99 MMHG | RESPIRATION RATE: 18 BRPM | WEIGHT: 189 LBS | HEIGHT: 70 IN | SYSTOLIC BLOOD PRESSURE: 150 MMHG | BODY MASS INDEX: 27.06 KG/M2 | OXYGEN SATURATION: 96 % | HEART RATE: 86 BPM | TEMPERATURE: 98.4 F

## 2024-11-17 DIAGNOSIS — S41.112A SKIN TEAR OF LEFT UPPER ARM WITHOUT COMPLICATION, INITIAL ENCOUNTER: ICD-10-CM
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
## 2024-11-17 PROCEDURE — 2500000004 HC RX 250 GENERAL PHARMACY W/ HCPCS (ALT 636 FOR OP/ED)

## 2024-11-17 PROCEDURE — 90715 TDAP VACCINE 7 YRS/> IM: CPT

## 2024-11-17 PROCEDURE — 99282 EMERGENCY DEPT VISIT SF MDM: CPT | Mod: 25

## 2024-11-17 PROCEDURE — 90471 IMMUNIZATION ADMIN: CPT

## 2024-11-17 ASSESSMENT — LIFESTYLE VARIABLES
HAVE PEOPLE ANNOYED YOU BY CRITICIZING YOUR DRINKING: NO
HAVE YOU EVER FELT YOU SHOULD CUT DOWN ON YOUR DRINKING: NO
EVER HAD A DRINK FIRST THING IN THE MORNING TO STEADY YOUR NERVES TO GET RID OF A HANGOVER: NO
EVER FELT BAD OR GUILTY ABOUT YOUR DRINKING: NO
TOTAL SCORE: 0

## 2024-11-17 ASSESSMENT — PAIN DESCRIPTION - PAIN TYPE: TYPE: ACUTE PAIN

## 2024-11-17 ASSESSMENT — PAIN - FUNCTIONAL ASSESSMENT: PAIN_FUNCTIONAL_ASSESSMENT: 0-10

## 2024-11-17 ASSESSMENT — COLUMBIA-SUICIDE SEVERITY RATING SCALE - C-SSRS
6. HAVE YOU EVER DONE ANYTHING, STARTED TO DO ANYTHING, OR PREPARED TO DO ANYTHING TO END YOUR LIFE?: NO
1. IN THE PAST MONTH, HAVE YOU WISHED YOU WERE DEAD OR WISHED YOU COULD GO TO SLEEP AND NOT WAKE UP?: NO
2. HAVE YOU ACTUALLY HAD ANY THOUGHTS OF KILLING YOURSELF?: NO

## 2024-11-17 ASSESSMENT — PAIN DESCRIPTION - PROGRESSION: CLINICAL_PROGRESSION: NOT CHANGED

## 2024-11-17 ASSESSMENT — PAIN SCALES - GENERAL: PAINLEVEL_OUTOF10: 3

## 2024-11-17 ASSESSMENT — PAIN DESCRIPTION - LOCATION: LOCATION: ARM

## 2024-11-17 ASSESSMENT — PAIN DESCRIPTION - ORIENTATION: ORIENTATION: LEFT

## 2024-11-17 NOTE — DISCHARGE INSTRUCTIONS
Please do continue to keep the wound on your left upper extremity clean and dry and you may redress the wound every 2 to 3 days.  Be sure to apply triple antibiotic therapy to avoid infection.  Please follow-up with your primary care provider regarding wound healing in 1 week.  Present back to ER for any new onset or worsening of symptoms despite therapy.    It is important to remember that your care does not end here and you must continue to monitor your condition closely. Please return to the emergency department for any worsening or concerning signs or symptoms as directed by our conversations and the discharge instructions. If you do not have a doctor please contact the referral number on your discharge instructions. Please contact any physician specialists provided in your discharge notes as it is very important to follow up with them regarding your condition. If you are unable to reach the physicians provided, please come back to the Emergency Department at any time.

## 2024-11-17 NOTE — ED PROVIDER NOTES
HPI   Chief Complaint   Patient presents with    Fall       HPI  Patient is a 89-year-old male presenting for evaluation of a fall with a skin tear to the left arm.  Patient states that he uses a rollator to ambulate and was hanging onto the counter in the kitchen while ambulating but there was water at the sink and he did slip his hand and his left arm came down and scraped the countertop.  States he does have a large skin tear to his left arm.  The patient states that he otherwise did not hit his head, lose consciousness.  He states he was able to get up after the fall occurred.  He denies injury otherwise.  Denies headaches, lightheadedness, dizziness, chest pain or shortness of breath.  He states he does take Coumadin for atrial fibrillation.  Unsure of last tetanus shot.      Patient History   Past Medical History:   Diagnosis Date    Aortic stenosis     Status post transcatheter aortic valve replacement    Atherosclerotic heart disease of native coronary artery without angina pectoris 12/12/2022    CAD (coronary artery disease)    Atrial fibrillation (Multi)     Chronic systolic heart failure (Multi)     Complete heart block (Multi)     status post dual chamber pacemaker placemtn in 01/2008    Diabetes mellitus, type 2 (Multi)     Essential (primary) hypertension 12/12/2022    Hypertension    Presence of automatic (implantable) cardiac defibrillator 11/14/2022    Cardiac defibrillator in place, upgraded from pacemaker in 05/2014     Past Surgical History:   Procedure Laterality Date    AORTIC VALVE REPLACEMENT  04/07/2022    Transcatheter aortic valve replacement (TAVR) for aortic valve stenosis    APPENDECTOMY  12/01/2014    Appendectomy    CARDIAC ELECTROPHYSIOLOGY PROCEDURE N/A 12/19/2023    Procedure: ICD UPGRADE, DUAL TO BIV;  Surgeon: Luis Felipe Greene MD;  Location: [place] [address] Cardiac Cath Lab;  Service: Electrophysiology;  Laterality: N/A;  [id number] Bi-V ICD gen change [facility]    CORONARY  ARTERY BYPASS GRAFT  12/01/2014    CABG    CT ABDOMEN PELVIS ANGIOGRAM W AND/OR WO IV CONTRAST  03/17/2020    CT ABDOMEN PELVIS ANGIOGRAM W AND/OR WO IV CONTRAST 3/17/2020 [facility] ANCILLARY LEGAC[facility]    OTHER SURGICAL HISTORY  12/01/2014    Cardio-defib Pulse Generator Implantation Date     Family History   Problem Relation Name Age of Onset    Drug abuse Mother          OVERDOSE     Social History     Tobacco Use    Smoking status: Never    Smokeless tobacco: Never   Substance Use Topics    Alcohol use: Yes     Comment: a beer a year    Drug use: Never       Physical Exam   ED Triage Vitals [11/17/24 1631]   Temperature Heart Rate Respirations BP   36.9 °C (98.4 °F) 86 18 (!) 150/99      Pulse Ox Temp Source Heart Rate Source Patient Position   96 % Temporal -- --      BP Location FiO2 (%)     -- --       Physical Exam  Vitals and nursing note reviewed.   Constitutional:       General: He is not in acute distress.     Appearance: He is well-developed.   HENT:      Head: Normocephalic and atraumatic.   Eyes:      Conjunctiva/sclera: Conjunctivae normal.   Cardiovascular:      Rate and Rhythm: Normal rate and regular rhythm.      Heart sounds: No murmur heard.  Pulmonary:      Effort: Pulmonary effort is normal. No respiratory distress.      Breath sounds: Normal breath sounds.   Abdominal:      Palpations: Abdomen is soft.      Tenderness: There is no abdominal tenderness.   Musculoskeletal:         General: No swelling.      Cervical back: Neck supple.   Skin:     General: Skin is warm and dry.      Capillary Refill: Capillary refill takes less than 2 seconds.      Comments: Large approximately 8 cm x 5 cm skin tear to the left proximal anterior arm without obvious contamination or foreign body.   Neurological:      General: No focal deficit present.      Mental Status: He is alert and oriented to person, place, and time.      Comments: Awake and alert oriented to person place and time providing history without  difficulty.  No focal neurologic deficit.  No limb ataxia, facial droop or slurred speech   Psychiatric:         Mood and Affect: Mood normal.           ED Course & MDM   Diagnoses as of 11/17/24 1716   Fall, initial encounter   Skin tear of left upper arm without complication, initial encounter                 No data recorded     Belinda Coma Scale Score: 15 (11/17/24 1635 : Shantel Vigil RN)                           Medical Decision Making  Parts of this chart have been completed using voice recognition software. Please excuse any errors of transcription.  My thought process and reason for plan has been formulated from the time that I saw the patient until the time of disposition and is not specific to one specific moment during their visit and furthermore my MDM encompasses this entire chart and not only this text box.      HPI: Detailed above.    Exam: A medically appropriate exam performed, outlined above, given the known history and presentation.    History obtained from: Patient, daughter    Medications given during visit:  Medications   diphth,pertus(acell),tetanus (BoostRIX) 2.5-8-5 Lf-mcg-Lf/0.5mL vaccine 0.5 mL (0.5 mL intramuscular Given 11/17/24 1655)        Diagnostic/tests  Labs Reviewed - No data to display   No orders to display        Considerations/further MDM:  Patient is a 89-year-old male presenting for evaluation of fall, skin tear    Patient is well-appearing and in no apparent distress during the visit.  He has no gross motor, neurologic or vascular deficits on exam.  He is moving all extremities with 5 out of 5 strength.  He denies pain or injury exempted from the skin tear to his left arm that he would like to be addressed.  Moving all extremities without difficulty or pain.  No midline spinal tenderness to palpation.  States he did not hit his head or lose consciousness, denies headache or neck pain, do not feel additional imaging studies are warranted at this time. Patient is provided  Boostrix and his skin tear is thoroughly cleaned and bandaged by nursing staff.  Patient is neurovascularly intact after this was performed.  He is released in the company of his family members and instructed to continue with wound care at home and follow-up with primary care provider for reassessment of symptoms.  Return precautions discussed.  I discussed the laboratory and imaging findings with the patient at bedside. Patient's questions and concerns were addressed. Patient was released in good condition, discharged with instructions to follow up with primary care provider and appropriate specialist, and to return to ED at any time for worsening symptoms or any other concerns. Patient demonstrates understanding of the findings and the importance of appropriate follow up care.         Procedure  Procedures     Meaghan Sarmiento PA-C  11/17/24 8168

## 2024-11-17 NOTE — ED TRIAGE NOTES
States that he tripped in the kitchen and while falling his left arm hit on the counter and has a large area of skin that came off. Pt is on coumadin and is denying hitting his head. Pt states he did not have LOC.

## 2024-11-18 ENCOUNTER — ANTICOAGULATION - WARFARIN VISIT (OUTPATIENT)
Dept: CARDIOLOGY | Facility: CLINIC | Age: 89
End: 2024-11-18
Payer: MEDICARE

## 2024-11-18 DIAGNOSIS — I48.91 ATRIAL FIBRILLATION, UNSPECIFIED TYPE (MULTI): Primary | ICD-10-CM

## 2024-11-18 DIAGNOSIS — I48.20 CHRONIC ATRIAL FIBRILLATION (MULTI): ICD-10-CM

## 2024-11-18 LAB
POC INR: 2.1 (ref 2–3)
POC PROTHROMBIN TIME: NORMAL

## 2024-11-18 PROCEDURE — 99211 OFF/OP EST MAY X REQ PHY/QHP: CPT

## 2024-11-18 PROCEDURE — 85610 PROTHROMBIN TIME: CPT | Mod: QW

## 2024-11-18 NOTE — PROGRESS NOTES
Patient identification verified with 2 identifiers.    Location: [facility] - [address] [address] [facility]. [place], [place] [address] [phone]     Referring Physician: Dr. Melvin Bahena  Enrollment/ Re-enrollment date:  25  INR Goal: 2.0-3.0  INR monitoring is per [facility] protocol.  Anticoagulation Medication: warfarin  Indication: Atrial Fibrillation/Atrial Flutter    Subjective   Bleeding signs/symptoms: No    Bruising: No   Major bleeding event: No  Thrombosis signs/symptoms: No  Thromboembolic event: No  Missed doses: No  Extra doses: No  Medication changes: No  Dietary changes: No  Change in health: No  Change in activity: No  Alcohol: No  Other concerns: No    Upcoming Procedures:  Does the Patient Have any upcoming procedures that require interruption in anticoagulation therapy? no  Does the patient require bridging? no      Anticoagulation Summary  As of 2024      INR goal:  2.0-3.0   TTR:  86.2% (1.1 y)   INR used for dosin.10 (2024)   Weekly warfarin total:  39 mg               Assessment/Plan   Therapeutic     1. New dose: no change    2. Next INR: 2 weeks  pt is due in 1 week but is unable to get a ride next week as daughter is out of town.      Education provided to patient during the visit:  Patient instructed to call in interim with questions, concerns and changes.   Patient educated on interactions between medications and warfarin.   Patient educated on dietary consistency in vitamin k consumption.   Patient educated on affects of alcohol consumption while taking warfarin.   Patient educated on signs of bleeding/clotting.   Patient educated on compliance with dosing, follow up appointments, and prescribed plan of care.

## 2024-12-02 ENCOUNTER — ANTICOAGULATION - WARFARIN VISIT (OUTPATIENT)
Dept: CARDIOLOGY | Facility: CLINIC | Age: 89
End: 2024-12-02
Payer: MEDICARE

## 2024-12-02 DIAGNOSIS — I48.20 CHRONIC ATRIAL FIBRILLATION (MULTI): ICD-10-CM

## 2024-12-02 DIAGNOSIS — I48.91 ATRIAL FIBRILLATION, UNSPECIFIED TYPE (MULTI): Primary | ICD-10-CM

## 2024-12-02 LAB
POC INR: 2.9
POC PROTHROMBIN TIME: NORMAL

## 2024-12-02 PROCEDURE — 85610 PROTHROMBIN TIME: CPT | Mod: QW

## 2024-12-02 PROCEDURE — 99211 OFF/OP EST MAY X REQ PHY/QHP: CPT

## 2024-12-02 NOTE — PROGRESS NOTES
Patient identification verified with 2 identifiers.    Location: [facility] - [address] [address] [place], [place] [address] [phone]     Referring Physician: Dr. Melvin Bahena  Enrollment/ Re-enrollment date:  25  INR Goal: 2.0-3.0  INR monitoring is per [facility] protocol.  Anticoagulation Medication: warfarin  Indication: Atrial Fibrillation/Atrial Flutter    Subjective   Bleeding signs/symptoms: No    Bruising: No   Major bleeding event: No  Thrombosis signs/symptoms: No  Thromboembolic event: No  Missed doses: No  Extra doses: No  Medication changes: No  Dietary changes: No  Change in health: No  Change in activity: No  Alcohol: No  Other concerns: No    Upcoming Procedures:  Does the Patient Have any upcoming procedures that require interruption in anticoagulation therapy? no  Does the patient require bridging? no      Anticoagulation Summary  As of 2024      INR goal:  2.0-3.0   TTR:  86.7% (1.1 y)   INR used for dosin.90 (2024)   Weekly warfarin total:  39 mg               Assessment/Plan   Therapeutic     1. New dose: no change    2. Next INR: 2 weeks      Education provided to patient during the visit:  Patient instructed to call in interim with questions, concerns and changes.   Patient educated on compliance with dosing, follow up appointments, and prescribed plan of care.

## 2024-12-08 DIAGNOSIS — J96.22 ACUTE ON CHRONIC RESPIRATORY FAILURE WITH HYPOXIA AND HYPERCAPNIA (MULTI): ICD-10-CM

## 2024-12-08 DIAGNOSIS — J96.21 ACUTE ON CHRONIC RESPIRATORY FAILURE WITH HYPOXIA AND HYPERCAPNIA (MULTI): ICD-10-CM

## 2024-12-09 ENCOUNTER — OFFICE VISIT (OUTPATIENT)
Dept: CARDIOLOGY | Facility: CLINIC | Age: 89
End: 2024-12-09
Payer: MEDICARE

## 2024-12-09 ENCOUNTER — HOSPITAL ENCOUNTER (OUTPATIENT)
Dept: CARDIOLOGY | Facility: CLINIC | Age: 89
Discharge: HOME | End: 2024-12-09
Payer: MEDICARE

## 2024-12-09 VITALS
DIASTOLIC BLOOD PRESSURE: 55 MMHG | WEIGHT: 183.6 LBS | SYSTOLIC BLOOD PRESSURE: 96 MMHG | HEART RATE: 94 BPM | OXYGEN SATURATION: 95 % | BODY MASS INDEX: 26.34 KG/M2

## 2024-12-09 DIAGNOSIS — I10 PRIMARY HYPERTENSION: ICD-10-CM

## 2024-12-09 DIAGNOSIS — Z95.1 HISTORY OF CORONARY ARTERY BYPASS SURGERY: ICD-10-CM

## 2024-12-09 DIAGNOSIS — I42.8 OTHER CARDIOMYOPATHY: ICD-10-CM

## 2024-12-09 DIAGNOSIS — H91.93 BILATERAL HEARING LOSS, UNSPECIFIED HEARING LOSS TYPE: ICD-10-CM

## 2024-12-09 DIAGNOSIS — I48.91 ATRIAL FIBRILLATION, UNSPECIFIED TYPE (MULTI): Primary | ICD-10-CM

## 2024-12-09 PROBLEM — L40.9 PSORIASIS, UNSPECIFIED: Status: ACTIVE | Noted: 2024-12-09

## 2024-12-09 PROBLEM — H35.032 HYPERTENSIVE RETINOPATHY, LEFT EYE: Status: ACTIVE | Noted: 2024-12-09

## 2024-12-09 PROBLEM — E11.42 TYPE 2 DIABETES MELLITUS WITH DIABETIC POLYNEUROPATHY: Status: ACTIVE | Noted: 2024-12-09

## 2024-12-09 PROBLEM — H47.019 ISCHEMIC OPTIC NEUROPATHY: Status: ACTIVE | Noted: 2024-12-09

## 2024-12-09 PROBLEM — B35.1 ONYCHOMYCOSIS OF TOENAIL: Status: ACTIVE | Noted: 2024-12-09

## 2024-12-09 PROBLEM — R91.8 LUNG INFILTRATE: Status: ACTIVE | Noted: 2024-12-09

## 2024-12-09 PROBLEM — M21.379 FOOT DROP, UNSPECIFIED FOOT: Status: ACTIVE | Noted: 2024-12-09

## 2024-12-09 PROBLEM — H90.3 SENSORINEURAL HEARING LOSS, BILATERAL: Status: ACTIVE | Noted: 2024-12-09

## 2024-12-09 PROBLEM — L12.0 BULLOUS PEMPHIGOID (MULTI): Status: ACTIVE | Noted: 2024-12-09

## 2024-12-09 PROBLEM — H52.4 PRESBYOPIA: Status: ACTIVE | Noted: 2024-12-09

## 2024-12-09 PROBLEM — I49.9 ARRHYTHMIA: Status: ACTIVE | Noted: 2024-12-09

## 2024-12-09 PROBLEM — D84.9 IMMUNOCOMPROMISED STATE: Status: ACTIVE | Noted: 2024-12-09

## 2024-12-09 PROBLEM — R21 RASH AND OTHER NONSPECIFIC SKIN ERUPTION: Status: ACTIVE | Noted: 2024-12-09

## 2024-12-09 PROBLEM — H02.139 SENILE ECTROPION: Status: ACTIVE | Noted: 2024-12-09

## 2024-12-09 PROCEDURE — 1160F RVW MEDS BY RX/DR IN RCRD: CPT | Performed by: NURSE PRACTITIONER

## 2024-12-09 PROCEDURE — 99214 OFFICE O/P EST MOD 30 MIN: CPT | Performed by: NURSE PRACTITIONER

## 2024-12-09 PROCEDURE — 3078F DIAST BP <80 MM HG: CPT | Performed by: NURSE PRACTITIONER

## 2024-12-09 PROCEDURE — 1126F AMNT PAIN NOTED NONE PRSNT: CPT | Performed by: NURSE PRACTITIONER

## 2024-12-09 PROCEDURE — 1036F TOBACCO NON-USER: CPT | Performed by: NURSE PRACTITIONER

## 2024-12-09 PROCEDURE — 1159F MED LIST DOCD IN RCRD: CPT | Performed by: NURSE PRACTITIONER

## 2024-12-09 PROCEDURE — 93005 ELECTROCARDIOGRAM TRACING: CPT

## 2024-12-09 PROCEDURE — 3074F SYST BP LT 130 MM HG: CPT | Performed by: NURSE PRACTITIONER

## 2024-12-09 PROCEDURE — 93010 ELECTROCARDIOGRAM REPORT: CPT | Performed by: INTERNAL MEDICINE

## 2024-12-09 RX ORDER — ADHESIVE BANDAGE
5 BANDAGE TOPICAL DAILY PRN
COMMUNITY

## 2024-12-09 RX ORDER — WARFARIN 3 MG/1
TABLET ORAL
Qty: 180 TABLET | Refills: 2 | Status: SHIPPED | OUTPATIENT
Start: 2024-12-09

## 2024-12-09 RX ORDER — INSULIN GLARGINE-YFGN 100 [IU]/ML
INJECTION, SOLUTION SUBCUTANEOUS
COMMUNITY
Start: 2024-09-20

## 2024-12-09 ASSESSMENT — PAIN SCALES - GENERAL: PAINLEVEL_OUTOF10: 0-NO PAIN

## 2024-12-09 NOTE — PROGRESS NOTES
Subjective   Navneet Thompson is a 89 y.o. male.    Chief Complaint:  Atrial Fibrillation, Coronary Artery Disease, Hypertension, and Hyperlipidemia    Mr. Barrera returns for a 3 month follow up. He is seen in collaboration with Dr. Bahena. He had a fall last month, with a fairly significant skin tear on his arm. He also received a tetnus shot at that time, and since then has been struggling with hearing loss. He was also recently started on Taltz and had his first injection on 11/20/24. He is not sure what caused his sudden hearing loss, though believes it could be one of his medications. He continues to struggle with some dyspnea, though denies any chest pain. He offers no other cardiovascular complaints or concerns today. He denies any complaints of chest pain, lightheadedness, dizziness, palpitations, syncope, orthopnea, paroxysmal nocturnal dyspnea, lower extremity swelling or bleeding concerns.      Hearing Loss        Review of Systems   All other systems reviewed and are negative.      Objective   Physical Exam  Constitutional:       Appearance: Healthy appearance. In no distress  Pulmonary:      Effort: Pulmonary effort is normal.      Breath sounds: Normal breath sounds.   Cardiovascular:      Normal rate. Regular rhythm. Normal S1. Normal S2.       Murmurs: There is no murmur.      Carotids: right carotid pulse +2, no bruit heard over the right carotid. left carotid pulse +2, no bruit heard over the left carotid.  Pacemaker/ICD Implant site has healed without signs of infection.  Edema:     Peripheral edema absent.   Abdominal:      Palpations: Abdomen is soft.   Musculoskeletal:       Cervical back: Normal range of motion.   Skin:     General: Skin is warm and dry. Normal color and pigmentation   Neurological:      Mental Status: Alert and oriented to person, place and time.   Psychiatric:     Mood and Affect: appropriate mood and appropriate affect.     EKG obtained and reviewed. V-paced. HR  85    Lab Review:   Lab Results   Component Value Date     09/16/2024    K 5.1 09/16/2024     09/16/2024    CO2 25 09/16/2024    BUN 32 (H) 09/16/2024    CREATININE 1.13 09/16/2024    GLUCOSE 191 (H) 09/16/2024    CALCIUM 9.3 09/16/2024     Lab Results   Component Value Date    WBC 8.6 12/13/2023    HGB 11.6 (L) 12/13/2023    HCT 39.2 (L) 12/13/2023    MCV 90 12/13/2023     12/13/2023     Lab Results   Component Value Date    CHOL 139 06/14/2023    TRIG 122 06/14/2023    HDL 35.0 (A) 06/14/2023       Assessment/Plan   Mr. Thompson is a pleasant 89 year old  male with a past medical history significant for CAD s/p CABG using LIMA-LAD, SVG-OM3 and LO-COLD9-XV3, patent by heart catheterization 2/2020, atrial fibrillation s/p several cardioversion on warfarin anticoagulation, complete heart block s/p PPM placement in 2008 upgraded to BiVICD in 2012 with most recent generator change in 2024, HFrEF, diabetes and severe aortic stenosis s/p TAVR with Evolute Pro Plus 26 mm valve 4/2020. He also has a mildly dilated ascending aorta measuring 4.1 cm by CT scan 11/2022. Echocardiogram 10/2023 showed a stable EF of 30-35% with biatrial dilatation and mildly elevated RVSP and mildly reduced RV systolic function. He presents today for routine follow up stable from a cardiac standpoint. His VS and EKG remain stable. His weight has also remained stable around 170-173 lbs. on his home scale. I will have him continue all medications unchanged. He will follow up with his PCP and ENT regarding his hearing loss. He will follow up with us in clinic 3 months. He knows to call for any concerns.

## 2024-12-10 ENCOUNTER — TELEPHONE (OUTPATIENT)
Dept: CARDIOLOGY | Facility: CLINIC | Age: 89
End: 2024-12-10
Payer: MEDICARE

## 2024-12-10 DIAGNOSIS — H91.93 BILATERAL HEARING LOSS, UNSPECIFIED HEARING LOSS TYPE: Primary | ICD-10-CM

## 2024-12-10 LAB
ATRIAL RATE: 312 BPM
Q ONSET: 227 MS
QRS COUNT: 14 BEATS
QRS DURATION: 150 MS
QT INTERVAL: 406 MS
QTC CALCULATION(BAZETT): 483 MS
QTC FREDERICIA: 456 MS
R AXIS: 205 DEGREES
T AXIS: -20 DEGREES
T OFFSET: 430 MS
VENTRICULAR RATE: 85 BPM

## 2024-12-11 RX ORDER — FUROSEMIDE 40 MG/1
40 TABLET ORAL
Qty: 180 TABLET | Refills: 1 | Status: SHIPPED | OUTPATIENT
Start: 2024-12-11 | End: 2025-12-11

## 2024-12-16 ENCOUNTER — ANTICOAGULATION - WARFARIN VISIT (OUTPATIENT)
Dept: CARDIOLOGY | Facility: CLINIC | Age: 89
End: 2024-12-16
Payer: MEDICARE

## 2024-12-16 DIAGNOSIS — I48.91 ATRIAL FIBRILLATION, UNSPECIFIED TYPE (MULTI): Primary | ICD-10-CM

## 2024-12-16 DIAGNOSIS — I48.20 CHRONIC ATRIAL FIBRILLATION (MULTI): ICD-10-CM

## 2024-12-16 LAB
POC INR: 3.3 (ref 2–3)
POC PROTHROMBIN TIME: ABNORMAL

## 2024-12-16 PROCEDURE — 99211 OFF/OP EST MAY X REQ PHY/QHP: CPT

## 2024-12-16 PROCEDURE — 85610 PROTHROMBIN TIME: CPT | Mod: QW

## 2024-12-16 NOTE — PROGRESS NOTES
ADULT AUDIOLOGY EVALUATION    Name:  Navnete Thompson  :  1933  Age:  89 y.o.  Date of Evaluation:  2024     IMPRESSIONS     Today's test results indicate normal middle ear functioning bilaterally, with moderate sloping to severe sensorineural hearing loss in the right ear, and normal sloping to severe sensorineural hearing loss in the right ear. Significant asymmetry is present in the low to mid frequencies, with greater hearing loss in the right ear than the left. Word recognition is poor in the right ear, and excellent in the left ear.    RECOMMENDATIONS     Medical follow up with ENT for evaluation and management of sudden and asymmetrical hearing loss.   Pending medical management, consideration of binaural hearing aids. [facility] and [facility] with this [facility] for [facility] needs. He was provided with a copy of his hearing test today for his records.  Return for annual hearing evaluation or sooner should concerns arise.    Time: 4421-0030    HISTORY     Navneet Thompson is seen today for an audiologic evaluation. Patient was accompanied by his daughter who assisted in providing case history.    Patient reports that around the end of November he woke up and was unable to hear out of his right ear. [facility] significant cerumen in both ears. Some cerumen was removed at this time, and it was recommended that he use at home softening drops and flushing with water for removal of remaining cerumen. It was emphasized today that Navneet has had multiple recent changes in medication dosages, including increased furosemide and prednisone. Navneet reports that he has worn hearing aids in the past that [facility], but eventually stopped wearing these over time. He denied history of noise exposure. 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 He denied tinnitus,  dizziness, aural fullness, recent ear infections, otalgia, otorrhea, or history of otologic surgeries. It was noted today that patient's son has experienced a sudden hearing loss in the past.       TEST RESULTS     Otoscopic Evaluation:  Right Ear: Slight wax in ear canal with full visualization of tympanic membrane  Left Ear: Slight wax in ear canal with full visualization of tympanic membrane    Tympanometry:   Right Ear: Normal, type A tympanogram with normal ear canal volume, peak pressure and compliance.   Left Ear: Normal, type A tympanogram with normal ear canal volume, peak pressure and compliance.     Ipsilateral Acoustic Reflexes:   Right Ear: Present 500-2000 Hz, absent 4000 Hz.   Left Ear: Present 500-4000 Hz.     Pure Tone Audiometry (125-8000 Hz):     Right Ear: Moderate sensorineural hearing loss at 125 Hz, falling to moderately-severe from 250-4000 Hz, and severe at 8000 Hz. Significant asymmetry present from 125-2000 Hz.    Left Ear: Borderline mild sensorineural hearing loss from 125-250 Hz, rising to normal at 500 Hz, sloping back to mild at 1000 Hz, moderate at 2000 Hz, moderately-severe at 4000 Hz, and severe at 8000 Hz.    Of important note, testing was completed outside of the sound [facility] today. Patient arrived in a wheelchair that did not fit into the doorway of the hobson.     Speech Audiometry:   Right Ear:    Speech Reception Threshold (SRT) was obtained at 70 dBHL using recorded material.   Word Recognition scores were poor ( 36% ) in quiet when words were presented at 95 dBHL using recorded NU-6 word list ordered by difficulty.  Left Ear:    Speech Reception Threshold (SRT) was obtained at 80 dBHL using recorded material.   Word Recognition scores were excellent (100%) in quiet when words were presented at 80 dBHL using recorded NU-6 word list ordered by difficulty.         PATIENT EDUCATION     Patient and his daughter were counseled in regard to findings. All questions were  addressed.        Chiquita Bardales, Luiz, CCC-A  Clinical Audiologist    Degree of Hearing Sensitivity Decibel Range   Within Normal Limits (WNL) 0-25   Mild 26-40   Moderate 41-55   Moderately-Severe 56-70   Severe 71-90   Profound 91+      Key   CNT/DNT Could Not Test/Did Not Test   TM Tympanic Membrane   WNL Within Normal Limits   HA Hearing Aid   SNHL Sensorineural Hearing Loss   CHL Conductive Hearing Loss   NIHL Noise-Induced Hearing Loss   ECV Ear Canal Volume   MLV Monitored Live Voice     AUDIOGRAM

## 2024-12-16 NOTE — PROGRESS NOTES
Patient identification verified with 2 identifiers.    Location: [facility] - [address] [address] [facility]. [place], [place] [address] [phone]     Referring Physician: Dr. Melvin Bahena  Enrollment/ Re-enrollment date:  5/16/25  INR Goal: 2.0-3.0  INR monitoring is per [facility] protocol.  Anticoagulation Medication: warfarin  Indication: Atrial Fibrillation/Atrial Flutter    Subjective   Bleeding signs/symptoms: No    Bruising: No   Major bleeding event: No  Thrombosis signs/symptoms: No  Thromboembolic event: No  Missed doses: No  Extra doses: No  Medication changes: Yes  Cutting down on prednisone  Dietary changes: No  Change in health: No  Change in activity: No  Alcohol: No  Other concerns: No    Upcoming Procedures:  Does the Patient Have any upcoming procedures that require interruption in anticoagulation therapy? no  Does the patient require bridging? no      Anticoagulation Summary  As of 12/16/2024      INR goal:  2.0-3.0   TTR:  84.7% (1.1 y)   INR used for dosing:  3.30 (12/16/2024)   Weekly warfarin total:  37.5 mg               Assessment/Plan   Supratherapeutic     1. New dose:  TWD decreased per protocol     2. Next INR: 1 week      Education provided to patient during the visit:  Patient instructed to call in interim with questions, concerns and changes.   Patient educated on interactions between medications and warfarin.   Patient educated on dietary consistency in vitamin k consumption.   Patient educated on signs of bleeding/clotting.   Patient educated on compliance with dosing, follow up appointments, and prescribed plan of care.

## 2024-12-17 ENCOUNTER — CLINICAL SUPPORT (OUTPATIENT)
Dept: AUDIOLOGY | Facility: CLINIC | Age: 89
End: 2024-12-17
Payer: MEDICARE

## 2024-12-17 DIAGNOSIS — H91.93 BILATERAL HEARING LOSS, UNSPECIFIED HEARING LOSS TYPE: ICD-10-CM

## 2024-12-17 DIAGNOSIS — H90.3 ASYMMETRICAL SENSORINEURAL HEARING LOSS: Primary | ICD-10-CM

## 2024-12-17 PROCEDURE — 92557 COMPREHENSIVE HEARING TEST: CPT

## 2024-12-17 PROCEDURE — 92550 TYMPANOMETRY & REFLEX THRESH: CPT

## 2024-12-19 ENCOUNTER — OFFICE VISIT (OUTPATIENT)
Dept: OTOLARYNGOLOGY | Facility: CLINIC | Age: 89
End: 2024-12-19
Payer: MEDICARE

## 2024-12-19 VITALS — BODY MASS INDEX: 26.26 KG/M2 | WEIGHT: 183 LBS

## 2024-12-19 DIAGNOSIS — H91.21 SUDDEN RIGHT HEARING LOSS: ICD-10-CM

## 2024-12-19 DIAGNOSIS — H90.3 SENSORINEURAL HEARING LOSS (SNHL) OF BOTH EARS: Primary | ICD-10-CM

## 2024-12-19 PROCEDURE — 69801 INCISE INNER EAR: CPT | Performed by: OTOLARYNGOLOGY

## 2024-12-19 PROCEDURE — 1036F TOBACCO NON-USER: CPT | Performed by: OTOLARYNGOLOGY

## 2024-12-19 PROCEDURE — 99204 OFFICE O/P NEW MOD 45 MIN: CPT | Performed by: OTOLARYNGOLOGY

## 2024-12-19 PROCEDURE — 1159F MED LIST DOCD IN RCRD: CPT | Performed by: OTOLARYNGOLOGY

## 2024-12-19 PROCEDURE — 1160F RVW MEDS BY RX/DR IN RCRD: CPT | Performed by: OTOLARYNGOLOGY

## 2024-12-19 RX ORDER — DEXAMETHASONE SODIUM PHOSPHATE 10 MG/ML
10 INJECTION INTRAMUSCULAR; INTRAVENOUS ONCE
Status: SHIPPED | OUTPATIENT
Start: 2024-12-19

## 2024-12-19 NOTE — PROGRESS NOTES
Reason for Consult:  Sudden hearing loss     Subjective   History Of Present Illness:  Navneet Thompson is a 89 y.o. male with a significant history of aortic stenosis, coronary artery disease, atrial fibrillation, pacemaker user, diabetes mellitus, and recent diagnosis of bullous pemphigoid for which he has been on steroids for the last couple weeks.    Approximately a month ago he wake up and noticed that his right hearing had dropped.  He consulted his cardiologist as well as primary care physician discussed to see him for management of his sudden hearing loss.  He denies tinnitus or dizziness.     Past Medical History:  He has a past medical history of Aortic stenosis, Atherosclerotic heart disease of native coronary artery without angina pectoris (12/12/2022), Atrial fibrillation (Multi), Chronic systolic heart failure, Complete heart block, Diabetes mellitus, type 2 (Multi), Essential (primary) hypertension (12/12/2022), and Presence of automatic (implantable) cardiac defibrillator (11/14/2022).    Surgical History:  He has a past surgical history that includes Appendectomy (12/01/2014); Coronary artery bypass graft (12/01/2014); Other surgical history (12/01/2014); CT angio abdomen pelvis w and or wo IV IV contrast (03/17/2020); Aortic valve replacement (04/07/2022); and Cardiac electrophysiology procedure (N/A, 12/19/2023).     Social History:  He reports that he has never smoked. He has never been exposed to tobacco smoke. He has never used smokeless tobacco. He reports current alcohol use. He reports that he does not use drugs.    Family History:  family history includes Drug abuse in his mother.     Medications:  Current Outpatient Medications   Medication Instructions    acetaminophen (TYLENOL) 650 mg, oral, Every 6 hours PRN    aspirin 81 mg, Daily    atorvastatin (LIPITOR) 80 mg, oral, Nightly    cholecalciferol (VITAMIN D-3) 1,000 Units, oral, Daily    dupilumab (Dupixent) 300 mg/2 mL pen  injector Inject under the skin.    empagliflozin (JARDIANCE) 10 mg, oral, Daily    FreeStyle Maria Elena 14 Day Sensor kit USE AS DIRECTED EVERY 14 DAYS    furosemide (LASIX) 40 mg, oral, 2 times daily (morning and late afternoon)    Humira 40 mg, Every 14 days    hydrocortisone 1 % cream 2 times daily    insulin aspart (NovoLOG U-100 Insulin aspart) 100 unit/mL injection INJECT 6 UNITS UNDER THE SKIN WITH BREAKFAST/LUNCH/DINNER, IF BLOOD GLUCOSE GREATER THAN 180 GIVE 8 UNITS, BLOOD GLUCOSE GREATER THAN 240 GIVE 10 UNITS AND GREATER THAN 300 GIVE 12 UNITS    insulin glargine (LANTUS U-100 INSULIN) 45 Units, Nightly    insulin glargine-yfgn 100 unit/mL (3 mL) Pen Inject under the skin.    isosorbide mononitrate ER (Imdur) 30 mg 24 hr tablet 1 tablet, Daily    ixekizumab (TALTZ) 80 mg, Every 14 days    lisinopril 5 mg tablet 1 tablet, Daily    lubricating eye drops ophthalmic solution 1 drop, Both Eyes, 2 times daily PRN    magnesium hydroxide (Milk of Magnesia) 400 mg/5 mL suspension 5 mL, Daily PRN    magnesium oxide (MAG-OX) 400 mg, oral, Daily    melatonin 3 mg, oral, Nightly PRN    metoprolol tartrate (LOPRESSOR) 25 mg, 2 times daily    neomycin-bacitracin-polymyxin (Polysporin) ophthalmic ointment 0.5 inches, Both Eyes, Daily    omeprazole (PRILOSEC) 20 mg, oral, Daily, Do not crush or chew.    potassium chloride CR (Klor-Con M20) 20 mEq ER tablet 20 mEq, oral, Daily, Do not crush or chew.    predniSONE (DELTASONE) 40 mg, Daily    predniSONE 10 mg, Daily    triamcinolone (Kenalog) 0.1 % cream Topical, 2 times daily    warfarin (Coumadin) 3 mg tablet TAKE 1 AND 1/2 TABLETS BY MOUTH 4 DAYS A WEEK AND 2 TABLETS ALL OTHER DAYS      Allergies:  Linagliptin, Dicloxacillin, Doxycycline, and Scopolamine    Review of Systems:   A comprehensive 10-point review of systems was obtained including constitutional, neurological, HEENT, pulmonary, cardiovascular, genito-urinary, and other pertinent systems and was negative except as  noted in the HPI.     Objective   Physical Exam:  Last Recorded Vitals: There were no vitals taken for this visit.    On physical exam, the patient is a well-nourished, well-developed patient, in no acute distress, able to communicate without assistance in English language. Head and face is atraumatic and normocephalic. Salivary glands are intact. Facial strength is symmetrical bilaterally.       On ear examination:  Right ear: The patient has an open and patent ear canal. The tympanic membrane is intact.  AC>BC  Left ear: The patient has an open and patent ear canal. The tympanic membrane is intact.  AC>BC  The Flores is left    On vestibular exam, the patient has no spontaneous nystagmus, no headshake nystagmus, no head-thrust nystagmus, and no nystagmus on hyperventilation or Valsalva maneuvers. Perez-Hallpike maneuver is negative bilaterally.       On neuro exam, the patient is alert and oriented x3, cranial nerves are grossly intact, cerebellar exam is normal.      The rest of the exam, including anterior rhinoscopy, oropharyngeal exam, neck exam, and cardiovascular exam, were normal including no palpable lymphadenopathies, thyroid in the midline position, normal pulses, and normal chest excursion.       Reviewed Results:  Audiology Testing:  I personally reviewed the audiogram from 12/2024 that showed:   Right ear: Moderate downsloping to severe sensorineural hearing loss. Discrimination: 36%   Left ear: Mild downsloping to moderately severe sensorineural hearing loss. Discrimination: 100%      Imaging:  None     Procedure:  Intratympanic Injection of Dexamethasone  After informed consent was obtained, the risks, indications, and complications of doing an intratympanic injection with dexamethasone in the affected were discussed with the patient. The patient elected to proceed.  I topicalized the drum with phenol and injected 0.8 cc of dexamethasone 10 mg/cc in the affected ear. We left the infusion for 30 minutes  and then suctioned the overflowing fluid.  The patient tolerated the procedure well.    Assessment/Plan     1. Sensorineural hearing loss (SNHL) of both ears    2. Sudden right hearing loss        In summary, Navneet Thompson is a 89 y.o. male with with a significant history of aortic stenosis, coronary artery disease, atrial fibrillation, pacemaker user, diabetes mellitus, and recent diagnosis of bullous pemphigoid for which he has been on steroids for the last couple weeks.    He had a sudden drop in his hearing in the right ear without any obvious precipitating factor.  He had already been on oral steroids.  For the reason we did a intratympanic injection of dexamethasone today.  He tolerated this well.    I plan to see him back in a week for another injection and again in 2 weeks.    The patient is unaware if his pacemaker is MRI compatible and he is going to check this with his cardiologist.  Depending on that we might consider an MRI his next visit.       ____________________________________________________  Jamarcus Brito MD  Professor and Chief   Otology/Neurotology/Lateral Skull-Base Surgery   [facility]

## 2024-12-19 NOTE — LETTER
December 19, 2024     Paige Hodge APRN-DEN  [address]  [facility] [address]    Patient: Navneet Thopmson   YOB: 1933   Date of Visit: 12/19/2024       Dear Dr. Paige Hodge APRN-CNP:    Thank you for referring Navneet Thompson to me for evaluation. Below are my notes for this consultation.  If you have questions, please do not hesitate to call me. I look forward to following your patient along with you.       Sincerely,     Jamarcus De La Rosa MD      CC: Melvin Bahena MD  ______________________________________________________________________________________            Reason for Consult:  Sudden hearing loss     Subjective  History Of Present Illness:  Navneet Thompson is a 89 y.o. male with a significant history of aortic stenosis, coronary artery disease, atrial fibrillation, pacemaker user, diabetes mellitus, and recent diagnosis of bullous pemphigoid for which he has been on steroids for the last couple weeks.    Approximately a month ago he wake up and noticed that his right hearing had dropped.  He consulted his cardiologist as well as primary care physician discussed to see him for management of his sudden hearing loss.  He denies tinnitus or dizziness.     Past Medical History:  He has a past medical history of Aortic stenosis, Atherosclerotic heart disease of native coronary artery without angina pectoris (12/12/2022), Atrial fibrillation (Multi), Chronic systolic heart failure, Complete heart block, Diabetes mellitus, type 2 (Multi), Essential (primary) hypertension (12/12/2022), and Presence of automatic (implantable) cardiac defibrillator (11/14/2022).    Surgical History:  He has a past surgical history that includes Appendectomy (12/01/2014); Coronary artery bypass graft (12/01/2014); Other surgical history (12/01/2014); CT angio abdomen pelvis w and or wo IV IV contrast (03/17/2020); Aortic valve replacement (04/07/2022); and Cardiac electrophysiology procedure  (N/A, 12/19/2023).     Social History:  He reports that he has never smoked. He has never been exposed to tobacco smoke. He has never used smokeless tobacco. He reports current alcohol use. He reports that he does not use drugs.    Family History:  family history includes Drug abuse in his mother.     Medications:  Current Outpatient Medications   Medication Instructions   • acetaminophen (TYLENOL) 650 mg, oral, Every 6 hours PRN   • aspirin 81 mg, Daily   • atorvastatin (LIPITOR) 80 mg, oral, Nightly   • cholecalciferol (VITAMIN D-3) 1,000 Units, oral, Daily   • dupilumab (Dupixent) 300 mg/2 mL pen injector Inject under the skin.   • empagliflozin (JARDIANCE) 10 mg, oral, Daily   • FreeStyle Maria Elena 14 Day Sensor kit USE AS DIRECTED EVERY 14 DAYS   • furosemide (LASIX) 40 mg, oral, 2 times daily (morning and late afternoon)   • Humira 40 mg, Every 14 days   • hydrocortisone 1 % cream 2 times daily   • insulin aspart (NovoLOG U-100 Insulin aspart) 100 unit/mL injection INJECT 6 UNITS UNDER THE SKIN WITH BREAKFAST/LUNCH/DINNER, IF BLOOD GLUCOSE GREATER THAN 180 GIVE 8 UNITS, BLOOD GLUCOSE GREATER THAN 240 GIVE 10 UNITS AND GREATER THAN 300 GIVE 12 UNITS   • insulin glargine (LANTUS U-100 INSULIN) 45 Units, Nightly   • insulin glargine-yfgn 100 unit/mL (3 mL) Pen Inject under the skin.   • isosorbide mononitrate ER (Imdur) 30 mg 24 hr tablet 1 tablet, Daily   • ixekizumab (TALTZ) 80 mg, Every 14 days   • lisinopril 5 mg tablet 1 tablet, Daily   • lubricating eye drops ophthalmic solution 1 drop, Both Eyes, 2 times daily PRN   • magnesium hydroxide (Milk of Magnesia) 400 mg/5 mL suspension 5 mL, Daily PRN   • magnesium oxide (MAG-OX) 400 mg, oral, Daily   • melatonin 3 mg, oral, Nightly PRN   • metoprolol tartrate (LOPRESSOR) 25 mg, 2 times daily   • neomycin-bacitracin-polymyxin (Polysporin) ophthalmic ointment 0.5 inches, Both Eyes, Daily   • omeprazole (PRILOSEC) 20 mg, oral, Daily, Do not crush or chew.   • potassium  chloride CR (Klor-Con M20) 20 mEq ER tablet 20 mEq, oral, Daily, Do not crush or chew.   • predniSONE (DELTASONE) 40 mg, Daily   • predniSONE 10 mg, Daily   • triamcinolone (Kenalog) 0.1 % cream Topical, 2 times daily   • warfarin (Coumadin) 3 mg tablet TAKE 1 AND 1/2 TABLETS BY MOUTH 4 DAYS A WEEK AND 2 TABLETS ALL OTHER DAYS      Allergies:  Linagliptin, Dicloxacillin, Doxycycline, and Scopolamine    Review of Systems:   A comprehensive 10-point review of systems was obtained including constitutional, neurological, HEENT, pulmonary, cardiovascular, genito-urinary, and other pertinent systems and was negative except as noted in the HPI.     Objective  Physical Exam:  Last Recorded Vitals: There were no vitals taken for this visit.    On physical exam, the patient is a well-nourished, well-developed patient, in no acute distress, able to communicate without assistance in English language. Head and face is atraumatic and normocephalic. Salivary glands are intact. Facial strength is symmetrical bilaterally.       On ear examination:  Right ear: The patient has an open and patent ear canal. The tympanic membrane is intact.  AC>BC  Left ear: The patient has an open and patent ear canal. The tympanic membrane is intact.  AC>BC  The Flores is left    On vestibular exam, the patient has no spontaneous nystagmus, no headshake nystagmus, no head-thrust nystagmus, and no nystagmus on hyperventilation or Valsalva maneuvers. Perez-Hallpike maneuver is negative bilaterally.       On neuro exam, the patient is alert and oriented x3, cranial nerves are grossly intact, cerebellar exam is normal.      The rest of the exam, including anterior rhinoscopy, oropharyngeal exam, neck exam, and cardiovascular exam, were normal including no palpable lymphadenopathies, thyroid in the midline position, normal pulses, and normal chest excursion.       Reviewed Results:  Audiology Testing:  I personally reviewed the audiogram from 12/2024 that  showed:   Right ear: Moderate downsloping to severe sensorineural hearing loss. Discrimination: 36%   Left ear: Mild downsloping to moderately severe sensorineural hearing loss. Discrimination: 100%      Imaging:  None     Procedure:  Intratympanic Injection of Dexamethasone  After informed consent was obtained, the risks, indications, and complications of doing an intratympanic injection with dexamethasone in the affected were discussed with the patient. The patient elected to proceed.  I topicalized the drum with phenol and injected 0.8 cc of dexamethasone 10 mg/cc in the affected ear. We left the infusion for 30 minutes and then suctioned the overflowing fluid.  The patient tolerated the procedure well.    Assessment/Plan    1. Sensorineural hearing loss (SNHL) of both ears    2. Sudden right hearing loss        In summary, Navneet Thompson is a 89 y.o. male with with a significant history of aortic stenosis, coronary artery disease, atrial fibrillation, pacemaker user, diabetes mellitus, and recent diagnosis of bullous pemphigoid for which he has been on steroids for the last couple weeks.    He had a sudden drop in his hearing in the right ear without any obvious precipitating factor.  He had already been on oral steroids.  For the reason we did a intratympanic injection of dexamethasone today.  He tolerated this well.    I plan to see him back in a week for another injection and again in 2 weeks.    The patient is unaware if his pacemaker is MRI compatible and he is going to check this with his cardiologist.  Depending on that we might consider an MRI his next visit.       ____________________________________________________  Jamarcus Brito MD  Professor and Chief   Otology/Neurotology/Lateral Skull-Base Surgery   [facility]

## 2024-12-20 ENCOUNTER — TELEPHONE (OUTPATIENT)
Dept: CARDIOLOGY | Facility: CLINIC | Age: 89
End: 2024-12-20
Payer: MEDICARE

## 2024-12-20 NOTE — TELEPHONE ENCOUNTER
I spoke to michael and gave her the phone number for the device clinic to check and see if his device is MRI compatible.

## 2024-12-23 ENCOUNTER — APPOINTMENT (OUTPATIENT)
Dept: CARDIOLOGY | Facility: CLINIC | Age: 89
End: 2024-12-23
Payer: MEDICARE

## 2024-12-23 ENCOUNTER — ANTICOAGULATION - WARFARIN VISIT (OUTPATIENT)
Dept: CARDIOLOGY | Facility: CLINIC | Age: 89
End: 2024-12-23
Payer: MEDICARE

## 2024-12-23 DIAGNOSIS — I48.20 CHRONIC ATRIAL FIBRILLATION (MULTI): ICD-10-CM

## 2024-12-23 DIAGNOSIS — I48.91 ATRIAL FIBRILLATION, UNSPECIFIED TYPE (MULTI): Primary | ICD-10-CM

## 2024-12-23 LAB
POC INR: 2.5
POC PROTHROMBIN TIME: NORMAL

## 2024-12-23 PROCEDURE — 99211 OFF/OP EST MAY X REQ PHY/QHP: CPT

## 2024-12-23 PROCEDURE — 85610 PROTHROMBIN TIME: CPT | Mod: QW

## 2024-12-23 NOTE — PROGRESS NOTES
Patient identification verified with 2 identifiers.    Location: [facility] - [address] [address] [place], [place] [address] [phone]     Referring Physician: Dr. Melvin Bahena  Enrollment/ Re-enrollment date:  25  INR Goal: 2.0-3.0  INR monitoring is per [facility] protocol.  Anticoagulation Medication: warfarin  Indication: Atrial Fibrillation/Atrial Flutter  Subjective   Bleeding signs/symptoms: No    Bruising: No   Major bleeding event: No  Thrombosis signs/symptoms: No  Thromboembolic event: No  Missed doses: No  Extra doses: No  Medication changes: Yes  IS RECEIVING STEROID SHOTS  Dietary changes: No  Change in health: No  Change in activity: No  Alcohol: No  Other concerns: No    Upcoming Procedures:  Does the Patient Have any upcoming procedures that require interruption in anticoagulation therapy? no  Does the patient require bridging? no      Anticoagulation Summary  As of 2024      INR goal:  2.0-3.0   TTR:  84.3% (1.2 y)   INR used for dosin.50 (2024)   Weekly warfarin total:  37.5 mg               Assessment/Plan   Therapeutic     1. New dose: no change    2. Next INR: 1 week      Education provided to patient during the visit:  Patient instructed to call in interim with questions, concerns and changes.   Patient educated on compliance with dosing, follow up appointments, and prescribed plan of care.

## 2024-12-26 ENCOUNTER — APPOINTMENT (OUTPATIENT)
Dept: OTOLARYNGOLOGY | Facility: CLINIC | Age: 89
End: 2024-12-26
Payer: MEDICARE

## 2024-12-26 VITALS — BODY MASS INDEX: 26.2 KG/M2 | HEIGHT: 70 IN | WEIGHT: 183 LBS

## 2024-12-26 DIAGNOSIS — H91.21 SUDDEN RIGHT HEARING LOSS: ICD-10-CM

## 2024-12-26 DIAGNOSIS — H90.3 SENSORINEURAL HEARING LOSS (SNHL) OF BOTH EARS: Primary | ICD-10-CM

## 2024-12-26 PROCEDURE — 99213 OFFICE O/P EST LOW 20 MIN: CPT | Performed by: OTOLARYNGOLOGY

## 2024-12-26 PROCEDURE — 69801 INCISE INNER EAR: CPT | Performed by: OTOLARYNGOLOGY

## 2024-12-26 RX ORDER — DEXAMETHASONE SODIUM PHOSPHATE 10 MG/ML
10 INJECTION INTRAMUSCULAR; INTRAVENOUS ONCE
Status: SHIPPED | OUTPATIENT
Start: 2024-12-26

## 2024-12-26 ASSESSMENT — PATIENT HEALTH QUESTIONNAIRE - PHQ9
2. FEELING DOWN, DEPRESSED OR HOPELESS: NOT AT ALL
1. LITTLE INTEREST OR PLEASURE IN DOING THINGS: NOT AT ALL
SUM OF ALL RESPONSES TO PHQ9 QUESTIONS 1 AND 2: 0

## 2024-12-26 NOTE — LETTER
December 26, 2024     MINOR Carrillo-DEN  [address]  [facility] [address]    Patient: Navneet Thompson   YOB: 1933   Date of Visit: 12/26/2024       Dear Dr. Paige Hodge APRN-CNP:    Thank you for referring Navneet Thompson to me for evaluation. Below are my notes for this consultation.  If you have questions, please do not hesitate to call me. I look forward to following your patient along with you.       Sincerely,     Jamarcus De La Rosa MD      CC: Melvin Bahena MD  ______________________________________________________________________________________            Reason for Consult:  Sudden hearing loss     Subjective  History Of Present Illness:  Navneet Thompson is a 89 y.o. male with with a significant history of aortic stenosis, coronary artery disease, atrial fibrillation, pacemaker user, diabetes mellitus, and recent diagnosis of bullous pemphigoid for which he has been on steroids for the last couple weeks.    He had a sudden drop in his hearing in the right ear without any obvious precipitating factor.  He had already been on oral steroids.  For the reason we did a intratympanic injection of dexamethasone a week ago and is here for his second injection.       Past Medical History:  He has a past medical history of Aortic stenosis, Atherosclerotic heart disease of native coronary artery without angina pectoris (12/12/2022), Atrial fibrillation (Multi), Chronic systolic heart failure, Complete heart block, Diabetes mellitus, type 2 (Multi), Essential (primary) hypertension (12/12/2022), and Presence of automatic (implantable) cardiac defibrillator (11/14/2022).    Surgical History:  He has a past surgical history that includes Appendectomy (12/01/2014); Coronary artery bypass graft (12/01/2014); Other surgical history (12/01/2014); CT angio abdomen pelvis w and or wo IV IV contrast (03/17/2020); Aortic valve replacement (04/07/2022); and Cardiac electrophysiology  procedure (N/A, 12/19/2023).     Social History:  He reports that he has never smoked. He has never been exposed to tobacco smoke. He has never used smokeless tobacco. He reports current alcohol use. He reports that he does not use drugs.    Family History:  family history includes Drug abuse in his mother.     Medications:  Current Outpatient Medications   Medication Instructions   • acetaminophen (TYLENOL) 650 mg, oral, Every 6 hours PRN   • aspirin 81 mg, Daily   • atorvastatin (LIPITOR) 80 mg, oral, Nightly   • cholecalciferol (VITAMIN D-3) 1,000 Units, oral, Daily   • dupilumab (Dupixent) 300 mg/2 mL pen injector Inject under the skin.   • empagliflozin (JARDIANCE) 10 mg, oral, Daily   • FreeStyle Maria Elena 14 Day Sensor kit USE AS DIRECTED EVERY 14 DAYS   • furosemide (LASIX) 40 mg, oral, 2 times daily (morning and late afternoon)   • Humira 40 mg, Every 14 days   • hydrocortisone 1 % cream 2 times daily   • insulin aspart (NovoLOG U-100 Insulin aspart) 100 unit/mL injection INJECT 6 UNITS UNDER THE SKIN WITH BREAKFAST/LUNCH/DINNER, IF BLOOD GLUCOSE GREATER THAN 180 GIVE 8 UNITS, BLOOD GLUCOSE GREATER THAN 240 GIVE 10 UNITS AND GREATER THAN 300 GIVE 12 UNITS   • insulin glargine (LANTUS U-100 INSULIN) 45 Units, Nightly   • insulin glargine-yfgn 100 unit/mL (3 mL) Pen Inject under the skin.   • isosorbide mononitrate ER (Imdur) 30 mg 24 hr tablet 1 tablet, Daily   • ixekizumab (TALTZ) 80 mg, Every 14 days   • lisinopril 5 mg tablet 1 tablet, Daily   • lubricating eye drops ophthalmic solution 1 drop, Both Eyes, 2 times daily PRN   • magnesium hydroxide (Milk of Magnesia) 400 mg/5 mL suspension 5 mL, Daily PRN   • magnesium oxide (MAG-OX) 400 mg, oral, Daily   • melatonin 3 mg, oral, Nightly PRN   • metoprolol tartrate (LOPRESSOR) 25 mg, 2 times daily   • neomycin-bacitracin-polymyxin (Polysporin) ophthalmic ointment 0.5 inches, Both Eyes, Daily   • omeprazole (PRILOSEC) 20 mg, oral, Daily, Do not crush or chew.   •  "potassium chloride CR (Klor-Con M20) 20 mEq ER tablet 20 mEq, oral, Daily, Do not crush or chew.   • predniSONE (DELTASONE) 40 mg, Daily   • predniSONE 10 mg, Daily   • triamcinolone (Kenalog) 0.1 % cream Topical, 2 times daily   • warfarin (Coumadin) 3 mg tablet TAKE 1 AND 1/2 TABLETS BY MOUTH 4 DAYS A WEEK AND 2 TABLETS ALL OTHER DAYS      Allergies:  Linagliptin, Dicloxacillin, Doxycycline, and Scopolamine    Review of Systems:   A comprehensive 10-point review of systems was obtained including constitutional, neurological, HEENT, pulmonary, cardiovascular, genito-urinary, and other pertinent systems and was negative except as noted in the HPI.     Objective  Physical Exam:  Last Recorded Vitals: Height 1.778 m (5' 10\"), weight 83 kg (183 lb).    On physical exam, the patient is a well-nourished, well-developed patient, in no acute distress, able to communicate without assistance in English language. Head and face is atraumatic and normocephalic. Salivary glands are intact. Facial strength is symmetrical bilaterally.       On ear examination:  Right ear: The patient has an open and patent ear canal. The tympanic membrane is intact.  AC>BC  Left ear: The patient has an open and patent ear canal. The tympanic membrane is intact.  AC>BC  The Flroes is left    On vestibular exam, the patient has no spontaneous nystagmus, no headshake nystagmus, no head-thrust nystagmus, and no nystagmus on hyperventilation or Valsalva maneuvers. Perez-Hallpike maneuver is negative bilaterally.       On neuro exam, the patient is alert and oriented x3, cranial nerves are grossly intact, cerebellar exam is normal.      The rest of the exam, including anterior rhinoscopy, oropharyngeal exam, neck exam, and cardiovascular exam, were normal including no palpable lymphadenopathies, thyroid in the midline position, normal pulses, and normal chest excursion.       Reviewed Results:  Audiology Testing:  I personally reviewed the audiogram from " 12/2024 that showed:   Right ear: Moderate downsloping to severe sensorineural hearing loss. Discrimination: 36%   Left ear: Mild downsloping to moderately severe sensorineural hearing loss. Discrimination: 100%      Imaging:  None     Procedure:  Intratympanic Injection of Dexamethasone  After informed consent was obtained, the risks, indications, and complications of doing an intratympanic injection with dexamethasone in the affected were discussed with the patient. The patient elected to proceed.  I topicalized the drum with phenol and injected 0.8 cc of dexamethasone 10 mg/cc in the affected ear. We left the infusion for 30 minutes and then suctioned the overflowing fluid.  The patient tolerated the procedure well.    Assessment/Plan    1. Sensorineural hearing loss (SNHL) of both ears    2. Sudden right hearing loss        In summary, Navneet Thompson is a 89 y.o. male with with a significant history of aortic stenosis, coronary artery disease, atrial fibrillation, pacemaker user, diabetes mellitus, and recent diagnosis of bullous pemphigoid for which he has been on steroids for the last couple weeks.    He had a sudden drop in his hearing in the right ear without any obvious precipitating factor.  He had already been on oral steroids.  For the reason we did a intratympanic injection of dexamethasone a week ago and his second injection again today.     I plan to see him back in 2 weeks for last injection. I ordered MRI to rule out retrocochlear pathology.           ____________________________________________________  Jamarcus Brito MD  Professor and Chief   Otology/Neurotology/Lateral Skull-Base Surgery   [facility]

## 2024-12-30 ENCOUNTER — ANTICOAGULATION - WARFARIN VISIT (OUTPATIENT)
Dept: CARDIOLOGY | Facility: CLINIC | Age: 89
End: 2024-12-30
Payer: MEDICARE

## 2024-12-30 DIAGNOSIS — I48.91 ATRIAL FIBRILLATION, UNSPECIFIED TYPE (MULTI): Primary | ICD-10-CM

## 2024-12-30 DIAGNOSIS — I48.20 CHRONIC ATRIAL FIBRILLATION (MULTI): ICD-10-CM

## 2024-12-30 LAB
POC INR: 1.9
POC PROTHROMBIN TIME: NORMAL

## 2024-12-30 PROCEDURE — 99211 OFF/OP EST MAY X REQ PHY/QHP: CPT

## 2024-12-30 PROCEDURE — 85610 PROTHROMBIN TIME: CPT | Mod: QW

## 2024-12-30 NOTE — PROGRESS NOTES
Patient identification verified with 2 identifiers.    Location: [facility] - [address] [address] [facility]. [place], [place] [address] [phone]     Referring Physician: Dr. Melvin Bahena  Enrollment/ Re-enrollment date:  25  INR Goal: 2.0-3.0  INR monitoring is per [facility] protocol.  Anticoagulation Medication: warfarin  Indication: Atrial Fibrillation/Atrial Flutter    Subjective   Bleeding signs/symptoms: No    Bruising: No   Major bleeding event: No  Thrombosis signs/symptoms: No  Thromboembolic event: No  Missed doses: Yes  PT THINKS HE COULD HAVE MISSED A DOSE, IS % SURE  Extra doses: No  Medication changes: No  Dietary changes: No  Change in health: No  Change in activity: No  Alcohol: No  Other concerns: No    Upcoming Procedures:  Does the Patient Have any upcoming procedures that require interruption in anticoagulation therapy? no  Does the patient require bridging? no      Anticoagulation Summary  As of 2024      INR goal:  2.0-3.0   TTR:  84.3% (1.2 y)   INR used for dosin.90 (2024)   Weekly warfarin total:  37.5 mg               Assessment/Plan   Subtherapeutic     1. New dose: no change    2. Next INR: 1 week      Education provided to patient during the visit:  Patient instructed to call in interim with questions, concerns and changes.   Patient educated on interactions between medications and warfarin.   Patient educated on dietary consistency in vitamin k consumption.   Patient educated on compliance with dosing, follow up appointments, and prescribed plan of care.

## 2025-01-06 ENCOUNTER — ANTICOAGULATION - WARFARIN VISIT (OUTPATIENT)
Dept: CARDIOLOGY | Facility: CLINIC | Age: OVER 89
End: 2025-01-06
Payer: MEDICARE

## 2025-01-06 DIAGNOSIS — I48.20 CHRONIC ATRIAL FIBRILLATION (MULTI): ICD-10-CM

## 2025-01-06 DIAGNOSIS — I48.91 ATRIAL FIBRILLATION, UNSPECIFIED TYPE (MULTI): Primary | ICD-10-CM

## 2025-01-06 LAB
POC INR: 1.7
POC PROTHROMBIN TIME: NORMAL

## 2025-01-06 PROCEDURE — 85610 PROTHROMBIN TIME: CPT | Mod: QW

## 2025-01-06 NOTE — PROGRESS NOTES
Patient identification verified with 2 identifiers.    Location: [facility] - [address] [address] [place], [place] [address] [phone]     Referring Physician: Dr. Melvin Bahena  Enrollment/ Re-enrollment date:  25  INR Goal: 2.0-3.0  INR monitoring is per [facility] protocol.  Anticoagulation Medication: warfarin  Indication: Atrial Fibrillation/Atrial Flutter    Subjective   Bleeding signs/symptoms: No    Bruising: No   Major bleeding event: No  Thrombosis signs/symptoms: No  Thromboembolic event: No  Missed doses: No  Extra doses: No  Medication changes: No  Dietary changes: No  Change in health: No  Change in activity: No  Alcohol: No  Other concerns: No    Upcoming Procedures:  Does the Patient Have any upcoming procedures that require interruption in anticoagulation therapy? no  Does the patient require bridging? no      Anticoagulation Summary  As of 2025      INR goal:  2.0-3.0   TTR:  83.0% (1.2 y)   INR used for dosin.70 (2025)   Weekly warfarin total:  39 mg               Assessment/Plan   Sub-Therapeutic     1. New dose: will increase weekly dose by 5%  2. Next INR: 1 week      Education provided to patient during the visit:  Patient instructed to call in interim with questions, concerns and changes.   Patient educated on interactions between medications and warfarin.   Patient educated on dietary consistency in vitamin k consumption.   Patient educated on compliance with dosing, follow up appointments, and prescribed plan of care.

## 2025-01-09 ENCOUNTER — APPOINTMENT (OUTPATIENT)
Dept: OTOLARYNGOLOGY | Facility: CLINIC | Age: OVER 89
End: 2025-01-09
Payer: MEDICARE

## 2025-01-09 VITALS — WEIGHT: 183 LBS | HEIGHT: 70 IN | BODY MASS INDEX: 26.2 KG/M2

## 2025-01-09 DIAGNOSIS — H91.21 SUDDEN RIGHT HEARING LOSS: ICD-10-CM

## 2025-01-09 DIAGNOSIS — H90.3 SENSORINEURAL HEARING LOSS (SNHL) OF BOTH EARS: Primary | ICD-10-CM

## 2025-01-09 PROCEDURE — 99213 OFFICE O/P EST LOW 20 MIN: CPT | Performed by: OTOLARYNGOLOGY

## 2025-01-09 ASSESSMENT — PATIENT HEALTH QUESTIONNAIRE - PHQ9
SUM OF ALL RESPONSES TO PHQ9 QUESTIONS 1 AND 2: 0
1. LITTLE INTEREST OR PLEASURE IN DOING THINGS: NOT AT ALL
2. FEELING DOWN, DEPRESSED OR HOPELESS: NOT AT ALL

## 2025-01-09 NOTE — PROGRESS NOTES
Reason for Consult:  Sudden hearing loss     Subjective   History Of Present Illness:  Navneet Thompson is a 89 y.o. male with with a significant history of aortic stenosis, coronary artery disease, atrial fibrillation, pacemaker user, diabetes mellitus, and recent diagnosis of bullous pemphigoid for which he has been on steroids for the last couple weeks.    He had a sudden drop in his hearing in the right ear without any obvious precipitating factor.  He had already been on oral steroids.  For this reason, we did an intratympanic injection of dexamethasone on 12/19/2024 and 12/26/2024.  He presents today for a possible third steroid injection. He has not noticed any improvement in his right-sided hearing and does not want another injection. He denies any dizziness. Two days after his injection, he noted some cuts along the soles of his feet and wonders if this is related, as his dermatologist thought it might be. He has not worn hearing aids for 8-10 years.     Past Medical History:  He has a past medical history of Aortic stenosis, Atherosclerotic heart disease of native coronary artery without angina pectoris (12/12/2022), Atrial fibrillation (Multi), Chronic systolic heart failure, Complete heart block, Diabetes mellitus, type 2 (Multi), Essential (primary) hypertension (12/12/2022), and Presence of automatic (implantable) cardiac defibrillator (11/14/2022).    Surgical History:  He has a past surgical history that includes Appendectomy (12/01/2014); Coronary artery bypass graft (12/01/2014); Other surgical history (12/01/2014); CT angio abdomen pelvis w and or wo IV IV contrast (03/17/2020); Aortic valve replacement (04/07/2022); and Cardiac electrophysiology procedure (N/A, 12/19/2023).     Social History:  He reports that he has never smoked. He has never been exposed to tobacco smoke. He has never used smokeless tobacco. He reports current alcohol use. He reports that he does not use drugs.    Family  History:  family history includes Drug abuse in his mother.     Medications:  Current Outpatient Medications   Medication Instructions    acetaminophen (TYLENOL) 650 mg, oral, Every 6 hours PRN    aspirin 81 mg, Daily    atorvastatin (LIPITOR) 80 mg, oral, Nightly    cholecalciferol (VITAMIN D-3) 1,000 Units, oral, Daily    dupilumab (Dupixent) 300 mg/2 mL pen injector Inject under the skin.    empagliflozin (JARDIANCE) 10 mg, oral, Daily    FreeStyle Maria Elena 14 Day Sensor kit USE AS DIRECTED EVERY 14 DAYS    furosemide (LASIX) 40 mg, oral, 2 times daily (morning and late afternoon)    Humira 40 mg, Every 14 days    hydrocortisone 1 % cream 2 times daily    insulin aspart (NovoLOG U-100 Insulin aspart) 100 unit/mL injection INJECT 6 UNITS UNDER THE SKIN WITH BREAKFAST/LUNCH/DINNER, IF BLOOD GLUCOSE GREATER THAN 180 GIVE 8 UNITS, BLOOD GLUCOSE GREATER THAN 240 GIVE 10 UNITS AND GREATER THAN 300 GIVE 12 UNITS    insulin glargine (LANTUS U-100 INSULIN) 45 Units, Nightly    insulin glargine-yfgn 100 unit/mL (3 mL) Pen Inject under the skin.    isosorbide mononitrate ER (Imdur) 30 mg 24 hr tablet 1 tablet, Daily    ixekizumab (TALTZ) 80 mg, Every 14 days    lisinopril 5 mg tablet 1 tablet, Daily    lubricating eye drops ophthalmic solution 1 drop, Both Eyes, 2 times daily PRN    magnesium hydroxide (Milk of Magnesia) 400 mg/5 mL suspension 5 mL, Daily PRN    magnesium oxide (MAG-OX) 400 mg, oral, Daily    melatonin 3 mg, oral, Nightly PRN    metoprolol tartrate (LOPRESSOR) 25 mg, 2 times daily    neomycin-bacitracin-polymyxin (Polysporin) ophthalmic ointment 0.5 inches, Both Eyes, Daily    omeprazole (PRILOSEC) 20 mg, oral, Daily, Do not crush or chew.    potassium chloride CR (Klor-Con M20) 20 mEq ER tablet 20 mEq, oral, Daily, Do not crush or chew.    predniSONE (DELTASONE) 40 mg, Daily    predniSONE 10 mg, Daily    triamcinolone (Kenalog) 0.1 % cream Topical, 2 times daily    warfarin (Coumadin) 3 mg tablet TAKE 1 AND  "1/2 TABLETS BY MOUTH 4 DAYS A WEEK AND 2 TABLETS ALL OTHER DAYS      Allergies:  Linagliptin, Dicloxacillin, Doxycycline, and Scopolamine    Review of Systems:   A comprehensive 10-point review of systems was obtained including constitutional, neurological, HEENT, pulmonary, cardiovascular, genito-urinary, and other pertinent systems and was negative except as noted in the HPI.     Objective   Physical Exam:  Last Recorded Vitals: Height 1.778 m (5' 10\"), weight 83 kg (183 lb).    On physical exam, the patient is a well-nourished, well-developed patient, in no acute distress, able to communicate without assistance in English language. Head and face is atraumatic and normocephalic. Salivary glands are intact. Facial strength is symmetrical bilaterally.       On ear examination:  Right ear: The patient has an open and patent ear canal. The tympanic membrane is intact, prior injection site healed.  AC>BC  Left ear: The patient has an open and patent ear canal. The tympanic membrane is intact.  AC>BC  The Flores is left    On vestibular exam, the patient has no spontaneous nystagmus, no headshake nystagmus, no head-thrust nystagmus, and no nystagmus on hyperventilation or Valsalva maneuvers. Perez-Hallpike maneuver is negative bilaterally.       On neuro exam, the patient is alert and oriented x3, cranial nerves are grossly intact, cerebellar exam is normal.      The rest of the exam, including anterior rhinoscopy, oropharyngeal exam, neck exam, and cardiovascular exam, were normal including no palpable lymphadenopathies, thyroid in the midline position, normal pulses, and normal chest excursion.       Reviewed Results:  Audiology Testing:  I personally reviewed the audiogram from 12/2024 that showed:   Right ear: Moderate downsloping to severe sensorineural hearing loss. Discrimination: 36%   Left ear: Mild downsloping to moderately severe sensorineural hearing loss. Discrimination: 100%      Imaging:  None   "   Procedure:  None     Assessment/Plan     1. Sensorineural hearing loss (SNHL) of both ears    2. Sudden right hearing loss          In summary, Navneet Thompson is a 89 y.o. male with with a significant history of aortic stenosis, coronary artery disease, atrial fibrillation, pacemaker user, diabetes mellitus, and recent diagnosis of bullous pemphigoid for which he has been on steroids for the last couple weeks.    He had a sudden drop in his hearing in the right ear without any obvious precipitating factor.  He had already been on oral steroids.  He underwent two intratympanic steroid injections on 12/19 and 12/26 without any subjective improvement. He was not interested in having a third injection today.     He has not had an MRI completed yet, but he did have to check on this given presence of a pacemaker. As he will likely forgo any intervention even if a retrocochlear pathology is identified, given his other health conditions and advanced age, he does not want to proceed with an MRI. [facility] through that system. He will need an updated audiogram and likely a fitting with a BiCROS amplification system. We [facility] to help get [facility]. He can return to us whenever needed in the future.          ____________________________________________________  Jamarcus Brito MD  Professor and Chief   Otology/Neurotology/Lateral Skull-Base Surgery   [facility]

## 2025-01-09 NOTE — LETTER
January 13, 2025     Paige Hodge, APRN-CNP  [address]  [facility] [address]    Patient: Navneet Thompson   YOB: 1933   Date of Visit: 1/9/2025       Dear Dr. Paige Hodge, APRN-CNP:    Thank you for referring Navneet Thompson to me for evaluation. Below are my notes for this consultation.  If you have questions, please do not hesitate to call me. I look forward to following your patient along with you.       Sincerely,     Jamarcus De La Rosa MD      CC: Melvin Bahena MD  ______________________________________________________________________________________            Reason for Consult:  Sudden hearing loss     Subjective  History Of Present Illness:  Navneet Thompson is a 89 y.o. male with with a significant history of aortic stenosis, coronary artery disease, atrial fibrillation, pacemaker user, diabetes mellitus, and recent diagnosis of bullous pemphigoid for which he has been on steroids for the last couple weeks.    He had a sudden drop in his hearing in the right ear without any obvious precipitating factor.  He had already been on oral steroids.  For this reason, we did an intratympanic injection of dexamethasone on 12/19/2024 and 12/26/2024.  He presents today for a possible third steroid injection. He has not noticed any improvement in his right-sided hearing and does not want another injection. He denies any dizziness. Two days after his injection, he noted some cuts along the soles of his feet and wonders if this is related, as his dermatologist thought it might be. He has not worn hearing aids for 8-10 years.     Past Medical History:  He has a past medical history of Aortic stenosis, Atherosclerotic heart disease of native coronary artery without angina pectoris (12/12/2022), Atrial fibrillation (Multi), Chronic systolic heart failure, Complete heart block, Diabetes mellitus, type 2 (Multi), Essential (primary) hypertension (12/12/2022), and Presence of automatic  (implantable) cardiac defibrillator (11/14/2022).    Surgical History:  He has a past surgical history that includes Appendectomy (12/01/2014); Coronary artery bypass graft (12/01/2014); Other surgical history (12/01/2014); CT angio abdomen pelvis w and or wo IV IV contrast (03/17/2020); Aortic valve replacement (04/07/2022); and Cardiac electrophysiology procedure (N/A, 12/19/2023).     Social History:  He reports that he has never smoked. He has never been exposed to tobacco smoke. He has never used smokeless tobacco. He reports current alcohol use. He reports that he does not use drugs.    Family History:  family history includes Drug abuse in his mother.     Medications:  Current Outpatient Medications   Medication Instructions   • acetaminophen (TYLENOL) 650 mg, oral, Every 6 hours PRN   • aspirin 81 mg, Daily   • atorvastatin (LIPITOR) 80 mg, oral, Nightly   • cholecalciferol (VITAMIN D-3) 1,000 Units, oral, Daily   • dupilumab (Dupixent) 300 mg/2 mL pen injector Inject under the skin.   • empagliflozin (JARDIANCE) 10 mg, oral, Daily   • FreeStyle Maria Elena 14 Day Sensor kit USE AS DIRECTED EVERY 14 DAYS   • furosemide (LASIX) 40 mg, oral, 2 times daily (morning and late afternoon)   • Humira 40 mg, Every 14 days   • hydrocortisone 1 % cream 2 times daily   • insulin aspart (NovoLOG U-100 Insulin aspart) 100 unit/mL injection INJECT 6 UNITS UNDER THE SKIN WITH BREAKFAST/LUNCH/DINNER, IF BLOOD GLUCOSE GREATER THAN 180 GIVE 8 UNITS, BLOOD GLUCOSE GREATER THAN 240 GIVE 10 UNITS AND GREATER THAN 300 GIVE 12 UNITS   • insulin glargine (LANTUS U-100 INSULIN) 45 Units, Nightly   • insulin glargine-yfgn 100 unit/mL (3 mL) Pen Inject under the skin.   • isosorbide mononitrate ER (Imdur) 30 mg 24 hr tablet 1 tablet, Daily   • ixekizumab (TALTZ) 80 mg, Every 14 days   • lisinopril 5 mg tablet 1 tablet, Daily   • lubricating eye drops ophthalmic solution 1 drop, Both Eyes, 2 times daily PRN   • magnesium hydroxide (Milk of  "Magnesia) 400 mg/5 mL suspension 5 mL, Daily PRN   • magnesium oxide (MAG-OX) 400 mg, oral, Daily   • melatonin 3 mg, oral, Nightly PRN   • metoprolol tartrate (LOPRESSOR) 25 mg, 2 times daily   • neomycin-bacitracin-polymyxin (Polysporin) ophthalmic ointment 0.5 inches, Both Eyes, Daily   • omeprazole (PRILOSEC) 20 mg, oral, Daily, Do not crush or chew.   • potassium chloride CR (Klor-Con M20) 20 mEq ER tablet 20 mEq, oral, Daily, Do not crush or chew.   • predniSONE (DELTASONE) 40 mg, Daily   • predniSONE 10 mg, Daily   • triamcinolone (Kenalog) 0.1 % cream Topical, 2 times daily   • warfarin (Coumadin) 3 mg tablet TAKE 1 AND 1/2 TABLETS BY MOUTH 4 DAYS A WEEK AND 2 TABLETS ALL OTHER DAYS      Allergies:  Linagliptin, Dicloxacillin, Doxycycline, and Scopolamine    Review of Systems:   A comprehensive 10-point review of systems was obtained including constitutional, neurological, HEENT, pulmonary, cardiovascular, genito-urinary, and other pertinent systems and was negative except as noted in the HPI.     Objective  Physical Exam:  Last Recorded Vitals: Height 1.778 m (5' 10\"), weight 83 kg (183 lb).    On physical exam, the patient is a well-nourished, well-developed patient, in no acute distress, able to communicate without assistance in English language. Head and face is atraumatic and normocephalic. Salivary glands are intact. Facial strength is symmetrical bilaterally.       On ear examination:  Right ear: The patient has an open and patent ear canal. The tympanic membrane is intact, prior injection site healed.  AC>BC  Left ear: The patient has an open and patent ear canal. The tympanic membrane is intact.  AC>BC  The Flores is left    On vestibular exam, the patient has no spontaneous nystagmus, no headshake nystagmus, no head-thrust nystagmus, and no nystagmus on hyperventilation or Valsalva maneuvers. Perez-Hallpike maneuver is negative bilaterally.       On neuro exam, the patient is alert and oriented x3, " cranial nerves are grossly intact, cerebellar exam is normal.      The rest of the exam, including anterior rhinoscopy, oropharyngeal exam, neck exam, and cardiovascular exam, were normal including no palpable lymphadenopathies, thyroid in the midline position, normal pulses, and normal chest excursion.       Reviewed Results:  Audiology Testing:  I personally reviewed the audiogram from 12/2024 that showed:   Right ear: Moderate downsloping to severe sensorineural hearing loss. Discrimination: 36%   Left ear: Mild downsloping to moderately severe sensorineural hearing loss. Discrimination: 100%      Imaging:  None     Procedure:  None     Assessment/Plan    1. Sensorineural hearing loss (SNHL) of both ears    2. Sudden right hearing loss          In summary, Navneet Thompson is a 89 y.o. male with with a significant history of aortic stenosis, coronary artery disease, atrial fibrillation, pacemaker user, diabetes mellitus, and recent diagnosis of bullous pemphigoid for which he has been on steroids for the last couple weeks.    He had a sudden drop in his hearing in the right ear without any obvious precipitating factor.  He had already been on oral steroids.  He underwent two intratympanic steroid injections on 12/19 and 12/26 without any subjective improvement. He was not interested in having a third injection today.     He has not had an MRI completed yet, but he did have to check on this given presence of a pacemaker. As he will likely forgo any intervention even if a retrocochlear pathology is identified, given his other health conditions and advanced age, he does not want to proceed with an MRI. [facility] if he can get [facility] through that system. He will need an updated audiogram and likely a fitting with a BiCROS amplification system. We [facility] to help get him scheduled. He can return to us whenever needed in the future.           ____________________________________________________  Jamarcus Brito MD  Professor and Chief   Otology/Neurotology/Lateral Skull-Base Surgery   [facility]

## 2025-01-13 ENCOUNTER — APPOINTMENT (OUTPATIENT)
Dept: CARDIOLOGY | Facility: CLINIC | Age: OVER 89
End: 2025-01-13
Payer: MEDICARE

## 2025-01-20 ENCOUNTER — ANTICOAGULATION - WARFARIN VISIT (OUTPATIENT)
Dept: CARDIOLOGY | Facility: CLINIC | Age: OVER 89
End: 2025-01-20
Payer: MEDICARE

## 2025-01-20 DIAGNOSIS — I48.91 ATRIAL FIBRILLATION, UNSPECIFIED TYPE (MULTI): Primary | ICD-10-CM

## 2025-01-20 DIAGNOSIS — I48.20 CHRONIC ATRIAL FIBRILLATION (MULTI): ICD-10-CM

## 2025-01-20 LAB
POC INR: 2.6
POC PROTHROMBIN TIME: NORMAL

## 2025-01-20 PROCEDURE — 99211 OFF/OP EST MAY X REQ PHY/QHP: CPT

## 2025-01-20 PROCEDURE — 85610 PROTHROMBIN TIME: CPT | Mod: QW

## 2025-01-20 NOTE — PROGRESS NOTES
Patient identification verified with 2 identifiers.    Location: [facility] - [address] [address] [place], [place] [address] [phone]     Referring Physician: Dr. Melvin Bahena  Enrollment/ Re-enrollment date:  25  INR Goal: 2.0-3.0  INR monitoring is per [facility] protocol.  Anticoagulation Medication: warfarin  Indication: Atrial Fibrillation/Atrial Flutter  Subjective   Bleeding signs/symptoms: No    Bruising: No   Major bleeding event: No  Thrombosis signs/symptoms: No  Thromboembolic event: No  Missed doses: No  Extra doses: No  Medication changes: No  Dietary changes: No  Change in health: No  Change in activity: No  Alcohol: No  Other concerns: No    Upcoming Procedures:  Does the Patient Have any upcoming procedures that require interruption in anticoagulation therapy? no  Does the patient require bridging? no      Anticoagulation Summary  As of 2025      INR goal:  2.0-3.0   TTR:  82.5% (1.2 y)   INR used for dosin.60 (2025)   Weekly warfarin total:  39 mg               Assessment/Plan   Therapeutic     1. New dose: no change    2. Next INR: 1 week PT CANNOT RETURN UNTIL 2/3 DUE TO LACK OF TRANSPORTATION      Education provided to patient during the visit:  Patient instructed to call in interim with questions, concerns and changes.   Patient educated on compliance with dosing, follow up appointments, and prescribed plan of care.

## 2025-01-26 ENCOUNTER — APPOINTMENT (OUTPATIENT)
Dept: RADIOLOGY | Facility: HOSPITAL | Age: OVER 89
End: 2025-01-26
Payer: MEDICARE

## 2025-01-26 ENCOUNTER — HOSPITAL ENCOUNTER (INPATIENT)
Facility: HOSPITAL | Age: OVER 89
End: 2025-01-26
Attending: EMERGENCY MEDICINE | Admitting: INTERNAL MEDICINE
Payer: MEDICARE

## 2025-01-26 ENCOUNTER — APPOINTMENT (OUTPATIENT)
Dept: CARDIOLOGY | Facility: HOSPITAL | Age: OVER 89
End: 2025-01-26
Payer: MEDICARE

## 2025-01-26 VITALS
BODY MASS INDEX: 26.48 KG/M2 | TEMPERATURE: 98 F | SYSTOLIC BLOOD PRESSURE: 130 MMHG | RESPIRATION RATE: 31 BRPM | HEIGHT: 70 IN | HEART RATE: 77 BPM | OXYGEN SATURATION: 100 % | DIASTOLIC BLOOD PRESSURE: 71 MMHG | WEIGHT: 185 LBS

## 2025-01-26 DIAGNOSIS — J44.9 CHRONIC OBSTRUCTIVE PULMONARY DISEASE, UNSPECIFIED COPD TYPE (MULTI): ICD-10-CM

## 2025-01-26 DIAGNOSIS — H90.3 SENSORINEURAL HEARING LOSS (SNHL) OF BOTH EARS: ICD-10-CM

## 2025-01-26 DIAGNOSIS — I50.9 ACUTE ON CHRONIC CONGESTIVE HEART FAILURE, UNSPECIFIED HEART FAILURE TYPE: Primary | ICD-10-CM

## 2025-01-26 DIAGNOSIS — L40.9 PSORIASIS, UNSPECIFIED: ICD-10-CM

## 2025-01-26 DIAGNOSIS — I48.91 ATRIAL FIBRILLATION, UNSPECIFIED TYPE (MULTI): ICD-10-CM

## 2025-01-26 DIAGNOSIS — N17.9 AKI (ACUTE KIDNEY INJURY) (CMS-HCC): ICD-10-CM

## 2025-01-26 DIAGNOSIS — R91.8 PULMONARY INFILTRATE: ICD-10-CM

## 2025-01-26 DIAGNOSIS — L12.0 BULLOUS PEMPHIGOID (MULTI): ICD-10-CM

## 2025-01-26 DIAGNOSIS — H91.21 SUDDEN RIGHT HEARING LOSS: ICD-10-CM

## 2025-01-26 DIAGNOSIS — I50.21 HEART FAILURE, ACUTE SYSTOLIC: ICD-10-CM

## 2025-01-26 DIAGNOSIS — E11.42 TYPE 2 DIABETES MELLITUS WITH DIABETIC POLYNEUROPATHY, WITH LONG-TERM CURRENT USE OF INSULIN: ICD-10-CM

## 2025-01-26 DIAGNOSIS — R06.02 SHORTNESS OF BREATH: ICD-10-CM

## 2025-01-26 DIAGNOSIS — R79.89 ELEVATED TROPONIN: ICD-10-CM

## 2025-01-26 DIAGNOSIS — I10 DIASTOLIC HYPERTENSION: ICD-10-CM

## 2025-01-26 DIAGNOSIS — Z79.4 TYPE 2 DIABETES MELLITUS WITH DIABETIC POLYNEUROPATHY, WITH LONG-TERM CURRENT USE OF INSULIN: ICD-10-CM

## 2025-01-26 DIAGNOSIS — Z95.810 CARDIAC DEFIBRILLATOR IN PLACE: ICD-10-CM

## 2025-01-26 DIAGNOSIS — R05.1 ACUTE COUGH: ICD-10-CM

## 2025-01-26 LAB
ALBUMIN SERPL BCP-MCNC: 3.4 G/DL (ref 3.4–5)
ALP SERPL-CCNC: 56 U/L (ref 33–136)
ALT SERPL W P-5'-P-CCNC: 23 U/L (ref 10–52)
ANION GAP BLDA CALCULATED.4IONS-SCNC: 9 MMO/L (ref 10–25)
ANION GAP SERPL CALCULATED.3IONS-SCNC: 11 MMOL/L (ref 10–20)
APPARATUS: ABNORMAL
ARTERIAL PATENCY WRIST A: POSITIVE
AST SERPL W P-5'-P-CCNC: 32 U/L (ref 9–39)
BACTERIA BLD CULT: NORMAL
BACTERIA BLD CULT: NORMAL
BASE EXCESS BLDA CALC-SCNC: 0.9 MMOL/L (ref -2–3)
BASOPHILS # BLD AUTO: 0.01 X10*3/UL (ref 0–0.1)
BASOPHILS NFR BLD AUTO: 0.2 %
BILIRUB SERPL-MCNC: 0.5 MG/DL (ref 0–1.2)
BNP SERPL-MCNC: 279 PG/ML (ref 0–99)
BODY TEMPERATURE: 37 DEGREES CELSIUS
BUN SERPL-MCNC: 29 MG/DL (ref 6–23)
CA-I BLDA-SCNC: 1.2 MMOL/L (ref 1.1–1.33)
CALCIUM SERPL-MCNC: 8.9 MG/DL (ref 8.6–10.3)
CARDIAC TROPONIN I PNL SERPL HS: 150 NG/L (ref 0–20)
CARDIAC TROPONIN I PNL SERPL HS: 152 NG/L (ref 0–20)
CHLORIDE BLDA-SCNC: 106 MMOL/L (ref 98–107)
CHLORIDE SERPL-SCNC: 104 MMOL/L (ref 98–107)
CO2 SERPL-SCNC: 29 MMOL/L (ref 21–32)
CREAT SERPL-MCNC: 1.37 MG/DL (ref 0.5–1.3)
EGFRCR SERPLBLD CKD-EPI 2021: 49 ML/MIN/1.73M*2
EOSINOPHIL # BLD AUTO: 0.03 X10*3/UL (ref 0–0.4)
EOSINOPHIL NFR BLD AUTO: 0.5 %
EPAP CMH2O: 5 CM H2O
ERYTHROCYTE [DISTWIDTH] IN BLOOD BY AUTOMATED COUNT: 15.6 % (ref 11.5–14.5)
FLUAV RNA RESP QL NAA+PROBE: NOT DETECTED
FLUBV RNA RESP QL NAA+PROBE: NOT DETECTED
GLUCOSE BLD MANUAL STRIP-MCNC: 107 MG/DL (ref 74–99)
GLUCOSE BLD MANUAL STRIP-MCNC: 66 MG/DL (ref 74–99)
GLUCOSE BLDA-MCNC: 116 MG/DL (ref 74–99)
GLUCOSE SERPL-MCNC: 82 MG/DL (ref 74–99)
HCO3 BLDA-SCNC: 23.8 MMOL/L (ref 22–26)
HCT VFR BLD AUTO: 38 % (ref 41–52)
HCT VFR BLD EST: 38 % (ref 41–52)
HGB BLD-MCNC: 11.9 G/DL (ref 13.5–17.5)
HGB BLDA-MCNC: 12.8 G/DL (ref 13.5–17.5)
IMM GRANULOCYTES # BLD AUTO: 0.02 X10*3/UL (ref 0–0.5)
IMM GRANULOCYTES NFR BLD AUTO: 0.3 % (ref 0–0.9)
INHALED O2 CONCENTRATION: 60 %
INR PPP: 5.2 (ref 0.9–1.2)
IPAP CMH2O: 12 CM H2O
LACTATE BLDA-SCNC: 2.2 MMOL/L (ref 0.4–2)
LACTATE SERPL-SCNC: 1.5 MMOL/L (ref 0.4–2)
LYMPHOCYTES # BLD AUTO: 0.99 X10*3/UL (ref 0.8–3)
LYMPHOCYTES NFR BLD AUTO: 15.6 %
MCH RBC QN AUTO: 30.3 PG (ref 26–34)
MCHC RBC AUTO-ENTMCNC: 31.3 G/DL (ref 32–36)
MCV RBC AUTO: 97 FL (ref 80–100)
MONOCYTES # BLD AUTO: 0.73 X10*3/UL (ref 0.05–0.8)
MONOCYTES NFR BLD AUTO: 11.5 %
NEUTROPHILS # BLD AUTO: 4.58 X10*3/UL (ref 1.6–5.5)
NEUTROPHILS NFR BLD AUTO: 71.9 %
NRBC BLD-RTO: 0 /100 WBCS (ref 0–0)
OXYHGB MFR BLDA: 97.4 % (ref 94–98)
PCO2 BLDA: 32 MM HG (ref 38–42)
PH BLDA: 7.48 PH (ref 7.38–7.42)
PLATELET # BLD AUTO: 157 X10*3/UL (ref 150–450)
PO2 BLDA: 205 MM HG (ref 85–95)
POTASSIUM BLDA-SCNC: 5.1 MMOL/L (ref 3.5–5.3)
POTASSIUM SERPL-SCNC: 4.1 MMOL/L (ref 3.5–5.3)
PROT SERPL-MCNC: 6.3 G/DL (ref 6.4–8.2)
PROTHROMBIN TIME: 49.7 SECONDS (ref 9.3–12.7)
RBC # BLD AUTO: 3.93 X10*6/UL (ref 4.5–5.9)
SAO2 % BLDA: 99 % (ref 94–100)
SARS-COV-2 RNA RESP QL NAA+PROBE: NOT DETECTED
SODIUM BLDA-SCNC: 134 MMOL/L (ref 136–145)
SODIUM SERPL-SCNC: 140 MMOL/L (ref 136–145)
SPECIMEN DRAWN FROM PATIENT: ABNORMAL
VENTILATOR MODE: ABNORMAL
VENTILATOR RATE: 16 BPM
WBC # BLD AUTO: 6.4 X10*3/UL (ref 4.4–11.3)

## 2025-01-26 PROCEDURE — 2060000001 HC INTERMEDIATE ICU ROOM DAILY

## 2025-01-26 PROCEDURE — 2500000004 HC RX 250 GENERAL PHARMACY W/ HCPCS (ALT 636 FOR OP/ED): Performed by: NURSE PRACTITIONER

## 2025-01-26 PROCEDURE — 84484 ASSAY OF TROPONIN QUANT: CPT | Performed by: EMERGENCY MEDICINE

## 2025-01-26 PROCEDURE — 85025 COMPLETE CBC W/AUTO DIFF WBC: CPT | Performed by: EMERGENCY MEDICINE

## 2025-01-26 PROCEDURE — 99285 EMERGENCY DEPT VISIT HI MDM: CPT | Mod: 25 | Performed by: EMERGENCY MEDICINE

## 2025-01-26 PROCEDURE — 94660 CPAP INITIATION&MGMT: CPT

## 2025-01-26 PROCEDURE — 71045 X-RAY EXAM CHEST 1 VIEW: CPT | Performed by: STUDENT IN AN ORGANIZED HEALTH CARE EDUCATION/TRAINING PROGRAM

## 2025-01-26 PROCEDURE — 2500000004 HC RX 250 GENERAL PHARMACY W/ HCPCS (ALT 636 FOR OP/ED): Performed by: EMERGENCY MEDICINE

## 2025-01-26 PROCEDURE — 87075 CULTR BACTERIA EXCEPT BLOOD: CPT | Mod: WESLAB | Performed by: EMERGENCY MEDICINE

## 2025-01-26 PROCEDURE — 96365 THER/PROPH/DIAG IV INF INIT: CPT

## 2025-01-26 PROCEDURE — 82947 ASSAY GLUCOSE BLOOD QUANT: CPT

## 2025-01-26 PROCEDURE — 83880 ASSAY OF NATRIURETIC PEPTIDE: CPT | Performed by: EMERGENCY MEDICINE

## 2025-01-26 PROCEDURE — 82435 ASSAY OF BLOOD CHLORIDE: CPT | Performed by: INTERNAL MEDICINE

## 2025-01-26 PROCEDURE — 80053 COMPREHEN METABOLIC PANEL: CPT | Performed by: EMERGENCY MEDICINE

## 2025-01-26 PROCEDURE — 71045 X-RAY EXAM CHEST 1 VIEW: CPT

## 2025-01-26 PROCEDURE — 99223 1ST HOSP IP/OBS HIGH 75: CPT | Performed by: NURSE PRACTITIONER

## 2025-01-26 PROCEDURE — 87636 SARSCOV2 & INF A&B AMP PRB: CPT | Performed by: EMERGENCY MEDICINE

## 2025-01-26 PROCEDURE — 83605 ASSAY OF LACTIC ACID: CPT | Performed by: EMERGENCY MEDICINE

## 2025-01-26 PROCEDURE — 71045 X-RAY EXAM CHEST 1 VIEW: CPT | Mod: FOREIGN READ | Performed by: RADIOLOGY

## 2025-01-26 PROCEDURE — 99232 SBSQ HOSP IP/OBS MODERATE 35: CPT | Performed by: PHYSICIAN ASSISTANT

## 2025-01-26 PROCEDURE — 85610 PROTHROMBIN TIME: CPT | Performed by: EMERGENCY MEDICINE

## 2025-01-26 PROCEDURE — 96366 THER/PROPH/DIAG IV INF ADDON: CPT

## 2025-01-26 PROCEDURE — 2500000005 HC RX 250 GENERAL PHARMACY W/O HCPCS: Performed by: INTERNAL MEDICINE

## 2025-01-26 PROCEDURE — 36415 COLL VENOUS BLD VENIPUNCTURE: CPT | Performed by: EMERGENCY MEDICINE

## 2025-01-26 PROCEDURE — 2500000001 HC RX 250 WO HCPCS SELF ADMINISTERED DRUGS (ALT 637 FOR MEDICARE OP): Performed by: NURSE PRACTITIONER

## 2025-01-26 PROCEDURE — 93005 ELECTROCARDIOGRAM TRACING: CPT

## 2025-01-26 PROCEDURE — 2500000002 HC RX 250 W HCPCS SELF ADMINISTERED DRUGS (ALT 637 FOR MEDICARE OP, ALT 636 FOR OP/ED): Performed by: NURSE PRACTITIONER

## 2025-01-26 PROCEDURE — 99223 1ST HOSP IP/OBS HIGH 75: CPT | Performed by: INTERNAL MEDICINE

## 2025-01-26 PROCEDURE — 36600 WITHDRAWAL OF ARTERIAL BLOOD: CPT

## 2025-01-26 RX ORDER — ADHESIVE BANDAGE
5 BANDAGE TOPICAL DAILY PRN
Status: DISCONTINUED | OUTPATIENT
Start: 2025-01-26 | End: 2025-02-01 | Stop reason: HOSPADM

## 2025-01-26 RX ORDER — ACETAMINOPHEN 325 MG/1
650 TABLET ORAL EVERY 4 HOURS PRN
Status: DISCONTINUED | OUTPATIENT
Start: 2025-01-26 | End: 2025-02-01 | Stop reason: HOSPADM

## 2025-01-26 RX ORDER — ATORVASTATIN CALCIUM 80 MG/1
80 TABLET, FILM COATED ORAL NIGHTLY
Status: DISCONTINUED | OUTPATIENT
Start: 2025-01-26 | End: 2025-02-01 | Stop reason: HOSPADM

## 2025-01-26 RX ORDER — ONDANSETRON HYDROCHLORIDE 2 MG/ML
4 INJECTION, SOLUTION INTRAVENOUS EVERY 8 HOURS PRN
Status: DISCONTINUED | OUTPATIENT
Start: 2025-01-26 | End: 2025-02-01 | Stop reason: HOSPADM

## 2025-01-26 RX ORDER — INSULIN GLARGINE 100 [IU]/ML
45 INJECTION, SOLUTION SUBCUTANEOUS NIGHTLY
Status: DISCONTINUED | OUTPATIENT
Start: 2025-01-26 | End: 2025-01-28

## 2025-01-26 RX ORDER — BISACODYL 5 MG
10 TABLET, DELAYED RELEASE (ENTERIC COATED) ORAL DAILY PRN
Status: DISCONTINUED | OUTPATIENT
Start: 2025-01-26 | End: 2025-02-01 | Stop reason: HOSPADM

## 2025-01-26 RX ORDER — LISINOPRIL 5 MG/1
5 TABLET ORAL DAILY
Status: DISCONTINUED | OUTPATIENT
Start: 2025-01-26 | End: 2025-02-01 | Stop reason: HOSPADM

## 2025-01-26 RX ORDER — GUAIFENESIN/DEXTROMETHORPHAN 100-10MG/5
5 SYRUP ORAL EVERY 4 HOURS PRN
Status: DISCONTINUED | OUTPATIENT
Start: 2025-01-26 | End: 2025-01-28

## 2025-01-26 RX ORDER — ACETAMINOPHEN 325 MG/1
650 TABLET ORAL EVERY 6 HOURS PRN
Status: DISCONTINUED | OUTPATIENT
Start: 2025-01-26 | End: 2025-01-28

## 2025-01-26 RX ORDER — TRIAMCINOLONE ACETONIDE 1 MG/G
CREAM TOPICAL 2 TIMES DAILY
Status: DISCONTINUED | OUTPATIENT
Start: 2025-01-26 | End: 2025-02-01 | Stop reason: HOSPADM

## 2025-01-26 RX ORDER — POLYETHYLENE GLYCOL 3350 17 G/17G
17 POWDER, FOR SOLUTION ORAL DAILY PRN
Status: DISCONTINUED | OUTPATIENT
Start: 2025-01-26 | End: 2025-02-01 | Stop reason: HOSPADM

## 2025-01-26 RX ORDER — BISACODYL 10 MG/1
10 SUPPOSITORY RECTAL DAILY PRN
Status: DISCONTINUED | OUTPATIENT
Start: 2025-01-26 | End: 2025-02-01 | Stop reason: HOSPADM

## 2025-01-26 RX ORDER — INSULIN LISPRO 100 [IU]/ML
0-10 INJECTION, SOLUTION INTRAVENOUS; SUBCUTANEOUS
Status: DISCONTINUED | OUTPATIENT
Start: 2025-01-26 | End: 2025-02-01 | Stop reason: HOSPADM

## 2025-01-26 RX ORDER — AZITHROMYCIN 500 MG/1
500 TABLET, FILM COATED ORAL
Status: DISCONTINUED | OUTPATIENT
Start: 2025-01-26 | End: 2025-01-30

## 2025-01-26 RX ORDER — ONDANSETRON 4 MG/1
4 TABLET, FILM COATED ORAL EVERY 8 HOURS PRN
Status: DISCONTINUED | OUTPATIENT
Start: 2025-01-26 | End: 2025-02-01 | Stop reason: HOSPADM

## 2025-01-26 RX ORDER — FUROSEMIDE 10 MG/ML
40 INJECTION INTRAMUSCULAR; INTRAVENOUS EVERY 12 HOURS
Status: DISCONTINUED | OUTPATIENT
Start: 2025-01-26 | End: 2025-01-31

## 2025-01-26 RX ORDER — CHOLECALCIFEROL (VITAMIN D3) 25 MCG
1000 TABLET ORAL DAILY
Status: DISCONTINUED | OUTPATIENT
Start: 2025-01-26 | End: 2025-02-01 | Stop reason: HOSPADM

## 2025-01-26 RX ORDER — NEOMYCIN AND POLYMYXIN B SULFATES AND BACITRACIN ZINC 400; 3.5; 1 [USP'U]/G; MG/G; [USP'U]/G
0.5 OINTMENT OPHTHALMIC DAILY
Status: DISCONTINUED | OUTPATIENT
Start: 2025-01-26 | End: 2025-02-01 | Stop reason: HOSPADM

## 2025-01-26 RX ORDER — PREDNISONE 5 MG/1
2.5 TABLET ORAL DAILY
Status: DISCONTINUED | OUTPATIENT
Start: 2025-01-26 | End: 2025-02-01 | Stop reason: HOSPADM

## 2025-01-26 RX ORDER — PROCHLORPERAZINE EDISYLATE 5 MG/ML
10 INJECTION INTRAMUSCULAR; INTRAVENOUS EVERY 6 HOURS PRN
Status: DISCONTINUED | OUTPATIENT
Start: 2025-01-26 | End: 2025-02-01 | Stop reason: HOSPADM

## 2025-01-26 RX ORDER — ISOSORBIDE MONONITRATE 30 MG/1
30 TABLET, EXTENDED RELEASE ORAL DAILY
Status: DISCONTINUED | OUTPATIENT
Start: 2025-01-26 | End: 2025-01-30

## 2025-01-26 RX ORDER — DEXTROSE 50 % IN WATER (D50W) INTRAVENOUS SYRINGE
25
Status: DISCONTINUED | OUTPATIENT
Start: 2025-01-26 | End: 2025-02-01 | Stop reason: HOSPADM

## 2025-01-26 RX ORDER — TALC
3 POWDER (GRAM) TOPICAL NIGHTLY PRN
Status: DISCONTINUED | OUTPATIENT
Start: 2025-01-26 | End: 2025-02-01 | Stop reason: HOSPADM

## 2025-01-26 RX ORDER — ASPIRIN 81 MG/1
81 TABLET ORAL DAILY
Status: DISCONTINUED | OUTPATIENT
Start: 2025-01-26 | End: 2025-02-01 | Stop reason: HOSPADM

## 2025-01-26 RX ORDER — FAMOTIDINE 20 MG/1
20 TABLET, FILM COATED ORAL DAILY
Status: DISCONTINUED | OUTPATIENT
Start: 2025-01-26 | End: 2025-02-01 | Stop reason: HOSPADM

## 2025-01-26 RX ORDER — ACETAMINOPHEN 650 MG/1
650 SUPPOSITORY RECTAL EVERY 4 HOURS PRN
Status: DISCONTINUED | OUTPATIENT
Start: 2025-01-26 | End: 2025-02-01 | Stop reason: HOSPADM

## 2025-01-26 RX ORDER — PANTOPRAZOLE SODIUM 40 MG/1
40 TABLET, DELAYED RELEASE ORAL
Status: DISCONTINUED | OUTPATIENT
Start: 2025-01-27 | End: 2025-02-01 | Stop reason: HOSPADM

## 2025-01-26 RX ORDER — FAMOTIDINE 10 MG/ML
20 INJECTION INTRAVENOUS DAILY
Status: DISCONTINUED | OUTPATIENT
Start: 2025-01-26 | End: 2025-02-01 | Stop reason: HOSPADM

## 2025-01-26 RX ORDER — PROCHLORPERAZINE 25 MG/1
25 SUPPOSITORY RECTAL EVERY 12 HOURS PRN
Status: DISCONTINUED | OUTPATIENT
Start: 2025-01-26 | End: 2025-02-01 | Stop reason: HOSPADM

## 2025-01-26 RX ORDER — PROCHLORPERAZINE MALEATE 10 MG
10 TABLET ORAL EVERY 6 HOURS PRN
Status: DISCONTINUED | OUTPATIENT
Start: 2025-01-26 | End: 2025-02-01 | Stop reason: HOSPADM

## 2025-01-26 RX ORDER — DEXTROSE 50 % IN WATER (D50W) INTRAVENOUS SYRINGE
12.5
Status: DISCONTINUED | OUTPATIENT
Start: 2025-01-26 | End: 2025-02-01 | Stop reason: HOSPADM

## 2025-01-26 RX ORDER — FUROSEMIDE 40 MG/1
40 TABLET ORAL
Status: DISCONTINUED | OUTPATIENT
Start: 2025-01-26 | End: 2025-01-29

## 2025-01-26 RX ORDER — METOPROLOL TARTRATE 25 MG/1
25 TABLET, FILM COATED ORAL 2 TIMES DAILY
Status: DISCONTINUED | OUTPATIENT
Start: 2025-01-26 | End: 2025-02-01 | Stop reason: HOSPADM

## 2025-01-26 RX ORDER — POTASSIUM CHLORIDE 20 MEQ/1
20 TABLET, EXTENDED RELEASE ORAL DAILY
Status: DISCONTINUED | OUTPATIENT
Start: 2025-01-26 | End: 2025-02-01

## 2025-01-26 RX ORDER — CEFTRIAXONE 1 G/50ML
1 INJECTION, SOLUTION INTRAVENOUS EVERY 24 HOURS
Status: DISCONTINUED | OUTPATIENT
Start: 2025-01-27 | End: 2025-02-01

## 2025-01-26 RX ORDER — WARFARIN 3 MG/1
6 TABLET ORAL
Status: CANCELLED | OUTPATIENT
Start: 2025-01-26

## 2025-01-26 RX ORDER — ACETAMINOPHEN 160 MG/5ML
650 SOLUTION ORAL EVERY 4 HOURS PRN
Status: DISCONTINUED | OUTPATIENT
Start: 2025-01-26 | End: 2025-02-01 | Stop reason: HOSPADM

## 2025-01-26 RX ADMIN — CEFEPIME 1 G: 1 INJECTION, POWDER, FOR SOLUTION INTRAMUSCULAR; INTRAVENOUS at 11:42

## 2025-01-26 RX ADMIN — FAMOTIDINE 20 MG: 20 TABLET, FILM COATED ORAL at 16:27

## 2025-01-26 RX ADMIN — POTASSIUM CHLORIDE 20 MEQ: 1500 TABLET, EXTENDED RELEASE ORAL at 16:27

## 2025-01-26 RX ADMIN — FUROSEMIDE 40 MG: 10 INJECTION, SOLUTION INTRAMUSCULAR; INTRAVENOUS at 20:10

## 2025-01-26 RX ADMIN — METOPROLOL TARTRATE 25 MG: 25 TABLET, FILM COATED ORAL at 16:27

## 2025-01-26 RX ADMIN — ASPIRIN 81 MG: 81 TABLET, COATED ORAL at 16:27

## 2025-01-26 RX ADMIN — Medication 60 PERCENT: at 20:15

## 2025-01-26 RX ADMIN — AZITHROMYCIN 500 MG: 500 TABLET, FILM COATED ORAL at 16:27

## 2025-01-26 ASSESSMENT — ENCOUNTER SYMPTOMS
SORE THROAT: 0
CONSTIPATION: 0
SPEECH DIFFICULTY: 0
BLOOD IN STOOL: 0
ABDOMINAL PAIN: 0
COUGH: 1
CHEST TIGHTNESS: 0
DIAPHORESIS: 0
EYES NEGATIVE: 1
PSYCHIATRIC NEGATIVE: 1
CHILLS: 0
ACTIVITY CHANGE: 1
NUMBNESS: 0
DIARRHEA: 0
FATIGUE: 1
NECK PAIN: 0
HEMATOLOGIC/LYMPHATIC NEGATIVE: 1
APNEA: 0
NECK STIFFNESS: 0
DIZZINESS: 0
PALPITATIONS: 0
HEADACHES: 0
FEVER: 0
NAUSEA: 0
BACK PAIN: 1
SINUS PAIN: 0
APPETITE CHANGE: 0
UNEXPECTED WEIGHT CHANGE: 1
ABDOMINAL DISTENTION: 0
WHEEZING: 0
LIGHT-HEADEDNESS: 0
SHORTNESS OF BREATH: 1
DIFFICULTY URINATING: 0
VOICE CHANGE: 0
ENDOCRINE NEGATIVE: 1
VOMITING: 0
TROUBLE SWALLOWING: 0

## 2025-01-26 ASSESSMENT — PAIN SCALES - GENERAL
PAINLEVEL_OUTOF10: 0 - NO PAIN
PAINLEVEL_OUTOF10: 0 - NO PAIN

## 2025-01-26 ASSESSMENT — LIFESTYLE VARIABLES
EVER HAD A DRINK FIRST THING IN THE MORNING TO STEADY YOUR NERVES TO GET RID OF A HANGOVER: NO
HAVE YOU EVER FELT YOU SHOULD CUT DOWN ON YOUR DRINKING: NO
EVER FELT BAD OR GUILTY ABOUT YOUR DRINKING: NO
HAVE PEOPLE ANNOYED YOU BY CRITICIZING YOUR DRINKING: NO
TOTAL SCORE: 0

## 2025-01-26 ASSESSMENT — PAIN - FUNCTIONAL ASSESSMENT: PAIN_FUNCTIONAL_ASSESSMENT: 0-10

## 2025-01-26 NOTE — H&P
History Of Present Illness  Navneet Thompson is a 89 y.o. male presenting with loss of breath for almost 4 days, gain weight for 1 week .  Patient's with past medical history of chronic systolic heart failure EF 35% aortic stenosis coronary artery disease, A-fib on Coumadin, pacemaker 2008, diabetes type 2, hypertension.   Patient complains of increased shortness of breath with exertion and rest gaining 7 to 8 pound in the last week.  Complaining of about productive cough.  He also complained of heartburn due to cough.  Denies any chest pain, abdominal pain, nausea, vomiting, diarrhea or constipation.  He admitted that he uses milk of magnesia and has bowel movement this morning at 4:00 in the morning.  Denies any fever or chills.  No neurological deficit.  No dizziness no fall.  Using a walker at home.  Lives alone.    discussed with the patient and his daughter who is at the bedside full code      Past Medical History  Past Medical History:   Diagnosis Date    Aortic stenosis     Status post transcatheter aortic valve replacement    Atherosclerotic heart disease of native coronary artery without angina pectoris 12/12/2022    CAD (coronary artery disease)    Atrial fibrillation (Multi)     Chronic systolic heart failure     Complete heart block     status post dual chamber pacemaker placemtn in 01/2008    Diabetes mellitus, type 2 (Multi)     Essential (primary) hypertension 12/12/2022    Hypertension    Presence of automatic (implantable) cardiac defibrillator 11/14/2022    Cardiac defibrillator in place, upgraded from pacemaker in 05/2014       Surgical History  Past Surgical History:   Procedure Laterality Date    AORTIC VALVE REPLACEMENT  04/07/2022    Transcatheter aortic valve replacement (TAVR) for aortic valve stenosis    APPENDECTOMY  12/01/2014    Appendectomy    CARDIAC ELECTROPHYSIOLOGY PROCEDURE N/A 12/19/2023    Procedure: ICD UPGRADE, DUAL TO BIV;  Surgeon: Luis Felipe Greene MD;  Location: [place] [address]  Cardiac Cath Lab;  Service: Electrophysiology;  Laterality: N/A;  [id number] Bi-V ICD gen change [facility]    CORONARY ARTERY BYPASS GRAFT  12/01/2014    CABG    CT ABDOMEN PELVIS ANGIOGRAM W AND/OR WO IV CONTRAST  03/17/2020    CT ABDOMEN PELVIS ANGIOGRAM W AND/OR WO IV CONTRAST 3/17/2020 [facility] [place] [facility]    OTHER SURGICAL HISTORY  12/01/2014    Cardio-defib Pulse Generator Implantation Date        Social History  He reports that he has never smoked. He has never been exposed to tobacco smoke. He has never used smokeless tobacco. He reports current alcohol use. He reports that he does not use drugs.    Family History  Family History   Problem Relation Name Age of Onset    Drug abuse Mother          OVERDOSE        Allergies  Linagliptin, Dicloxacillin, Doxycycline, and Scopolamine    Review of Systems   Constitutional:  Positive for activity change, fatigue and unexpected weight change. Negative for appetite change, chills, diaphoresis and fever.   HENT:  Negative for congestion, sinus pain, sore throat, trouble swallowing and voice change.    Eyes: Negative.    Respiratory:  Positive for cough and shortness of breath. Negative for apnea, chest tightness and wheezing.    Cardiovascular:  Positive for leg swelling. Negative for chest pain and palpitations.   Gastrointestinal:  Negative for abdominal distention, abdominal pain, blood in stool, constipation, diarrhea, nausea and vomiting.   Endocrine: Negative.    Genitourinary:  Negative for decreased urine volume, difficulty urinating and urgency.   Musculoskeletal:  Positive for back pain. Negative for neck pain and neck stiffness.   Neurological:  Negative for dizziness, speech difficulty, light-headedness, numbness and headaches.   Hematological: Negative.    Psychiatric/Behavioral: Negative.          Physical Exam  Vitals and nursing note reviewed.   Constitutional:       Appearance: Normal appearance.   HENT:      Head: Normocephalic and atraumatic.  "     Nose: Nose normal. No congestion or rhinorrhea.   Eyes:      Extraocular Movements: Extraocular movements intact.      Pupils: Pupils are equal, round, and reactive to light.   Cardiovascular:      Rate and Rhythm: Normal rate. Rhythm irregular.   Pulmonary:      Effort: Pulmonary effort is normal. No respiratory distress.      Breath sounds: Normal breath sounds. No wheezing or rhonchi.   Chest:      Chest wall: No tenderness.   Abdominal:      General: Bowel sounds are normal. There is no distension.      Tenderness: There is no abdominal tenderness. There is no right CVA tenderness or left CVA tenderness.   Musculoskeletal:         General: Swelling present.      Cervical back: Normal range of motion and neck supple.      Right lower leg: No edema.      Left lower leg: Edema present.   Skin:     General: Skin is warm.   Neurological:      General: No focal deficit present.      Mental Status: He is alert and oriented to person, place, and time.   Psychiatric:         Mood and Affect: Mood normal.          Last Recorded Vitals  Blood pressure 117/90, pulse 90, temperature 36.7 °C (98 °F), temperature source Oral, resp. rate 18, height 1.778 m (5' 10\"), weight 83.9 kg (185 lb), SpO2 94%.    Relevant Results  Current Facility-Administered Medications on File Prior to Encounter   Medication Dose Route Frequency Provider Last Rate Last Admin    [DISCONTINUED] dexAMETHasone (Decadron) injection 10 mg  10 mg intravenous Once Jamarcus De La Rosa MD         Current Outpatient Medications on File Prior to Encounter   Medication Sig Dispense Refill    acetaminophen (Tylenol) 325 mg tablet Take 2 tablets (650 mg) by mouth every 6 hours if needed for mild pain (1 - 3). 30 tablet 0    aspirin 81 mg EC tablet Take 1 tablet (81 mg) by mouth once daily.      atorvastatin (Lipitor) 80 mg tablet Take 1 tablet (80 mg) by mouth once daily at bedtime. 30 tablet 0    cholecalciferol (Vitamin D-3) 25 MCG (1000 UT) tablet Take 1 " tablet (1,000 Units) by mouth once daily. Do not start before October 20, 2023.      dupilumab (Dupixent) 300 mg/2 mL pen injector Inject under the skin.      empagliflozin (Jardiance) 10 mg Take 1 tablet (10 mg) by mouth once daily. Do not start before October 23, 2023.      FreeStyle Maria Elena 14 Day Sensor kit USE AS DIRECTED EVERY 14 DAYS      furosemide (Lasix) 40 mg tablet Take 1 tablet (40 mg) by mouth 2 times daily (morning and late afternoon). 180 tablet 1    hydrocortisone 1 % cream Apply topically 2 times a day.      insulin aspart (NovoLOG U-100 Insulin aspart) 100 unit/mL injection INJECT 6 UNITS UNDER THE SKIN WITH BREAKFAST/LUNCH/DINNER, IF BLOOD GLUCOSE GREATER THAN 180 GIVE 8 UNITS, BLOOD GLUCOSE GREATER THAN 240 GIVE 10 UNITS AND GREATER THAN 300 GIVE 12 UNITS      insulin glargine (Lantus U-100 Insulin) 100 unit/mL injection Inject 45 Units under the skin once daily at bedtime.      insulin glargine-yfgn 100 unit/mL (3 mL) Pen Inject under the skin.      isosorbide mononitrate ER (Imdur) 30 mg 24 hr tablet Take 1 tablet (30 mg) by mouth once daily.      ixekizumab (Taltz) 80 mg/mL injection Inject 1 mL (80 mg) under the skin every 14 (fourteen) days.      lisinopril 5 mg tablet Take 1 tablet (5 mg) by mouth once daily.      lubricating eye drops ophthalmic solution Administer 1 drop into both eyes 2 times a day as needed for dry eyes.      magnesium hydroxide (Milk of Magnesia) 400 mg/5 mL suspension Take 5 mL by mouth once daily as needed.      melatonin 3 mg tablet Take 1 tablet (3 mg) by mouth as needed at bedtime for sleep.  0    metoprolol tartrate (Lopressor) 25 mg tablet Take 1 tablet (25 mg) by mouth 2 times a day.      neomycin-bacitracin-polymyxin (Polysporin) ophthalmic ointment Apply 0.5 inches to both eyes once daily.      omeprazole (PriLOSEC) 20 mg DR capsule Take 1 capsule (20 mg) by mouth once daily. Do not crush or chew.      potassium chloride CR (Klor-Con M20) 20 mEq ER tablet Take  1 tablet (20 mEq) by mouth once daily. Do not crush or chew. 30 tablet 5    predniSONE 5 mg tablet,delayed release (DR/EC) Take 10 mg by mouth once daily.      triamcinolone (Kenalog) 0.1 % cream Apply topically 2 times a day.      warfarin (Coumadin) 3 mg tablet TAKE 1 AND 1/2 TABLETS BY MOUTH 4 DAYS A WEEK AND 2 TABLETS ALL OTHER DAYS 180 tablet 2    [DISCONTINUED] adalimumab (Humira) 40 mg/0.8 mL syringe kit prefilled syringe Inject 1 each (40 mg) under the skin every 14 (fourteen) days. (Patient not taking: Reported on 1/9/2025)      [DISCONTINUED] magnesium oxide (Mag-Ox) 400 mg (241.3 mg magnesium) tablet Take 1 tablet (400 mg) by mouth once daily. Do not start before October 20, 2023.      [DISCONTINUED] predniSONE (Deltasone) 20 mg tablet Take 2 tablets (40 mg) by mouth once daily.       Results for orders placed or performed during the hospital encounter of 01/26/25 (from the past 24 hours)   CBC and Auto Differential   Result Value Ref Range    WBC 6.4 4.4 - 11.3 x10*3/uL    nRBC 0.0 0.0 - 0.0 /100 WBCs    RBC 3.93 (L) 4.50 - 5.90 x10*6/uL    Hemoglobin 11.9 (L) 13.5 - 17.5 g/dL    Hematocrit 38.0 (L) 41.0 - 52.0 %    MCV 97 80 - 100 fL    MCH 30.3 26.0 - 34.0 pg    MCHC 31.3 (L) 32.0 - 36.0 g/dL    RDW 15.6 (H) 11.5 - 14.5 %    Platelets 157 150 - 450 x10*3/uL    Neutrophils % 71.9 40.0 - 80.0 %    Immature Granulocytes %, Automated 0.3 0.0 - 0.9 %    Lymphocytes % 15.6 13.0 - 44.0 %    Monocytes % 11.5 2.0 - 10.0 %    Eosinophils % 0.5 0.0 - 6.0 %    Basophils % 0.2 0.0 - 2.0 %    Neutrophils Absolute 4.58 1.60 - 5.50 x10*3/uL    Immature Granulocytes Absolute, Automated 0.02 0.00 - 0.50 x10*3/uL    Lymphocytes Absolute 0.99 0.80 - 3.00 x10*3/uL    Monocytes Absolute 0.73 0.05 - 0.80 x10*3/uL    Eosinophils Absolute 0.03 0.00 - 0.40 x10*3/uL    Basophils Absolute 0.01 0.00 - 0.10 x10*3/uL   Comprehensive metabolic panel   Result Value Ref Range    Glucose 82 74 - 99 mg/dL    Sodium 140 136 - 145 mmol/L     Potassium 4.1 3.5 - 5.3 mmol/L    Chloride 104 98 - 107 mmol/L    Bicarbonate 29 21 - 32 mmol/L    Anion Gap 11 10 - 20 mmol/L    Urea Nitrogen 29 (H) 6 - 23 mg/dL    Creatinine 1.37 (H) 0.50 - 1.30 mg/dL    eGFR 49 (L) >60 mL/min/1.73m*2    Calcium 8.9 8.6 - 10.3 mg/dL    Albumin 3.4 3.4 - 5.0 g/dL    Alkaline Phosphatase 56 33 - 136 U/L    Total Protein 6.3 (L) 6.4 - 8.2 g/dL    AST 32 9 - 39 U/L    Bilirubin, Total 0.5 0.0 - 1.2 mg/dL    ALT 23 10 - 52 U/L   B-Type Natriuretic Peptide   Result Value Ref Range     (H) 0 - 99 pg/mL   Sars-CoV-2 and Influenza A/B PCR   Result Value Ref Range    Flu A Result Not Detected Not Detected    Flu B Result Not Detected Not Detected    Coronavirus 2019, PCR Not Detected Not Detected   Troponin I, High Sensitivity, Initial   Result Value Ref Range    Troponin I, High Sensitivity 152 (HH) 0 - 20 ng/L   Troponin, High Sensitivity, 1 Hour   Result Value Ref Range    Troponin I, High Sensitivity 150 (HH) 0 - 20 ng/L   Protime-INR   Result Value Ref Range    Protime 49.7 (H) 9.3 - 12.7 seconds    INR 5.2 (H) 0.9 - 1.2     *Note: Due to a large number of results and/or encounters for the requested time period, some results have not been displayed. A complete set of results can be found in Results Review.     XR chest 1 view    Result Date: 1/26/2025  STUDY: Chest Radiograph;  1/26/225 10:24 AM. INDICATION: Shortness of breath. COMPARISON: Chest radiographs 9/9/2024. ACCESSION NUMBER(S): [id number] ORDERING CLINICIAN: NAHUN BRADLEY TECHNIQUE:  Frontal chest was obtained at 10:24 hours. FINDINGS: CARDIOMEDIASTINAL SILHOUETTE: Cardiomediastinal silhouette is enlarged. There are median sternotomy wires. There is a right sided cardiac device.  LUNGS: There is increased density in the left lower lobe which may represent a left lower lobe infiltrate. An underlying left sided pleural effusion cannot be excluded. There is no pneumothorax.  ABDOMEN: No remarkable upper abdominal  findings.  BONES: No acute osseous changes.    1. Increased density in left lower lobe may represent a left lower lobe infiltrate. An underlying left sided pleural effusion cannot be excluded. 2. Enlarged heart. Signed by Quinten Joshi MD          Assessment/Plan   Assessment & Plan  Acute on chronic congestive heart failure, unspecified heart failure type  Patient gained 7 to 8 pounds in the last week.  +3 pitting edema both lower extremities last EF was 35% in 2023  Denies any chest pain, complaint of shortness of breath in room air  Order D echo .  start on Lasix IV 40 twice daily  Fluid restriction  Consult cardiology    Elevated troponin  Troponin 151 then 150 patient denies any chest pain.  Follow-up with cardiology  On telemetry  Pneumonia due to infectious organism  Positive for cough.  No fever or chills  Possible left lower lobe pneumonia  Start on Zithromax and Rocephin follow-up with the blood cultures sputum culture  Atrial fibrillation (Multi)  INR 5.2.  Patient's normally takes 6 mg of Coumadin every day except Wednesday and Sunday he is taking 4.5 mg  Hold Coumadin for now  PT/INR daily  BETH (acute kidney injury) (CMS-[facility])  Creatinine up may be related to Lasix will continue to give Lasix monitor kidney function  Acid reflux  Continue with omeprazole  Hypertension  Continue with home meds BP is under control  History of coronary artery bypass surgery  Chest tube shortness of breath continue with home meds    DVT prophylaxis  On Coumadin therapy that was held due to high INR         I spent 75 minutes in the professional and overall care of this patient.      Astrid Ramirez, APRN-CNP

## 2025-01-26 NOTE — ASSESSMENT & PLAN NOTE
Positive for cough.  No fever or chills  Possible left lower lobe pneumonia  Start on Zithromax and Rocephin follow-up with the blood cultures sputum culture

## 2025-01-26 NOTE — ASSESSMENT & PLAN NOTE
INR 5.2.  Patient's normally takes 6 mg of Coumadin every day except Wednesday and Sunday he is taking 4.5 mg  Hold Coumadin for now  PT/INR daily

## 2025-01-26 NOTE — PROGRESS NOTES
Attestation/Supervisory note for KAVEH Styles      The patient is a 89-year-old male presenting to the emergency department by EMS from home for evaluation of increasing shortness of breath, dyspnea on exertion and nonproductive cough.  He does have a history of CHF and is on a diuretic.  He reports his cardiologist is Dr. Bahena.  He states he has not had any recent changes in his diet or medications but he feels like he has gained weight recently.  He states that he does also have a nonproductive cough.  He denies any headache or visual changes.  No chest pain.  No palpitations.  No diaphoresis.  No abdominal pain.  No nausea vomiting.  No diarrhea or constipation.  no urinary complaints.  All pertinent positives and negatives are recorded above.  All other systems reviewed and otherwise negative.  Vital signs with diastolic hypertension but otherwise within normal limits.  Physical exam with a well-nourished well-developed male in no acute distress.  HEENT exam within normal limits.  He has no evidence of airway compromise or respiratory distress.  Abdominal exam is benign.  He does not have any gross motor, neurologic or vascular episodes on exam.  He does have bilateral lower extremity pitting edema. Pulses are equal bilaterally.      EKG with a ventricular paced rhythm at 82 bpm, normal axis, normal voltage, normal ST segment, normal T waves      Diagnostic labs with an elevated troponin, mildly elevated BNP, anemia, mild renal insufficiency,      Initial troponin 152.  Repeat troponin and INR results pending at the time of admission      XR chest 1 view   Final Result   1. Increased density in left lower lobe may represent a left lower   lobe infiltrate. An underlying left sided pleural effusion cannot be   excluded.   2. Enlarged heart.   Signed by Quinten Joshi MD           The patient does not have any evidence of airway compromise or respiratory distress on exam.  He does not have any evidence of  a STEMI on EKG.  Cardiac enzymes do show an elevated troponin.  No events on telemetry.  Chest x-ray with an increased density in the left lower lobe that may represent a developing infiltrate.  He does have evidence of cardiomegaly but no evidence of acute CHF or widening of the mediastinum.  No evidence of pneumothorax.  His pulses are equal bilaterally.  Cultures were obtained and IV antibiotics were ordered for possible pneumonia.      The patient was admitted for further management and trending of his cardiac enzymes.      Impression/diagnosis:  1.  Dyspnea, dyspnea on exertion  2.  Nonproductive cough  3.  CHF, acute on chronic  4.  Diastolic hypertension  5.  Pulmonary infiltrate on imaging      Critical care time billing is not warranted at this time      I personally saw the patient and made/approve the management plan and take responsibility for the patient management.      I independently interpreted the following study (S) EKG and diagnostic labs      I personally discussed the patient's management with the patient      I reviewed the results of the diagnostic labs and diagnostic imaging.  Formal radiology read was completed by the radiologist.      Mónica Bradley MD

## 2025-01-26 NOTE — ED PROVIDER NOTES
HPI   Chief Complaint   Patient presents with    Shortness of Breath     Pt has had sob for about a week. Pt has chf and feels like he is retaining water       This is a 89-year-old male with a past medical history of CHF presenting to the emergency department for shortness of breath x 4 days.  Patient comes from home via EMS.  He states that over the last 4 days he has been having increasing shortness of breath and shortness of breath with exertion along with weight gain of 7 to 8 pounds in the last week.  He denies any changes to his diet or changes to his medications.  He has been having a mixed dry and productive cough.  No chest pain or abdominal pain, no nausea, vomiting, diarrhea or constipation.  No fever or chills.      Please see HPI for pertinent positive and negative ROS.         Patient History   Past Medical History:   Diagnosis Date    Aortic stenosis     Status post transcatheter aortic valve replacement    Atherosclerotic heart disease of native coronary artery without angina pectoris 12/12/2022    CAD (coronary artery disease)    Atrial fibrillation (Multi)     Chronic systolic heart failure     Complete heart block     status post dual chamber pacemaker placemtn in 01/2008    Diabetes mellitus, type 2 (Multi)     Essential (primary) hypertension 12/12/2022    Hypertension    Presence of automatic (implantable) cardiac defibrillator 11/14/2022    Cardiac defibrillator in place, upgraded from pacemaker in 05/2014     Past Surgical History:   Procedure Laterality Date    AORTIC VALVE REPLACEMENT  04/07/2022    Transcatheter aortic valve replacement (TAVR) for aortic valve stenosis    APPENDECTOMY  12/01/2014    Appendectomy    CARDIAC ELECTROPHYSIOLOGY PROCEDURE N/A 12/19/2023    Procedure: ICD UPGRADE, DUAL TO BIV;  Surgeon: Luis Felipe Greene MD;  Location: [place] [address] Cardiac Cath Lab;  Service: Electrophysiology;  Laterality: N/A;  [id number] Bi-V ICD gen change [facility]    CORONARY ARTERY  BYPASS GRAFT  12/01/2014    CABG    CT ABDOMEN PELVIS ANGIOGRAM W AND/OR WO IV CONTRAST  03/17/2020    CT ABDOMEN PELVIS ANGIOGRAM W AND/OR WO IV CONTRAST 3/17/2020 [facility]CILL[facility]    OTHER SURGICAL HISTORY  12/01/2014    Cardio-defib Pulse Generator Implantation Date     Family History   Problem Relation Name Age of Onset    Drug abuse Mother          OVERDOSE     Social History     Tobacco Use    Smoking status: Never     Passive exposure: Never    Smokeless tobacco: Never   Substance Use Topics    Alcohol use: Yes     Comment: a beer a year    Drug use: Never       Physical Exam   ED Triage Vitals   Temp Pulse Resp BP   -- -- -- --      SpO2 Temp src Heart Rate Source Patient Position   -- -- -- --      BP Location FiO2 (%)     -- --       Physical Exam  GENERAL APPEARANCE: Awake and alert. No acute respiratory distress.  Patient is talking in smooth nondyspneic sentences.  VITAL SIGNS: As per the nurses' triage record.  HEENT: Normocephalic, atraumatic.   NECK: Soft, nontender and supple, full gross ROM, no meningeal signs.  CHEST: Nontender to palpation.  Diffuse inspiratory wheeze and rhonchi bilaterally.  Symmetric rise and fall of chest wall.   HEART: Clear S1 and S2. Regular rate and rhythm. Strong and equal pulses in the extremities.  ABDOMEN: Soft, nontender, nondistended, positive bowel sounds, no palpable masses.  MUSCULOSKELETAL: Pitting pedal edema bilaterally.  Full gross active range of motion. Ambulating on own with no acute difficulties  NEUROLOGICAL: Awake, alert and oriented x 3. Motor power intact in the upper and lower extremities. Sensation is intact to light touch in the upper and lower extremities. Patient answering questions appropriately.   IMMUNOLOGICAL: No lymphatic streaking noted  DERMATOLOGIC: Warm and dry  PYSCH: Cooperative with appropriate mood and affect.    ED Course & MDM   Diagnoses as of 01/26/25 1128   Acute on chronic congestive heart failure, unspecified heart failure  type   Shortness of breath   Acute cough   Diastolic hypertension   Pulmonary infiltrate   Elevated troponin                 No data recorded     Belinda Coma Scale Score: 15 (01/26/25 1007 : Genet Soto RN)                           Medical Decision Making  Parts of this chart have been completed using voice recognition software. Please excuse any errors of transcription.  My thought process and reason for plan has been formulated from the time that I saw the patient until the time of disposition and is not specific to one specific moment during their visit and furthermore my MDM encompasses this entire chart and not only this text box.      HPI: Detailed above.    Exam: A medically appropriate exam performed, outlined above, given the known history and presentation.    History obtained from: Patient    EKG: See my supervising physician's EKG interpretation    Medications given during visit:  Medications   cefepime (Maxipime) 1 g in dextrose 5% IV 50 mL (has no administration in time range)        Diagnostic/tests  Labs Reviewed   CBC WITH AUTO DIFFERENTIAL - Abnormal       Result Value    WBC 6.4      nRBC 0.0      RBC 3.93 (*)     Hemoglobin 11.9 (*)     Hematocrit 38.0 (*)     MCV 97      MCH 30.3      MCHC 31.3 (*)     RDW 15.6 (*)     Platelets 157      Neutrophils % 71.9      Immature Granulocytes %, Automated 0.3      Lymphocytes % 15.6      Monocytes % 11.5      Eosinophils % 0.5      Basophils % 0.2      Neutrophils Absolute 4.58      Immature Granulocytes Absolute, Automated 0.02      Lymphocytes Absolute 0.99      Monocytes Absolute 0.73      Eosinophils Absolute 0.03      Basophils Absolute 0.01     COMPREHENSIVE METABOLIC PANEL - Abnormal    Glucose 82      Sodium 140      Potassium 4.1      Chloride 104      Bicarbonate 29      Anion Gap 11      Urea Nitrogen 29 (*)     Creatinine 1.37 (*)     eGFR 49 (*)     Calcium 8.9      Albumin 3.4      Alkaline Phosphatase 56      Total Protein 6.3 (*)      AST 32      Bilirubin, Total 0.5      ALT 23     B-TYPE NATRIURETIC PEPTIDE - Abnormal     (*)     Narrative:        <100 pg/mL - Heart failure unlikely  100-299 pg/mL - Intermediate probability of acute heart                  failure exacerbation. Correlate with clinical                  context and patient history.    >=300 pg/mL - Heart Failure likely. Correlate with clinical                  context and patient history.    BNP testing is performed using different testing methodology at [facility] than at other [facility]. Direct result comparisons should only be made within the same method.      SERIAL TROPONIN-INITIAL - Abnormal    Troponin I, High Sensitivity 152 (*)     Narrative:     Less than 99th percentile of normal range cutoff-  Female and children under 18 years old <14 ng/L; Male <21 ng/L: Negative  Repeat testing should be performed if clinically indicated.     Female and children under 18 years old 14-50 ng/L; Male 21-50 ng/L:  Consistent with possible cardiac damage and possible increased clinical   risk. Serial measurements may help to assess extent of myocardial damage.     >50 ng/L: Consistent with cardiac damage, increased clinical risk and  myocardial infarction. Serial measurements may help assess extent of   myocardial damage.      NOTE: Children less than 1 year old may have higher baseline troponin   levels and results should be interpreted in conjunction with the overall   clinical context.     NOTE: Troponin I testing is performed using a different   testing methodology at [facility] than at other   [facility]. Direct result comparisons should only   be made within the same method.   SARS-COV-2 AND INFLUENZA A/B PCR - Normal    Flu A Result Not Detected      Flu B Result Not Detected      Coronavirus 2019, PCR Not Detected      Narrative:     This assay is an FDA-cleared, in vitro diagnostic nucleic acid amplification test for the  qualitative detection and differentiation of SARS CoV-2/ Influenza A/B from nasopharyngeal specimens collected from individuals with signs and symptoms of respiratory tract infections, and has been validated for use at [facility]. Negative results do not preclude COVID-19/ Influenza A/B infections and should not be used as the sole basis for diagnosis, treatment, or other management decisions. Testing for SARS CoV-2 is recommended only for patients who meet current clinical and/or epidemiological criteria defined by federal, state, or local public health directives.   BLOOD CULTURE   BLOOD CULTURE   TROPONIN SERIES- (INITIAL, 1 HR)    Narrative:     The following orders were created for panel order Troponin I Series, High Sensitivity (0, 1 HR).  Procedure                               Abnormality         Status                     ---------                               -----------         ------                     Troponin I, High Sensiti...[[id number]]  Abnormal            Final result               Troponin, High Sensitivi...[[id number]]                                                   Please view results for these tests on the individual orders.   SERIAL TROPONIN, 1 HOUR   PROTIME-INR      XR chest 1 view   Final Result   1. Increased density in left lower lobe may represent a left lower   lobe infiltrate. An underlying left sided pleural effusion cannot be   excluded.   2. Enlarged heart.   Signed by Quinten Joshi MD           Considerations/further MDM:  Patient was seen in conjucntion with my supervising physician,  Dr. Bradley. Please refer to her note.    Differential diagnosis includes was not limited to acute CHF exacerbation versus pneumonia versus viral syndrome versus atypical ACS     Chest x-ray shows increased density of left lower lobe which may represent of left lower lobe infiltrate.  Underlying left-sided pleural effusion cannot be ruled out, IV cefepime 1 g ordered.   Enlarged heart seen.  BNP is elevated 279.  Initial troponin I 152.  CMP shows slight increased renal function.  Negative viral swabs.  CBC shows no leukocytosis and a mild normocytic anemia.  Due to elevation in troponin I with evidence of possible pneumonia plan for admission for further management.  The patient was agreeable to this plan.  The patient was admitted in stable condition to accepting physician, .     Procedure  Procedures     Barbara Styles PA-C  01/26/25 1126

## 2025-01-26 NOTE — ASSESSMENT & PLAN NOTE
Patient gained 7 to 8 pounds in the last week.  +3 pitting edema both lower extremities last EF was 35% in 2023  Denies any chest pain, complaint of shortness of breath in room air  Order D echo .  start on Lasix IV 40 twice daily  Fluid restriction  Consult cardiology    Elevated troponin  Troponin 151 then 150 patient denies any chest pain.  Follow-up with cardiology  On telemetry

## 2025-01-27 ENCOUNTER — APPOINTMENT (OUTPATIENT)
Dept: CARDIOLOGY | Facility: HOSPITAL | Age: OVER 89
End: 2025-01-27
Payer: MEDICARE

## 2025-01-27 LAB
ANION GAP SERPL CALCULATED.3IONS-SCNC: 13 MMOL/L (ref 10–20)
AORTIC VALVE MEAN GRADIENT: 7 MMHG
AORTIC VALVE PEAK VELOCITY: 1.81 M/S
AV PEAK GRADIENT: 13 MMHG
BUN SERPL-MCNC: 28 MG/DL (ref 6–23)
CALCIUM SERPL-MCNC: 8.6 MG/DL (ref 8.6–10.3)
CHLORIDE SERPL-SCNC: 103 MMOL/L (ref 98–107)
CO2 SERPL-SCNC: 29 MMOL/L (ref 21–32)
CREAT SERPL-MCNC: 1.34 MG/DL (ref 0.5–1.3)
EGFRCR SERPLBLD CKD-EPI 2021: 50 ML/MIN/1.73M*2
EJECTION FRACTION APICAL 4 CHAMBER: 34.1
EJECTION FRACTION: 28 %
ERYTHROCYTE [DISTWIDTH] IN BLOOD BY AUTOMATED COUNT: 15.7 % (ref 11.5–14.5)
EST. AVERAGE GLUCOSE BLD GHB EST-MCNC: 180 MG/DL
GLUCOSE BLD MANUAL STRIP-MCNC: 114 MG/DL (ref 74–99)
GLUCOSE BLD MANUAL STRIP-MCNC: 73 MG/DL (ref 74–99)
GLUCOSE BLD MANUAL STRIP-MCNC: 80 MG/DL (ref 74–99)
GLUCOSE SERPL-MCNC: 75 MG/DL (ref 74–99)
HBA1C MFR BLD: 7.9 %
HCT VFR BLD AUTO: 38.8 % (ref 41–52)
HGB BLD-MCNC: 11.9 G/DL (ref 13.5–17.5)
HOLD SPECIMEN: NORMAL
INR PPP: 6.1 (ref 0.9–1.2)
LEFT VENTRICLE INTERNAL DIMENSION DIASTOLE: 4.88 CM (ref 3.5–6)
MCH RBC QN AUTO: 29.6 PG (ref 26–34)
MCHC RBC AUTO-ENTMCNC: 30.7 G/DL (ref 32–36)
MCV RBC AUTO: 97 FL (ref 80–100)
MITRAL VALVE E/A RATIO: 163.83
MITRAL VALVE E/E' RATIO: 24.42
NRBC BLD-RTO: 0 /100 WBCS (ref 0–0)
PLATELET # BLD AUTO: 160 X10*3/UL (ref 150–450)
POTASSIUM SERPL-SCNC: 4.3 MMOL/L (ref 3.5–5.3)
PROTHROMBIN TIME: 57.1 SECONDS (ref 9.3–12.7)
RBC # BLD AUTO: 4.02 X10*6/UL (ref 4.5–5.9)
RIGHT VENTRICLE FREE WALL PEAK S': 6.05 CM/S
RIGHT VENTRICLE PEAK SYSTOLIC PRESSURE: 52.8 MMHG
SODIUM SERPL-SCNC: 141 MMOL/L (ref 136–145)
TRICUSPID ANNULAR PLANE SYSTOLIC EXCURSION: 1.1 CM
WBC # BLD AUTO: 7.9 X10*3/UL (ref 4.4–11.3)

## 2025-01-27 PROCEDURE — 93308 TTE F-UP OR LMTD: CPT | Performed by: INTERNAL MEDICINE

## 2025-01-27 PROCEDURE — 2500000001 HC RX 250 WO HCPCS SELF ADMINISTERED DRUGS (ALT 637 FOR MEDICARE OP): Performed by: NURSE PRACTITIONER

## 2025-01-27 PROCEDURE — 2500000004 HC RX 250 GENERAL PHARMACY W/ HCPCS (ALT 636 FOR OP/ED): Performed by: NURSE PRACTITIONER

## 2025-01-27 PROCEDURE — 36415 COLL VENOUS BLD VENIPUNCTURE: CPT | Performed by: NURSE PRACTITIONER

## 2025-01-27 PROCEDURE — 97161 PT EVAL LOW COMPLEX 20 MIN: CPT | Mod: GP

## 2025-01-27 PROCEDURE — 97110 THERAPEUTIC EXERCISES: CPT | Mod: GP

## 2025-01-27 PROCEDURE — C8924 2D TTE W OR W/O FOL W/CON,FU: HCPCS

## 2025-01-27 PROCEDURE — 2500000002 HC RX 250 W HCPCS SELF ADMINISTERED DRUGS (ALT 637 FOR MEDICARE OP, ALT 636 FOR OP/ED): Performed by: NURSE PRACTITIONER

## 2025-01-27 PROCEDURE — 80048 BASIC METABOLIC PNL TOTAL CA: CPT | Performed by: EMERGENCY MEDICINE

## 2025-01-27 PROCEDURE — 85027 COMPLETE CBC AUTOMATED: CPT | Performed by: EMERGENCY MEDICINE

## 2025-01-27 PROCEDURE — 99232 SBSQ HOSP IP/OBS MODERATE 35: CPT | Performed by: NURSE PRACTITIONER

## 2025-01-27 PROCEDURE — 83036 HEMOGLOBIN GLYCOSYLATED A1C: CPT | Mod: WESLAB | Performed by: NURSE PRACTITIONER

## 2025-01-27 PROCEDURE — 82947 ASSAY GLUCOSE BLOOD QUANT: CPT

## 2025-01-27 PROCEDURE — 85610 PROTHROMBIN TIME: CPT | Performed by: NURSE PRACTITIONER

## 2025-01-27 PROCEDURE — 2500000005 HC RX 250 GENERAL PHARMACY W/O HCPCS: Performed by: INTERNAL MEDICINE

## 2025-01-27 PROCEDURE — 36415 COLL VENOUS BLD VENIPUNCTURE: CPT | Performed by: EMERGENCY MEDICINE

## 2025-01-27 PROCEDURE — 2500000001 HC RX 250 WO HCPCS SELF ADMINISTERED DRUGS (ALT 637 FOR MEDICARE OP)

## 2025-01-27 PROCEDURE — 2060000001 HC INTERMEDIATE ICU ROOM DAILY

## 2025-01-27 RX ORDER — NYSTATIN 100000 [USP'U]/G
1 POWDER TOPICAL 2 TIMES DAILY
Status: DISCONTINUED | OUTPATIENT
Start: 2025-01-27 | End: 2025-02-01 | Stop reason: HOSPADM

## 2025-01-27 RX ORDER — HYDROCORTISONE 25 MG/G
1 CREAM TOPICAL 2 TIMES DAILY
COMMUNITY
End: 2025-02-01 | Stop reason: HOSPADM

## 2025-01-27 RX ORDER — MAGNESIUM OXIDE 420 MG/1
1 TABLET ORAL DAILY
COMMUNITY

## 2025-01-27 RX ADMIN — POTASSIUM CHLORIDE 20 MEQ: 1500 TABLET, EXTENDED RELEASE ORAL at 10:04

## 2025-01-27 RX ADMIN — FUROSEMIDE 40 MG: 10 INJECTION, SOLUTION INTRAMUSCULAR; INTRAVENOUS at 22:10

## 2025-01-27 RX ADMIN — CEFTRIAXONE SODIUM 1 G: 1 INJECTION, SOLUTION INTRAVENOUS at 10:20

## 2025-01-27 RX ADMIN — TRIAMCINOLONE ACETONIDE: 1 CREAM TOPICAL at 10:04

## 2025-01-27 RX ADMIN — ISOSORBIDE MONONITRATE 30 MG: 30 TABLET, EXTENDED RELEASE ORAL at 10:04

## 2025-01-27 RX ADMIN — ATORVASTATIN CALCIUM 80 MG: 80 TABLET, FILM COATED ORAL at 20:34

## 2025-01-27 RX ADMIN — Medication 4 L/MIN: at 19:51

## 2025-01-27 RX ADMIN — PREDNISONE 2.5 MG: 5 TABLET ORAL at 10:04

## 2025-01-27 RX ADMIN — LISINOPRIL 5 MG: 5 TABLET ORAL at 10:04

## 2025-01-27 RX ADMIN — ASPIRIN 81 MG: 81 TABLET, COATED ORAL at 10:04

## 2025-01-27 RX ADMIN — POLYETHYLENE GLYCOL 3350 17 G: 17 POWDER, FOR SOLUTION ORAL at 20:34

## 2025-01-27 RX ADMIN — GUAIFENESIN AND DEXTROMETHORPHAN 5 ML: 10; 100 SYRUP ORAL at 20:34

## 2025-01-27 RX ADMIN — METOPROLOL TARTRATE 25 MG: 25 TABLET, FILM COATED ORAL at 10:04

## 2025-01-27 RX ADMIN — NYSTATIN 1 APPLICATION: 100000 POWDER TOPICAL at 20:34

## 2025-01-27 RX ADMIN — FAMOTIDINE 20 MG: 20 TABLET, FILM COATED ORAL at 10:04

## 2025-01-27 RX ADMIN — Medication 5 L/MIN: at 07:22

## 2025-01-27 RX ADMIN — METOPROLOL TARTRATE 25 MG: 25 TABLET, FILM COATED ORAL at 20:34

## 2025-01-27 RX ADMIN — INSULIN GLARGINE 45 UNITS: 100 INJECTION, SOLUTION SUBCUTANEOUS at 20:34

## 2025-01-27 RX ADMIN — FUROSEMIDE 40 MG: 10 INJECTION, SOLUTION INTRAMUSCULAR; INTRAVENOUS at 10:04

## 2025-01-27 RX ADMIN — PERFLUTREN 3 ML OF DILUTION: 6.52 INJECTION, SUSPENSION INTRAVENOUS at 13:22

## 2025-01-27 RX ADMIN — PANTOPRAZOLE SODIUM 40 MG: 40 TABLET, DELAYED RELEASE ORAL at 10:05

## 2025-01-27 RX ADMIN — AZITHROMYCIN 500 MG: 500 TABLET, FILM COATED ORAL at 10:04

## 2025-01-27 SDOH — SOCIAL STABILITY: SOCIAL INSECURITY: ARE YOU MARRIED, WIDOWED, DIVORCED, SEPARATED, NEVER MARRIED, OR LIVING WITH A PARTNER?: WIDOWED

## 2025-01-27 SDOH — HEALTH STABILITY: MENTAL HEALTH
DO YOU FEEL STRESS - TENSE, RESTLESS, NERVOUS, OR ANXIOUS, OR UNABLE TO SLEEP AT NIGHT BECAUSE YOUR MIND IS TROUBLED ALL THE TIME - THESE DAYS?: NOT AT ALL

## 2025-01-27 SDOH — ECONOMIC STABILITY: FOOD INSECURITY: WITHIN THE PAST 12 MONTHS, THE FOOD YOU BOUGHT JUST DIDN'T LAST AND YOU DIDN'T HAVE MONEY TO GET MORE.: NEVER TRUE

## 2025-01-27 SDOH — ECONOMIC STABILITY: TRANSPORTATION INSECURITY: IN THE PAST 12 MONTHS, HAS LACK OF TRANSPORTATION KEPT YOU FROM MEDICAL APPOINTMENTS OR FROM GETTING MEDICATIONS?: NO

## 2025-01-27 SDOH — SOCIAL STABILITY: SOCIAL INSECURITY: DO YOU FEEL ANYONE HAS EXPLOITED OR TAKEN ADVANTAGE OF YOU FINANCIALLY OR OF YOUR PERSONAL PROPERTY?: NO

## 2025-01-27 SDOH — SOCIAL STABILITY: SOCIAL INSECURITY: WITHIN THE LAST YEAR, HAVE YOU BEEN HUMILIATED OR EMOTIONALLY ABUSED IN OTHER WAYS BY YOUR PARTNER OR EX-PARTNER?: NO

## 2025-01-27 SDOH — SOCIAL STABILITY: SOCIAL NETWORK
DO YOU BELONG TO ANY CLUBS OR ORGANIZATIONS SUCH AS CHURCH GROUPS, UNIONS, FRATERNAL OR ATHLETIC GROUPS, OR SCHOOL GROUPS?: NO

## 2025-01-27 SDOH — SOCIAL STABILITY: SOCIAL INSECURITY: ARE THERE ANY APPARENT SIGNS OF INJURIES/BEHAVIORS THAT COULD BE RELATED TO ABUSE/NEGLECT?: NO

## 2025-01-27 SDOH — SOCIAL STABILITY: SOCIAL INSECURITY: DOES ANYONE TRY TO KEEP YOU FROM HAVING/CONTACTING OTHER FRIENDS OR DOING THINGS OUTSIDE YOUR HOME?: NO

## 2025-01-27 SDOH — HEALTH STABILITY: PHYSICAL HEALTH
HOW OFTEN DO YOU NEED TO HAVE SOMEONE HELP YOU WHEN YOU READ INSTRUCTIONS, PAMPHLETS, OR OTHER WRITTEN MATERIAL FROM YOUR DOCTOR OR PHARMACY?: RARELY

## 2025-01-27 SDOH — ECONOMIC STABILITY: INCOME INSECURITY: IN THE PAST 12 MONTHS HAS THE ELECTRIC, GAS, OIL, OR WATER COMPANY THREATENED TO SHUT OFF SERVICES IN YOUR HOME?: NO

## 2025-01-27 SDOH — ECONOMIC STABILITY: FOOD INSECURITY: WITHIN THE PAST 12 MONTHS, YOU WORRIED THAT YOUR FOOD WOULD RUN OUT BEFORE YOU GOT THE MONEY TO BUY MORE.: NEVER TRUE

## 2025-01-27 SDOH — ECONOMIC STABILITY: HOUSING INSECURITY: AT ANY TIME IN THE PAST 12 MONTHS, WERE YOU HOMELESS OR LIVING IN A SHELTER (INCLUDING NOW)?: NO

## 2025-01-27 SDOH — SOCIAL STABILITY: SOCIAL INSECURITY: WERE YOU ABLE TO COMPLETE ALL THE BEHAVIORAL HEALTH SCREENINGS?: YES

## 2025-01-27 SDOH — SOCIAL STABILITY: SOCIAL INSECURITY: HAS ANYONE EVER THREATENED TO HURT YOUR FAMILY OR YOUR PETS?: NO

## 2025-01-27 SDOH — ECONOMIC STABILITY: HOUSING INSECURITY: IN THE PAST 12 MONTHS, HOW MANY TIMES HAVE YOU MOVED WHERE YOU WERE LIVING?: 0

## 2025-01-27 SDOH — SOCIAL STABILITY: SOCIAL INSECURITY: WITHIN THE LAST YEAR, HAVE YOU BEEN AFRAID OF YOUR PARTNER OR EX-PARTNER?: NO

## 2025-01-27 SDOH — ECONOMIC STABILITY: HOUSING INSECURITY: IN THE LAST 12 MONTHS, WAS THERE A TIME WHEN YOU WERE NOT ABLE TO PAY THE MORTGAGE OR RENT ON TIME?: NO

## 2025-01-27 SDOH — HEALTH STABILITY: MENTAL HEALTH: HOW OFTEN DO YOU HAVE SIX OR MORE DRINKS ON ONE OCCASION?: NEVER

## 2025-01-27 SDOH — SOCIAL STABILITY: SOCIAL NETWORK: HOW OFTEN DO YOU ATTEND CHURCH OR RELIGIOUS SERVICES?: NEVER

## 2025-01-27 SDOH — SOCIAL STABILITY: SOCIAL NETWORK: HOW OFTEN DO YOU GET TOGETHER WITH FRIENDS OR RELATIVES?: ONCE A WEEK

## 2025-01-27 SDOH — HEALTH STABILITY: PHYSICAL HEALTH: ON AVERAGE, HOW MANY MINUTES DO YOU ENGAGE IN EXERCISE AT THIS LEVEL?: 0 MIN

## 2025-01-27 SDOH — HEALTH STABILITY: MENTAL HEALTH: HOW MANY DRINKS CONTAINING ALCOHOL DO YOU HAVE ON A TYPICAL DAY WHEN YOU ARE DRINKING?: 1 OR 2

## 2025-01-27 SDOH — HEALTH STABILITY: MENTAL HEALTH: HOW OFTEN DO YOU HAVE A DRINK CONTAINING ALCOHOL?: MONTHLY OR LESS

## 2025-01-27 SDOH — SOCIAL STABILITY: SOCIAL INSECURITY
WITHIN THE LAST YEAR, HAVE YOU BEEN RAPED OR FORCED TO HAVE ANY KIND OF SEXUAL ACTIVITY BY YOUR PARTNER OR EX-PARTNER?: NO

## 2025-01-27 SDOH — SOCIAL STABILITY: SOCIAL INSECURITY: DO YOU FEEL UNSAFE GOING BACK TO THE PLACE WHERE YOU ARE LIVING?: NO

## 2025-01-27 SDOH — SOCIAL STABILITY: SOCIAL NETWORK: HOW OFTEN DO YOU ATTEND MEETINGS OF THE CLUBS OR ORGANIZATIONS YOU BELONG TO?: NEVER

## 2025-01-27 SDOH — ECONOMIC STABILITY: FOOD INSECURITY: HOW HARD IS IT FOR YOU TO PAY FOR THE VERY BASICS LIKE FOOD, HOUSING, MEDICAL CARE, AND HEATING?: NOT HARD AT ALL

## 2025-01-27 SDOH — HEALTH STABILITY: PHYSICAL HEALTH: ON AVERAGE, HOW MANY DAYS PER WEEK DO YOU ENGAGE IN MODERATE TO STRENUOUS EXERCISE (LIKE A BRISK WALK)?: 0 DAYS

## 2025-01-27 SDOH — SOCIAL STABILITY: SOCIAL INSECURITY: ARE YOU OR HAVE YOU BEEN THREATENED OR ABUSED PHYSICALLY, EMOTIONALLY, OR SEXUALLY BY ANYONE?: NO

## 2025-01-27 SDOH — SOCIAL STABILITY: SOCIAL INSECURITY: HAVE YOU HAD THOUGHTS OF HARMING ANYONE ELSE?: NO

## 2025-01-27 SDOH — SOCIAL STABILITY: SOCIAL INSECURITY: ABUSE: ADULT

## 2025-01-27 ASSESSMENT — COGNITIVE AND FUNCTIONAL STATUS - GENERAL
CLIMB 3 TO 5 STEPS WITH RAILING: A LITTLE
MOVING TO AND FROM BED TO CHAIR: A LITTLE
DRESSING REGULAR LOWER BODY CLOTHING: A LITTLE
WALKING IN HOSPITAL ROOM: A LOT
DRESSING REGULAR UPPER BODY CLOTHING: A LITTLE
MOBILITY SCORE: 14
MOVING FROM LYING ON BACK TO SITTING ON SIDE OF FLAT BED WITH BEDRAILS: A LITTLE
EATING MEALS: A LITTLE
TURNING FROM BACK TO SIDE WHILE IN FLAT BAD: A LITTLE
STANDING UP FROM CHAIR USING ARMS: A LOT
STANDING UP FROM CHAIR USING ARMS: A LITTLE
CLIMB 3 TO 5 STEPS WITH RAILING: A LOT
WALKING IN HOSPITAL ROOM: A LITTLE
STANDING UP FROM CHAIR USING ARMS: A LITTLE
TURNING FROM BACK TO SIDE WHILE IN FLAT BAD: A LITTLE
TOILETING: A LITTLE
TOILETING: A LITTLE
MOVING FROM LYING ON BACK TO SITTING ON SIDE OF FLAT BED WITH BEDRAILS: A LITTLE
DRESSING REGULAR LOWER BODY CLOTHING: A LITTLE
WALKING IN HOSPITAL ROOM: A LOT
MOVING TO AND FROM BED TO CHAIR: A LOT
DAILY ACTIVITIY SCORE: 18
PERSONAL GROOMING: A LITTLE
DAILY ACTIVITIY SCORE: 18
MOBILITY SCORE: 18
DRESSING REGULAR UPPER BODY CLOTHING: A LITTLE
PATIENT BASELINE BEDBOUND: NO
TURNING FROM BACK TO SIDE WHILE IN FLAT BAD: A LOT
MOBILITY SCORE: 14
EATING MEALS: A LITTLE
MOVING FROM LYING ON BACK TO SITTING ON SIDE OF FLAT BED WITH BEDRAILS: A LITTLE
HELP NEEDED FOR BATHING: A LITTLE
PERSONAL GROOMING: A LITTLE
CLIMB 3 TO 5 STEPS WITH RAILING: A LOT
MOVING TO AND FROM BED TO CHAIR: A LOT
HELP NEEDED FOR BATHING: A LITTLE

## 2025-01-27 ASSESSMENT — PAIN - FUNCTIONAL ASSESSMENT: PAIN_FUNCTIONAL_ASSESSMENT: 0-10

## 2025-01-27 ASSESSMENT — LIFESTYLE VARIABLES
HOW MANY STANDARD DRINKS CONTAINING ALCOHOL DO YOU HAVE ON A TYPICAL DAY: PATIENT DOES NOT DRINK
AUDIT-C TOTAL SCORE: 1
SUBSTANCE_ABUSE_PAST_12_MONTHS: NO
HOW OFTEN DO YOU HAVE A DRINK CONTAINING ALCOHOL: NEVER
HOW OFTEN DO YOU HAVE 6 OR MORE DRINKS ON ONE OCCASION: NEVER
AUDIT-C TOTAL SCORE: 0
PRESCIPTION_ABUSE_PAST_12_MONTHS: NO
SKIP TO QUESTIONS 9-10: 1
AUDIT-C TOTAL SCORE: 0
SKIP TO QUESTIONS 9-10: 1

## 2025-01-27 ASSESSMENT — PAIN SCALES - GENERAL
PAINLEVEL_OUTOF10: 0 - NO PAIN
PAINLEVEL_OUTOF10: 0 - NO PAIN

## 2025-01-27 ASSESSMENT — ACTIVITIES OF DAILY LIVING (ADL)
BATHING: INDEPENDENT
LACK_OF_TRANSPORTATION: NO
HEARING - RIGHT EAR: FUNCTIONAL
HEARING - LEFT EAR: FUNCTIONAL
ADEQUATE_TO_COMPLETE_ADL: YES
WALKS IN HOME: INDEPENDENT
ADL_ASSISTANCE: INDEPENDENT
GROOMING: INDEPENDENT
DRESSING YOURSELF: INDEPENDENT
JUDGMENT_ADEQUATE_SAFELY_COMPLETE_DAILY_ACTIVITIES: YES
ASSISTIVE_DEVICE: WALKER
TOILETING: NEEDS ASSISTANCE
PATIENT'S MEMORY ADEQUATE TO SAFELY COMPLETE DAILY ACTIVITIES?: YES
FEEDING YOURSELF: INDEPENDENT

## 2025-01-27 NOTE — PROGRESS NOTES
Pharmacy Medication History Review    Navneet Thompson is a 89 y.o. male admitted for Acute on chronic congestive heart failure, unspecified heart failure type. Pharmacy reviewed the patient's [id number] medications and allergies for accuracy.    Medications ADDED:  Areds   Magnesium oxide 420mg  Medications CHANGED:  Novolog - BID  Hydrocortisone 2.5mg  Warfarin 3mg 2 tablets Mon/Tues/Th/Fri/Sat - 1.5 tablets Wed/sun  Medications REMOVED:   Prednisone 5mg       The list below reflects the updated PTA list. Comments regarding how patient may be taking medications differently can be found in the Admit Orders Activity  Prior to Admission Medications   Prescriptions Last Dose Informant   FreeStyle Maria Elena 14 Day Sensor kit  Self   Sig: USE AS DIRECTED EVERY 14 DAYS   acetaminophen (Tylenol) 325 mg tablet Unknown Self   Sig: Take 2 tablets (650 mg) by mouth every 6 hours if needed for mild pain (1 - 3).   aspirin 81 mg EC tablet Past Week Self   Sig: Take 1 tablet (81 mg) by mouth once daily.   atorvastatin (Lipitor) 80 mg tablet Past Week Self   Sig: Take 1 tablet (80 mg) by mouth once daily at bedtime.   cholecalciferol (Vitamin D-3) 25 MCG (1000 UT) tablet Past Week Self   Sig: Take 1 tablet (1,000 Units) by mouth once daily. Do not start before October 20, 2023.   dupilumab (Dupixent) 300 mg/2 mL pen injector 1/22/2025 Self   Sig: Inject under the skin.   empagliflozin (Jardiance) 10 mg Past Week Self   Sig: Take 1 tablet (10 mg) by mouth once daily. Do not start before October 23, 2023.   furosemide (Lasix) 40 mg tablet Past Week Self   Sig: Take 1 tablet (40 mg) by mouth 2 times daily (morning and late afternoon).   hydrocortisone 2.5 % cream Past Week Self   Sig: Apply 1 Application topically 2 times a day. ear   insulin aspart (NovoLOG U-100 Insulin aspart) 100 unit/mL injection 1/25/2025 Self   Sig: INJECT 6 UNITS UNDER THE SKIN WITH BREAKFAST/LUNCH/DINNER, IF BLOOD GLUCOSE GREATER THAN 180 GIVE 8 UNITS,  BLOOD GLUCOSE GREATER THAN 240 GIVE 10 UNITS AND GREATER THAN 300 GIVE 12 UNITS   insulin glargine (Lantus U-100 Insulin) 100 unit/mL injection 2025 Self   Sig: Inject 45 Units under the skin once daily at bedtime.   isosorbide mononitrate ER (Imdur) 30 mg 24 hr tablet Past Week Self   Sig: Take 1 tablet (30 mg) by mouth once daily.   ixekizumab (Taltz) 80 mg/mL injection 2025 Self   Sig: Inject 1 mL (80 mg) under the skin every 14 (fourteen) days.   lisinopril 5 mg tablet Past Week Self   Sig: Take 1 tablet (5 mg) by mouth once daily.   lubricating eye drops ophthalmic solution Past Week Self   Sig: Administer 1 drop into both eyes 2 times a day as needed for dry eyes.   magnesium hydroxide (Milk of Magnesia) 400 mg/5 mL suspension Unknown Self   Sig: Take 5 mL by mouth once daily as needed.   magnesium oxide (Mag-Ox) 420 mg tablet Past Week Self   Si tablet (420 mg) by nasoduodenal tube route once daily.   melatonin 3 mg tablet Unknown Self   Sig: Take 1 tablet (3 mg) by mouth as needed at bedtime for sleep.   metoprolol tartrate (Lopressor) 25 mg tablet Past Week Self   Sig: Take 1 tablet (25 mg) by mouth 2 times a day.   neomycin-bacitracin-polymyxin (Polysporin) ophthalmic ointment Unknown Self   Sig: Apply 0.5 inches to both eyes once daily.   omeprazole (PriLOSEC) 20 mg DR capsule Past Week Self   Sig: Take 1 capsule (20 mg) by mouth once daily. Do not crush or chew.   potassium chloride CR (Klor-Con M20) 20 mEq ER tablet Past Week Self   Sig: Take 1 tablet (20 mEq) by mouth once daily. Do not crush or chew.   triamcinolone (Kenalog) 0.1 % cream Unknown Self   Sig: Apply topically 2 times a day.   vit A/vit C/vit E/zinc/copper (ICAPS AREDS ORAL) Past Week Self   Sig: Take 1 capsule by mouth 2 times a day.   warfarin (Coumadin) 3 mg tablet Past Week Self   Sig: TAKE 1 AND 1/2 TABLETS BY MOUTH 4 DAYS A WEEK AND 2 TABLETS ALL OTHER DAYS   Patient taking differently: Take 1 1/2 tablets (4.5 mg) by  mouth on Wednesdays and Sundays and 2 tablets (6 mg) on other days      Facility-Administered Medications: None        The list below reflects the updated allergy list. Please review each documented allergy for additional clarification and justification.  Allergies  Reviewed by Carolyn Mariano CPhT on 1/27/2025        Severity Reactions Comments    Linagliptin Medium Itching, Rash     Dicloxacillin Not Specified Nausea/vomiting     Doxycycline Not Specified Nausea/vomiting VIOLENTLY SICK    Scopolamine Not Specified Syncope PASSED OUT, ALTERED MENTAL STATE            Pharmacy has been updated to [facility].    Sources used to complete the med history include dispense history, PTA medication history, patient interview. Patient is a fair historian.     Below are additional concerns with the patient's PTA list.  Patient preferred not to go over the medications, he wanted me to reference the printed list from  [facility], and [facility]. He reports everything as current and accurate. Updated last doses as well as I could with his answers.    Carolyn Mariano CPhT-Adv  Please reach out via [employer] Secure Chat for questions

## 2025-01-27 NOTE — ASSESSMENT & PLAN NOTE
INR supratherapeutic - hold warfarin   Patient's normally takes 6 mg of Coumadin every day except Wednesday and Sunday he is taking 4.5 mg  PT/INR daily

## 2025-01-27 NOTE — ED NOTES
Pt requested to ambulate to the bathroom. Pt brought a wheelchair and assisted to sit up but pt not understanding commands fully and put his feet on top of the wheelchair instead of the floor. Pt then put his feet to the side of the wheelchair and attempted to stand but was unable to. Pt did not have a fall and was assisted back into the bed by multiple staff. Pt became very short of breath breathing around 45 times a minute. RT called and hospitalist paged. Pt placed on bipap, nighttime dose of iv lasix given, and rt christiano abg.     Kimberly Luke RN  01/26/25 2031

## 2025-01-27 NOTE — PROGRESS NOTES
"Navneet Thompson is a 89 y.o. male on day 1 of admission presenting with Acute on chronic congestive heart failure, unspecified heart failure type.    Subjective   Patient resting in bed. States he feels his breathing is starting to improve, but is still congested. Has moist cough that is not productive. Denies chest pain, abdominal pain, fevers, chills.        Objective     Physical Exam  Constitutional:       Appearance: Normal appearance.   HENT:      Head: Normocephalic and atraumatic.      Mouth/Throat:      Mouth: Mucous membranes are moist.      Pharynx: Oropharynx is clear.   Eyes:      Extraocular Movements: Extraocular movements intact.      Conjunctiva/sclera: Conjunctivae normal.      Pupils: Pupils are equal, round, and reactive to light.   Cardiovascular:      Rate and Rhythm: Normal rate and regular rhythm.      Pulses: Normal pulses.      Heart sounds: Normal heart sounds.   Pulmonary:      Effort: Pulmonary effort is normal.      Breath sounds: Normal breath sounds.   Abdominal:      General: Bowel sounds are normal.      Palpations: Abdomen is soft.      Tenderness: There is no abdominal tenderness.   Musculoskeletal:         General: Normal range of motion.      Cervical back: Normal range of motion and neck supple.   Skin:     General: Skin is warm and dry.      Capillary Refill: Capillary refill takes less than 2 seconds.   Neurological:      General: No focal deficit present.      Mental Status: He is alert and oriented to person, place, and time.   Psychiatric:         Mood and Affect: Mood normal.         Behavior: Behavior normal.         Last Recorded Vitals  Blood pressure 163/75, pulse 78, temperature 36.7 °C (98 °F), temperature source Oral, resp. rate (!) 23, height 1.778 m (5' 10\"), weight 83.9 kg (185 lb), SpO2 99%.  Intake/Output last 3 Shifts:  No intake/output data recorded.    Relevant Results  Results for orders placed or performed during the hospital encounter of 01/26/25 (from " the past 24 hours)   POCT GLUCOSE   Result Value Ref Range    POCT Glucose 66 (L) 74 - 99 mg/dL   POCT GLUCOSE   Result Value Ref Range    POCT Glucose 107 (H) 74 - 99 mg/dL   Blood Gas Arterial Full Panel   Result Value Ref Range    POCT pH, Arterial 7.48 (H) 7.38 - 7.42 pH    POCT pCO2, Arterial 32 (L) 38 - 42 mm Hg    POCT pO2, Arterial 205 (H) 85 - 95 mm Hg    POCT SO2, Arterial 99 94 - 100 %    POCT Oxy Hemoglobin, Arterial 97.4 94.0 - 98.0 %    POCT Hematocrit Calculated, Arterial 38.0 (L) 41.0 - 52.0 %    POCT Sodium, Arterial 134 (L) 136 - 145 mmol/L    POCT Potassium, Arterial 5.1 3.5 - 5.3 mmol/L    POCT Chloride, Arterial 106 98 - 107 mmol/L    POCT Ionized Calcium, Arterial 1.20 1.10 - 1.33 mmol/L    POCT Glucose, Arterial 116 (H) 74 - 99 mg/dL    POCT Lactate, Arterial 2.2 (H) 0.4 - 2.0 mmol/L    POCT Base Excess, Arterial 0.9 -2.0 - 3.0 mmol/L    POCT HCO3 Calculated, Arterial 23.8 22.0 - 26.0 mmol/L    POCT Hemoglobin, Arterial 12.8 (L) 13.5 - 17.5 g/dL    POCT Anion Gap, Arterial 9 (L) 10 - 25 mmo/L    Patient Temperature 37.0 degrees Celsius    FiO2 60 %    Apparatus      Ventilator Mode BiPAP     Ventilator Rate 16 bpm    Ipap CMH2O 12.0 cm H2O    Epap CMH2O 5.0 cm H2O    Site of Arterial Puncture Radial Left     Girish's Test Positive    Gray Top   Result Value Ref Range    Extra Tube Hold for add-ons.    Lactate   Result Value Ref Range    Lactate 1.5 0.4 - 2.0 mmol/L   Hemoglobin A1C   Result Value Ref Range    Hemoglobin A1C 7.9 (H) See comment %    Estimated Average Glucose 180 Not Established mg/dL   CBC   Result Value Ref Range    WBC 7.9 4.4 - 11.3 x10*3/uL    nRBC 0.0 0.0 - 0.0 /100 WBCs    RBC 4.02 (L) 4.50 - 5.90 x10*6/uL    Hemoglobin 11.9 (L) 13.5 - 17.5 g/dL    Hematocrit 38.8 (L) 41.0 - 52.0 %    MCV 97 80 - 100 fL    MCH 29.6 26.0 - 34.0 pg    MCHC 30.7 (L) 32.0 - 36.0 g/dL    RDW 15.7 (H) 11.5 - 14.5 %    Platelets 160 150 - 450 x10*3/uL   Basic metabolic panel   Result Value Ref  Range    Glucose 75 74 - 99 mg/dL    Sodium 141 136 - 145 mmol/L    Potassium 4.3 3.5 - 5.3 mmol/L    Chloride 103 98 - 107 mmol/L    Bicarbonate 29 21 - 32 mmol/L    Anion Gap 13 10 - 20 mmol/L    Urea Nitrogen 28 (H) 6 - 23 mg/dL    Creatinine 1.34 (H) 0.50 - 1.30 mg/dL    eGFR 50 (L) >60 mL/min/1.73m*2    Calcium 8.6 8.6 - 10.3 mg/dL   Protime-INR   Result Value Ref Range    Protime 57.1 (HH) 9.3 - 12.7 seconds    INR 6.1 (HH) 0.9 - 1.2   POCT GLUCOSE   Result Value Ref Range    POCT Glucose 73 (L) 74 - 99 mg/dL   Transthoracic Echo (TTE) Limited   Result Value Ref Range    BSA 2.04 m2     *Note: Due to a large number of results and/or encounters for the requested time period, some results have not been displayed. A complete set of results can be found in Results Review.   'ECG 12 lead    Result Date: 1/27/2025  Ventricular-paced rhythm Abnormal ECG When compared with ECG of 09-DEC-2024 11:14, Vent. rate has decreased BY   3 BPM    XR chest 1 view    Result Date: 1/26/2025  Interpreted By:  Diego Coronel, STUDY: XR CHEST 1 VIEW;  1/26/2025 8:24 pm   INDICATION: Signs/Symptoms:SOB.     COMPARISON: 01/26/2025   ACCESSION NUMBER(S): [id number]   ORDERING CLINICIAN: LUIS MINAYA   FINDINGS: Right chest wall multi lead AICD. Status post median sternotomy. Numerous mediastinal clips are noted. There is a transcatheter aortic valve replacement (TAVR).   Cardiomegaly. The pulmonary vasculature is congested centrally and indistinct peripherally, with peribronchovascular and interlobular septal thickening. There is more dense asymmetric airspace disease at the left lung base with pleural effusion similar to prior study. There is a small right pleural effusion.   There is no pneumothorax.         No significant interval change with bilateral effusions and left retrocardiac consolidation/atelectasis.   Mild pulmonary vascular congestion/interstitial edema.   MACRO: None.   Signed by: Diego Coronel 1/26/2025 9:03  PM Dictation workstation:   [id number]    XR chest 1 view    Result Date: 1/26/2025  STUDY: Chest Radiograph;  1/26/225 10:24 AM. INDICATION: Shortness of breath. COMPARISON: Chest radiographs 9/9/2024. ACCESSION NUMBER(S): [id number] ORDERING CLINICIAN: NAHUN BRADLEY TECHNIQUE:  Frontal chest was obtained at 10:24 hours. FINDINGS: CARDIOMEDIASTINAL SILHOUETTE: Cardiomediastinal silhouette is enlarged. There are median sternotomy wires. There is a right sided cardiac device.  LUNGS: There is increased density in the left lower lobe which may represent a left lower lobe infiltrate. An underlying left sided pleural effusion cannot be excluded. There is no pneumothorax.  ABDOMEN: No remarkable upper abdominal findings.  BONES: No acute osseous changes.    1. Increased density in left lower lobe may represent a left lower lobe infiltrate. An underlying left sided pleural effusion cannot be excluded. 2. Enlarged heart. Signed by Quinten Joshi MD       Assessment/Plan   Assessment & Plan  Acute on chronic congestive heart failure, unspecified heart failure type  Patient gained 7 to 8 pounds in the last week.  +3 pitting edema both lower extremities   Echo 10/2023 LVEF 30-35% - Update; await results   Fluid restriction  IV lasix twice daily   Consult cardiology - appreciate recs   ICD in situ - interrogate    Pneumonia due to infectious organism  Positive for cough.  No fever or chills  Possible left lower lobe pneumonia on imaging   Start on Zithromax and Rocephin   follow-up cultures   Atrial fibrillation (Multi)  INR supratherapeutic - hold warfarin   Patient's normally takes 6 mg of Coumadin every day except Wednesday and Sunday he is taking 4.5 mg  PT/INR daily  BETH (acute kidney injury) (CMS-[facility])  Creatinine at baseline 1.1-1.2  May be related to fluid retention   Diurese, monitor labs daily   Acid reflux  Continue with omeprazole  Hypertension  Continue with home meds   Vitals as ordered   History of  coronary artery bypass surgery  Angina free, stable   Continue ASA, statin, beta blocker   Troponin elevated with flat pattern.   DVT prophylaxis   Will resume warfarin when INR drifting down.     Kellie Henley, APRN-CNP

## 2025-01-27 NOTE — PROGRESS NOTES
Navneet Thompson is a 89 y.o. male on day 1 of admission presenting with Acute on chronic congestive heart failure, unspecified heart failure type.    Patient lives alone in a split level house, 6 steps up to bedroom and bathroom, 4 steps down to basement/laundry. States he has double railings. He uses a walker. States he is independent with ADLs. His daughter comes over at least once/week and does housework among other tasks. He receives MoW M-F. He no longer drives, daughter transports.   ADOD 01/29/2025  [facility] scores: PT-14, OT- [place]  Patient has scored moderate intensity. RNCC will discuss SNF with patient and provide facility list.     Patient does not have a safe discharge plan. Please speak with care coordination prior to discharging.     Talya Nuñez RN

## 2025-01-27 NOTE — CARE PLAN
The patient's goals for the shift include  unable to express    The clinical goals for the shift include adi cleaning

## 2025-01-27 NOTE — SIGNIFICANT EVENT
Increasing resp distress. Wet sounding breath sounds on exam and tachypneic in the 40s.  Wet sounding breath sounds.  Patient was placed on BiPAP with improvement but still tachypneic.  Will give a dose of Lasix early, and check x-ray.  Also have been in communication with ICU who will evaluate the patient.  Patient currently has a bed in stepdown, but right now we need to determine whether he is more appropriate for ICU before we physically move him.

## 2025-01-27 NOTE — CONSULTS
[facility] Critical Care Medicine       Date:  1/26/2025  Patient:  Navneet Thompson  YOB: 1933  MRN:  [id number]   Admit Date:  1/26/2025  ========================================================================================================    Chief Complaint   Patient presents with    Shortness of Breath     Pt has had sob for about a week. Pt has chf and feels like he is retaining water     History of Present Illness:  89 yo male patient who presents with dyspnea of 4 day duration, progressively worsening, also associated with 7-8 pound weight gain with mild increase in his baseline lower leg edema. He also has a productive cough.     PMHx: As below     Medical History:  Past Medical History:   Diagnosis Date    Aortic stenosis     Status post transcatheter aortic valve replacement    Atherosclerotic heart disease of native coronary artery without angina pectoris 12/12/2022    CAD (coronary artery disease)    Atrial fibrillation (Multi)     Chronic systolic heart failure     Complete heart block     status post dual chamber pacemaker placemtn in 01/2008    Diabetes mellitus, type 2 (Multi)     Essential (primary) hypertension 12/12/2022    Hypertension    Presence of automatic (implantable) cardiac defibrillator 11/14/2022    Cardiac defibrillator in place, upgraded from pacemaker in 05/2014     Past Surgical History:   Procedure Laterality Date    AORTIC VALVE REPLACEMENT  04/07/2022    Transcatheter aortic valve replacement (TAVR) for aortic valve stenosis    APPENDECTOMY  12/01/2014    Appendectomy    CARDIAC ELECTROPHYSIOLOGY PROCEDURE N/A 12/19/2023    Procedure: ICD UPGRADE, DUAL TO BIV;  Surgeon: Luis Felipe Greene MD;  Location: [place] [address] Cardiac Cath Lab;  Service: Electrophysiology;  Laterality: N/A;  [id number] Bi-V ICD gen change [employer]    CORONARY ARTERY BYPASS GRAFT  12/01/2014    CABG    CT ABDOMEN PELVIS ANGIOGRAM W AND/OR WO IV CONTRAST  03/17/2020    CT ABDOMEN PELVIS  ANGIOGRAM W AND/OR WO IV CONTRAST 3/17/2020 [facility] [place]    OTHER SURGICAL HISTORY  12/01/2014    Cardio-defib Pulse Generator Implantation Date     (Not in a hospital admission)    Linagliptin, Dicloxacillin, Doxycycline, and Scopolamine  Social History     Tobacco Use    Smoking status: Never     Passive exposure: Never    Smokeless tobacco: Never   Substance Use Topics    Alcohol use: Yes     Comment: a beer a year    Drug use: Never     Family History   Problem Relation Name Age of Onset    Drug abuse Mother          OVERDOSE       Hospital Medications:        Current Facility-Administered Medications:     acetaminophen (Tylenol) tablet 650 mg, 650 mg, oral, q4h PRN **OR** acetaminophen (Tylenol) oral liquid 650 mg, 650 mg, oral, q4h PRN **OR** acetaminophen (Tylenol) suppository 650 mg, 650 mg, rectal, q4h PRN, Astrid Zrikem, APRN-CNP    [Held by provider] acetaminophen (Tylenol) tablet 650 mg, 650 mg, oral, q6h PRN, Astrid Zrikem, APRN-CNP    aspirin EC tablet 81 mg, 81 mg, oral, Daily, Astrid Zrikem, APRN-CNP, 81 mg at 01/26/25 1627    atorvastatin (Lipitor) tablet 80 mg, 80 mg, oral, Nightly, Astrid Zrikem, APRN-CNP    azithromycin (Zithromax) tablet 500 mg, 500 mg, oral, q24h JESUS, Astrid Zrikem, APRN-CNP, 500 mg at 01/26/25 1627    benzocaine-menthol (Cepastat Sore Throat) lozenge 1 lozenge, 1 lozenge, Mouth/Throat, q2h PRN, Astrid Zrikem, APRN-CNP    bisacodyl (Dulcolax) EC tablet 10 mg, 10 mg, oral, Daily PRN, Astrid Zrikem, APRN-CNP    bisacodyl (Dulcolax) suppository 10 mg, 10 mg, rectal, Daily PRN, Astrid Zrikem, APRN-CNP    [START ON 1/27/2025] cefTRIAXone (Rocephin) 1 g in dextrose (iso) IV 50 mL, 1 g, intravenous, q24h, Astrid Zrikem, APRN-CNP    [Held by provider] cholecalciferol (Vitamin D-3) tablet 1,000 Units, 1,000 Units, oral, Daily, Astrid Zrikem, APRN-CNP    dextromethorphan-guaifenesin (Robitussin DM)  mg/5 mL oral liquid 5 mL, 5 mL, oral, q4h PRN, Astrid Zrikem, APRN-CNP     dextrose 50 % injection 12.5 g, 12.5 g, intravenous, q15 min PRN, Astrid Zrikem, APRN-CNP    dextrose 50 % injection 25 g, 25 g, intravenous, q15 min PRN, Astrid Zrikem, APRN-CNP    [Held by provider] empagliflozin (Jardiance) tablet 10 mg, 10 mg, oral, Daily, Astrid Zrikem, APRN-CNP    famotidine (Pepcid) tablet 20 mg, 20 mg, oral, Daily, 20 mg at 01/26/25 1627 **OR** famotidine PF (Pepcid) injection 20 mg, 20 mg, intravenous, Daily, Astrid Zrikem, APRN-CNP    furosemide (Lasix) injection 40 mg, 40 mg, intravenous, q12h, Astrid Zrikem, APRN-CNP, 40 mg at 01/26/25 2010    [Held by provider] furosemide (Lasix) tablet 40 mg, 40 mg, oral, BID, Astrid Zrikem, APRN-CNP    glucagon (Glucagen) injection 1 mg, 1 mg, intramuscular, q15 min PRN, Astrid Zrikem, APRN-CNP    glucagon (Glucagen) injection 1 mg, 1 mg, intramuscular, q15 min PRN, Astrid Zrikem, APRN-CNP    insulin glargine (Lantus) injection 45 Units, 45 Units, subcutaneous, Nightly, Astrid Zrikem, APRN-CNP    insulin lispro injection 0-10 Units, 0-10 Units, subcutaneous, TID AC, Astrid Zrikem, APRN-CNP    [Held by provider] isosorbide mononitrate ER (Imdur) 24 hr tablet 30 mg, 30 mg, oral, Daily, Astrid Zrikem, APRN-CNP    [Held by provider] ixekizumab (Taltz) injection 80 mg, 80 mg, subcutaneous, q14 days, Astrid Zrikem, APRN-CNP    [Held by provider] lisinopril tablet 5 mg, 5 mg, oral, Daily, Astrid Zrikem, APRN-CNP    lubricating eye drops ophthalmic solution 1 drop, 1 drop, Both Eyes, BID PRN, Astrid Zrikem, APRN-CNP    [Held by provider] magnesium hydroxide (Milk of Magnesia) 400 mg/5 mL suspension 5 mL, 5 mL, oral, Daily PRN, Astrid Zrikem, APRN-CNP    melatonin tablet 3 mg, 3 mg, oral, Nightly PRN, Astrid Zrikem, APRN-CNP    metoprolol tartrate (Lopressor) tablet 25 mg, 25 mg, oral, BID, Astrid Zrikem, APRN-CNP, 25 mg at 01/26/25 1627    [Held by provider] neomycin-bacitracin-polymyxin (Polysporin) ophthalmic ointment 0.5 inch, 0.5 inch, Both Eyes, Daily,  Astrid Zrikem, APRN-CNP    ondansetron (Zofran) tablet 4 mg, 4 mg, oral, q8h PRN **OR** ondansetron (Zofran) injection 4 mg, 4 mg, intravenous, q8h PRN, Astrid Zrikem, APRN-CNP    oxygen (O2) therapy, , inhalation, Continuous - Inhalation, Danis Mendoza, DO, 60 percent at 01/26/25 2015    [START ON 1/27/2025] pantoprazole (ProtoNix) EC tablet 40 mg, 40 mg, oral, Daily before breakfast, Astrid Zrikem, APRN-CNP    polyethylene glycol (Glycolax, Miralax) packet 17 g, 17 g, oral, Daily PRN, Astrid Zrikem, APRN-CNP    potassium chloride CR (Klor-Con M20) ER tablet 20 mEq, 20 mEq, oral, Daily, Astrid Zrikem, APRN-CNP, 20 mEq at 01/26/25 1627    predniSONE (Deltasone) tablet 2.5 mg, 2.5 mg, oral, Daily, Astrid Zrikem, APRN-CNP    prochlorperazine (Compazine) tablet 10 mg, 10 mg, oral, q6h PRN **OR** prochlorperazine (Compazine) injection 10 mg, 10 mg, intravenous, q6h PRN **OR** prochlorperazine (Compazine) suppository 25 mg, 25 mg, rectal, q12h PRN, Astrid Zrikem, APRN-CNP    triamcinolone (Kenalog) 0.1 % cream, , Topical, BID, Astrid Zrikem, APRN-CNP    Current Outpatient Medications:     acetaminophen (Tylenol) 325 mg tablet, Take 2 tablets (650 mg) by mouth every 6 hours if needed for mild pain (1 - 3)., Disp: 30 tablet, Rfl: 0    aspirin 81 mg EC tablet, Take 1 tablet (81 mg) by mouth once daily., Disp: , Rfl:     atorvastatin (Lipitor) 80 mg tablet, Take 1 tablet (80 mg) by mouth once daily at bedtime., Disp: 30 tablet, Rfl: 0    cholecalciferol (Vitamin D-3) 25 MCG (1000 UT) tablet, Take 1 tablet (1,000 Units) by mouth once daily. Do not start before October 20, 2023., Disp: , Rfl:     dupilumab (Dupixent) 300 mg/2 mL pen injector, Inject under the skin., Disp: , Rfl:     empagliflozin (Jardiance) 10 mg, Take 1 tablet (10 mg) by mouth once daily. Do not start before October 23, 2023., Disp: , Rfl:     FreeStyle Maria Elena 14 Day Sensor kit, USE AS DIRECTED EVERY 14 DAYS, Disp: , Rfl:     furosemide (Lasix) 40 mg  tablet, Take 1 tablet (40 mg) by mouth 2 times daily (morning and late afternoon)., Disp: 180 tablet, Rfl: 1    hydrocortisone 1 % cream, Apply topically 2 times a day., Disp: , Rfl:     insulin aspart (NovoLOG U-100 Insulin aspart) 100 unit/mL injection, INJECT 6 UNITS UNDER THE SKIN WITH BREAKFAST/LUNCH/DINNER, IF BLOOD GLUCOSE GREATER THAN 180 GIVE 8 UNITS, BLOOD GLUCOSE GREATER THAN 240 GIVE 10 UNITS AND GREATER THAN 300 GIVE 12 UNITS, Disp: , Rfl:     insulin glargine (Lantus U-100 Insulin) 100 unit/mL injection, Inject 45 Units under the skin once daily at bedtime., Disp: , Rfl:     insulin glargine-yfgn 100 unit/mL (3 mL) Pen, Inject under the skin., Disp: , Rfl:     isosorbide mononitrate ER (Imdur) 30 mg 24 hr tablet, Take 1 tablet (30 mg) by mouth once daily., Disp: , Rfl:     ixekizumab (Taltz) 80 mg/mL injection, Inject 1 mL (80 mg) under the skin every 14 (fourteen) days., Disp: , Rfl:     lisinopril 5 mg tablet, Take 1 tablet (5 mg) by mouth once daily., Disp: , Rfl:     lubricating eye drops ophthalmic solution, Administer 1 drop into both eyes 2 times a day as needed for dry eyes., Disp: , Rfl:     magnesium hydroxide (Milk of Magnesia) 400 mg/5 mL suspension, Take 5 mL by mouth once daily as needed., Disp: , Rfl:     melatonin 3 mg tablet, Take 1 tablet (3 mg) by mouth as needed at bedtime for sleep., Disp: , Rfl: 0    metoprolol tartrate (Lopressor) 25 mg tablet, Take 1 tablet (25 mg) by mouth 2 times a day., Disp: , Rfl:     neomycin-bacitracin-polymyxin (Polysporin) ophthalmic ointment, Apply 0.5 inches to both eyes once daily., Disp: , Rfl:     omeprazole (PriLOSEC) 20 mg DR capsule, Take 1 capsule (20 mg) by mouth once daily. Do not crush or chew., Disp: , Rfl:     potassium chloride CR (Klor-Con M20) 20 mEq ER tablet, Take 1 tablet (20 mEq) by mouth once daily. Do not crush or chew., Disp: 30 tablet, Rfl: 5    predniSONE 5 mg tablet,delayed release (DR/EC), Take 10 mg by mouth once daily.,  "Disp: , Rfl:     triamcinolone (Kenalog) 0.1 % cream, Apply topically 2 times a day., Disp: , Rfl:     warfarin (Coumadin) 3 mg tablet, TAKE 1 AND 1/2 TABLETS BY MOUTH 4 DAYS A WEEK AND 2 TABLETS ALL OTHER DAYS (Patient taking differently: Take 1 1/2 tablets (4.5 mg) by mouth on  and Sundays and 2 tablets (6 mg) on other days), Disp: 180 tablet, Rfl: 2    Review of Systems:  14 point review of systems was completed and negative except for those specially mention in my HPI    Physical Exam:    Heart Rate:  []   Temperature:  [36.7 °C (98 °F)]   Respirations:  [15-40]   BP: (104-163)/()   Height:  [177.8 cm (5' 10\")]   Weight:  [83.9 kg (185 lb)]   Pulse Ox:  [91 %-100 %]     Physical Exam  Vitals reviewed.   Constitutional:       General: He is awake. He is not in acute distress.     Appearance: Normal appearance. He is not ill-appearing, toxic-appearing or diaphoretic.   Eyes:      Extraocular Movements: Extraocular movements intact.      Conjunctiva/sclera: Conjunctivae normal.      Comments: DIGNA, R3/L3, consensual response   Neck:      Vascular: No JVD.   Cardiovascular:      Rate and Rhythm: Normal rate. Rhythm irregular.      Pulses:           Radial pulses are 2+ on the right side and 2+ on the left side.        Posterior tibial pulses are 1+ on the right side and 1+ on the left side.   Pulmonary:      Effort: Tachypnea present. No accessory muscle usage.      Breath sounds: Examination of the right-lower field reveals decreased breath sounds. Examination of the left-lower field reveals decreased breath sounds. Decreased breath sounds present.      Comments: Ronchi noted more prominently to R hemothorax  Abdominal:      General: There is no distension.      Palpations: Abdomen is soft. There is no fluid wave.   Musculoskeletal:      Cervical back: Neck supple.      Right lower le+ Edema present.      Left lower le+ Edema present.        Feet:       Comments: Moves all " "extremities, strength 4/5 symmetric to BUE and BLE   Feet:      Comments: Ulceration to medial R heel  Skin:     Comments: Scattered age related skin changes.   Neurological:      General: No focal deficit present.      Mental Status: He is alert and oriented to person, place, and time.      GCS: GCS eye subscore is 4. GCS verbal subscore is 5. GCS motor subscore is 6.   Psychiatric:         Attention and Perception: Attention normal.         Mood and Affect: Mood normal.         Behavior: Behavior normal. Behavior is cooperative.         Objective:    I have reviewed all medications, laboratory results, and imaging pertinent for today's encounter. Please refer to radiology studies for all historical imaging and interpretations, please refer to EMR \"Results\" tab for all current and historical lab work.     FiO2 (%):  [60 %] 60 %  S RR:  [16] 16    No intake or output data in the 24 hours ending 01/26/25 2202      Assessment/Plan:    ICU team consulted for acute respiratory failure with hypoxia.     #Acute respiratory failure with hypoxia  -CXR with mild congestion, stable on repeat  -Placed on BiPAP @ 12/5, FiO2 0.60, base rate 16    -->ABGs: pH 7.48, PaCO2 32 torr, PaO2 205 torr  -Maintain SpO2 at/above 92%  -Agree with Lasix x1   -High risk respiratory protocol (C&DB, IS use)    DISPO: Due to current clinical stability, and no additional therapies warranted that are specific to the ICU needs, continue with the current treatment plan and the patient is cleared to go to the step down unit.    Critical Care Time:  Total face to face time spent with patient/family of 35 minutes critical care time, with >50% of the time spent discussing plan of care/management, counseling/educating on disease processes, explaining results of diagnostic testing.    JONNIE Sneed, M,Ed., PA-C   - [facility]  Pulmonary/Critical Care Medicine  Secure Chat Preferred    "

## 2025-01-27 NOTE — PROGRESS NOTES
Navneet Thompson is a 89 y.o. male on day 1 of admission presenting with Acute on chronic congestive heart failure, unspecified heart failure type.       01/27/25 1002   Discharge Planning   Living Arrangements Alone   Support Systems Children   Assistance Needed housekeeping   Type of Residence Private residence   Number of Stairs to Enter Residence 1   Number of Stairs Within Residence 2  (6 steps up, 4 steps down)   Do you have animals or pets at home? No   Who is requesting discharge planning? Provider   Home or Post Acute Services Post acute facilities (Rehab/SNF/etc)   Type of Post Acute Facility Services Rehab;Skilled nursing   Expected Discharge Disposition SNF   Does the patient need discharge transport arranged? Yes   RoundTrip coordination needed? Yes   Has discharge transport been arranged? No   Patient Choice   Provider Choice list and CMS website (https://medicare.gov/care-compare#search) for post-acute Quality and Resource Measure Data were provided and reviewed with: Patient   Patient / Family choosing to utilize agency / facility established prior to hospitalization No   Stroke Family Assessment   Stroke Family Assessment Needed No         Talya Nuñez RN

## 2025-01-27 NOTE — ASSESSMENT & PLAN NOTE
Positive for cough.  No fever or chills  Possible left lower lobe pneumonia on imaging   Start on Zithromax and Rocephin   follow-up cultures

## 2025-01-27 NOTE — PROGRESS NOTES
Physical Therapy    Physical Therapy Evaluation & Treatment    Patient Name: Navneet Thompson  MRN: [id number]  Department: [facility] ED  Room: [place] [address]  Today's Date: 1/27/2025   Time Calculation  Start Time: 0750  Stop Time: 0824  Time Calculation (min): 34 min    Assessment/Plan   PT Assessment  PT Assessment Results: Decreased strength, Decreased range of motion, Decreased endurance, Impaired balance, Decreased mobility, Decreased coordination, Decreased safety awareness, Impaired hearing, Impaired sensation, Obesity, Decreased skin integrity  Rehab Prognosis: Good  Barriers to Discharge Home: Caregiver assistance, Physical needs  Caregiver Assistance: Patient lives alone and/or does not have reliable caregiver assistance  Physical Needs: Stair navigation to access bed limited by function/safety, Stair navigation to access bath limited by function/safety, High falls risk due to function or environment  Evaluation/Treatment Tolerance: Patient limited by fatigue  Medical Staff Made Aware: Yes  Strengths: Ability to acquire knowledge  Barriers to Participation: Comorbidities  End of Session Communication: Bedside nurse  Assessment Comment: pt required fluctuating close supervision to mod assist of 1 for the above mobility. Pt demonstrates decreased strength, balance, endurance, mobility ease and safety, + increased fall risk as compared to baseline. Pt will benefit from continied skilled PT services while in acute care and mod int rehab post hospital d/c.  End of Session Patient Position: Bed, 3 rail up, Alarm on (call button in reach)   IP OR SWING BED PT PLAN  Inpatient or Swing Bed: Inpatient  PT Plan  Treatment/Interventions: Bed mobility, Transfer training, Gait training, Stair training, Balance training, Strengthening, Endurance training, Range of motion, Therapeutic exercise, Therapeutic activity  PT Plan: Ongoing PT  PT Frequency: 5 times per week  PT Discharge Recommendations: Moderate intensity level of continued  care  Equipment Recommended upon Discharge: Wheeled walker, Other (comment) (rollator)  PT Recommended Transfer Status: Assist x1, Assistive device (FWw)  PT - OK to Discharge: Yes      Subjective     General Visit Information:  General  Reason for Referral: impaired mobility; + acute on chronic CHF exacerbation  Referred By: Dr FREEMAN velazquez  Past Medical History Relevant to Rehab: CHF, A-fib, CAD, AVR, pacermaker, ICD, DM, HTN  Family/Caregiver Present: No  Prior to Session Communication: Bedside nurse  Patient Position Received: On cart  Preferred Learning Style: verbal, visual  General Comment: 89 yo WM admitted to [facility] via ED 1/26/25 with c/o increasing SOB x 4 days with 8 lb weight gain over last week. + CHF exacerbation; Cleared by nurse for therapy; pt agreeable to therapy; + telemetry, purewick.  Home Living:  Home Living  Type of Home: House  Lives With: Alone  Home Adaptive Equipment: Walker rolling or standard, Cane (rollator)  Home Layout: Multi-level, Laundry in basement, Bed/bath upstairs, Stairs to alternate level with rails, Stairs to alternate level without rails  Alternate Level Stairs-Rails: Both  Alternate Level Stairs-Number of Steps: 6 steps up to bed/bathroom; 4 steps down to laundryroom  Home Access: Stairs to enter with rails  Entrance Stairs-Rails:  (grabbar by door)  Entrance Stairs-Number of Steps: 1  Bathroom Shower/Tub: Tub/shower unit  Bathroom Toilet: Standard  Bathroom Equipment: Grab bars in shower, Shower chair with back  Bathroom Accessibility: 2nd floor  Prior Level of Function:  Prior Function Per Pt/Caregiver Report  Level of [place]: Independent with ADLs and functional transfers, Needs assistance with homemaking  Receives Help From: Family (daughter comes over daily however daughter's spouse is currently in the hospital)  ADL Assistance: Independent  Homemaking Assistance:  (daughter does laundry, cleaning and provides transportation; pt receives M on W x 5 days for  lunch)  Ambulatory Assistance: Independent (maykel rollator)  Vocational: Retired  Prior Function Comments: pt admits to 1 fall past 6 mos; independently manages his own meds  Precautions:  Precautions  Hearing/Visual Limitations: + reading glasses; deaf right ear  Medical Precautions: Fall precautions, Oxygen therapy device and L/min (5 liters o2 via nc)     Date/Time Vitals Session Patient Position Pulse Resp SpO2 BP MAP (mmHg)    01/27/25 0700 --  --  85  31  99 %  148/74  96     01/27/25 0750 During PT  --  --  --  --  --  --     01/27/25 0830 --  --  71  30  98 %  160/72  99           Vital Signs Comment: O2 sat 96 to 97% with HR 78 to 85 bpm throughout session.    Objective   Pain:  Pain Assessment  Pain Assessment: 0-10  0-10 (Numeric) Pain Score: 0 - No pain  Cognition:  Cognition  Overall Cognitive Status: Within Functional Limits  Orientation Level: Oriented X4  Safety/Judgement:  (decreased safety insight during functional mobility)  Insight: Mild    General Assessments:  General Observation  General Observation: pleasant , cooperative, + mild edema bilateral ankles, dry cracked skin bilateral feet with right medial heel area of small ulcer               Activity Tolerance  Endurance: Decreased tolerance for upright activites  Activity Tolerance Comments: poor + due to weakness, SOB, fatigue    Sensation  Sensation Comment: pt c/o tingling all fingertips; appears + neuropathy in bilateral feet also    Strength  Strength Comments: varsha hips 3-/5, knees 3+/5, right ankle 2+/5, left 2-/5  Coordination  Movements are Fluid and Coordinated: No  Lower Body Coordination: decreased timing and accuracy of bilateral LE movements    Postural Control  Posture Comment: obese with mild forward head, protracted shldrs, mild forward flexed    Static Sitting Balance  Static Sitting-Balance Support: Feet supported  Static Sitting-Level of Assistance: Close supervision  Static Sitting-Comment/Number of Minutes: 4-5  min---good  Dynamic Sitting Balance  Dynamic Sitting-Balance Support: Feet supported, Bilateral upper extremity supported  Dynamic Sitting-Level of Assistance: Contact guard  Dynamic Sitting-Comments: fair +    Static Standing Balance  Static Standing-Balance Support: Bilateral upper extremity supported  Static Standing-Level of Assistance: Minimum assistance  Static Standing-Comment/Number of Minutes: FWW, 1-2 min---fair +  Dynamic Standing Balance  Dynamic Standing-Balance Support: Bilateral upper extremity supported  Dynamic Standing-Level of Assistance: Minimum assistance, Moderate assistance  Dynamic Standing-Comments: FWW, fair -  Functional Assessments:  Bed Mobility  Bed Mobility: Yes  Bed Mobility 1  Bed Mobility 1: Supine to sitting  Level of Assistance 1: Moderate assistance, Minimal verbal cues  Bed Mobility Comments 1: head of cart slightly elevated; mod assist of 1 for trunk up, scooting pelvis to edge of cart, verbal cues for active us eof varsha UE's as able due to poor varsha shldr strength, AROM  Bed Mobility 2  Bed Mobility  2: Sitting to supine  Level of Assistance 2: Minimum assistance  Bed Mobility Comments 2: head of bed elevated; min assist of 1 for varsha LE's into bed    Transfers  Transfer: Yes  Transfer 1  Transfer From 1: Bed to  Transfer to 1: Stand  Technique 1: Sit to stand  Transfer Device 1: Walker  Transfer Level of Assistance 1: Minimum assistance, Minimal verbal cues  Trials/Comments 1: min assist of 1 for trunk up, verbal cues for proper varsha hand placement on cart  Transfers 2  Transfer From 2: Stand to  Transfer to 2: Bed  Technique 2: Stand to sit  Transfer Device 2: Walker  Transfer Level of Assistance 2: Minimum assistance, Minimal verbal cues  Trials/Comments 2: min assist of 1 for trunk down, verbal cues for reaching back for bed with both hands    Ambulation/Gait Training  Ambulation/Gait Training Performed: Yes  Ambulation/Gait Training 1  Surface 1: Level tile  Device 1: Rolling  walker  Assistance 1: Minimum assistance, Moderate assistance, Moderate verbal cues  Quality of Gait 1: Wide base of support, Decreased step length, Diminished heel strike, Forward flexed posture, Soft knee(s)  Comments/Distance (ft) 1: pt amb cart to bed, FWW, + turn, min to mod assist of 1, verbal cues for erect posture, increased step height and pursed lip breathing, approx 12 to 14 steps    Stairs  Stairs: No  Extremity/Trunk Assessments:  Cervical Spine   Cervical Spine:  (mild forward head)  RLE   RLE :  (see above strength comments)  LLE   LLE :  (see above strength comments)  Treatments:  Therapeutic Exercise  Therapeutic Exercise Performed: Yes  Therapeutic Exercise Activity 1: supine varsha AP x 20 reps as able;  Therapeutic Exercise Activity 2: supine varsha assisted heel slides x 5 reps  Therapeutic Exercise Activity 3: seated varsha LAQ's x 15 reps  Therapeutic Exercise Activity 4: supine varsha hip abd/add x 10 reps  Therapeutic Exercise Activity 5: supine and seated pursed lip/deep breathing x 3-4 sets of 3 to 5 reps  Outcome Measures:  [facility] Basic Mobility  Turning from your back to your side while in a flat bed without using bedrails: A little  Moving from lying on your back to sitting on the side of a flat bed without using bedrails: A lot  Moving to and from bed to chair (including a wheelchair): A lot  Standing up from a chair using your arms (e.g. wheelchair or bedside chair): A little  To walk in hospital room: A lot  Climbing 3-5 steps with railing: A lot  Basic Mobility - Total Score: 14    Encounter Problems       Encounter Problems (Active)       Balance       STG - Maintains dynamic standing balance with upper extremity support (Progressing)       Start:  01/27/25    Expected End:  02/24/25       INTERVENTIONS:  1. Practice standing with minimal support.  2. Educate patient about standing tolerance.  3. Educate patient about independence with gait, transfers, and ADL's.  4. Educate patient about use  of assistive device.  5. Educate patient about self-directed care.            Mobility       STG - Patient will ambulate 100 ft, FWW, + turns, mod Ind (Progressing)       Start:  01/27/25    Expected End:  02/24/25            pt ascends/descends 6 steps with 2 rails, mod ind (Progressing)       Start:  01/27/25    Expected End:  02/24/25               PT Transfers       STG - Patient to transfer to and from sit to supine mod Ind (Progressing)       Start:  01/27/25    Expected End:  02/24/25            STG - Patient will transfer sit to and from stand mod ind (Progressing)       Start:  01/27/25    Expected End:  02/24/25                   Education Documentation  Precautions, taught by Freda Beebe, PT at 1/27/2025  8:55 AM.  Learner: Patient  Readiness: Acceptance  Method: Explanation, Demonstration  Response: Needs Reinforcement  Comment: PT educated pt in safe transfer technique and out of bed with assist from staff at all times, FWW.    Mobility Training, taught by Freda Beebe PT at 1/27/2025  8:55 AM.  Learner: Patient  Readiness: Acceptance  Method: Explanation, Demonstration  Response: Needs Reinforcement  Comment: PT educated pt in safe transfer technique and out of bed with assist from staff at all times, FWW.    Education Comments  No comments found.

## 2025-01-27 NOTE — PROGRESS NOTES
Navneet Thompson is a 89 y.o. male on day 1 of admission presenting with Acute on chronic congestive heart failure, unspecified heart failure type.       01/27/25 1003   ACS Disability Status   Are you deaf or do you have serious difficulty hearing? Y  (has hearing aides)   Are you blind or do you have serious difficulty seeing, even when wearing glasses? N   Because of a physical, mental, or emotional condition, do you have serious difficulty concentrating, remembering, or making decisions? (5 years old or older) N   Do you have serious difficulty walking or climbing stairs? N   Do you have serious difficulty dressing or bathing? N   Because of a physical, mental, or emotional condition, do you have serious difficulty doing errands alone such as visiting the doctor? N         Talya Nuñez RN

## 2025-01-27 NOTE — ASSESSMENT & PLAN NOTE
Patient gained 7 to 8 pounds in the last week.  +3 pitting edema both lower extremities   Echo 10/2023 LVEF 30-35% - Update; await results   Fluid restriction  IV lasix twice daily   Consult cardiology - appreciate recs   ICD in situ - interrogate

## 2025-01-27 NOTE — CONSULTS
Inpatient consult to Cardiology  Consult performed by: PARKER Tapia  Consult ordered by: PARKER Sanderson  Reason for consult: Heart failure exacerbation          Reason For Consult  Heart failure exacerbation    History Of Present Illness  Navneet Thompson is a 89 y.o. male presenting to the ED for shortness of breath worsening over the past 4-5 days, and weight gain of about 8 pounds.  Patient has a past medical history of systolic heart failure, aortic stenosis with TAVR, coronary artery disease, hypertension, A-fib on Coumadin, cor pulmonale, AICD initially placed in 2008 and upgraded 11/2023 from dual to BIV, diabetes type 2.  Patient states over the past 4 to 5 days he has been having increased shortness of breath and a productive cough.  Denies any chest pain, palpitations, pressure.  Patient states that he does feel bloated in his abdomen; no nausea, vomiting, diarrhea, constipation.  Denies any fever or illness.  He does state that his niece from [place] visited him last week and she did have symptoms of a common cold, he thinks that he may have caught something from her.  In the emergency department chest x-ray was completed and showed small right pleural effusion, pulmonary vasculature is congested centrally and indistinct peripherally, with peribronchovascular and interlobular septal thickening.  EKG done in the ED showed ventricular paced rhythm. Previous echo was done in 10/2023, which showed an EF of 30 to 35%, left and right atrium's are moderately to severe dilated, and there is a global hypokinesis of the left ventricle.  This morning's labs show sodium 141, potassium 4.3, BUN 28, creatinine 1.34, GFR 50, troponin 150, INR 6.1, hemoglobin/hematocrit 11.9/38.8. The ED gave cefepime and IV furosemide 40 mg.  The patient was also on BiPAP for few hours in the ED due to increased work of breathing and shortness of breath.  Upon assessment patient is breathing more comfortably  on 4 L nasal cannula. Patient sees cardiologist Dr. Martinez.      Past Medical History  He has a past medical history of Aortic stenosis, Atherosclerotic heart disease of native coronary artery without angina pectoris (12/12/2022), Atrial fibrillation (Multi), Chronic systolic heart failure, Complete heart block, Diabetes mellitus, type 2 (Multi), Essential (primary) hypertension (12/12/2022), and Presence of automatic (implantable) cardiac defibrillator (11/14/2022).    Surgical History  He has a past surgical history that includes Appendectomy (12/01/2014); Coronary artery bypass graft (12/01/2014); Other surgical history (12/01/2014); CT angio abdomen pelvis w and or wo IV IV contrast (03/17/2020); Aortic valve replacement (04/07/2022); and Cardiac electrophysiology procedure (N/A, 12/19/2023).     Social History  He reports that he has never smoked. He has never been exposed to tobacco smoke. He has never used smokeless tobacco. He reports current alcohol use. He reports that he does not use drugs.    Family History  Family History   Problem Relation Name Age of Onset    Drug abuse Mother          OVERDOSE        Allergies  Linagliptin, Dicloxacillin, Doxycycline, and Scopolamine      Physical Exam  Vitals and nursing note reviewed.   Constitutional:       Appearance: He is ill-appearing. He is not diaphoretic.   HENT:      Head: Normocephalic and atraumatic.      Mouth/Throat:      Mouth: Mucous membranes are dry.      Pharynx: Oropharynx is clear.   Eyes:      Extraocular Movements: Extraocular movements intact.   Cardiovascular:      Heart sounds: No murmur heard.     No friction rub. No gallop.      Comments: Ventricular paced rhythm.  No JVD present  Pulmonary:      Breath sounds: No stridor. Wheezing present. No rhonchi.      Comments: On 4 L nasal cannula.  With some mild conversational dyspnea. Bilateral upper lobes are clear to slightly diminished, bilateral lower lobes are diminished with expiratory  "wheezing  Chest:      Chest wall: No tenderness.   Abdominal:      General: Bowel sounds are normal.      Palpations: Abdomen is soft.      Comments: Abdomen looks slightly distended, but is soft, nontender   Musculoskeletal:      Cervical back: Normal range of motion and neck supple.      Comments: Slight edema in bilateral lower extremities, nonpitting   Skin:     General: Skin is warm and dry.      Capillary Refill: Capillary refill takes less than 2 seconds.   Neurological:      Mental Status: He is alert.   Psychiatric:         Mood and Affect: Mood normal.         Behavior: Behavior normal.         Thought Content: Thought content normal.         Judgment: Judgment normal.          Last Recorded Vitals  Blood pressure 148/74, pulse 85, temperature 36.7 °C (98 °F), temperature source Oral, resp. rate (!) 31, height 1.778 m (5' 10\"), weight 83.9 kg (185 lb), SpO2 99%.    Results for orders placed or performed during the hospital encounter of 01/26/25 (from the past 24 hours)   CBC and Auto Differential   Result Value Ref Range    WBC 6.4 4.4 - 11.3 x10*3/uL    nRBC 0.0 0.0 - 0.0 /100 WBCs    RBC 3.93 (L) 4.50 - 5.90 x10*6/uL    Hemoglobin 11.9 (L) 13.5 - 17.5 g/dL    Hematocrit 38.0 (L) 41.0 - 52.0 %    MCV 97 80 - 100 fL    MCH 30.3 26.0 - 34.0 pg    MCHC 31.3 (L) 32.0 - 36.0 g/dL    RDW 15.6 (H) 11.5 - 14.5 %    Platelets 157 150 - 450 x10*3/uL    Neutrophils % 71.9 40.0 - 80.0 %    Immature Granulocytes %, Automated 0.3 0.0 - 0.9 %    Lymphocytes % 15.6 13.0 - 44.0 %    Monocytes % 11.5 2.0 - 10.0 %    Eosinophils % 0.5 0.0 - 6.0 %    Basophils % 0.2 0.0 - 2.0 %    Neutrophils Absolute 4.58 1.60 - 5.50 x10*3/uL    Immature Granulocytes Absolute, Automated 0.02 0.00 - 0.50 x10*3/uL    Lymphocytes Absolute 0.99 0.80 - 3.00 x10*3/uL    Monocytes Absolute 0.73 0.05 - 0.80 x10*3/uL    Eosinophils Absolute 0.03 0.00 - 0.40 x10*3/uL    Basophils Absolute 0.01 0.00 - 0.10 x10*3/uL   Comprehensive metabolic panel "   Result Value Ref Range    Glucose 82 74 - 99 mg/dL    Sodium 140 136 - 145 mmol/L    Potassium 4.1 3.5 - 5.3 mmol/L    Chloride 104 98 - 107 mmol/L    Bicarbonate 29 21 - 32 mmol/L    Anion Gap 11 10 - 20 mmol/L    Urea Nitrogen 29 (H) 6 - 23 mg/dL    Creatinine 1.37 (H) 0.50 - 1.30 mg/dL    eGFR 49 (L) >60 mL/min/1.73m*2    Calcium 8.9 8.6 - 10.3 mg/dL    Albumin 3.4 3.4 - 5.0 g/dL    Alkaline Phosphatase 56 33 - 136 U/L    Total Protein 6.3 (L) 6.4 - 8.2 g/dL    AST 32 9 - 39 U/L    Bilirubin, Total 0.5 0.0 - 1.2 mg/dL    ALT 23 10 - 52 U/L   B-Type Natriuretic Peptide   Result Value Ref Range     (H) 0 - 99 pg/mL   Sars-CoV-2 and Influenza A/B PCR   Result Value Ref Range    Flu A Result Not Detected Not Detected    Flu B Result Not Detected Not Detected    Coronavirus 2019, PCR Not Detected Not Detected   Troponin I, High Sensitivity, Initial   Result Value Ref Range    Troponin I, High Sensitivity 152 (HH) 0 - 20 ng/L   Troponin, High Sensitivity, 1 Hour   Result Value Ref Range    Troponin I, High Sensitivity 150 (HH) 0 - 20 ng/L   Blood Culture    Specimen: Peripheral Venipuncture; Blood culture   Result Value Ref Range    Blood Culture Loaded on Instrument - Culture in progress    Blood Culture    Specimen: Peripheral Venipuncture; Blood culture   Result Value Ref Range    Blood Culture Loaded on Instrument - Culture in progress    Protime-INR   Result Value Ref Range    Protime 49.7 (H) 9.3 - 12.7 seconds    INR 5.2 (H) 0.9 - 1.2   POCT GLUCOSE   Result Value Ref Range    POCT Glucose 66 (L) 74 - 99 mg/dL   POCT GLUCOSE   Result Value Ref Range    POCT Glucose 107 (H) 74 - 99 mg/dL   Blood Gas Arterial Full Panel   Result Value Ref Range    POCT pH, Arterial 7.48 (H) 7.38 - 7.42 pH    POCT pCO2, Arterial 32 (L) 38 - 42 mm Hg    POCT pO2, Arterial 205 (H) 85 - 95 mm Hg    POCT SO2, Arterial 99 94 - 100 %    POCT Oxy Hemoglobin, Arterial 97.4 94.0 - 98.0 %    POCT Hematocrit Calculated, Arterial 38.0  (L) 41.0 - 52.0 %    POCT Sodium, Arterial 134 (L) 136 - 145 mmol/L    POCT Potassium, Arterial 5.1 3.5 - 5.3 mmol/L    POCT Chloride, Arterial 106 98 - 107 mmol/L    POCT Ionized Calcium, Arterial 1.20 1.10 - 1.33 mmol/L    POCT Glucose, Arterial 116 (H) 74 - 99 mg/dL    POCT Lactate, Arterial 2.2 (H) 0.4 - 2.0 mmol/L    POCT Base Excess, Arterial 0.9 -2.0 - 3.0 mmol/L    POCT HCO3 Calculated, Arterial 23.8 22.0 - 26.0 mmol/L    POCT Hemoglobin, Arterial 12.8 (L) 13.5 - 17.5 g/dL    POCT Anion Gap, Arterial 9 (L) 10 - 25 mmo/L    Patient Temperature 37.0 degrees Celsius    FiO2 60 %    Apparatus      Ventilator Mode BiPAP     Ventilator Rate 16 bpm    Ipap CMH2O 12.0 cm H2O    Epap CMH2O 5.0 cm H2O    Site of Arterial Puncture Radial Left     Girish's Test Positive    Gray Top   Result Value Ref Range    Extra Tube Hold for add-ons.    Lactate   Result Value Ref Range    Lactate 1.5 0.4 - 2.0 mmol/L   CBC   Result Value Ref Range    WBC 7.9 4.4 - 11.3 x10*3/uL    nRBC 0.0 0.0 - 0.0 /100 WBCs    RBC 4.02 (L) 4.50 - 5.90 x10*6/uL    Hemoglobin 11.9 (L) 13.5 - 17.5 g/dL    Hematocrit 38.8 (L) 41.0 - 52.0 %    MCV 97 80 - 100 fL    MCH 29.6 26.0 - 34.0 pg    MCHC 30.7 (L) 32.0 - 36.0 g/dL    RDW 15.7 (H) 11.5 - 14.5 %    Platelets 160 150 - 450 x10*3/uL   Basic metabolic panel   Result Value Ref Range    Glucose 75 74 - 99 mg/dL    Sodium 141 136 - 145 mmol/L    Potassium 4.3 3.5 - 5.3 mmol/L    Chloride 103 98 - 107 mmol/L    Bicarbonate 29 21 - 32 mmol/L    Anion Gap 13 10 - 20 mmol/L    Urea Nitrogen 28 (H) 6 - 23 mg/dL    Creatinine 1.34 (H) 0.50 - 1.30 mg/dL    eGFR 50 (L) >60 mL/min/1.73m*2    Calcium 8.6 8.6 - 10.3 mg/dL   Protime-INR   Result Value Ref Range    Protime 57.1 (HH) 9.3 - 12.7 seconds    INR 6.1 (HH) 0.9 - 1.2     *Note: Due to a large number of results and/or encounters for the requested time period, some results have not been displayed. A complete set of results can be found in Results Review.       Assessment/Plan     Acute on chronic systolic heart failure  Shortness of breath  AICD  Paroxysmal A-fib on Coumadin  Hypertension disorder  Supratherapeutic INR  Cor pulmonale  CAD  TAVR  Diabetes Type 2    1/27: As stated above, came to the ED for increased shortness of breath worsening over the past 4 to 5 days and states that he has had a weight gain during this time of 7 to 8 pounds.  Admitting team reordered patient's home 40mg Lasix from p.o. to IV BID, which the patient is tolerating well.  Will continue with this dose. Overnight in the ED patient had increased work of breathing and was placed on BiPAP O2 therapy and given a dose of IV Lasix at that time and showed improvement, now being on 4L nasal cannula with slight conversational dyspnea.  Patient denies chest pain, palpitations, pressure and no JVD was noted upon assessment. States the feeling of SOB has improved.  Patient's Coumadin is on hold, will continue to hold due to a supratherapeutic INR of 6.1.  Coumadin management will be done by the admitting team. Current vitals are 85, in a ventricular paced rhythm, blood pressure 148/74 satting 99% on 4 L.  Reordered his home medication of lisinopril and his Imdur.  Transthoracic echocardiogram was ordered, and will be completed sometime today.  Will also interrogate ICD. Will continue to follow this patient with you.       I spent 60 minutes in the professional and overall care of this patient.

## 2025-01-27 NOTE — PROGRESS NOTES
Navneet Thompson is a 89 y.o. male on day 1 of admission presenting with Acute on chronic congestive heart failure, unspecified heart failure type.       01/27/25 0954   Physical Activity   On average, how many days per week do you engage in moderate to strenuous exercise (like a brisk walk)? 0 days   On average, how many minutes do you engage in exercise at this level? 0 min   Financial Resource Strain   How hard is it for you to pay for the very basics like food, housing, medical care, and heating? Not hard   Housing Stability   In the last 12 months, was there a time when you were not able to pay the mortgage or rent on time? N   In the past 12 months, how many times have you moved where you were living? 0   At any time in the past 12 months, were you homeless or living in a shelter (including now)? N   Transportation Needs   In the past 12 months, has lack of transportation kept you from medical appointments or from getting medications? no   In the past 12 months, has lack of transportation kept you from meetings, work, or from getting things needed for daily living? No   Food Insecurity   Within the past 12 months, you worried that your food would run out before you got the money to buy more. Never true   Within the past 12 months, the food you bought just didn't last and you didn't have money to get more. Never true   Stress   Do you feel stress - tense, restless, nervous, or anxious, or unable to sleep at night because your mind is troubled all the time - these days? Not at all   Social Connections   In a typical week, how many times do you talk on the phone with family, friends, or neighbors? More than 3   How often do you get together with friends or relatives? Once  (daughter comes over at least once/week)   How often do you attend Temple or Mu-ism services? Never   Do you belong to any clubs or organizations such as Temple groups, unions, fraternal or athletic groups, or school groups? No   How often do  you attend meetings of the clubs or organizations you belong to? Never   Are you , , , , never , or living with a partner?    Intimate Partner Violence   Within the last year, have you been afraid of your partner or ex-partner? No   Within the last year, have you been humiliated or emotionally abused in other ways by your partner or ex-partner? No   Within the last year, have you been kicked, hit, slapped, or otherwise physically hurt by your partner or ex-partner? No   Within the last year, have you been raped or forced to have any kind of sexual activity by your partner or ex-partner? No   Alcohol Use   Q1: How often do you have a drink containing alcohol? Monthly or l   Q2: How many drinks containing alcohol do you have on a typical day when you are drinking? 1 or 2   Q3: How often do you have six or more drinks on one occasion? Never   Utilities   In the past 12 months has the electric, gas, oil, or water company threatened to shut off services in your home? No   Health Literacy   How often do you need to have someone help you when you read instructions, pamphlets, or other written material from your doctor or pharmacy? Rarely         Talya Nuñez RN

## 2025-01-28 LAB
ANION GAP SERPL CALCULATED.3IONS-SCNC: 13 MMOL/L (ref 10–20)
BUN SERPL-MCNC: 32 MG/DL (ref 6–23)
CALCIUM SERPL-MCNC: 8.3 MG/DL (ref 8.6–10.3)
CHLORIDE SERPL-SCNC: 102 MMOL/L (ref 98–107)
CO2 SERPL-SCNC: 30 MMOL/L (ref 21–32)
CREAT SERPL-MCNC: 1.38 MG/DL (ref 0.5–1.3)
EGFRCR SERPLBLD CKD-EPI 2021: 48 ML/MIN/1.73M*2
ERYTHROCYTE [DISTWIDTH] IN BLOOD BY AUTOMATED COUNT: 15.7 % (ref 11.5–14.5)
GLUCOSE BLD MANUAL STRIP-MCNC: 126 MG/DL (ref 74–99)
GLUCOSE BLD MANUAL STRIP-MCNC: 162 MG/DL (ref 74–99)
GLUCOSE BLD MANUAL STRIP-MCNC: 80 MG/DL (ref 74–99)
GLUCOSE SERPL-MCNC: 90 MG/DL (ref 74–99)
HCT VFR BLD AUTO: 35.6 % (ref 41–52)
HGB BLD-MCNC: 11 G/DL (ref 13.5–17.5)
INR PPP: 6.9 (ref 0.9–1.2)
MAGNESIUM SERPL-MCNC: 2.19 MG/DL (ref 1.6–2.4)
MCH RBC QN AUTO: 29.3 PG (ref 26–34)
MCHC RBC AUTO-ENTMCNC: 30.9 G/DL (ref 32–36)
MCV RBC AUTO: 95 FL (ref 80–100)
NRBC BLD-RTO: 0 /100 WBCS (ref 0–0)
PLATELET # BLD AUTO: 155 X10*3/UL (ref 150–450)
POTASSIUM SERPL-SCNC: 3.7 MMOL/L (ref 3.5–5.3)
PROTHROMBIN TIME: 64 SECONDS (ref 9.3–12.7)
RBC # BLD AUTO: 3.75 X10*6/UL (ref 4.5–5.9)
SODIUM SERPL-SCNC: 141 MMOL/L (ref 136–145)
WBC # BLD AUTO: 6.3 X10*3/UL (ref 4.4–11.3)

## 2025-01-28 PROCEDURE — 2500000005 HC RX 250 GENERAL PHARMACY W/O HCPCS: Performed by: INTERNAL MEDICINE

## 2025-01-28 PROCEDURE — 2500000001 HC RX 250 WO HCPCS SELF ADMINISTERED DRUGS (ALT 637 FOR MEDICARE OP)

## 2025-01-28 PROCEDURE — 2500000002 HC RX 250 W HCPCS SELF ADMINISTERED DRUGS (ALT 637 FOR MEDICARE OP, ALT 636 FOR OP/ED): Performed by: NURSE PRACTITIONER

## 2025-01-28 PROCEDURE — 99232 SBSQ HOSP IP/OBS MODERATE 35: CPT | Performed by: NURSE PRACTITIONER

## 2025-01-28 PROCEDURE — 2500000001 HC RX 250 WO HCPCS SELF ADMINISTERED DRUGS (ALT 637 FOR MEDICARE OP): Performed by: NURSE PRACTITIONER

## 2025-01-28 PROCEDURE — 2060000001 HC INTERMEDIATE ICU ROOM DAILY

## 2025-01-28 PROCEDURE — 2500000004 HC RX 250 GENERAL PHARMACY W/ HCPCS (ALT 636 FOR OP/ED): Performed by: NURSE PRACTITIONER

## 2025-01-28 PROCEDURE — 80048 BASIC METABOLIC PNL TOTAL CA: CPT | Performed by: NURSE PRACTITIONER

## 2025-01-28 PROCEDURE — 82947 ASSAY GLUCOSE BLOOD QUANT: CPT

## 2025-01-28 PROCEDURE — 2500000005 HC RX 250 GENERAL PHARMACY W/O HCPCS: Performed by: NURSE PRACTITIONER

## 2025-01-28 PROCEDURE — 85027 COMPLETE CBC AUTOMATED: CPT | Performed by: EMERGENCY MEDICINE

## 2025-01-28 PROCEDURE — 36415 COLL VENOUS BLD VENIPUNCTURE: CPT | Performed by: NURSE PRACTITIONER

## 2025-01-28 PROCEDURE — 83735 ASSAY OF MAGNESIUM: CPT | Performed by: REGISTERED NURSE

## 2025-01-28 PROCEDURE — 85610 PROTHROMBIN TIME: CPT | Performed by: NURSE PRACTITIONER

## 2025-01-28 PROCEDURE — 36415 COLL VENOUS BLD VENIPUNCTURE: CPT | Performed by: REGISTERED NURSE

## 2025-01-28 RX ORDER — GUAIFENESIN 600 MG/1
600 TABLET, EXTENDED RELEASE ORAL 2 TIMES DAILY
Status: DISCONTINUED | OUTPATIENT
Start: 2025-01-28 | End: 2025-02-01 | Stop reason: HOSPADM

## 2025-01-28 RX ORDER — ALBUTEROL SULFATE 0.83 MG/ML
2.5 SOLUTION RESPIRATORY (INHALATION) EVERY 6 HOURS PRN
Status: DISCONTINUED | OUTPATIENT
Start: 2025-01-28 | End: 2025-02-01 | Stop reason: HOSPADM

## 2025-01-28 RX ORDER — BENZONATATE 100 MG/1
100 CAPSULE ORAL 3 TIMES DAILY PRN
Status: DISCONTINUED | OUTPATIENT
Start: 2025-01-28 | End: 2025-01-30

## 2025-01-28 RX ORDER — PHYTONADIONE 5 MG/1
2.5 TABLET ORAL ONCE
Status: COMPLETED | OUTPATIENT
Start: 2025-01-28 | End: 2025-01-28

## 2025-01-28 RX ORDER — ADHESIVE BANDAGE
30 BANDAGE TOPICAL NIGHTLY
Status: DISCONTINUED | OUTPATIENT
Start: 2025-01-29 | End: 2025-01-29

## 2025-01-28 RX ORDER — INSULIN GLARGINE 100 [IU]/ML
30 INJECTION, SOLUTION SUBCUTANEOUS NIGHTLY
Status: DISCONTINUED | OUTPATIENT
Start: 2025-01-28 | End: 2025-02-01 | Stop reason: HOSPADM

## 2025-01-28 RX ADMIN — METOPROLOL TARTRATE 25 MG: 25 TABLET, FILM COATED ORAL at 08:52

## 2025-01-28 RX ADMIN — NYSTATIN 1 APPLICATION: 100000 POWDER TOPICAL at 21:01

## 2025-01-28 RX ADMIN — INSULIN GLARGINE 30 UNITS: 100 INJECTION, SOLUTION SUBCUTANEOUS at 21:01

## 2025-01-28 RX ADMIN — LISINOPRIL 5 MG: 5 TABLET ORAL at 08:51

## 2025-01-28 RX ADMIN — INSULIN LISPRO 2 UNITS: 100 INJECTION, SOLUTION INTRAVENOUS; SUBCUTANEOUS at 16:17

## 2025-01-28 RX ADMIN — PANTOPRAZOLE SODIUM 40 MG: 40 TABLET, DELAYED RELEASE ORAL at 07:43

## 2025-01-28 RX ADMIN — METOPROLOL TARTRATE 25 MG: 25 TABLET, FILM COATED ORAL at 21:01

## 2025-01-28 RX ADMIN — GUAIFENESIN AND DEXTROMETHORPHAN 5 ML: 10; 100 SYRUP ORAL at 09:08

## 2025-01-28 RX ADMIN — Medication 4 L/MIN: at 08:05

## 2025-01-28 RX ADMIN — FUROSEMIDE 40 MG: 10 INJECTION, SOLUTION INTRAMUSCULAR; INTRAVENOUS at 09:08

## 2025-01-28 RX ADMIN — ISOSORBIDE MONONITRATE 30 MG: 30 TABLET, EXTENDED RELEASE ORAL at 08:52

## 2025-01-28 RX ADMIN — CARBOXYMETHYLCELLULOSE SODIUM 1 DROP: 5 SOLUTION/ DROPS OPHTHALMIC at 09:13

## 2025-01-28 RX ADMIN — PREDNISONE 2.5 MG: 5 TABLET ORAL at 08:52

## 2025-01-28 RX ADMIN — NYSTATIN 1 APPLICATION: 100000 POWDER TOPICAL at 08:58

## 2025-01-28 RX ADMIN — ATORVASTATIN CALCIUM 80 MG: 80 TABLET, FILM COATED ORAL at 21:01

## 2025-01-28 RX ADMIN — FUROSEMIDE 40 MG: 10 INJECTION, SOLUTION INTRAMUSCULAR; INTRAVENOUS at 21:01

## 2025-01-28 RX ADMIN — CARBOXYMETHYLCELLULOSE SODIUM 1 DROP: 5 SOLUTION/ DROPS OPHTHALMIC at 08:51

## 2025-01-28 RX ADMIN — AZITHROMYCIN 500 MG: 500 TABLET, FILM COATED ORAL at 08:50

## 2025-01-28 RX ADMIN — PHYTONADIONE 2.5 MG: 5 TABLET ORAL at 07:43

## 2025-01-28 RX ADMIN — MAGNESIUM HYDROXIDE 5 ML: 1200 LIQUID ORAL at 13:47

## 2025-01-28 RX ADMIN — POTASSIUM CHLORIDE 20 MEQ: 1500 TABLET, EXTENDED RELEASE ORAL at 08:52

## 2025-01-28 RX ADMIN — GUAIFENESIN AND DEXTROMETHORPHAN 5 ML: 10; 100 SYRUP ORAL at 00:43

## 2025-01-28 RX ADMIN — BENZOCAINE AND MENTHOL 1 LOZENGE: 15; 3.6 LOZENGE ORAL at 21:12

## 2025-01-28 RX ADMIN — GUAIFENESIN 600 MG: 600 TABLET, MULTILAYER, EXTENDED RELEASE ORAL at 21:01

## 2025-01-28 RX ADMIN — TRIAMCINOLONE ACETONIDE: 1 CREAM TOPICAL at 21:01

## 2025-01-28 RX ADMIN — BENZONATATE 100 MG: 100 CAPSULE ORAL at 21:12

## 2025-01-28 RX ADMIN — ASPIRIN 81 MG: 81 TABLET, COATED ORAL at 08:50

## 2025-01-28 RX ADMIN — FAMOTIDINE 20 MG: 20 TABLET, FILM COATED ORAL at 08:52

## 2025-01-28 RX ADMIN — CEFTRIAXONE SODIUM 1 G: 1 INJECTION, SOLUTION INTRAVENOUS at 11:35

## 2025-01-28 RX ADMIN — Medication 5 L/MIN: at 21:04

## 2025-01-28 ASSESSMENT — COGNITIVE AND FUNCTIONAL STATUS - GENERAL
TOILETING: A LITTLE
DRESSING REGULAR UPPER BODY CLOTHING: A LITTLE
DAILY ACTIVITIY SCORE: 19
PERSONAL GROOMING: A LITTLE
MOVING TO AND FROM BED TO CHAIR: A LITTLE
WALKING IN HOSPITAL ROOM: A LOT
STANDING UP FROM CHAIR USING ARMS: A LITTLE
CLIMB 3 TO 5 STEPS WITH RAILING: A LOT
DRESSING REGULAR LOWER BODY CLOTHING: A LITTLE
HELP NEEDED FOR BATHING: A LITTLE
MOBILITY SCORE: 18

## 2025-01-28 ASSESSMENT — PAIN - FUNCTIONAL ASSESSMENT
PAIN_FUNCTIONAL_ASSESSMENT: 0-10
PAIN_FUNCTIONAL_ASSESSMENT: FLACC (FACE, LEGS, ACTIVITY, CRY, CONSOLABILITY)

## 2025-01-28 ASSESSMENT — PAIN SCALES - GENERAL
PAINLEVEL_OUTOF10: 0 - NO PAIN
PAINLEVEL_OUTOF10: 2
PAINLEVEL_OUTOF10: 0 - NO PAIN

## 2025-01-28 NOTE — PROGRESS NOTES
Navneet Thompson is a 89 y.o. male on day 2 of admission presenting with Acute on chronic congestive heart failure, unspecified heart failure type.    Subjective   Alert and oriented, sitting up at bedside.  Reports no significant improvement in his breathing.  No chest pain.  Peripheral edema improved       Objective     Physical Exam  Vitals and nursing note reviewed.   Constitutional:       General: He is not in acute distress.     Appearance: He is ill-appearing. He is not toxic-appearing.   HENT:      Head: Normocephalic and atraumatic.      Nose: Nose normal.      Mouth/Throat:      Mouth: Mucous membranes are moist.      Pharynx: Oropharynx is clear.   Cardiovascular:      Rate and Rhythm: Normal rate. Rhythm irregular.      Pulses: Normal pulses.      Heart sounds: Normal heart sounds. No murmur heard.     No friction rub. No gallop.   Pulmonary:      Comments: Wheezing in bilateral upper lobes, significantly diminished bilateral bases with faint crackles.  Oxygen nasal cannula.  Conversational dyspnea is appreciated.  Cough elicited with deep inspiration.  No pain with deep inspiration  Abdominal:      General: Bowel sounds are normal.      Palpations: Abdomen is soft.   Musculoskeletal:         General: Normal range of motion.      Cervical back: Normal range of motion.      Right lower leg: No edema.      Left lower leg: No edema.      Comments: No significant edema to lower extremities.  Bruises and scabs in various stages of healing to right lower extremity   Skin:     General: Skin is warm.      Capillary Refill: Capillary refill takes less than 2 seconds.   Neurological:      Mental Status: He is alert and oriented to person, place, and time. Mental status is at baseline.   Psychiatric:         Mood and Affect: Mood normal.         Behavior: Behavior normal.         Thought Content: Thought content normal.         Judgment: Judgment normal.         Last Recorded Vitals  Blood pressure 117/60, pulse 88,  "temperature 36.3 °C (97.3 °F), temperature source Temporal, resp. rate 16, height 1.778 m (5' 10\"), weight 83.9 kg (184 lb 15.5 oz), SpO2 97%.  Intake/Output last 3 Shifts:  I/O last 3 completed shifts:  In: 360 (4.3 mL/kg) [P.O.:360]  Out: 700 (8.3 mL/kg) [Urine:700 (0.2 mL/kg/hr)]  Weight: 83.9 kg     Relevant Results  Results for orders placed or performed during the hospital encounter of 01/26/25 (from the past 24 hours)   POCT GLUCOSE   Result Value Ref Range    POCT Glucose 73 (L) 74 - 99 mg/dL   Transthoracic Echo (TTE) Limited   Result Value Ref Range    AV pk anushka 1.81 m/s    AV mn grad 7 mmHg    MV E/A ratio 163.83     Tricuspid annular plane systolic excursion 1.1 cm    MV avg E/e' ratio 24.42     LV EF 28 %    RV free wall pk S' 6.05 cm/s    RVSP 52.8 mmHg    LVIDd 4.88 cm    AV pk grad 13 mmHg    LV A4C EF 34.1    POCT GLUCOSE   Result Value Ref Range    POCT Glucose 80 74 - 99 mg/dL   POCT GLUCOSE   Result Value Ref Range    POCT Glucose 114 (H) 74 - 99 mg/dL   Protime-INR   Result Value Ref Range    Protime 64.0 (HH) 9.3 - 12.7 seconds    INR 6.9 (HH) 0.9 - 1.2   CBC   Result Value Ref Range    WBC 6.3 4.4 - 11.3 x10*3/uL    nRBC 0.0 0.0 - 0.0 /100 WBCs    RBC 3.75 (L) 4.50 - 5.90 x10*6/uL    Hemoglobin 11.0 (L) 13.5 - 17.5 g/dL    Hematocrit 35.6 (L) 41.0 - 52.0 %    MCV 95 80 - 100 fL    MCH 29.3 26.0 - 34.0 pg    MCHC 30.9 (L) 32.0 - 36.0 g/dL    RDW 15.7 (H) 11.5 - 14.5 %    Platelets 155 150 - 450 x10*3/uL   Basic Metabolic Panel   Result Value Ref Range    Glucose 90 74 - 99 mg/dL    Sodium 141 136 - 145 mmol/L    Potassium 3.7 3.5 - 5.3 mmol/L    Chloride 102 98 - 107 mmol/L    Bicarbonate 30 21 - 32 mmol/L    Anion Gap 13 10 - 20 mmol/L    Urea Nitrogen 32 (H) 6 - 23 mg/dL    Creatinine 1.38 (H) 0.50 - 1.30 mg/dL    eGFR 48 (L) >60 mL/min/1.73m*2    Calcium 8.3 (L) 8.6 - 10.3 mg/dL   POCT GLUCOSE   Result Value Ref Range    POCT Glucose 80 74 - 99 mg/dL     *Note: Due to a large number of " results and/or encounters for the requested time period, some results have not been displayed. A complete set of results can be found in Results Review.     Transthoracic Echo (TTE) Limited    Result Date: 1/27/2025           [place] [id number]            Phone [phone] TRANSTHORACIC ECHOCARDIOGRAM REPORT Patient Name:       MILA IBARRA      Doming[place] Physician:    [id number] Mj Serrato DO Study Date:         1/27/2025            Ordering Provider:    [id number] MEJIA MRN/PID:            [id number]             Fellow: Accession#:         [id number]         Nurse: Date of Birth/Age:  9/30/1933 / 89 years Sonographer:          Esterlla Alfonso RDCS Gender Assigned at                      Additional Staff: Birth: Height:             177.80 cm            Admit Date: Weight:             83.91 kg             Admission Status:     Inpatient -                                                                Routine BSA / BMI:          2.02 m2 / 26.54      [facility]                     kg/m2 Blood Pressure: 135 /69 mmHg Study Type:    TRANSTHORACIC ECHO (TTE) LIMITED Diagnosis/ICD: Heart failure, unspecified-I50.9 Indication:    acute on chronic congestive heart failure CPT Codes:     Echo Limited-29112; Color Doppler-[id number] Patient History: Pertinent History: Afib, atherosclerosis of coronary, cardiomyopathy, CHF, HTN,                    HLD, 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 Study Detail: The following Echo studies were performed: 2D, M-Mode, Doppler and               color flow. Technically challenging study due to patient lying in               supine position, prominent lung artifact and body habitus.               Definity used as a contrast agent  for endocardial border               definition. Total contrast used for this procedure was 3 mL via IV               push. Unable to obtain suprasternal notch view.  PHYSICIAN INTERPRETATION: Left Ventricle: Left ventricular ejection fraction is severely decreased, by visual estimate at 25-30%. There is global hypokinesis of the left ventricle with minor regional variations. The left ventricular cavity size is mildly dilated. There is normal septal and mildly increased posterior left ventricular wall thickness. There is left ventricular concentric remodeling. Spectral Doppler shows a Grade III (restrictive pattern) of left ventricular diastolic filling with an elevated left atrial pressure. Left Atrium: The left atrial size is moderate to severely dilated. Right Ventricle: The right ventricle is normal in size. There is normal right ventricular global systolic function. Right Atrium: The right atrial size is mildly dilated. Aortic Valve: There is a prosthetic aortic valve present. The aortic valve dimensionless index is 0.33. There is no evidence of aortic valve regurgitation. The peak instantaneous gradient of the aortic valve is 13 mmHg. The mean gradient of the aortic valve is 7 mmHg. Verna; TAVR function. Mitral Valve: The mitral valve is abnormal. There is moderate mitral valve regurgitation. Tricuspid Valve: The tricuspid valve is structurally normal. There is mild tricuspid regurgitation. The Doppler estimated RVSP is moderately elevated right ventricular systolic pressure at 52.8 mmHg. Pulmonic Valve: The pulmonic valve is structurally normal. There is no indication of pulmonic valve regurgitation. Pericardium: No pericardial effusion noted. Pleural: There is a moderate left pleural effusion. Aorta: The aortic root is normal.  CONCLUSIONS:  1. Left ventricular ejection fraction is severely decreased, by visual estimate at 25-30%.  2. There is global hypokinesis of the left ventricle with minor regional  variations.  3. Spectral Doppler shows a a Grade III (restrictive pattern) of left ventricular diastolic filling with an elevated left atrial pressure.  4. Left ventricular cavity size is mildly dilated.  5. There is normal right ventricular global systolic function.  6. The left atrial size is moderate to severely dilated.  7. There is a moderate pleural effusion.  8. Moderate mitral valve regurgitation.  9. Moderately elevated right ventricular systolic pressure. 10. Verna; TAVR function. QUANTITATIVE DATA SUMMARY:  2D MEASUREMENTS:           Normal Ranges: LAs:             5.26 cm   (2.7-4.0cm) IVSd:            0.93 cm   (0.6-1.1cm) LVPWd:           1.22 cm   (0.6-1.1cm) LVIDd:           4.88 cm   (3.9-5.9cm) LVIDs:           4.19 cm LV Mass Index:   95.4 g/m2 LV % FS          14.2 %  M-MODE MEASUREMENTS:         Normal Ranges: LAs:                 5.06 cm (2.7-4.0cm)  LV SYSTOLIC FUNCTION:                      Normal Ranges: EF-A4C View:    34 % (>=55%) EF-Visual:      28 % LV EF Reported: 28 %  LV DIASTOLIC FUNCTION:           Normal Ranges: MV Peak E:             1.37 m/s  (0.7-1.2 m/s) MV Peak A:             0.01 m/s  (0.42-0.7 m/s) E/A Ratio:             163.83    (1.0-2.2) MV e'                  0.056 m/s (>8.0) MV lateral e'          0.06 m/s MV medial e'           0.05 m/s E/e' Ratio:            24.42     (<8.0)  MITRAL VALVE:          Normal Ranges: MV DT:        215 msec (150-240msec)  AORTIC VALVE:                      Normal Ranges: AoV Vmax:                1.81 m/s  (<=1.7m/s) AoV Peak P.1 mmHg (<20mmHg) AoV Mean P.6 mmHg  (1.7-11.5mmHg) LVOT Max Geronimo:            0.64 m/s  (<=1.1m/s) AoV VTI:                 32.46 cm  (18-25cm) LVOT VTI:                10.67 cm AoV Dimensionless Index: 0.33  RIGHT VENTRICLE: TAPSE: 11.1 mm RV s'  0.06 m/s  TRICUSPID VALVE/RVSP:          Normal Ranges: Peak TR Velocity:     3.53 m/s RV Syst Pressure:     53 mmHg  (< 30mmHg) IVC Diam:              1.56 cm  [id number] Mj Serrato  Electronically signed on 1/27/2025 at 4:47:00 PM  ** Final **          Assessment/Plan   Assessment & Plan  Acute on chronic congestive heart failure, unspecified heart failure type    Atrial fibrillation (Multi)    Acid reflux    Chronic obstructive pulmonary disease (Multi)    Hypertension    Pneumonia due to infectious organism    History of coronary artery bypass surgery    BETH (acute kidney injury) (CMS-HCC)      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  Admitting team changed patient's home 40mg Lasix from p.o. to IV BID, which the patient is tolerating well.  Will continue with this dose. Overnight in the ED patient had increased work of breathing and was placed on BiPAP O2 therapy and given a dose of IV Lasix at that time and showed improvement, now being on 4L nasal cannula with slight conversational dyspnea.  Patient denies chest pain, palpitations, pressure and no JVD was noted upon assessment. States the feeling of SOB has improved.  Patient's Coumadin is on hold, will continue to hold due to a supratherapeutic INR of 6.1.  Coumadin management will be done by the admitting team. Current vitals are 85, in a ventricular paced rhythm, blood pressure 148/74 satting 99% on 4 L.  Reordered his home medication of lisinopril and his Imdur.  Transthoracic echocardiogram was ordered, and will be completed sometime today.  Will also interrogate ICD. Will continue to follow this patient with you.    1/28:  minimal improvement over the past 24 hours.  Patient's respiratory status is similar.  He continues to require increased nasal  cannula oxygen and has conversational dyspnea.  Wheezing noted in upper lobes and significantly diminished in bilateral bases with faint crackles.  Believe the patient will benefit from further IV diuresis.  Will continue on current dose.  Additionally have prescribed albuterol treatments.  On telemetry monitor he remains in a rate controlled atrial fibrillation.  His warfarin is on hold for elevated INR yesterday of 6.1.  He did receive 2.5 mg of vitamin K from admitting, however his INR is further risen today to 6.9.  At this point there is no obvious liver etiology with AST and ALT as well as a total bili within normal limits.  Given the patient has no active bleeding and a stable hemoglobin of 11.0 from my perspective would feel okay to continue to watch his INR for 1 day further without giving extra vitamin K.  However if he does continue to rise tomorrow would give 5 mg of vitamin K.  Otherwise his blood pressure is stable with most recent 117/60.  Creatinine 1.33.  He is chest pain-free.  Anticipate a further 48 hours hospitalization.  Will follow with you.    I spent 35 minutes in the professional and overall care of this patient.      Dwight Delong, APRN-CNP

## 2025-01-28 NOTE — PROGRESS NOTES
Physical Therapy                 Therapy Communication Note    Patient Name: Navneet Thompson  MRN: [id number]  Department: [address]  Room: 13/13-B  Today's Date: 1/28/2025     Discipline: Physical Therapy    PT Missed Visit: Yes     Missed Visit Reason: Missed Visit Reason: Cancel (pt's most recent INR from 1/28 is 6.9 which is too high, out of the safe range for PT mobilization. Will cancel at this time.)    Missed Time: Cancel    Comment:

## 2025-01-28 NOTE — NURSING NOTE
PT telemetry monitor showing silvano 45-58 bpm. Pt leads not in place correctly. Leads fixed, HR 67.

## 2025-01-28 NOTE — PROGRESS NOTES
01/28/25 1409   Discharge Planning   Who is requesting discharge planning? Provider   Home or Post Acute Services Post acute facilities (Rehab/SNF/etc)   Type of Post Acute Facility Services Skilled nursing   Expected Discharge Disposition SNF   Does the patient need discharge transport arranged? Yes   RoundTrip coordination needed? Yes   Has discharge transport been arranged? No     Pt being diuresed with IV lasix. PT/OT skilled patient mod intensity. Pt is possibly agreeable to SNF but wants to speak to his daughter first. Left SNF choices on bedside table for patient and daughter to review. Pt will not need a precert.    DISCHARGE PLAN NOT SECURE.

## 2025-01-28 NOTE — CARE PLAN
Problem: Pain - Adult  Goal: Verbalizes/displays adequate comfort level or baseline comfort level  Outcome: Progressing     Problem: Safety - Adult  Goal: Free from fall injury  Outcome: Progressing     Problem: Discharge Planning  Goal: Discharge to home or other facility with appropriate resources  Outcome: Progressing     Problem: Chronic Conditions and Co-morbidities  Goal: Patient's chronic conditions and co-morbidity symptoms are monitored and maintained or improved  Outcome: Progressing     Problem: Nutrition  Goal: Nutrient intake appropriate for maintaining nutritional needs  Outcome: Progressing     Problem: Diabetes  Goal: Achieve decreasing blood glucose levels by end of shift  Outcome: Progressing  Goal: Increase stability of blood glucose readings by end of shift  Outcome: Progressing  Goal: Decrease in ketones present in urine by end of shift  Outcome: Progressing  Goal: Maintain electrolyte levels within acceptable range throughout shift  Outcome: Progressing  Goal: Maintain glucose levels >70mg/dl to <250mg/dl throughout shift  Outcome: Progressing  Goal: No changes in neurological exam by end of shift  Outcome: Progressing  Goal: Learn about and adhere to nutrition recommendations by end of shift  Outcome: Progressing  Goal: Vital signs within normal range for age by end of shift  Outcome: Progressing  Goal: Increase self care and/or family involovement by end of shift  Outcome: Progressing  Goal: Receive DSME education by end of shift  Outcome: Progressing     Problem: Heart Failure  Goal: Improved gas exchange this shift  Outcome: Progressing  Goal: Improved urinary output this shift  Outcome: Progressing  Goal: Reduction in peripheral edema within 24 hours  Outcome: Progressing  Goal: Report improvement of dyspnea/breathlessness this shift  Outcome: Progressing  Goal: Weight from fluid excess reduced over 2-3 days, then stabilize  Outcome: Progressing  Goal: Increase self care and/or family  involvement in 24 hours  Outcome: Progressing     Problem: Fall/Injury  Goal: Not fall by end of shift  Outcome: Progressing  Goal: Be free from injury by end of the shift  Outcome: Progressing  Goal: Verbalize understanding of personal risk factors for fall in the hospital  Outcome: Progressing  Goal: Verbalize understanding of risk factor reduction measures to prevent injury from fall in the home  Outcome: Progressing  Goal: Use assistive devices by end of the shift  Outcome: Progressing  Goal: Pace activities to prevent fatigue by end of the shift  Outcome: Progressing   The patient's goals for the shift include      The clinical goals for the shift include Safety    Over the shift, the patient did not make progress toward the following goals. Barriers to progression include weakness and some confusion. Recommendations to address these barriers include bed alarm, reorientation.

## 2025-01-28 NOTE — CONSULTS
Nutrition Assessement Note    Nutrition Assessment    Reason for Assessment: Dietitian discretion (CHF)    Reason for Hospital Admission:  Navneet Thompson is a 89 y.o. male who is admitted for SOB, CHF (EF 30-35%). Attempted to reach patient by phone without success. PO intake is good per records. Hx of DM2, A1c 7.9. Will offer low sodium and CCD diet education at follow-up visit, if appropriate.    Malnutrition Screening Tool (MST)  Have you recently lost weight without trying?: No  If yes, how much weight have you lost?: Unsure  Weight Loss Score: 0  Have you been eating poorly because of a decreased appetite?: Yes  Malnutrition Score: 1  Nutrition Screen  Stage 3 or 4 Pressure Injury or Multiple Non-Healing Wounds: No  Home Tube Feeding or Total Parenteral Nutrition (TPN): No  Dietitian Consult Needed: No    Past Medical History:   Diagnosis Date    Aortic stenosis     Status post transcatheter aortic valve replacement    Atherosclerotic heart disease of native coronary artery without angina pectoris 12/12/2022    CAD (coronary artery disease)    Atrial fibrillation (Multi)     Chronic systolic heart failure     Complete heart block     status post dual chamber pacemaker placemtn in 01/2008    Diabetes mellitus, type 2 (Multi)     Essential (primary) hypertension 12/12/2022    Hypertension    Presence of automatic (implantable) cardiac defibrillator 11/14/2022    Cardiac defibrillator in place, upgraded from pacemaker in 05/2014      Past Surgical History:   Procedure Laterality Date    AORTIC VALVE REPLACEMENT  04/07/2022    Transcatheter aortic valve replacement (TAVR) for aortic valve stenosis    APPENDECTOMY  12/01/2014    Appendectomy    CARDIAC ELECTROPHYSIOLOGY PROCEDURE N/A 12/19/2023    Procedure: ICD UPGRADE, DUAL TO BIV;  Surgeon: Luis Felipe Greene MD;  Location: [place] [address] Cardiac Cath Lab;  Service: Electrophysiology;  Laterality: N/A;  [id number] Bi-V ICD gen change [facility]    CORONARY  "ARTERY BYPASS GRAFT  12/01/2014    CABG    CT ABDOMEN PELVIS ANGIOGRAM W AND/OR WO IV CONTRAST  03/17/2020    CT ABDOMEN PELVIS ANGIOGRAM W AND/OR WO IV CONTRAST 3/17/2020 [facility] ANCILLARY LEGAC[facility]    OTHER SURGICAL HISTORY  12/01/2014    Cardio-defib Pulse Generator Implantation Date       Nutrition History:     Energy Intake: Good > 75 %     Food Allergies/Intolerances:  None  GI Symptoms: None  Oral Problems: None    Anthropometrics:  Ht: 177.8 cm (5' 10\"), Wt: 83.9 kg (184 lb 15.5 oz), BMI: 26.54  IBW/kg (Dietitian Calculated): 75.45 kg  Percent of IBW: 111 %     Weight Change:  Daily Weight  01/27/25 : 83.9 kg (184 lb 15.5 oz)  01/09/25 : 83 kg (183 lb)  12/26/24 : 83 kg (183 lb)  12/19/24 : 83 kg (183 lb)  12/09/24 : 83.3 kg (183 lb 9.6 oz)  11/17/24 : 85.7 kg (189 lb)  09/11/24 : 86 kg (189 lb 8 oz)  09/09/24 : 86.2 kg (190 lb)  12/19/23 : 83 kg (182 lb 14 oz)  12/18/23 : 82.6 kg (182 lb)     Weight History / % Weight Change: Per records, stable weight     Nutrition Focused Physical Exam Findings: defer: unavailable     Nutrition Significant Labs:  Lab Results   Component Value Date    WBC 6.3 01/28/2025    HGB 11.0 (L) 01/28/2025    HCT 35.6 (L) 01/28/2025     01/28/2025    CHOL 139 06/14/2023    TRIG 122 06/14/2023    HDL 35.0 (A) 06/14/2023    ALT 23 01/26/2025    AST 32 01/26/2025     01/28/2025    K 3.7 01/28/2025     01/28/2025    CREATININE 1.38 (H) 01/28/2025    BUN 32 (H) 01/28/2025    CO2 30 01/28/2025    TSH 4.13 (H) 06/14/2023    INR 6.9 (HH) 01/28/2025    HGBA1C 7.9 (H) 01/27/2025     Nutrition Specific Medications:  aspirin, 81 mg, oral, Daily  atorvastatin, 80 mg, oral, Nightly  azithromycin, 500 mg, oral, q24h JESUS  cefTRIAXone, 1 g, intravenous, q24h  cholecalciferol, 1,000 Units, oral, Daily  [Held by provider] empagliflozin, 10 mg, oral, Daily  famotidine, 20 mg, oral, Daily   Or  famotidine, 20 mg, intravenous, Daily  furosemide, 40 mg, intravenous, q12h  [Held by provider] " furosemide, 40 mg, oral, BID  guaiFENesin, 600 mg, oral, BID  insulin glargine, 30 Units, subcutaneous, Nightly  insulin lispro, 0-10 Units, subcutaneous, TID AC  isosorbide mononitrate ER, 30 mg, oral, Daily  [Held by provider] ixekizumab, 80 mg, subcutaneous, q14 days  lisinopril, 5 mg, oral, Daily  metoprolol tartrate, 25 mg, oral, BID  neomycin-bacitracin-polymyxin, 0.5 inch, Both Eyes, Daily  nystatin, 1 Application, Topical, BID  oxygen, , inhalation, Continuous - Inhalation  pantoprazole, 40 mg, oral, Daily before breakfast  perflutren protein A microsphere, 0.5 mL, intravenous, Once in imaging  potassium chloride CR, 20 mEq, oral, Daily  predniSONE, 2.5 mg, oral, Daily  sulfur hexafluoride microsphr, 2 mL, intravenous, Once in imaging  triamcinolone, , Topical, BID         Dietary Orders (From admission, onward)       Start     Ordered    01/27/25 1708  May Participate in Room Service  ( ROOM SERVICE MAY PARTICIPATE)  Once        Question:  .  Answer:  Yes    01/27/25 1707    01/26/25 1248  Adult diet Regular, Cardiac, Consistent Carb; CCD 90 gm/meal; 1500 mL fluid; 70 gm fat; 2 - 3 grams Sodium  Diet effective now        Question Answer Comment   Diet type Regular    Diet type Cardiac    Diet type Consistent Carb    Carb diet selection: CCD 90 gm/meal    Dietary fluid restriction / 24h: 1500 mL fluid    Fat restriction: 70 gm fat    Sodium restriction: 2 - 3 grams Sodium        01/26/25 1247                   Estimated Needs:   Estimated Energy Needs  Total Energy Estimated Needs in 24 hours (kCal): 2100 kCal  Energy Estimated Needs per kg Body Weight in 24 hours (kCal/kg): 25 kCal/kg  Method for Estimating Needs: Actual Wt    Estimated Protein Needs  Total Protein Estimated Needs in 24 Hours (g): 101 g  Protein Estimated Needs per kg Body Weight in 24 Hours (g/kg): 1.2 g/kg  Method for Estimating 24 Hour Protein Needs: Actual Wt    Estimated Fluid Needs  Total Fluid Estimated Needs in 24 Hours (mL): 1500  mL  Patient on Order Fluid Restriction: Yes      Nutrition Diagnosis   Nutrition Diagnosis:     Nutrition Diagnosis  Patient has Nutrition Diagnosis: Yes  Diagnosis Status (1): New  Nutrition Diagnosis 1: Increased nutrient needs  Related to (1): increased demand for nutrients  As Evidenced by (1): CHF     Nutrition Interventions/Recommendations   Nutrition Interventions and Recommendations:  Nutrition Prescription: Nutrition prescription for oral nutrition    Nutrition Recommendations:  Individualized Nutrition Prescription Provided for : 2100 calories, 101 gm protein via oral diet    Nutrition Interventions/Goals:   Food and/or Nutrient Delivery Interventions  Interventions: Meals and snacks  Meals and Snacks: Fat-modified diet, Mineral-modified diet, Carbohydrate-modified diet, Fluid-modified diet  Goal: Provide as ordered    Education Documentation  No documentation found.         Nutrition Monitoring and Evaluation   Monitoring/Evaluation:   Food/Nutrient Related History Monitoring  Monitoring and Evaluation Plan: Estimated Energy Intake  Estimated Energy Intake: Energy intake greater or equal to 75% of estimated energy needs     Biochemical Data, Medical Tests and Procedures  Monitoring and Evaluation Plan: Glucose/endocrine profile  Glucose/Endocrine Profile: Glucose within normal limits ( mg/dL), Hemoglobin A1c (HgbA1c)  Criteria: labs will trend towards desirable range    Time Spent (min): 30 minutes

## 2025-01-28 NOTE — ASSESSMENT & PLAN NOTE
Positive for cough.    - Tessalon pearles and Mucinex   No fever or chills  Possible left lower lobe pneumonia on imaging   Zithromax and Rocephin   follow-up cultures

## 2025-01-28 NOTE — ASSESSMENT & PLAN NOTE
Patient gained 7 to 8 pounds in the last week.  +3 pitting edema both lower extremities   Echo 10/2023 LVEF 30-35% - 1/27/25 LVEF 25-30% with grade 3 diastolic dysfunction and elevated RVSP   Fluid restriction  IV lasix twice daily   Consult cardiology - appreciate recs   ICD in situ - interrogate

## 2025-01-28 NOTE — CARE PLAN
The patient's goals for the shift include  breathe easier    The clinical goals for the shift include vss, iv abx

## 2025-01-28 NOTE — ASSESSMENT & PLAN NOTE
INR supratherapeutic - Last dose most likely 1/24 and INR continues to rise.   - 2.5 mg vitamin K today.   Patient normally takes 6 mg of Coumadin every day except Wednesday and Sunday he is taking 4.5 mg  PT/INR daily

## 2025-01-28 NOTE — PROGRESS NOTES
Occupational Therapy                 Therapy Communication Note    Patient Name: Navneet Thompson  MRN: [id number]  Department: [address]  Room: 13/13-B  Today's Date: 1/28/2025     Discipline: Occupational Therapy    Missed Visit Reason: Missed Visit Reason: Cancel (Patients INR 6.9, which is outside of therapeutic parameters)    Missed Time: Cancel    Comment: OT eval deferred

## 2025-01-28 NOTE — PROGRESS NOTES
"Navneet Thompson is a 89 y.o. male on day 2 of admission presenting with Acute on chronic congestive heart failure, unspecified heart failure type.    Subjective   Patient resting in bed. Denies chest pain. Feels he is still short of breath, but biggest complaint is cough. Denies fevers, chills.        Objective     Physical Exam  Constitutional:       Appearance: Normal appearance.   HENT:      Head: Normocephalic and atraumatic.      Mouth/Throat:      Mouth: Mucous membranes are moist.      Pharynx: Oropharynx is clear.   Eyes:      Extraocular Movements: Extraocular movements intact.      Conjunctiva/sclera: Conjunctivae normal.      Pupils: Pupils are equal, round, and reactive to light.   Cardiovascular:      Rate and Rhythm: Normal rate. Rhythm irregular.      Pulses: Normal pulses.      Heart sounds: Normal heart sounds.   Pulmonary:      Effort: Tachypnea present.      Breath sounds: Decreased breath sounds present.   Abdominal:      General: Bowel sounds are normal.      Palpations: Abdomen is soft.      Tenderness: There is no abdominal tenderness.   Musculoskeletal:         General: Normal range of motion.      Cervical back: Normal range of motion and neck supple.      Right lower leg: Edema present.      Left lower leg: Edema present.   Skin:     General: Skin is warm and dry.      Capillary Refill: Capillary refill takes less than 2 seconds.   Neurological:      General: No focal deficit present.      Mental Status: He is alert and oriented to person, place, and time.   Psychiatric:         Mood and Affect: Mood normal.         Behavior: Behavior normal.         Last Recorded Vitals  Blood pressure 105/51, pulse 61, temperature 36.1 °C (97 °F), temperature source Temporal, resp. rate 16, height 1.778 m (5' 10\"), weight 83.9 kg (184 lb 15.5 oz), SpO2 96%.  Intake/Output last 3 Shifts:  I/O last 3 completed shifts:  In: 360 (4.3 mL/kg) [P.O.:360]  Out: 700 (8.3 mL/kg) [Urine:700 (0.2 mL/kg/hr)]  Weight: " 83.9 kg     Relevant Results  Results for orders placed or performed during the hospital encounter of 01/26/25 (from the past 24 hours)   POCT GLUCOSE   Result Value Ref Range    POCT Glucose 80 74 - 99 mg/dL   POCT GLUCOSE   Result Value Ref Range    POCT Glucose 114 (H) 74 - 99 mg/dL   Protime-INR   Result Value Ref Range    Protime 64.0 (HH) 9.3 - 12.7 seconds    INR 6.9 (HH) 0.9 - 1.2   CBC   Result Value Ref Range    WBC 6.3 4.4 - 11.3 x10*3/uL    nRBC 0.0 0.0 - 0.0 /100 WBCs    RBC 3.75 (L) 4.50 - 5.90 x10*6/uL    Hemoglobin 11.0 (L) 13.5 - 17.5 g/dL    Hematocrit 35.6 (L) 41.0 - 52.0 %    MCV 95 80 - 100 fL    MCH 29.3 26.0 - 34.0 pg    MCHC 30.9 (L) 32.0 - 36.0 g/dL    RDW 15.7 (H) 11.5 - 14.5 %    Platelets 155 150 - 450 x10*3/uL   Basic Metabolic Panel   Result Value Ref Range    Glucose 90 74 - 99 mg/dL    Sodium 141 136 - 145 mmol/L    Potassium 3.7 3.5 - 5.3 mmol/L    Chloride 102 98 - 107 mmol/L    Bicarbonate 30 21 - 32 mmol/L    Anion Gap 13 10 - 20 mmol/L    Urea Nitrogen 32 (H) 6 - 23 mg/dL    Creatinine 1.38 (H) 0.50 - 1.30 mg/dL    eGFR 48 (L) >60 mL/min/1.73m*2    Calcium 8.3 (L) 8.6 - 10.3 mg/dL   POCT GLUCOSE   Result Value Ref Range    POCT Glucose 80 74 - 99 mg/dL   POCT GLUCOSE   Result Value Ref Range    POCT Glucose 126 (H) 74 - 99 mg/dL     *Note: Due to a large number of results and/or encounters for the requested time period, some results have not been displayed. A complete set of results can be found in Results Review.     Transthoracic Echo (TTE) Limited    Result Date: 1/27/2025           [place] [address]            Phone [phone] TRANSTHORACIC ECHOCARDIOGRAM REPORT Patient Name:       MILA Mix Physician:    [id number] Mj Serrato DO Study Date:         1/27/2025            Ordering Provider:    [id number] LEIDY                                                                 ZRIKEM MRN/PID:            [id number]             Fellow: Accession#:         [id number]         Nurse: Date of Birth/Age:  9/30/1933 / 89 years Sonographer:          Estrella Alfonso RDCS Gender Assigned at                      Additional Staff: Birth: Height:             177.80 cm            Admit Date: Weight:             83.91 kg             Admission Status:     Inpatient -                                                                Routine BSA / BMI:          2.02 m2 / 26.54      Department Location:  [facility]                     kg/m2 Blood Pressure: 135 /69 mmHg Study Type:    TRANSTHORACIC ECHO (TTE) LIMITED Diagnosis/ICD: Heart failure, unspecified-I50.9 Indication:    acute on chronic congestive heart failure CPT Codes:     Echo Limited-77121; Color Doppler-47106 Patient History: Pertinent History: Afib, atherosclerosis of coronary, cardiomyopathy, CHF, HTN,                    HLD, 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 Study Detail: The following Echo studies were performed: 2D, M-Mode, Doppler and               color flow. Technically challenging study due to patient lying in               supine position, prominent lung artifact and body habitus.               Definity used as a contrast agent for endocardial border               definition. Total contrast used for this procedure was 3 mL via IV               push. Unable to obtain suprasternal notch view.  PHYSICIAN INTERPRETATION: Left Ventricle: Left ventricular ejection fraction is severely decreased, by visual estimate at 25-30%. There is global hypokinesis of the left ventricle with minor regional variations. The left ventricular cavity size is mildly dilated. There is normal septal and mildly increased posterior left ventricular wall thickness. There is left ventricular concentric remodeling. Spectral Doppler shows a Grade III (restrictive pattern) of  left ventricular diastolic filling with an elevated left atrial pressure. Left Atrium: The left atrial size is moderate to severely dilated. Right Ventricle: The right ventricle is normal in size. There is normal right ventricular global systolic function. Right Atrium: The right atrial size is mildly dilated. Aortic Valve: There is a prosthetic aortic valve present. The aortic valve dimensionless index is 0.33. There is no evidence of aortic valve regurgitation. The peak instantaneous gradient of the aortic valve is 13 mmHg. The mean gradient of the aortic valve is 7 mmHg. Verna; TAVR function. Mitral Valve: The mitral valve is abnormal. There is moderate mitral valve regurgitation. Tricuspid Valve: The tricuspid valve is structurally normal. There is mild tricuspid regurgitation. The Doppler estimated RVSP is moderately elevated right ventricular systolic pressure at 52.8 mmHg. Pulmonic Valve: The pulmonic valve is structurally normal. There is no indication of pulmonic valve regurgitation. Pericardium: No pericardial effusion noted. Pleural: There is a moderate left pleural effusion. Aorta: The aortic root is normal.  CONCLUSIONS:  1. Left ventricular ejection fraction is severely decreased, by visual estimate at 25-30%.  2. There is global hypokinesis of the left ventricle with minor regional variations.  3. Spectral Doppler shows a a Grade III (restrictive pattern) of left ventricular diastolic filling with an elevated left atrial pressure.  4. Left ventricular cavity size is mildly dilated.  5. There is normal right ventricular global systolic function.  6. The left atrial size is moderate to severely dilated.  7. There is a moderate pleural effusion.  8. Moderate mitral valve regurgitation.  9. Moderately elevated right ventricular systolic pressure. 10. Verna; TAVR function. QUANTITATIVE DATA SUMMARY:  2D MEASUREMENTS:           Normal Ranges: LAs:             5.26 cm   (2.7-4.0cm) IVSd:            0.93 cm    (0.6-1.1cm) LVPWd:           1.22 cm   (0.6-1.1cm) LVIDd:           4.88 cm   (3.9-5.9cm) LVIDs:           4.19 cm LV Mass Index:   95.4 g/m2 LV % FS          14.2 %  M-MODE MEASUREMENTS:         Normal Ranges: LAs:                 5.06 cm (2.7-4.0cm)  LV SYSTOLIC FUNCTION:                      Normal Ranges: EF-A4C View:    34 % (>=55%) EF-Visual:      28 % LV EF Reported: 28 %  LV DIASTOLIC FUNCTION:           Normal Ranges: MV Peak E:             1.37 m/s  (0.7-1.2 m/s) MV Peak A:             0.01 m/s  (0.42-0.7 m/s) E/A Ratio:             163.83    (1.0-2.2) MV e'                  0.056 m/s (>8.0) MV lateral e'          0.06 m/s MV medial e'           0.05 m/s E/e' Ratio:            24.42     (<8.0)  MITRAL VALVE:          Normal Ranges: MV DT:        215 msec (150-240msec)  AORTIC VALVE:                      Normal Ranges: AoV Vmax:                1.81 m/s  (<=1.7m/s) AoV Peak P.1 mmHg (<20mmHg) AoV Mean P.6 mmHg  (1.7-11.5mmHg) LVOT Max Geronimo:            0.64 m/s  (<=1.1m/s) AoV VTI:                 32.46 cm  (18-25cm) LVOT VTI:                10.67 cm AoV Dimensionless Index: 0.33  RIGHT VENTRICLE: TAPSE: 11.1 mm RV s'  0.06 m/s  TRICUSPID VALVE/RVSP:          Normal Ranges: Peak TR Velocity:     3.53 m/s RV Syst Pressure:     53 mmHg  (< 30mmHg) IVC Diam:             1.56 cm  [id number] Mj Serrato DO Electronically signed on 2025 at 4:47:00 PM  ** Final **     XR chest 1 view    Result Date: 2025  Interpreted By:  Diego Coronel, STUDY: XR CHEST 1 VIEW;  2025 8:24 pm   INDICATION: Signs/Symptoms:SOB.     COMPARISON: 2025   ACCESSION NUMBER(S): [id number]   ORDERING CLINICIAN: LUIS MINAYA   FINDINGS: Right chest wall multi lead AICD. Status post median sternotomy. Numerous mediastinal clips are noted. There is a transcatheter aortic valve replacement (TAVR).   Cardiomegaly. The pulmonary vasculature is congested centrally and indistinct peripherally,  with peribronchovascular and interlobular septal thickening. There is more dense asymmetric airspace disease at the left lung base with pleural effusion similar to prior study. There is a small right pleural effusion.   There is no pneumothorax.         No significant interval change with bilateral effusions and left retrocardiac consolidation/atelectasis.   Mild pulmonary vascular congestion/interstitial edema.   MACRO: None.   Signed by: Diego Coronel 1/26/2025 9:03 PM Dictation workstation:   [id number]       Assessment/Plan   Assessment & Plan  Acute on chronic congestive heart failure, unspecified heart failure type  Patient gained 7 to 8 pounds in the last week.  +3 pitting edema both lower extremities   Echo 10/2023 LVEF 30-35% - 1/27/25 LVEF 25-30% with grade 3 diastolic dysfunction and elevated RVSP   Fluid restriction  IV lasix twice daily   Consult cardiology - appreciate recs   ICD in situ - interrogate    Pneumonia due to infectious organism  Positive for cough.    - Tessalon pearles and Mucinex   No fever or chills  Possible left lower lobe pneumonia on imaging   Zithromax and Rocephin   follow-up cultures   Atrial fibrillation (Multi)  INR supratherapeutic - Last dose most likely 1/24 and INR continues to rise.   - 2.5 mg vitamin K today.   Patient normally takes 6 mg of Coumadin every day except Wednesday and Sunday he is taking 4.5 mg  PT/INR daily  BETH (acute kidney injury) (CMS-[facility])  Creatinine at baseline 1.1-1.2  May be related to fluid retention   Diurese, monitor labs daily   Acid reflux  Continue with omeprazole  Hypertension  Continue with home meds   Vitals as ordered   History of coronary artery bypass surgery  Angina free, stable   Continue ASA, statin, beta blocker   Troponin elevated with flat pattern.     DVT prophylaxis   INR supratherapeutic     Kellie Henley, APRN-CNP

## 2025-01-28 NOTE — NURSING NOTE
Assumed care of pt. Pt alert lying in bed. Patient c/o of call bell being useless. Patient reoriented to the call bell. Denies any pain or discomfort. Paced on monitor,  Bed low with call bell in reach. Care ongoing.

## 2025-01-29 LAB
ANION GAP SERPL CALCULATED.3IONS-SCNC: 11 MMOL/L (ref 10–20)
ATRIAL RATE: 70 BPM
BUN SERPL-MCNC: 36 MG/DL (ref 6–23)
CALCIUM SERPL-MCNC: 8.8 MG/DL (ref 8.6–10.3)
CHLORIDE SERPL-SCNC: 101 MMOL/L (ref 98–107)
CO2 SERPL-SCNC: 32 MMOL/L (ref 21–32)
CREAT SERPL-MCNC: 1.32 MG/DL (ref 0.5–1.3)
EGFRCR SERPLBLD CKD-EPI 2021: 51 ML/MIN/1.73M*2
ERYTHROCYTE [DISTWIDTH] IN BLOOD BY AUTOMATED COUNT: 15.1 % (ref 11.5–14.5)
GLUCOSE BLD MANUAL STRIP-MCNC: 152 MG/DL (ref 74–99)
GLUCOSE BLD MANUAL STRIP-MCNC: 164 MG/DL (ref 74–99)
GLUCOSE SERPL-MCNC: 159 MG/DL (ref 74–99)
HCT VFR BLD AUTO: 37.7 % (ref 41–52)
HGB BLD-MCNC: 11.8 G/DL (ref 13.5–17.5)
INR PPP: 2 (ref 0.9–1.2)
MCH RBC QN AUTO: 29.3 PG (ref 26–34)
MCHC RBC AUTO-ENTMCNC: 31.3 G/DL (ref 32–36)
MCV RBC AUTO: 94 FL (ref 80–100)
NRBC BLD-RTO: 0 /100 WBCS (ref 0–0)
PLATELET # BLD AUTO: 182 X10*3/UL (ref 150–450)
POTASSIUM SERPL-SCNC: 3.6 MMOL/L (ref 3.5–5.3)
PROTHROMBIN TIME: 20.4 SECONDS (ref 9.3–12.7)
Q ONSET: 229 MS
QRS COUNT: 14 BEATS
QRS DURATION: 134 MS
QT INTERVAL: 430 MS
QTC CALCULATION(BAZETT): 502 MS
QTC FREDERICIA: 477 MS
R AXIS: 215 DEGREES
RBC # BLD AUTO: 4.03 X10*6/UL (ref 4.5–5.9)
SODIUM SERPL-SCNC: 140 MMOL/L (ref 136–145)
T AXIS: -23 DEGREES
T OFFSET: 444 MS
VENTRICULAR RATE: 82 BPM
WBC # BLD AUTO: 9.2 X10*3/UL (ref 4.4–11.3)

## 2025-01-29 PROCEDURE — 82947 ASSAY GLUCOSE BLOOD QUANT: CPT

## 2025-01-29 PROCEDURE — 2500000002 HC RX 250 W HCPCS SELF ADMINISTERED DRUGS (ALT 637 FOR MEDICARE OP, ALT 636 FOR OP/ED): Performed by: NURSE PRACTITIONER

## 2025-01-29 PROCEDURE — 2500000005 HC RX 250 GENERAL PHARMACY W/O HCPCS: Performed by: NURSE PRACTITIONER

## 2025-01-29 PROCEDURE — 2500000004 HC RX 250 GENERAL PHARMACY W/ HCPCS (ALT 636 FOR OP/ED): Performed by: NURSE PRACTITIONER

## 2025-01-29 PROCEDURE — 97166 OT EVAL MOD COMPLEX 45 MIN: CPT | Mod: GO

## 2025-01-29 PROCEDURE — 99232 SBSQ HOSP IP/OBS MODERATE 35: CPT

## 2025-01-29 PROCEDURE — 36415 COLL VENOUS BLD VENIPUNCTURE: CPT | Performed by: NURSE PRACTITIONER

## 2025-01-29 PROCEDURE — 99232 SBSQ HOSP IP/OBS MODERATE 35: CPT | Performed by: NURSE PRACTITIONER

## 2025-01-29 PROCEDURE — 85610 PROTHROMBIN TIME: CPT | Performed by: NURSE PRACTITIONER

## 2025-01-29 PROCEDURE — 97530 THERAPEUTIC ACTIVITIES: CPT | Mod: GP | Performed by: PHYSICAL THERAPIST

## 2025-01-29 PROCEDURE — 2500000001 HC RX 250 WO HCPCS SELF ADMINISTERED DRUGS (ALT 637 FOR MEDICARE OP)

## 2025-01-29 PROCEDURE — 80048 BASIC METABOLIC PNL TOTAL CA: CPT | Performed by: NURSE PRACTITIONER

## 2025-01-29 PROCEDURE — 2500000005 HC RX 250 GENERAL PHARMACY W/O HCPCS: Performed by: INTERNAL MEDICINE

## 2025-01-29 PROCEDURE — 97535 SELF CARE MNGMENT TRAINING: CPT | Mod: GO

## 2025-01-29 PROCEDURE — 2060000001 HC INTERMEDIATE ICU ROOM DAILY

## 2025-01-29 PROCEDURE — 2500000001 HC RX 250 WO HCPCS SELF ADMINISTERED DRUGS (ALT 637 FOR MEDICARE OP): Performed by: NURSE PRACTITIONER

## 2025-01-29 PROCEDURE — 85027 COMPLETE CBC AUTOMATED: CPT | Performed by: NURSE PRACTITIONER

## 2025-01-29 RX ORDER — SYRING-NEEDL,DISP,INSUL,0.3 ML 29 G X1/2"
296 SYRINGE, EMPTY DISPOSABLE MISCELLANEOUS ONCE AS NEEDED
Status: DISCONTINUED | OUTPATIENT
Start: 2025-01-29 | End: 2025-02-01 | Stop reason: HOSPADM

## 2025-01-29 RX ORDER — ADHESIVE BANDAGE
60 BANDAGE TOPICAL DAILY
Status: DISCONTINUED | OUTPATIENT
Start: 2025-01-30 | End: 2025-02-01 | Stop reason: HOSPADM

## 2025-01-29 RX ADMIN — ATORVASTATIN CALCIUM 80 MG: 80 TABLET, FILM COATED ORAL at 21:53

## 2025-01-29 RX ADMIN — INSULIN GLARGINE 30 UNITS: 100 INJECTION, SOLUTION SUBCUTANEOUS at 21:53

## 2025-01-29 RX ADMIN — BENZOCAINE AND MENTHOL 1 LOZENGE: 15; 3.6 LOZENGE ORAL at 22:00

## 2025-01-29 RX ADMIN — BENZONATATE 100 MG: 100 CAPSULE ORAL at 22:00

## 2025-01-29 RX ADMIN — LISINOPRIL 5 MG: 5 TABLET ORAL at 10:54

## 2025-01-29 RX ADMIN — MAGNESIUM HYDROXIDE 5 ML: 1200 LIQUID ORAL at 11:45

## 2025-01-29 RX ADMIN — TRIAMCINOLONE ACETONIDE: 1 CREAM TOPICAL at 22:02

## 2025-01-29 RX ADMIN — FAMOTIDINE 20 MG: 20 TABLET, FILM COATED ORAL at 10:54

## 2025-01-29 RX ADMIN — GUAIFENESIN 600 MG: 600 TABLET, MULTILAYER, EXTENDED RELEASE ORAL at 21:53

## 2025-01-29 RX ADMIN — GUAIFENESIN 600 MG: 600 TABLET, MULTILAYER, EXTENDED RELEASE ORAL at 10:53

## 2025-01-29 RX ADMIN — FUROSEMIDE 40 MG: 10 INJECTION, SOLUTION INTRAMUSCULAR; INTRAVENOUS at 21:53

## 2025-01-29 RX ADMIN — NEOMYCIN SULFATE, POLYMYXIN B SULFATE AND BACITRACIN ZINC 0.5 INCH: 3.5; 10000; 4 OINTMENT OPHTHALMIC at 10:54

## 2025-01-29 RX ADMIN — CHOLECALCIFEROL TAB 25 MCG (1000 UNIT) 1000 UNITS: 25 TAB at 10:54

## 2025-01-29 RX ADMIN — Medication 2 L/MIN: at 21:14

## 2025-01-29 RX ADMIN — NYSTATIN 1 APPLICATION: 100000 POWDER TOPICAL at 10:57

## 2025-01-29 RX ADMIN — Medication 3 L/MIN: at 08:32

## 2025-01-29 RX ADMIN — ISOSORBIDE MONONITRATE 30 MG: 30 TABLET, EXTENDED RELEASE ORAL at 10:54

## 2025-01-29 RX ADMIN — FUROSEMIDE 40 MG: 10 INJECTION, SOLUTION INTRAMUSCULAR; INTRAVENOUS at 10:53

## 2025-01-29 RX ADMIN — NYSTATIN 1 APPLICATION: 100000 POWDER TOPICAL at 22:02

## 2025-01-29 RX ADMIN — CEFTRIAXONE SODIUM 1 G: 1 INJECTION, SOLUTION INTRAVENOUS at 11:46

## 2025-01-29 RX ADMIN — PANTOPRAZOLE SODIUM 40 MG: 40 TABLET, DELAYED RELEASE ORAL at 10:54

## 2025-01-29 RX ADMIN — BISACODYL 10 MG: 5 TABLET, COATED ORAL at 21:53

## 2025-01-29 RX ADMIN — INSULIN LISPRO 2 UNITS: 100 INJECTION, SOLUTION INTRAVENOUS; SUBCUTANEOUS at 12:33

## 2025-01-29 RX ADMIN — METOPROLOL TARTRATE 25 MG: 25 TABLET, FILM COATED ORAL at 21:53

## 2025-01-29 RX ADMIN — AZITHROMYCIN 500 MG: 500 TABLET, FILM COATED ORAL at 10:53

## 2025-01-29 RX ADMIN — ASPIRIN 81 MG: 81 TABLET, COATED ORAL at 10:52

## 2025-01-29 RX ADMIN — POTASSIUM CHLORIDE 20 MEQ: 1500 TABLET, EXTENDED RELEASE ORAL at 10:53

## 2025-01-29 RX ADMIN — PREDNISONE 2.5 MG: 5 TABLET ORAL at 10:59

## 2025-01-29 RX ADMIN — TRIAMCINOLONE ACETONIDE: 1 CREAM TOPICAL at 10:57

## 2025-01-29 RX ADMIN — METOPROLOL TARTRATE 25 MG: 25 TABLET, FILM COATED ORAL at 10:53

## 2025-01-29 RX ADMIN — WARFARIN SODIUM 4.5 MG: 2.5 TABLET ORAL at 18:48

## 2025-01-29 ASSESSMENT — COGNITIVE AND FUNCTIONAL STATUS - GENERAL
MOVING FROM LYING ON BACK TO SITTING ON SIDE OF FLAT BED WITH BEDRAILS: A LITTLE
TOILETING: A LOT
MOVING FROM LYING ON BACK TO SITTING ON SIDE OF FLAT BED WITH BEDRAILS: A LITTLE
TURNING FROM BACK TO SIDE WHILE IN FLAT BAD: A LOT
MOBILITY SCORE: 15
TURNING FROM BACK TO SIDE WHILE IN FLAT BAD: A LITTLE
CLIMB 3 TO 5 STEPS WITH RAILING: A LOT
HELP NEEDED FOR BATHING: A LITTLE
STANDING UP FROM CHAIR USING ARMS: A LITTLE
WALKING IN HOSPITAL ROOM: A LITTLE
DRESSING REGULAR LOWER BODY CLOTHING: A LOT
MOVING TO AND FROM BED TO CHAIR: A LITTLE
MOVING TO AND FROM BED TO CHAIR: A LOT
DRESSING REGULAR UPPER BODY CLOTHING: A LITTLE
DAILY ACTIVITIY SCORE: 19
PERSONAL GROOMING: A LITTLE
DRESSING REGULAR LOWER BODY CLOTHING: A LITTLE
TOILETING: A LITTLE
STANDING UP FROM CHAIR USING ARMS: A LITTLE
CLIMB 3 TO 5 STEPS WITH RAILING: TOTAL
WALKING IN HOSPITAL ROOM: TOTAL
HELP NEEDED FOR BATHING: A LOT
DRESSING REGULAR UPPER BODY CLOTHING: A LITTLE
MOBILITY SCORE: 14
PERSONAL GROOMING: A LITTLE
DAILY ACTIVITIY SCORE: 16

## 2025-01-29 ASSESSMENT — PAIN SCALES - GENERAL
PAINLEVEL_OUTOF10: 0 - NO PAIN

## 2025-01-29 ASSESSMENT — ACTIVITIES OF DAILY LIVING (ADL)
HOME_MANAGEMENT_TIME_ENTRY: 10
BATHING_ASSISTANCE: MODERATE
ADL_ASSISTANCE: INDEPENDENT

## 2025-01-29 ASSESSMENT — PAIN - FUNCTIONAL ASSESSMENT
PAIN_FUNCTIONAL_ASSESSMENT: 0-10
PAIN_FUNCTIONAL_ASSESSMENT: 0-10

## 2025-01-29 NOTE — SIGNIFICANT EVENT
Area is reddened, slow to keenan.  Possibly some component of MASD.  Patient on O2 with HOB elevated.  Encouraged frequent change in position, up to chair for meals, and keeping skin clean and dry.

## 2025-01-29 NOTE — PROGRESS NOTES
"Navneet Thompson is a 89 y.o. male on day 3 of admission presenting with Acute on chronic congestive heart failure, unspecified heart failure type.    Subjective   Patient resting comfortably in bed. Denies chest pain, pressure or palpitations.        Objective     Physical Exam  HENT:      Head: Normocephalic and atraumatic.   Cardiovascular:      Rate and Rhythm: Normal rate and regular rhythm.      Heart sounds: No murmur heard.     No friction rub. No gallop.   Pulmonary:      Breath sounds: No wheezing, rhonchi or rales.      Comments: Diminished breath sounds throughout.  Mild conversational dyspnea noted.  Abdominal:      General: Bowel sounds are normal.      Palpations: Abdomen is soft.   Musculoskeletal:      Right lower leg: No edema.      Left lower leg: No edema.   Skin:     General: Skin is warm and dry.      Capillary Refill: Capillary refill takes less than 2 seconds.   Neurological:      Mental Status: He is alert and oriented to person, place, and time.   Psychiatric:         Mood and Affect: Mood normal.         Behavior: Behavior normal.         Last Recorded Vitals  Blood pressure 150/61, pulse 65, temperature 36.4 °C (97.5 °F), temperature source Temporal, resp. rate 18, height 1.778 m (5' 10\"), weight 80 kg (176 lb 5.9 oz), SpO2 95%.  Intake/Output last 3 Shifts:    Intake/Output Summary (Last 24 hours) at 1/29/2025 1048  Last data filed at 1/29/2025 0501  Gross per 24 hour   Intake 50 ml   Output 2100 ml   Net -2050 ml        Relevant Results  Results for orders placed or performed during the hospital encounter of 01/26/25 (from the past 24 hours)   POCT GLUCOSE   Result Value Ref Range    POCT Glucose 126 (H) 74 - 99 mg/dL   POCT GLUCOSE   Result Value Ref Range    POCT Glucose 162 (H) 74 - 99 mg/dL   Magnesium   Result Value Ref Range    Magnesium 2.19 1.60 - 2.40 mg/dL   Protime-INR   Result Value Ref Range    Protime 20.4 (H) 9.3 - 12.7 seconds    INR 2.0 (H) 0.9 - 1.2   CBC   Result Value " Ref Range    WBC 9.2 4.4 - 11.3 x10*3/uL    nRBC 0.0 0.0 - 0.0 /100 WBCs    RBC 4.03 (L) 4.50 - 5.90 x10*6/uL    Hemoglobin 11.8 (L) 13.5 - 17.5 g/dL    Hematocrit 37.7 (L) 41.0 - 52.0 %    MCV 94 80 - 100 fL    MCH 29.3 26.0 - 34.0 pg    MCHC 31.3 (L) 32.0 - 36.0 g/dL    RDW 15.1 (H) 11.5 - 14.5 %    Platelets 182 150 - 450 x10*3/uL   Basic Metabolic Panel   Result Value Ref Range    Glucose 159 (H) 74 - 99 mg/dL    Sodium 140 136 - 145 mmol/L    Potassium 3.6 3.5 - 5.3 mmol/L    Chloride 101 98 - 107 mmol/L    Bicarbonate 32 21 - 32 mmol/L    Anion Gap 11 10 - 20 mmol/L    Urea Nitrogen 36 (H) 6 - 23 mg/dL    Creatinine 1.32 (H) 0.50 - 1.30 mg/dL    eGFR 51 (L) >60 mL/min/1.73m*2    Calcium 8.8 8.6 - 10.3 mg/dL     *Note: Due to a large number of results and/or encounters for the requested time period, some results have not been displayed. A complete set of results can be found in Results Review.               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  Admitting team changed patient's home 40mg Lasix from p.o. to IV BID, which the patient is tolerating well.  Will continue with this dose. Overnight in the ED patient had increased work of breathing and was placed on BiPAP O2 therapy and given a dose of IV Lasix at that time  and showed improvement, now being on 4L nasal cannula with slight conversational dyspnea.  Patient denies chest pain, palpitations, pressure and no JVD was noted upon assessment. States the feeling of SOB has improved.  Patient's Coumadin is on hold, will continue to hold due to a supratherapeutic INR of 6.1.  Coumadin management will be done by the admitting team. Current vitals are 85, in a ventricular paced rhythm, blood pressure 148/74 satting 99% on 4 L.  Reordered his home medication of lisinopril and his Imdur.  Transthoracic echocardiogram was ordered, and will be completed sometime today.  Will also interrogate ICD. Will continue to follow this patient with you.     1/28:  minimal improvement over the past 24 hours.  Patient's respiratory status is similar.  He continues to require increased nasal cannula oxygen and has conversational dyspnea.  Wheezing noted in upper lobes and significantly diminished in bilateral bases with faint crackles.  Believe the patient will benefit from further IV diuresis.  Will continue on current dose.  Additionally have prescribed albuterol treatments.  On telemetry monitor he remains in a rate controlled atrial fibrillation.  His warfarin is on hold for elevated INR yesterday of 6.1.  He did receive 2.5 mg of vitamin K from admitting, however his INR is further risen today to 6.9.  At this point there is no obvious liver etiology with AST and ALT as well as a total bili within normal limits.  Given the patient has no active bleeding and a stable hemoglobin of 11.0 from my perspective would feel okay to continue to watch his INR for 1 day further without giving extra vitamin K.  However if he does continue to rise tomorrow would give 5 mg of vitamin K.  Otherwise his blood pressure is stable with most recent 117/60.  Creatinine 1.33.  He is chest pain-free.  Anticipate a further 48 hours hospitalization.  Will follow with you.    1/29: Patient is resting comfortably in bed.   He reports he is frustrated that they are trying to wean the oxygen when he feels that he needs it.  Of note his current pulse oximetry is 99% on 3 L nasal cannula.  I did have a long discussion with the patient regarding nasal cannula oxygen and the weaning process and he states he is agreeable to slowly weaning the oxygen today.  He does continue to diurese well with IV furosemide with an approximate -2 L fluid balance over the last 24 hours.  Blood pressures overall normotensive with last recorded at 150/61.  INR has improved significantly as morning to 2.0.  Hemoglobin 11.8.  Other lab work revealed sodium 140, potassium 3.6, creatinine stable at 1.32.  Will continue IV Lasix 40 mg twice daily.  Warfarin therapy to be managed by admitting team.  Will continue to follow this patient with you.        MINOR Hinojosa-CNP

## 2025-01-29 NOTE — PROGRESS NOTES
Physical Therapy    Physical Therapy Treatment    Patient Name: Navneet Thompson  MRN: [id number]  Department: [address]  Room: 13/13-B  Today's Date: 1/29/2025  Time Calculation  Start Time: 1356  Stop Time: 1444  Time Calculation (min): 48 min         Assessment/Plan   PT Assessment  Barriers to Discharge Home: Caregiver assistance, Physical needs  Caregiver Assistance: Patient lives alone and/or does not have reliable caregiver assistance  Physical Needs: Stair navigation to access bed limited by function/safety, Stair navigation to access bath limited by function/safety, High falls risk due to function or environment  End of Session Communication: Bedside nurse  Assessment Comment: Pt made adequate progress toward his functional mobility goals. Pt required up to modA x2 during functional mobility compared to his reported baseline. Pt would benefit from continued skilled PT services for maximizing independence and safety prior to & after discharge (MODERATE intensity).  End of Session Patient Position: Bed, 3 rail up, Alarm on (call light and needs within reach)     PT Plan  Treatment/Interventions: Bed mobility, Transfer training, Gait training, Stair training, Balance training, Strengthening, Endurance training, Range of motion, Therapeutic exercise, Therapeutic activity  PT Plan: Ongoing PT  PT Frequency: 5 times per week  PT Discharge Recommendations: Moderate intensity level of continued care  Equipment Recommended upon Discharge: Wheeled walker, Other (comment) (rollator)  PT Recommended Transfer Status: Assist x1, Assistive device (FWw)  PT - OK to Discharge: Yes      General Visit Information:   PT  Visit  PT Received On: 01/29/25  General  Reason for Referral: impaired mobility; + acute on chronic CHF exacerbation  Past Medical History Relevant to Rehab: CHF, A-fib, CAD, AVR, pacermaker, ICD, DM, HTN  Family/Caregiver Present: Yes  Caregiver Feedback: Son present at start of session.  Co-Treatment:  OT  Co-Treatment Reason: To maximize safety of pt/staff during functional mobility.  Prior to Session Communication: Bedside nurse  Patient Position Received: Bed, 3 rail up, Alarm on  General Comment: Cleared by RN for participation. Pt agreed to tx and was fully engaged throughout.    Subjective   Precautions:  Precautions  Hearing/Visual Limitations: + reading glasses; deaf right ear  Medical Precautions: Fall precautions, Oxygen therapy device and L/min  Precautions Comment: 3 L/min O2 via NC     Date/Time Vitals Session Patient Position Pulse Resp SpO2 BP MAP (mmHg)    01/29/25 1500 --  --  --  19  98 %  104/68  73               Objective   Pain:  Pain Assessment  0-10 (Numeric) Pain Score: 0 - No pain  Cognition:  Cognition  Overall Cognitive Status: Within Functional Limits  Orientation Level: Oriented X4  Processing Speed: Delayed     Postural Control:  Static Sitting Balance  Static Sitting-Balance Support: Feet supported  Static Sitting-Level of Assistance: Close supervision  Static Standing Balance  Static Standing-Balance Support: Bilateral upper extremity supported  Static Standing-Level of Assistance: Minimum assistance  Dynamic Standing Balance  Dynamic Standing-Balance Support: Bilateral upper extremity supported  Dynamic Standing-Level of Assistance: Minimum assistance     Activity Tolerance:  Activity Tolerance  Endurance: Tolerates 10 - 20 min exercise with multiple rests    Treatments:  Bed Mobility  Bed Mobility: Yes  Bed Mobility 1  Bed Mobility 1: Supine to sitting  Level of Assistance 1: Minimum assistance (to elevate trunk. HOB elevated ~40° and used R bed rail.)  Bed Mobility 2  Bed Mobility  2: Sitting to supine  Level of Assistance 2: Minimum assistance (to RLE. Bed flat, used bed rail, toward R side,)      Transfers  Transfer: Yes  Transfer 1  Technique 1: Sit to stand  Transfer Device 1: Walker  Transfer Level of Assistance 1: Moderate assistance, +2 (assist for lifting torque. Moderate  verbal cues for walker safety/BUE start position. x2 trials at EOB and x1 trial at toilet.)  Transfers 2  Technique 2: Stand to sit  Transfer Device 2: Walker  Transfer Level of Assistance 2: Moderate assistance (for increased eccentric control.)      Ambulation/Gait Training  Ambulation/Gait Training Performed: Yes  Ambulation/Gait Training 1  Surface 1: Level tile  Device 1: Rolling walker  Assistance 1: Minimum assistance (for steadying at trunk. Moderate verbal cues for walker managment and to keeps ROSMERY within frame of device.)  Quality of Gait 1: Wide base of support, Decreased step length, Diminished heel strike, Forward flexed posture, Soft knee(s) (Step through pattern with slowed velocity. No threat for LOB.)    Outcome Measures:  [facility] Basic Mobility  Turning from your back to your side while in a flat bed without using bedrails: A little  Moving from lying on your back to sitting on the side of a flat bed without using bedrails: A lot  Moving to and from bed to chair (including a wheelchair): A lot  Standing up from a chair using your arms (e.g. wheelchair or bedside chair): A little  To walk in hospital room: A little  Climbing 3-5 steps with railing: A lot  Basic Mobility - Total Score: 15    Education Documentation  Precautions, taught by Heather Rodriguez PT at 1/29/2025  4:01 PM.  Learner: Patient  Readiness: Acceptance  Method: Explanation  Response: Needs Reinforcement  Comment: Fall precautions. See above for cues during functional mobility.    Mobility Training, taught by Heather Rodriguez PT at 1/29/2025  4:01 PM.  Learner: Patient  Readiness: Acceptance  Method: Explanation  Response: Needs Reinforcement  Comment: Fall precautions. See above for cues during functional mobility.    Education Comments  No comments found.       Encounter Problems       Encounter Problems (Active)       Balance       STG - Maintains dynamic standing balance with upper extremity support (Progressing)       Start:   01/27/25    Expected End:  02/24/25       INTERVENTIONS:  1. Practice standing with minimal support.  2. Educate patient about standing tolerance.  3. Educate patient about independence with gait, transfers, and ADL's.  4. Educate patient about use of assistive device.  5. Educate patient about self-directed care.            Mobility       STG - Patient will ambulate 100 ft, FWW, + turns, mod Ind (Progressing)       Start:  01/27/25    Expected End:  02/24/25            pt ascends/descends 6 steps with 2 rails, mod ind (Not Progressing)       Start:  01/27/25    Expected End:  02/24/25               PT Transfers       STG - Patient to transfer to and from sit to supine mod Ind (Progressing)       Start:  01/27/25    Expected End:  02/24/25            STG - Patient will transfer sit to and from stand mod ind (Progressing)       Start:  01/27/25    Expected End:  02/24/25               Pain - Adult

## 2025-01-29 NOTE — ASSESSMENT & PLAN NOTE
Patient gained 7 to 8 pounds in the last week.  +3 pitting edema both lower extremities on admission   - Edema improving   Echo 10/2023 LVEF 30-35% - 1/27/25 LVEF 25-30% with grade 3 diastolic dysfunction and elevated RVSP   Fluid restriction  IV lasix twice daily   Consult cardiology - appreciate recs   ICD in situ - interrogate

## 2025-01-29 NOTE — ASSESSMENT & PLAN NOTE
Creatinine at baseline 1.1-1.2  May be related to fluid retention   Diurese, monitor labs daily   Improving

## 2025-01-29 NOTE — PROGRESS NOTES
Occupational Therapy    Evaluation/Treatment    Patient Name: Navneet Thompson  MRN: [id number]  Department: [facility] E  Room: 13/13-B  Today's Date: 01/29/25  Time Calculation  Start Time: 1357  Stop Time: 1445  Time Calculation (min): 48 min       Assessment:  OT Assessment: OT order received, chart reviewed, evaluation complete. Pt. requires assistance with ADLs and functional mobility due to impaired balance and stability. Pt. requires increased time to complete movements and multiple prompts to follow a command. Pt. would benefit from acute OT services to facilitate PLOF.  Prognosis: Good  Barriers to Discharge Home: Physical needs, Caregiver assistance  Evaluation/Treatment Tolerance: Patient limited by fatigue  Medical Staff Made Aware: Yes  End of Session Communication: Bedside nurse  End of Session Patient Position: Bed, 3 rail up, Alarm on (call light in reach)  OT Assessment Results: Decreased upper extremity range of motion, Decreased upper extremity strength, Decreased safe judgment during ADL, Decreased endurance, Decreased functional mobility, Decreased gross motor control  Prognosis: Good  Barriers to Discharge: Decreased caregiver support  Evaluation/Treatment Tolerance: Patient limited by fatigue  Medical Staff Made Aware: Yes  Strengths: Attitude of self, Coping skills  Barriers to Participation: Capable of completing ADLs semi/independent, Support of Caregivers  Plan:  Treatment Interventions: ADL retraining, Functional transfer training, UE strengthening/ROM, Endurance training, Patient/family training, Compensatory technique education  OT Frequency: 4 times per week  OT Discharge Recommendations: Moderate intensity level of continued care  Equipment Recommended upon Discharge: Wheeled walker  OT Recommended Transfer Status: Moderate assist, Assist of 2  OT - OK to Discharge: Yes  Treatment Interventions: ADL retraining, Functional transfer training, UE strengthening/ROM, Endurance training,  Patient/family training, Compensatory technique education    Subjective   Current Problem:  1. Acute on chronic congestive heart failure, unspecified heart failure type  Transthoracic Echo (TTE) Limited    Transthoracic Echo (TTE) Limited      2. Shortness of breath        3. Acute cough        4. Diastolic hypertension        5. Pulmonary infiltrate        6. Elevated troponin        7. Heart failure, acute systolic  Transthoracic Echo (TTE) Limited    Transthoracic Echo (TTE) Limited      8. Sensorineural hearing loss (SNHL) of both ears        9. Sudden right hearing loss        10. Cardiac defibrillator in place  Cardiac device check - Inpatient    Cardiac device check - Inpatient        General:   OT Received On: 01/29/25  General  Reason for Referral: Activities of daily living  Referred By: Dr FREEMAN Gutierrez  Past Medical History Relevant to Rehab: HLD, HTN, DM2, A-fib, COPD, BETH  Family/Caregiver Present: Yes  Caregiver Feedback: Son present upon entry to pt. room.  Co-Treatment: PT  Co-Treatment Reason: Pt. is medically complex requiring two skilled therapist for safety and maximization of functional mobility.  Prior to Session Communication: Bedside nurse  Patient Position Received: Bed, 3 rail up, Alarm on  General Comment: Pt. is a 89 year old male admitted with acute chronic congestive heart failure (unspecified heart failure type). Pt. has been experiencing an increase of SOB over the past 4 days and reports gaining 7-8 lbs over the past week.   Precautions:  Hearing/Visual Limitations: 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  Medical Precautions: Fall precautions, Oxygen therapy device and L/min (3L O2)     Date/Time Vitals Session Patient Position Pulse Resp SpO2 BP MAP (mmHg)    01/29/25 1500 --  --  --  19  98 %  104/68  73                 Pain:  Pain Assessment  Pain Assessment: 0-10  0-10 (Numeric) Pain Score: 0 - No pain    Objective   Cognition:  Overall Cognitive Status: Within  Functional Limits  Orientation Level: Oriented X4  Safety/Judgement:  (impaired)  Insight: Mild  Processing Speed: Delayed           Home Living:  Type of Home: House  Lives With: Alone  Home Adaptive Equipment: Walker rolling or standard, Cane  Home Layout: Multi-level, Laundry in basement, Bed/bath upstairs, Stairs to alternate level with rails  Alternate Level Stairs-Rails: Both  Alternate Level Stairs-Number of Steps: 6 steps up to bed/bathroom; 4 steps down to laundryroom  Home Access: Stairs to enter with rails  Entrance Stairs-Rails:  (grab bar by door)  Entrance Stairs-Number of Steps: 1  Bathroom Shower/Tub: Tub/shower unit  Bathroom Toilet: Standard  Bathroom Equipment: Grab bars in shower, Shower chair with back  Bathroom Accessibility: 2nd floor  Prior Function:  Level of [place]: Independent with ADLs and functional transfers, Needs assistance with homemaking  Receives Help From: Family  ADL Assistance: Independent  Homemaking Assistance:  (Pt. reports daughter comes over to assist with homemaking tasks.)  Ambulatory Assistance: Independent  Vocational: Retired  Prior Function Comments: Pt. reports having 1 fall in the past 6 months  IADL History:     ADL:  Eating Assistance: Independent  Eating Deficit: Setup, Increased time to complete (Pt. UE ROM appears to be WFL an is projected to be able to perform eating tasks with set up assistance.)  Grooming Assistance: Independent  Grooming Deficit: Setup, Increased time to complete (Pt. does not demonstrate UE ROM deficits.)  Bathing Assistance: Moderate  Bathing Deficit: Setup, Supervision/safety, Increased time to complete , Right upper leg, Left upper leg, Right lower leg including foot, Left lower leg including foot, Use of adaptive equipment (Pt. is projected to require assistance reaching LE due to decreased balance and stability in sitting.)  UE Dressing Assistance: Minimal  UE Dressing Deficit: Setup, Increased time to complete (Pt. projected to  need assistance with maintenance of lines during dressing tasks.)  LE Dressing Assistance: Maximal  LE Dressing Deficit: Setup, Verbal cueing, Supervision/safety, Increased time to complete, Tie shoes, Don/doff R sock, Don/doff L sock, Thread RLE into pants, Thread LLE into pants, Thread RLE into underwear, Thread LLE into underwear, Pull up over hips, Fasteners, Don/doff R shoe, Don/doff L shoe (Pt. required max assist to andrea/doff pants and shoes during therapy session. Pt. able to perform figure 4 stance to don shoes but required assistance with pushing foot into shoe due to brace on shoes. Assist to thread BLE into pants and pull over hips)  Toileting Assistance with Device: Maximal  Toileting Deficit:  (Pt. performed toileting during therapy session and max assistance for hygiene due to limited shoulder IR and decreased balance.)  ADL Comments: Pt. UE ROM is WFL but appears to have impaired balance and stability affecting his ability to perform ADLs in sitting.    Activity Tolerance:  Endurance: Tolerates 10 - 20 min exercise with multiple rests  Activity Tolerance Comments: Pt. appears to demonstrate SOB and weakness limiting activity tolerance.  Rate of Perceived Exertion (RPE): 4  Functional Standing Tolerance:     Bed Mobility/Transfers: Bed Mobility  Bed Mobility: Yes  Bed Mobility 1  Bed Mobility 1: Supine to sitting  Level of Assistance 1: Minimum assistance  Bed Mobility Comments 1: HOB elevated, pt. able to elevate legs over EOB and grab bed rails for assistance into seated position.  Bed Mobility 2  Bed Mobility  2: Sitting to supine  Level of Assistance 2: Minimum assistance  Bed Mobility Comments 2: Pt. able to elevate legs into bed but required min A for alignment of LE    Transfers  Transfer: Yes  Transfer 1  Transfer From 1: Sit to  Transfer to 1: Stand  Technique 1: Sit to stand  Transfer Device 1: Walker (rolling walker)  Transfer Level of Assistance 1: +2, Moderate assistance  Trials/Comments  1: Pt. required mod Ax2 for elevation from surface and unable to follow cues for safe hand placement despite being given multiple prompts to place hands on bed.  Transfers 2  Transfer From 2: Stand to  Transfer to 2: Sit  Technique 2: Stand to sit  Transfer Device 2: Walker  Transfer Level of Assistance 2: Moderate assistance  Trials/Comments 2: Pt. required min assist for trunk support and verbal cues to take backward steps and place hands on bed.  Transfers 3  Transfer From 3: Stand to  Transfer to 3: Toilet  Technique 3: Stand to sit  Transfer Device 3: Walker  Transfer Level of Assistance 3: Moderate assistance  Trials/Comments 3: Mod Ax1 for controlled descent on to toilet with grab bar use  Transfers 4  Transfer From 4: Toilet to  Transfer to 4: Stand  Technique 4: Sit to stand  Transfer Device 4: Walker  Transfer Level of Assistance 4: Moderate assistance, +2  Trials/Comments 4: Mod Ax2 due to low toilet seat and assistance needed to compensate for decreases LE strength.         Functional Mobility:  Functional Mobility  Functional Mobility Performed: Yes  Functional Mobility 1  Surface 1: Level tile  Device 1: Rolling walker  Assistance 1: Minimum assistance  Comments 1: Pt. pushed walker outside of base of support during functional mobility and required assistance for maintenance of lines. Min A for balance and rolling walker management         Vision:Vision - Basic Assessment  Current Vision: Wears glasses only for reading    Coordination:  Movements are Fluid and Coordinated: No  Upper Body Coordination: Pt. appears to have slow and delayed movements.      Extremities: RUE   RUE : Within Functional Limits and LUE   LUE: Within Functional Limits    Outcome Measures: [facility] Daily Activity  Putting on and taking off regular lower body clothing: A lot  Bathing (including washing, rinsing, drying): A lot  Putting on and taking off regular upper body clothing: A little  Toileting, which includes using toilet,  bedpan or urinal: A lot  Taking care of personal grooming such as brushing teeth: A little  Eating Meals: None  Daily Activity - Total Score: 16      Education Documentation  ADL Training, taught by KIM Dominguez at 1/29/2025  3:54 PM.  Learner: Patient  Readiness: Acceptance  Method: Explanation  Response: Verbalizes Understanding  Comment: Pt. educated on therapy plan and call light use.    Education Comments  No comments found.        OP EDUCATION:       Goals:  Encounter Problems       Encounter Problems (Active)       OT Goals       ADL (Progressing)       Start:  01/29/25    Expected End:  02/14/25       Patient will perform ADLs with modified independence utilizing AE as needed.          Functional Transfer  (Progressing)       Start:  01/29/25    Expected End:  02/14/25       Patient will transfer from sit to stand with modified independence utilizing a rolling walker.          Functional Mobility  (Progressing)       Start:  01/29/25    Expected End:  02/14/25       Patient will perform household distance functional mobility with modified independence utilizing a rolling walker.

## 2025-01-29 NOTE — PROGRESS NOTES
"Navneet Thompson is a 89 y.o. male on day 3 of admission presenting with Acute on chronic congestive heart failure, unspecified heart failure type.    Subjective   Patient resting in bed. Denies chest pain. Feels his breathing is better than prior to admit, but is still short of breath with moist, minimally productive, frequent cough. Having constipation. Family at bedside, questions answered.        Objective     Physical Exam  Constitutional:       Appearance: Normal appearance.   HENT:      Head: Normocephalic and atraumatic.      Mouth/Throat:      Mouth: Mucous membranes are moist.      Pharynx: Oropharynx is clear.   Eyes:      Extraocular Movements: Extraocular movements intact.      Conjunctiva/sclera: Conjunctivae normal.      Pupils: Pupils are equal, round, and reactive to light.   Cardiovascular:      Rate and Rhythm: Normal rate and regular rhythm.      Pulses: Normal pulses.      Heart sounds: Normal heart sounds.   Pulmonary:      Effort: Pulmonary effort is normal.      Breath sounds: Normal breath sounds.   Abdominal:      General: Bowel sounds are normal.      Palpations: Abdomen is soft.      Tenderness: There is no abdominal tenderness.   Musculoskeletal:         General: Normal range of motion.      Cervical back: Normal range of motion and neck supple.   Skin:     General: Skin is warm and dry.      Capillary Refill: Capillary refill takes less than 2 seconds.   Neurological:      General: No focal deficit present.      Mental Status: He is alert and oriented to person, place, and time.   Psychiatric:         Mood and Affect: Mood normal.         Behavior: Behavior normal.         Last Recorded Vitals  Blood pressure (!) 136/110, pulse 65, temperature 36.2 °C (97.2 °F), temperature source Temporal, resp. rate 17, height 1.778 m (5' 10\"), weight 80 kg (176 lb 5.9 oz), SpO2 95%.  Intake/Output last 3 Shifts:  I/O last 3 completed shifts:  In: 410 (5.1 mL/kg) [P.O.:360; IV Piggyback:50]  Out: 2800 " (35 mL/kg) [Urine:2800 (1 mL/kg/hr)]  Weight: 80 kg     Relevant Results  Results for orders placed or performed during the hospital encounter of 01/26/25 (from the past 24 hours)   POCT GLUCOSE   Result Value Ref Range    POCT Glucose 162 (H) 74 - 99 mg/dL   Magnesium   Result Value Ref Range    Magnesium 2.19 1.60 - 2.40 mg/dL   Protime-INR   Result Value Ref Range    Protime 20.4 (H) 9.3 - 12.7 seconds    INR 2.0 (H) 0.9 - 1.2   CBC   Result Value Ref Range    WBC 9.2 4.4 - 11.3 x10*3/uL    nRBC 0.0 0.0 - 0.0 /100 WBCs    RBC 4.03 (L) 4.50 - 5.90 x10*6/uL    Hemoglobin 11.8 (L) 13.5 - 17.5 g/dL    Hematocrit 37.7 (L) 41.0 - 52.0 %    MCV 94 80 - 100 fL    MCH 29.3 26.0 - 34.0 pg    MCHC 31.3 (L) 32.0 - 36.0 g/dL    RDW 15.1 (H) 11.5 - 14.5 %    Platelets 182 150 - 450 x10*3/uL   Basic Metabolic Panel   Result Value Ref Range    Glucose 159 (H) 74 - 99 mg/dL    Sodium 140 136 - 145 mmol/L    Potassium 3.6 3.5 - 5.3 mmol/L    Chloride 101 98 - 107 mmol/L    Bicarbonate 32 21 - 32 mmol/L    Anion Gap 11 10 - 20 mmol/L    Urea Nitrogen 36 (H) 6 - 23 mg/dL    Creatinine 1.32 (H) 0.50 - 1.30 mg/dL    eGFR 51 (L) >60 mL/min/1.73m*2    Calcium 8.8 8.6 - 10.3 mg/dL   POCT GLUCOSE   Result Value Ref Range    POCT Glucose 152 (H) 74 - 99 mg/dL     *Note: Due to a large number of results and/or encounters for the requested time period, some results have not been displayed. A complete set of results can be found in Results Review.         Assessment/Plan   Assessment & Plan  Acute on chronic congestive heart failure, unspecified heart failure type  Patient gained 7 to 8 pounds in the last week.  +3 pitting edema both lower extremities on admission   - Edema improving   Echo 10/2023 LVEF 30-35% - 1/27/25 LVEF 25-30% with grade 3 diastolic dysfunction and elevated RVSP   Fluid restriction  IV lasix twice daily   Consult cardiology - appreciate recs   ICD in situ - interrogate    Pneumonia due to infectious organism  Positive  for cough.    - Tessalon pearles and Mucinex   No fever or chills  Possible left lower lobe pneumonia on imaging   Zithromax and Rocephin   follow-up cultures   Repeat x-ray tomorrow   Atrial fibrillation (Multi)  INR supratherapeutic - Last dose most likely 1/24  INR 2.0 today - will resume 4.5 mg today   Patient normally takes 6 mg of Coumadin every day except Wednesday and Sunday he takes 4.5 mg  PT/INR daily  BETH (acute kidney injury) (CMS-[facility])  Creatinine at baseline 1.1-1.2  May be related to fluid retention   Diurese, monitor labs daily   Improving   Acid reflux  Continue with omeprazole  Hypertension  Continue with home meds   Vitals as ordered   History of coronary artery bypass surgery  Angina free, stable   Continue ASA, statin, beta blocker   Troponin elevated with flat pattern.     DVT prophylaxis   Warfarin     Kellie Henley, APRN-CNP

## 2025-01-29 NOTE — CARE PLAN
The patient's goals for the shift include  rest    The clinical goals for the shift include rest and reduce edema

## 2025-01-29 NOTE — SIGNIFICANT EVENT
Area with tan eschar intact.  Kam[place] wound is blanchable, no s/sx of infection observed at site.  Foam dressing in place.

## 2025-01-29 NOTE — ASSESSMENT & PLAN NOTE
INR supratherapeutic - Last dose most likely 1/24  INR 2.0 today - will resume 4.5 mg today   Patient normally takes 6 mg of Coumadin every day except Wednesday and Sunday he takes 4.5 mg  PT/INR daily

## 2025-01-29 NOTE — PROGRESS NOTES
01/29/25 1708   Discharge Planning   Home or Post Acute Services Post acute facilities (Rehab/SNF/etc)   Type of Post Acute Facility Services Skilled nursing   Expected Discharge Disposition SNF   Does the patient need discharge transport arranged? Yes   RoundTrip coordination needed? Yes     PT recommendation is for moderate intensity rehab.  Spoke with sister Pao via phone.  Pao provided SNF choices of 1. [facility] 2 [facility] 3. [facility] 4 . [facility].  Referral sent in [facility].  No precert will be needed.

## 2025-01-29 NOTE — ASSESSMENT & PLAN NOTE
Positive for cough.    - Tessalon pearles and Mucinex   No fever or chills  Possible left lower lobe pneumonia on imaging   Zithromax and Rocephin   follow-up cultures   Repeat x-ray tomorrow

## 2025-01-29 NOTE — SIGNIFICANT EVENT
RLE has scattered skin tears that are all scabbed over, education provided to patient and nurse to use caution with moving, transferring and to keep dry skin appropriately moisturized with lotion.

## 2025-01-29 NOTE — SIGNIFICANT EVENT
Patient has rash to ABD folds that has current tx order (nystatin), area light red, dry, no odor noted.

## 2025-01-30 ENCOUNTER — APPOINTMENT (OUTPATIENT)
Dept: RADIOLOGY | Facility: HOSPITAL | Age: OVER 89
End: 2025-01-30
Payer: MEDICARE

## 2025-01-30 LAB
ANION GAP SERPL CALCULATED.3IONS-SCNC: 8 MMOL/L (ref 10–20)
BACTERIA BLD CULT: NORMAL
BACTERIA BLD CULT: NORMAL
BUN SERPL-MCNC: 29 MG/DL (ref 6–23)
CALCIUM SERPL-MCNC: 8.6 MG/DL (ref 8.6–10.3)
CHLORIDE SERPL-SCNC: 101 MMOL/L (ref 98–107)
CO2 SERPL-SCNC: 36 MMOL/L (ref 21–32)
CREAT SERPL-MCNC: 1.09 MG/DL (ref 0.5–1.3)
EGFRCR SERPLBLD CKD-EPI 2021: 64 ML/MIN/1.73M*2
ERYTHROCYTE [DISTWIDTH] IN BLOOD BY AUTOMATED COUNT: 15.2 % (ref 11.5–14.5)
GLUCOSE BLD MANUAL STRIP-MCNC: 119 MG/DL (ref 74–99)
GLUCOSE BLD MANUAL STRIP-MCNC: 148 MG/DL (ref 74–99)
GLUCOSE BLD MANUAL STRIP-MCNC: 97 MG/DL (ref 74–99)
GLUCOSE SERPL-MCNC: 113 MG/DL (ref 74–99)
HCT VFR BLD AUTO: 37.5 % (ref 41–52)
HGB BLD-MCNC: 11.5 G/DL (ref 13.5–17.5)
INR PPP: 1.8 (ref 0.9–1.2)
MCH RBC QN AUTO: 29.4 PG (ref 26–34)
MCHC RBC AUTO-ENTMCNC: 30.7 G/DL (ref 32–36)
MCV RBC AUTO: 96 FL (ref 80–100)
NRBC BLD-RTO: 0 /100 WBCS (ref 0–0)
PLATELET # BLD AUTO: 172 X10*3/UL (ref 150–450)
POTASSIUM SERPL-SCNC: 3.4 MMOL/L (ref 3.5–5.3)
PROTHROMBIN TIME: 18.1 SECONDS (ref 9.3–12.7)
RBC # BLD AUTO: 3.91 X10*6/UL (ref 4.5–5.9)
SODIUM SERPL-SCNC: 142 MMOL/L (ref 136–145)
WBC # BLD AUTO: 8.1 X10*3/UL (ref 4.4–11.3)

## 2025-01-30 PROCEDURE — 2500000004 HC RX 250 GENERAL PHARMACY W/ HCPCS (ALT 636 FOR OP/ED): Performed by: NURSE PRACTITIONER

## 2025-01-30 PROCEDURE — 99232 SBSQ HOSP IP/OBS MODERATE 35: CPT

## 2025-01-30 PROCEDURE — 82947 ASSAY GLUCOSE BLOOD QUANT: CPT

## 2025-01-30 PROCEDURE — 2500000002 HC RX 250 W HCPCS SELF ADMINISTERED DRUGS (ALT 637 FOR MEDICARE OP, ALT 636 FOR OP/ED): Performed by: NURSE PRACTITIONER

## 2025-01-30 PROCEDURE — 2060000001 HC INTERMEDIATE ICU ROOM DAILY

## 2025-01-30 PROCEDURE — 2500000001 HC RX 250 WO HCPCS SELF ADMINISTERED DRUGS (ALT 637 FOR MEDICARE OP)

## 2025-01-30 PROCEDURE — 2500000001 HC RX 250 WO HCPCS SELF ADMINISTERED DRUGS (ALT 637 FOR MEDICARE OP): Performed by: NURSE PRACTITIONER

## 2025-01-30 PROCEDURE — 85610 PROTHROMBIN TIME: CPT | Performed by: NURSE PRACTITIONER

## 2025-01-30 PROCEDURE — 2500000005 HC RX 250 GENERAL PHARMACY W/O HCPCS: Performed by: INTERNAL MEDICINE

## 2025-01-30 PROCEDURE — 71045 X-RAY EXAM CHEST 1 VIEW: CPT

## 2025-01-30 PROCEDURE — 80048 BASIC METABOLIC PNL TOTAL CA: CPT | Performed by: NURSE PRACTITIONER

## 2025-01-30 PROCEDURE — 97110 THERAPEUTIC EXERCISES: CPT | Mod: GP

## 2025-01-30 PROCEDURE — 97530 THERAPEUTIC ACTIVITIES: CPT | Mod: GP

## 2025-01-30 PROCEDURE — 85027 COMPLETE CBC AUTOMATED: CPT | Performed by: NURSE PRACTITIONER

## 2025-01-30 PROCEDURE — 36415 COLL VENOUS BLD VENIPUNCTURE: CPT | Performed by: NURSE PRACTITIONER

## 2025-01-30 PROCEDURE — 99232 SBSQ HOSP IP/OBS MODERATE 35: CPT | Performed by: NURSE PRACTITIONER

## 2025-01-30 PROCEDURE — 71045 X-RAY EXAM CHEST 1 VIEW: CPT | Performed by: RADIOLOGY

## 2025-01-30 PROCEDURE — 97535 SELF CARE MNGMENT TRAINING: CPT | Mod: GO

## 2025-01-30 PROCEDURE — 97530 THERAPEUTIC ACTIVITIES: CPT | Mod: GO

## 2025-01-30 RX ORDER — POTASSIUM CHLORIDE 20 MEQ/1
20 TABLET, EXTENDED RELEASE ORAL ONCE
Status: DISCONTINUED | OUTPATIENT
Start: 2025-01-30 | End: 2025-02-01 | Stop reason: HOSPADM

## 2025-01-30 RX ORDER — BENZONATATE 100 MG/1
100 CAPSULE ORAL 3 TIMES DAILY
Status: DISCONTINUED | OUTPATIENT
Start: 2025-01-30 | End: 2025-02-01 | Stop reason: HOSPADM

## 2025-01-30 RX ORDER — WARFARIN 3 MG/1
6 TABLET ORAL ONCE
Status: COMPLETED | OUTPATIENT
Start: 2025-01-30 | End: 2025-01-30

## 2025-01-30 RX ORDER — POTASSIUM CHLORIDE 1.5 G/1.58G
20 POWDER, FOR SOLUTION ORAL ONCE
Status: COMPLETED | OUTPATIENT
Start: 2025-01-30 | End: 2025-01-30

## 2025-01-30 RX ADMIN — CEFTRIAXONE SODIUM 1 G: 1 INJECTION, SOLUTION INTRAVENOUS at 12:50

## 2025-01-30 RX ADMIN — BENZONATATE 100 MG: 100 CAPSULE ORAL at 21:57

## 2025-01-30 RX ADMIN — LISINOPRIL 5 MG: 5 TABLET ORAL at 08:56

## 2025-01-30 RX ADMIN — GUAIFENESIN 600 MG: 600 TABLET, MULTILAYER, EXTENDED RELEASE ORAL at 08:56

## 2025-01-30 RX ADMIN — BENZONATATE 100 MG: 100 CAPSULE ORAL at 17:57

## 2025-01-30 RX ADMIN — METOPROLOL TARTRATE 25 MG: 25 TABLET, FILM COATED ORAL at 08:56

## 2025-01-30 RX ADMIN — FAMOTIDINE 20 MG: 20 TABLET, FILM COATED ORAL at 08:55

## 2025-01-30 RX ADMIN — POTASSIUM CHLORIDE 20 MEQ: 1500 TABLET, EXTENDED RELEASE ORAL at 08:55

## 2025-01-30 RX ADMIN — BENZOCAINE AND MENTHOL 1 LOZENGE: 15; 3.6 LOZENGE ORAL at 21:57

## 2025-01-30 RX ADMIN — ASPIRIN 81 MG: 81 TABLET, COATED ORAL at 08:55

## 2025-01-30 RX ADMIN — FUROSEMIDE 40 MG: 10 INJECTION, SOLUTION INTRAMUSCULAR; INTRAVENOUS at 21:57

## 2025-01-30 RX ADMIN — TRIAMCINOLONE ACETONIDE: 1 CREAM TOPICAL at 22:12

## 2025-01-30 RX ADMIN — FUROSEMIDE 40 MG: 10 INJECTION, SOLUTION INTRAMUSCULAR; INTRAVENOUS at 09:21

## 2025-01-30 RX ADMIN — GUAIFENESIN 600 MG: 600 TABLET, MULTILAYER, EXTENDED RELEASE ORAL at 21:57

## 2025-01-30 RX ADMIN — NYSTATIN 1 APPLICATION: 100000 POWDER TOPICAL at 22:12

## 2025-01-30 RX ADMIN — INSULIN GLARGINE 30 UNITS: 100 INJECTION, SOLUTION SUBCUTANEOUS at 21:56

## 2025-01-30 RX ADMIN — MAGNESIUM HYDROXIDE 60 ML: 1200 LIQUID ORAL at 08:54

## 2025-01-30 RX ADMIN — NEOMYCIN SULFATE, POLYMYXIN B SULFATE AND BACITRACIN ZINC 0.5 INCH: 3.5; 10000; 4 OINTMENT OPHTHALMIC at 12:50

## 2025-01-30 RX ADMIN — PREDNISONE 2.5 MG: 5 TABLET ORAL at 08:56

## 2025-01-30 RX ADMIN — TRIAMCINOLONE ACETONIDE: 1 CREAM TOPICAL at 09:07

## 2025-01-30 RX ADMIN — PANTOPRAZOLE SODIUM 40 MG: 40 TABLET, DELAYED RELEASE ORAL at 09:06

## 2025-01-30 RX ADMIN — Medication 2 L/MIN: at 21:10

## 2025-01-30 RX ADMIN — ATORVASTATIN CALCIUM 80 MG: 80 TABLET, FILM COATED ORAL at 21:57

## 2025-01-30 RX ADMIN — POTASSIUM CHLORIDE 20 MEQ: 1.5 FOR SOLUTION ORAL at 08:54

## 2025-01-30 RX ADMIN — CHOLECALCIFEROL TAB 25 MCG (1000 UNIT) 1000 UNITS: 25 TAB at 08:56

## 2025-01-30 RX ADMIN — AZITHROMYCIN 500 MG: 500 TABLET, FILM COATED ORAL at 08:56

## 2025-01-30 RX ADMIN — WARFARIN SODIUM 6 MG: 3 TABLET ORAL at 18:19

## 2025-01-30 RX ADMIN — Medication 2 L/MIN: at 08:34

## 2025-01-30 RX ADMIN — NYSTATIN 1 APPLICATION: 100000 POWDER TOPICAL at 09:07

## 2025-01-30 RX ADMIN — ISOSORBIDE MONONITRATE 30 MG: 30 TABLET, EXTENDED RELEASE ORAL at 08:55

## 2025-01-30 ASSESSMENT — COGNITIVE AND FUNCTIONAL STATUS - GENERAL
MOVING FROM LYING ON BACK TO SITTING ON SIDE OF FLAT BED WITH BEDRAILS: A LITTLE
HELP NEEDED FOR BATHING: A LOT
DRESSING REGULAR UPPER BODY CLOTHING: A LITTLE
EATING MEALS: A LITTLE
TOILETING: A LITTLE
CLIMB 3 TO 5 STEPS WITH RAILING: A LOT
HELP NEEDED FOR BATHING: A LITTLE
TURNING FROM BACK TO SIDE WHILE IN FLAT BAD: A LOT
PERSONAL GROOMING: A LITTLE
TURNING FROM BACK TO SIDE WHILE IN FLAT BAD: A LITTLE
MOBILITY SCORE: 16
DRESSING REGULAR UPPER BODY CLOTHING: A LITTLE
WALKING IN HOSPITAL ROOM: A LITTLE
CLIMB 3 TO 5 STEPS WITH RAILING: A LOT
DRESSING REGULAR LOWER BODY CLOTHING: A LITTLE
MOVING FROM LYING ON BACK TO SITTING ON SIDE OF FLAT BED WITH BEDRAILS: A LITTLE
WALKING IN HOSPITAL ROOM: A LOT
STANDING UP FROM CHAIR USING ARMS: A LOT
MOVING TO AND FROM BED TO CHAIR: A LITTLE
MOVING TO AND FROM BED TO CHAIR: A LITTLE
STANDING UP FROM CHAIR USING ARMS: A LITTLE
MOBILITY SCORE: 15
DAILY ACTIVITIY SCORE: 19
TOILETING: A LOT
PERSONAL GROOMING: A LITTLE
DAILY ACTIVITIY SCORE: 15
DRESSING REGULAR LOWER BODY CLOTHING: A LOT

## 2025-01-30 ASSESSMENT — PAIN SCALES - GENERAL
PAINLEVEL_OUTOF10: 0 - NO PAIN

## 2025-01-30 ASSESSMENT — PAIN - FUNCTIONAL ASSESSMENT
PAIN_FUNCTIONAL_ASSESSMENT: 0-10

## 2025-01-30 ASSESSMENT — ACTIVITIES OF DAILY LIVING (ADL): HOME_MANAGEMENT_TIME_ENTRY: 15

## 2025-01-30 NOTE — PROGRESS NOTES
Occupational Therapy    OT Treatment    Patient Name: Navneet Thompson  MRN: [id number]  Department: [facility] E  Room: 13/13-B  Today's Date: 1/30/2025  Time Calculation  Start Time: 1458  Stop Time: 1527  Time Calculation (min): 29 min        Assessment:  OT Assessment: steady progress, pt still limited by decreased balance, reduced endurance and generalized weakness.  still requires assistance for ADLs and MIN A for transfers  Prognosis: Good  Barriers to Discharge Home: Physical needs, Caregiver assistance  Caregiver Assistance: Patient lives alone and/or does not have reliable caregiver assistance  Physical Needs: Intermittent mobility assistance needed, Intermittent ADL assistance needed, Stair navigation into home limited by function/safety  Evaluation/Treatment Tolerance: Patient limited by fatigue  Medical Staff Made Aware: Yes  End of Session Communication: Bedside nurse  End of Session Patient Position: Bed, 3 rail up, Alarm on  OT Assessment Results: Decreased ADL status, Decreased endurance, Decreased functional mobility  Prognosis: Good  Barriers to Discharge: Decreased caregiver support  Evaluation/Treatment Tolerance: Patient limited by fatigue  Medical Staff Made Aware: Yes  Strengths: Ability to acquire knowledge, Attitude of self  Barriers to Participation: Comorbidities  Plan:  Treatment Interventions: ADL retraining, Functional transfer training, UE strengthening/ROM, Endurance training, Equipment evaluation/education, Compensatory technique education  OT Frequency: 4 times per week  OT Discharge Recommendations: Moderate intensity level of continued care  Equipment Recommended upon Discharge: Wheeled walker  OT Recommended Transfer Status: Assist of 1, Minimal assist  OT - OK to Discharge: Yes  Treatment Interventions: ADL retraining, Functional transfer training, UE strengthening/ROM, Endurance training, Equipment evaluation/education, Compensatory technique education    Subjective   Previous Visit  Info:  OT Last Visit  OT Received On: 01/30/25  General:  General  Reason for Referral: ADL impairment; + acute on chronic CHF exacerbation  Past Medical History Relevant to Rehab: HLD, HTN, DM2, A-fib, COPD, BETH  Family/Caregiver Present: Yes  Caregiver Feedback: Son present for session  Prior to Session Communication: Bedside nurse  Patient Position Received: Up in chair, Alarm on  Preferred Learning Style: verbal, visual  General Comment: pt agreeable with therapy, cooperative but stating he wants to get back into bed after session due to being in the chair since 8a.m.  Precautions:  Medical Precautions: Fall precautions, Oxygen therapy device and L/min (2L)            Pain:  Pain Assessment  Pain Assessment: 0-10  0-10 (Numeric) Pain Score: 0 - No pain    Objective    Cognition:  Cognition  Overall Cognitive Status: Within Functional Limits  Orientation Level: Oriented X4  Insight: Mild  Impulsive: Mildly     Activities of Daily Living: LE Dressing  LE Dressing: Yes  Pants Level of Assistance: Moderate assistance  LE Dressing Where Assessed: Chair  LE Dressing Comments: required assistance for threading RLE into pants, pt was able to thread LLE via figure-four technique with increased time.  pt also required assistance for managing pants over hips while standing with FWW due to reliance on BUE for support  Functional Standing Tolerance:  Time: ~2 minute bouts  Activity: static standing with FWW.  pt stood for ~2 minute bouts at MIN/CGA.  progressed to being able to perform forward reaching while maintaining unilateral UE support on FWW however with increased unsteadiness noted.  pt performed x3 trials total with prolonged seated rest breaks inbetween sets  Bed Mobility/Transfers: Bed Mobility 1  Bed Mobility 1: Sitting to supine  Level of Assistance 1: Minimum assistance  Bed Mobility Comments 1: MIN A for lifting BLE back into bed; effortful    Transfer 1  Technique 1: Sit to stand, Stand to sit  Transfer  Device 1: Walker  Transfer Level of Assistance 1: Minimum assistance  Trials/Comments 1: multiple stands performed throughout session from standard chair.  pt requiring MIN A to ascend into standing position.  required cues for hand placement and positioning prior to ascending/descending  Transfers 2  Transfer From 2: Chair with arms to  Transfer to 2: Bed  Technique 2: Stand pivot  Transfer Device 2: Walker  Transfer Level of Assistance 2: Minimum assistance  Trials/Comments 2: MIN A for steady assist during pivot, cues for hand placement and positioning throughout    Sitting Balance:  Static Sitting Balance  Static Sitting-Balance Support: Feet supported  Static Sitting-Level of Assistance: Close supervision  Standing Balance:  Static Standing Balance  Static Standing-Balance Support: Bilateral upper extremity supported  Static Standing-Level of Assistance: Minimum assistance       Outcome Measures:[facility] Daily Activity  Putting on and taking off regular lower body clothing: A lot  Bathing (including washing, rinsing, drying): A lot  Putting on and taking off regular upper body clothing: A little  Toileting, which includes using toilet, bedpan or urinal: A lot  Taking care of personal grooming such as brushing teeth: A little  Eating Meals: A little  Daily Activity - Total Score: 15        Education Documentation  Body Mechanics, taught by Cornelio Zaman OT at 1/30/2025  3:47 PM.  Learner: Patient  Readiness: Acceptance  Method: Explanation, Demonstration  Response: Verbalizes Understanding    ADL Training, taught by Cornelio Zaman OT at 1/30/2025  3:47 PM.  Learner: Patient  Readiness: Acceptance  Method: Explanation, Demonstration  Response: Verbalizes Understanding    Education Comments  No comments found.        OP EDUCATION:       Goals:  Encounter Problems       Encounter Problems (Active)       OT Goals       ADL (Progressing)       Start:  01/29/25    Expected End:  02/14/25       Patient will perform  ADLs with modified independence utilizing AE as needed.          Functional Transfer  (Progressing)       Start:  01/29/25    Expected End:  02/14/25       Patient will transfer from sit to stand with modified independence utilizing a rolling walker.          Functional Mobility  (Progressing)       Start:  01/29/25    Expected End:  02/14/25       Patient will perform household distance functional mobility with modified independence utilizing a rolling walker.

## 2025-01-30 NOTE — PROGRESS NOTES
01/30/25 1433   Discharge Planning   Home or Post Acute Services Post acute facilities (Rehab/SNF/etc)   Type of Post Acute Facility Services Skilled nursing   Expected Discharge Disposition SNF   Does the patient need discharge transport arranged? Yes   RoundTrip coordination needed? Yes     [facility] is unable to accept.  [facility], [facility] and [facility] are still reviewing.  All three facilities stated they cannot provide Taltz medication.  Patient receives this medication every two weeks and is due next to receive it on Saturday. Dr. Najera stated she would prefer that patient does not receive this medication while acutely ill in the hospital.  Daughter will call patient's dermatologist to see if patient can miss upcoming dose and resume after rehab.   Anticipate patient will be ready for discharge soon. No precert will be needed once accepting facility has been established.

## 2025-01-30 NOTE — NURSING NOTE
Assumed care of patient. Handoff given at bedside. Patient is currently resting in bed on left side with eyes closed. Bed in lowest position, call light within reach. Bed alarm active and on.

## 2025-01-30 NOTE — PROGRESS NOTES
"Navneet Thompson is a 89 y.o. male on day 4 of admission presenting with Acute on chronic congestive heart failure, unspecified heart failure type.    Subjective   Patient sitting in chair at side of bed. Denies chest pain. Had bowel movement this morning. Feels a little less short of breath today, continues to have frequent, non-productive, moist cough        Objective     Physical Exam  Constitutional:       Appearance: Normal appearance.   HENT:      Head: Normocephalic and atraumatic.      Mouth/Throat:      Mouth: Mucous membranes are moist.      Pharynx: Oropharynx is clear.   Eyes:      Extraocular Movements: Extraocular movements intact.      Conjunctiva/sclera: Conjunctivae normal.      Pupils: Pupils are equal, round, and reactive to light.   Cardiovascular:      Rate and Rhythm: Normal rate and regular rhythm.      Pulses: Normal pulses.      Heart sounds: Normal heart sounds.   Pulmonary:      Effort: Pulmonary effort is normal.      Breath sounds: Normal breath sounds.   Abdominal:      General: Bowel sounds are normal.      Palpations: Abdomen is soft.      Tenderness: There is no abdominal tenderness.   Musculoskeletal:         General: Normal range of motion.      Cervical back: Normal range of motion and neck supple.   Skin:     General: Skin is warm and dry.      Capillary Refill: Capillary refill takes less than 2 seconds.   Neurological:      General: No focal deficit present.      Mental Status: He is alert and oriented to person, place, and time.   Psychiatric:         Mood and Affect: Mood normal.         Behavior: Behavior normal.         Last Recorded Vitals  Blood pressure (!) 109/48, pulse 72, temperature 36.3 °C (97.3 °F), temperature source Temporal, resp. rate 18, height 1.778 m (5' 10\"), weight 84.3 kg (185 lb 13.6 oz), SpO2 100%.  Intake/Output last 3 Shifts:  I/O last 3 completed shifts:  In: - (0 mL/kg)   Out: 1900 (22.5 mL/kg) [Urine:1900 (0.6 mL/kg/hr)]  Weight: 84.3 kg "     Relevant Results  Results for orders placed or performed during the hospital encounter of 01/26/25 (from the past 24 hours)   Protime-INR   Result Value Ref Range    Protime 18.1 (H) 9.3 - 12.7 seconds    INR 1.8 (H) 0.9 - 1.2   CBC   Result Value Ref Range    WBC 8.1 4.4 - 11.3 x10*3/uL    nRBC 0.0 0.0 - 0.0 /100 WBCs    RBC 3.91 (L) 4.50 - 5.90 x10*6/uL    Hemoglobin 11.5 (L) 13.5 - 17.5 g/dL    Hematocrit 37.5 (L) 41.0 - 52.0 %    MCV 96 80 - 100 fL    MCH 29.4 26.0 - 34.0 pg    MCHC 30.7 (L) 32.0 - 36.0 g/dL    RDW 15.2 (H) 11.5 - 14.5 %    Platelets 172 150 - 450 x10*3/uL   Basic Metabolic Panel   Result Value Ref Range    Glucose 113 (H) 74 - 99 mg/dL    Sodium 142 136 - 145 mmol/L    Potassium 3.4 (L) 3.5 - 5.3 mmol/L    Chloride 101 98 - 107 mmol/L    Bicarbonate 36 (H) 21 - 32 mmol/L    Anion Gap 8 (L) 10 - 20 mmol/L    Urea Nitrogen 29 (H) 6 - 23 mg/dL    Creatinine 1.09 0.50 - 1.30 mg/dL    eGFR 64 >60 mL/min/1.73m*2    Calcium 8.6 8.6 - 10.3 mg/dL   POCT GLUCOSE   Result Value Ref Range    POCT Glucose 97 74 - 99 mg/dL   POCT GLUCOSE   Result Value Ref Range    POCT Glucose 119 (H) 74 - 99 mg/dL   POCT GLUCOSE   Result Value Ref Range    POCT Glucose 148 (H) 74 - 99 mg/dL     *Note: Due to a large number of results and/or encounters for the requested time period, some results have not been displayed. A complete set of results can be found in Results Review.     XR chest 1 view    Result Date: 1/30/2025  Interpreted By:  Rachana Salcedo, STUDY: XR CHEST 1 VIEW 1/30/2025 9:07 am   INDICATION: Signs/Symptoms:CHF   COMPARISON: 01/26/2025   ACCESSION NUMBER(S): [id number]   ORDERING CLINICIAN: ANJEL SIMENTAL   TECHNIQUE: AP erect view of the chest   FINDINGS: There is postoperative change from median sternotomy and coronary revascularization as well as TAVR with ICD leads seen in the right side of the heart and coronary sinus.   The heart is enlarged. The pulmonary vascular congestion seen on prior study has  improved. I suspect that there are small residual bilateral pleural effusions, decreased in size.       Improving CHF with decreased size of bilateral pleural effusions as well.   Signed by: Rachana Salcedo 1/30/2025 11:42 AM Dictation workstation:   [id number]       Assessment/Plan   Assessment & Plan  Acute on chronic congestive heart failure, unspecified heart failure type  Patient gained 7 to 8 pounds in the last week prior to admission.  +3 pitting edema both lower extremities on admission   - Edema improving   Echo 10/2023 LVEF 30-35% - 1/27/25 LVEF 25-30% with grade 3 diastolic dysfunction and elevated RVSP   Fluid restriction  IV lasix twice daily   Consult cardiology - appreciate recs   ICD in situ - interrogate    Pneumonia due to infectious organism  Positive for cough.    - Tessalon pearles and Mucinex   No fever or chills  Possible left lower lobe pneumonia on imaging   Zithromax and Rocephin - complete 5 day course today   follow-up cultures   Repeat x-ray today improved from prior   Atrial fibrillation (Multi)  INR supratherapeutic on admission-Received 2.5 mg vitamin K for INR 6.9.   INR 1.8 today - give usual dose of 6 mg tonight   Patient normally takes 6 mg of Coumadin every day except Wednesday and Sunday he takes 4.5 mg  PT/INR daily  BETH (acute kidney injury) (CMS-[facility])  Creatinine at baseline 1.1-1.2  May be related to fluid retention   Diurese, monitor labs daily   Improving   Acid reflux  Continue with omeprazole  Hypertension  Continue with home meds   Vitals as ordered   History of coronary artery bypass surgery  Angina free, stable   Continue ASA, statin, beta blocker   Troponin elevated with flat pattern.   DVT prophylaxis   Warfarin     Kellie Henley, APRN-CNP

## 2025-01-30 NOTE — PROGRESS NOTES
Physical Therapy    Physical Therapy Treatment    Patient Name: Navneet Thompson  MRN: [id number]  Department: [address]  Room: 13/13  Today's Date: 1/30/2025  Time Calculation  Start Time: 0815  Stop Time: 0844  Time Calculation (min): 29 min         Assessment/Plan   PT Assessment  Barriers to Discharge Home: Caregiver assistance, Physical needs  Caregiver Assistance: Patient lives alone and/or does not have reliable caregiver assistance  Physical Needs: Stair navigation to access bed limited by function/safety, Stair navigation to access bath limited by function/safety, High falls risk due to function or environment  Evaluation/Treatment Tolerance: Patient limited by fatigue  Medical Staff Made Aware: Yes  Strengths: Ability to acquire knowledge  Barriers to Participation: Comorbidities  End of Session Communication: Bedside nurse  Assessment Comment: pt demonstrating need for min to mod assist of 1 for limited mobility with use of FWW. + high fall risk due to functional deficits and decreased safety insight  End of Session Patient Position: Up in chair, Alarm on (call button in reach)  PT Plan  Inpatient/Swing Bed or Outpatient: Inpatient  PT Plan  Treatment/Interventions: Bed mobility, Transfer training, Gait training, Stair training, Balance training, Strengthening, Endurance training, Range of motion, Therapeutic exercise, Therapeutic activity  PT Plan: Ongoing PT  PT Frequency: 5 times per week  PT Discharge Recommendations: Moderate intensity level of continued care  Equipment Recommended upon Discharge: Wheeled walker, Other (comment)  PT Recommended Transfer Status: Assist x1, Assistive device (FWW)  PT - OK to Discharge: Yes      General Visit Information:   PT  Visit  PT Received On: 01/30/25  General  Family/Caregiver Present: No  Prior to Session Communication: Bedside nurse  Patient Position Received: Bed, 3 rail up, Alarm on  Preferred Learning Style: verbal, visual  General Comment: cleared by nurse  for therapy; pt agreeable to therapy after PT promised to put warm blankets on him if he amb to bedside chair;  + telemetry, dashack    Subjective   Precautions:  Precautions  Hearing/Visual Limitations: 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  Medical Precautions: Fall precautions, Oxygen therapy device and L/min (2 liters o2 via nc)     Date/Time Vitals Session Patient Position Pulse Resp SpO2 BP MAP (mmHg)    01/30/25 0747 --  --  67  18  99 %  113/47  63     01/30/25 0815 During PT  --  --  --  --  --  --     01/30/25 0834 --  --  --  --  96 %  --  --           Vital Signs Comment: O2 sat 96% with HR 80 bpm in long sitting; O2 sat 93% with HR 82 bpm after transfer to chair     Objective   Pain:  Pain Assessment  Pain Assessment: 0-10  0-10 (Numeric) Pain Score: 0 - No pain  Cognition:  Cognition  Overall Cognitive Status: Within Functional Limits  Orientation Level: Oriented X4  Safety/Judgement:  (decreased safety insight during functional mobility)  Insight: Mild  Coordination:  Movements are Fluid and Coordinated: No  Lower Body Coordination: decreased timing and accuracy of bilateral LE movements  Postural Control:  Postural Control  Posture Comment: obese with mild forward head, protracted shldrs, mild forward flexed  Extremity/Trunk Assessments:    Activity Tolerance:  Activity Tolerance  Endurance: Endurance does not limit participation in activity  Activity Tolerance Comments: fair due to weakness, fatigue, frequent coughing  Treatments:  Therapeutic Exercise  Therapeutic Exercise Performed: Yes  Therapeutic Exercise Activity 1: supine varsha AP x 30 reps( within available range)  Therapeutic Exercise Activity 2: supine varsha hip abd/add x 20 reps  Therapeutic Exercise Activity 3: supine varsha heel slides x 8 reps  Therapeutic Exercise Activity 4: seated varsha LAQ's x 22 reps  Therapeutic Exercise Activity 5: seated deep/pursed lip breathing x 2 sets of 5 reps    Therapeutic Activity  Therapeutic  Activity Performed: Yes (see bed mobility, transfers, amb with FWW)    Bed Mobility  Bed Mobility: Yes  Bed Mobility 1  Level of Assistance 1: Minimum assistance, Minimal verbal cues, Minimal tactile cues  Bed Mobility Comments 1: head of bed elevated; min assist of 1 for trunk up, verbal/tactile cues for active use of right UE using bedrail via modified logrolling    Ambulation/Gait Training  Ambulation/Gait Training Performed: Yes  Ambulation/Gait Training 1  Surface 1: Level tile  Device 1: Rolling walker  Assistance 1: Minimum assistance, Moderate verbal cues  Quality of Gait 1: Wide base of support, Decreased step length, Diminished heel strike, Forward flexed posture, Soft knee(s)  Comments/Distance (ft) 1: pt amb bed to armchair, + turns, approx 10 to 12 steps, verbal cues for erec tposture, pursed lip breathing; Overall balance fair at best;  Transfers  Transfer: Yes  Transfer 1  Transfer From 1: Bed to  Transfer to 1: Stand  Technique 1: Sit to stand  Transfer Device 1: Walker  Transfer Level of Assistance 1: Moderate assistance, Minimal verbal cues  Trials/Comments 1: mod assist of 1 for trunk up, verbal cues for proper varsha hand placement on bed  Transfers 2  Transfer From 2: Stand to  Transfer to 2: Chair with arms  Technique 2: Stand to sit  Transfer Device 2: Walker  Transfer Level of Assistance 2: Moderate assistance, Minimal verbal cues  Trials/Comments 2: mod assist of 1 for trunk down, verbal cues for reaching back with both hands for amrs of chair    Stairs  Stairs: No    Outcome Measures:  [facility] Basic Mobility  Turning from your back to your side while in a flat bed without using bedrails: A little  Moving from lying on your back to sitting on the side of a flat bed without using bedrails: A lot  Moving to and from bed to chair (including a wheelchair): A little  Standing up from a chair using your arms (e.g. wheelchair or bedside chair): A lot  To walk in hospital room: A little  Climbing 3-5  steps with railing: A lot  Basic Mobility - Total Score: 15    Education Documentation  Precautions, taught by Freda Beebe, PT at 1/30/2025  8:57 AM.  Learner: Patient  Readiness: Acceptance  Method: Explanation, Demonstration  Response: Needs Reinforcement  Comment: PT educated pt in safe transfer technique    Mobility Training, taught by Freda Beebe, PT at 1/30/2025  8:57 AM.  Learner: Patient  Readiness: Acceptance  Method: Explanation, Demonstration  Response: Needs Reinforcement  Comment: PT educated pt in safe transfer technique    Education Comments  No comments found.               Encounter Problems       Encounter Problems (Active)       Balance       STG - Maintains dynamic standing balance with upper extremity support (Progressing)       Start:  01/27/25    Expected End:  02/24/25       INTERVENTIONS:  1. Practice standing with minimal support.  2. Educate patient about standing tolerance.  3. Educate patient about independence with gait, transfers, and ADL's.  4. Educate patient about use of assistive device.  5. Educate patient about self-directed care.            Mobility       STG - Patient will ambulate 100 ft, FWW, + turns, mod Ind (Progressing)       Start:  01/27/25    Expected End:  02/24/25            pt ascends/descends 6 steps with 2 rails, mod ind (Progressing)       Start:  01/27/25    Expected End:  02/24/25               PT Transfers       STG - Patient to transfer to and from sit to supine mod Ind (Progressing)       Start:  01/27/25    Expected End:  02/24/25            STG - Patient will transfer sit to and from stand mod ind (Progressing)       Start:  01/27/25    Expected End:  02/24/25               Pain - Adult

## 2025-01-30 NOTE — PROGRESS NOTES
"Navneet Thompson is a 89 y.o. male on day 4 of admission presenting with Acute on chronic congestive heart failure, unspecified heart failure type.    Subjective   Patient sitting up in chair. Denies chest pain or pressure. States his breathing feels like it is slowly improving.        Objective     Physical Exam  Vitals reviewed.   HENT:      Head: Normocephalic and atraumatic.   Cardiovascular:      Rate and Rhythm: Normal rate and regular rhythm.      Heart sounds: No murmur heard.     No friction rub. No gallop.   Pulmonary:      Comments: Diminished breath sounds in the lung bases  Abdominal:      General: Bowel sounds are normal.      Palpations: Abdomen is soft.   Musculoskeletal:      Right lower leg: No edema.      Left lower leg: No edema.   Skin:     General: Skin is warm and dry.   Neurological:      Mental Status: He is alert and oriented to person, place, and time.   Psychiatric:         Mood and Affect: Mood normal.         Behavior: Behavior normal.         Last Recorded Vitals  Blood pressure (!) 113/47, pulse 67, temperature 36.8 °C (98.2 °F), temperature source Temporal, resp. rate 18, height 1.778 m (5' 10\"), weight 84.3 kg (185 lb 13.6 oz), SpO2 99%.  Intake/Output last 3 Shifts:  I/O last 3 completed shifts:  In: - (0 mL/kg)   Out: 1900 (22.5 mL/kg) [Urine:1900 (0.6 mL/kg/hr)]  Weight: 84.3 kg     Relevant Results  Results for orders placed or performed during the hospital encounter of 01/26/25 (from the past 24 hours)   POCT GLUCOSE   Result Value Ref Range    POCT Glucose 152 (H) 74 - 99 mg/dL   POCT GLUCOSE   Result Value Ref Range    POCT Glucose 164 (H) 74 - 99 mg/dL   Protime-INR   Result Value Ref Range    Protime 18.1 (H) 9.3 - 12.7 seconds    INR 1.8 (H) 0.9 - 1.2   CBC   Result Value Ref Range    WBC 8.1 4.4 - 11.3 x10*3/uL    nRBC 0.0 0.0 - 0.0 /100 WBCs    RBC 3.91 (L) 4.50 - 5.90 x10*6/uL    Hemoglobin 11.5 (L) 13.5 - 17.5 g/dL    Hematocrit 37.5 (L) 41.0 - 52.0 %    MCV 96 80 - 100 " fL    MCH 29.4 26.0 - 34.0 pg    MCHC 30.7 (L) 32.0 - 36.0 g/dL    RDW 15.2 (H) 11.5 - 14.5 %    Platelets 172 150 - 450 x10*3/uL   Basic Metabolic Panel   Result Value Ref Range    Glucose 113 (H) 74 - 99 mg/dL    Sodium 142 136 - 145 mmol/L    Potassium 3.4 (L) 3.5 - 5.3 mmol/L    Chloride 101 98 - 107 mmol/L    Bicarbonate 36 (H) 21 - 32 mmol/L    Anion Gap 8 (L) 10 - 20 mmol/L    Urea Nitrogen 29 (H) 6 - 23 mg/dL    Creatinine 1.09 0.50 - 1.30 mg/dL    eGFR 64 >60 mL/min/1.73m*2    Calcium 8.6 8.6 - 10.3 mg/dL   POCT GLUCOSE   Result Value Ref Range    POCT Glucose 97 74 - 99 mg/dL     *Note: Due to a large number of results and/or encounters for the requested time period, some results have not been displayed. A complete set of results can be found in Results Review.               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  Admitting team changed patient's home 40mg Lasix from p.o. to IV BID, which the patient is tolerating well.  Will continue with this dose. Overnight in the ED patient had increased work of breathing and was placed on BiPAP O2 therapy and given a dose of IV Lasix at that time and showed improvement, now being on 4L nasal cannula with slight conversational dyspnea.  Patient denies chest pain, palpitations,  pressure and no JVD was noted upon assessment. States the feeling of SOB has improved.  Patient's Coumadin is on hold, will continue to hold due to a supratherapeutic INR of 6.1.  Coumadin management will be done by the admitting team. Current vitals are 85, in a ventricular paced rhythm, blood pressure 148/74 satting 99% on 4 L.  Reordered his home medication of lisinopril and his Imdur.  Transthoracic echocardiogram was ordered, and will be completed sometime today.  Will also interrogate ICD. Will continue to follow this patient with you.     1/28:  minimal improvement over the past 24 hours.  Patient's respiratory status is similar.  He continues to require increased nasal cannula oxygen and has conversational dyspnea.  Wheezing noted in upper lobes and significantly diminished in bilateral bases with faint crackles.  Believe the patient will benefit from further IV diuresis.  Will continue on current dose.  Additionally have prescribed albuterol treatments.  On telemetry monitor he remains in a rate controlled atrial fibrillation.  His warfarin is on hold for elevated INR yesterday of 6.1.  He did receive 2.5 mg of vitamin K from admitting, however his INR is further risen today to 6.9.  At this point there is no obvious liver etiology with AST and ALT as well as a total bili within normal limits.  Given the patient has no active bleeding and a stable hemoglobin of 11.0 from my perspective would feel okay to continue to watch his INR for 1 day further without giving extra vitamin K.  However if he does continue to rise tomorrow would give 5 mg of vitamin K.  Otherwise his blood pressure is stable with most recent 117/60.  Creatinine 1.33.  He is chest pain-free.  Anticipate a further 48 hours hospitalization.  Will follow with you.     1/29: Patient is resting comfortably in bed.  He reports he is frustrated that they are trying to wean the oxygen when he feels that he needs it.  Of note his current pulse  oximetry is 99% on 3 L nasal cannula.  I did have a long discussion with the patient regarding nasal cannula oxygen and the weaning process and he states he is agreeable to slowly weaning the oxygen today.  He does continue to diurese well with IV furosemide with an approximate -2 L fluid balance over the last 24 hours.  Blood pressures overall normotensive with last recorded at 150/61.  INR has improved significantly as morning to 2.0.  Hemoglobin 11.8.  Other lab work revealed sodium 140, potassium 3.6, creatinine stable at 1.32.  Will continue IV Lasix 40 mg twice daily.  Warfarin therapy to be managed by admitting team.  Will continue to follow this patient with you.    1/30: As above.  Patient states he feel likes his breathing is slowly improving. He remains mildly conversationally dyspneic. Patient has been weaned to 2 L nasal cannula with an oxygen saturation of 99% this morning, and believe he could continue to be weaned off oxygen today.  Blood pressures overall normotensive last recorded 113/47.  He remains in a demand paced rhythm on telemetry without significant ectopy.  Lab works morning reveals a sodium of 142, potassium 3.4, creatinine proved 1.09 and hemoglobin 11.5.  INR subtherapeutic at 1.8, Coumadin therapy to be managed by admitting team. I am going to have a chest x-ray completed at the bedside this morning to evaluate for improvement in his pulmonary vascular congestion. Otherwise, will continue with IV furosemide 40 mg twice daily and will order additional oral potassium supplementation this morning. Will continue to follow this patient with you.       Martha Peck, APRN-CNP

## 2025-01-30 NOTE — ASSESSMENT & PLAN NOTE
Positive for cough.    - Tessalon pearles and Mucinex   No fever or chills  Possible left lower lobe pneumonia on imaging   Zithromax and Rocephin - complete 5 day course today   follow-up cultures   Repeat x-ray today improved from prior

## 2025-01-30 NOTE — ASSESSMENT & PLAN NOTE
Patient gained 7 to 8 pounds in the last week prior to admission.  +3 pitting edema both lower extremities on admission   - Edema improving   Echo 10/2023 LVEF 30-35% - 1/27/25 LVEF 25-30% with grade 3 diastolic dysfunction and elevated RVSP   Fluid restriction  IV lasix twice daily   Consult cardiology - appreciate recs   ICD in situ - interrogate

## 2025-01-30 NOTE — PROGRESS NOTES
Spiritual Care Visit  Spiritual Care Request    Reason for Visit:  Routine Visit: Introduction     Request Received From:       Focus of Care:  Visited With: Patient         Refer to :          Spiritual Care Assessment    Spiritual Assessment:                      Care Provided:  Intended Effects: Establish rapport and connectedness, Build relationship of care and support, Demonstrate caring and concern, Lessen someone's feelings of isolation  Methods: Offer emotional support  Interventions: Explain  role    Sense of Community and or Holiness Affiliation:  None         Addressed Needs/Concerns and/or [place]be[place] Through:          Outcome:  Outcome of Spiritual Care Visit: Identifying spiritual/emotional issues, Comfort/healing presence, Identifying patient's strengths/source of hope, Support system identified     Advance Directives:         Spiritual Care Annotation      Annotation: provided patient support while rounding the Unit.  introduced  services of [facility].   explained the role of the  in providing emotional and spiritual support for patient's and family while in admitted to the hospital. No spiritual or Orthodox needs were expressed. Spiritual care will remain available for support as requested.

## 2025-01-30 NOTE — CARE PLAN
The patient's goals for the shift include  rest and have a bowel movement    The clinical goals for the shift include lower O2 needs

## 2025-01-30 NOTE — NURSING NOTE
Pt doing well off O2, 93%.  Turned back up to 2L d/t pt rolling over on his side and showing good wave form but saturation in mid 80s

## 2025-01-30 NOTE — ASSESSMENT & PLAN NOTE
INR supratherapeutic on admission-Received 2.5 mg vitamin K for INR 6.9.   INR 1.8 today - give usual dose of 6 mg tonight   Patient normally takes 6 mg of Coumadin every day except Wednesday and Sunday he takes 4.5 mg  PT/INR daily

## 2025-01-31 LAB
ANION GAP SERPL CALCULATED.3IONS-SCNC: 10 MMOL/L (ref 10–20)
BUN SERPL-MCNC: 29 MG/DL (ref 6–23)
CALCIUM SERPL-MCNC: 8.7 MG/DL (ref 8.6–10.3)
CHLORIDE SERPL-SCNC: 101 MMOL/L (ref 98–107)
CO2 SERPL-SCNC: 33 MMOL/L (ref 21–32)
CREAT SERPL-MCNC: 0.98 MG/DL (ref 0.5–1.3)
EGFRCR SERPLBLD CKD-EPI 2021: 73 ML/MIN/1.73M*2
ERYTHROCYTE [DISTWIDTH] IN BLOOD BY AUTOMATED COUNT: 14.8 % (ref 11.5–14.5)
GLUCOSE BLD MANUAL STRIP-MCNC: 131 MG/DL (ref 74–99)
GLUCOSE BLD MANUAL STRIP-MCNC: 205 MG/DL (ref 74–99)
GLUCOSE BLD MANUAL STRIP-MCNC: 249 MG/DL (ref 74–99)
GLUCOSE BLD MANUAL STRIP-MCNC: 257 MG/DL (ref 74–99)
GLUCOSE SERPL-MCNC: 143 MG/DL (ref 74–99)
HCT VFR BLD AUTO: 37.2 % (ref 41–52)
HGB BLD-MCNC: 11.4 G/DL (ref 13.5–17.5)
INR PPP: 2.4 (ref 0.9–1.2)
MCH RBC QN AUTO: 29.2 PG (ref 26–34)
MCHC RBC AUTO-ENTMCNC: 30.6 G/DL (ref 32–36)
MCV RBC AUTO: 95 FL (ref 80–100)
NRBC BLD-RTO: 0 /100 WBCS (ref 0–0)
PLATELET # BLD AUTO: 188 X10*3/UL (ref 150–450)
POTASSIUM SERPL-SCNC: 4 MMOL/L (ref 3.5–5.3)
PROTHROMBIN TIME: 24.3 SECONDS (ref 9.3–12.7)
RBC # BLD AUTO: 3.91 X10*6/UL (ref 4.5–5.9)
SODIUM SERPL-SCNC: 140 MMOL/L (ref 136–145)
WBC # BLD AUTO: 7.9 X10*3/UL (ref 4.4–11.3)

## 2025-01-31 PROCEDURE — 2500000002 HC RX 250 W HCPCS SELF ADMINISTERED DRUGS (ALT 637 FOR MEDICARE OP, ALT 636 FOR OP/ED): Performed by: NURSE PRACTITIONER

## 2025-01-31 PROCEDURE — 2500000005 HC RX 250 GENERAL PHARMACY W/O HCPCS: Performed by: INTERNAL MEDICINE

## 2025-01-31 PROCEDURE — 99232 SBSQ HOSP IP/OBS MODERATE 35: CPT

## 2025-01-31 PROCEDURE — 97530 THERAPEUTIC ACTIVITIES: CPT | Mod: GO,CO

## 2025-01-31 PROCEDURE — 2500000001 HC RX 250 WO HCPCS SELF ADMINISTERED DRUGS (ALT 637 FOR MEDICARE OP)

## 2025-01-31 PROCEDURE — 80048 BASIC METABOLIC PNL TOTAL CA: CPT | Performed by: NURSE PRACTITIONER

## 2025-01-31 PROCEDURE — 97535 SELF CARE MNGMENT TRAINING: CPT | Mod: GO,CO

## 2025-01-31 PROCEDURE — 85027 COMPLETE CBC AUTOMATED: CPT | Performed by: NURSE PRACTITIONER

## 2025-01-31 PROCEDURE — 2500000001 HC RX 250 WO HCPCS SELF ADMINISTERED DRUGS (ALT 637 FOR MEDICARE OP): Performed by: NURSE PRACTITIONER

## 2025-01-31 PROCEDURE — 36415 COLL VENOUS BLD VENIPUNCTURE: CPT | Performed by: NURSE PRACTITIONER

## 2025-01-31 PROCEDURE — 82947 ASSAY GLUCOSE BLOOD QUANT: CPT

## 2025-01-31 PROCEDURE — 85610 PROTHROMBIN TIME: CPT | Performed by: NURSE PRACTITIONER

## 2025-01-31 PROCEDURE — 2500000004 HC RX 250 GENERAL PHARMACY W/ HCPCS (ALT 636 FOR OP/ED): Performed by: NURSE PRACTITIONER

## 2025-01-31 PROCEDURE — 2060000001 HC INTERMEDIATE ICU ROOM DAILY

## 2025-01-31 PROCEDURE — 99232 SBSQ HOSP IP/OBS MODERATE 35: CPT | Performed by: NURSE PRACTITIONER

## 2025-01-31 RX ADMIN — WARFARIN SODIUM 4.5 MG: 2.5 TABLET ORAL at 17:10

## 2025-01-31 RX ADMIN — NEOMYCIN SULFATE, POLYMYXIN B SULFATE AND BACITRACIN ZINC 0.5 INCH: 3.5; 10000; 4 OINTMENT OPHTHALMIC at 11:11

## 2025-01-31 RX ADMIN — METOPROLOL TARTRATE 25 MG: 25 TABLET, FILM COATED ORAL at 20:41

## 2025-01-31 RX ADMIN — FAMOTIDINE 20 MG: 20 TABLET, FILM COATED ORAL at 08:51

## 2025-01-31 RX ADMIN — NYSTATIN 1 APPLICATION: 100000 POWDER TOPICAL at 08:52

## 2025-01-31 RX ADMIN — POTASSIUM CHLORIDE 20 MEQ: 1500 TABLET, EXTENDED RELEASE ORAL at 08:51

## 2025-01-31 RX ADMIN — BENZONATATE 100 MG: 100 CAPSULE ORAL at 14:17

## 2025-01-31 RX ADMIN — Medication 2 L/MIN: at 23:39

## 2025-01-31 RX ADMIN — CEFTRIAXONE SODIUM 1 G: 1 INJECTION, SOLUTION INTRAVENOUS at 11:15

## 2025-01-31 RX ADMIN — FUROSEMIDE 60 MG: 20 TABLET ORAL at 08:50

## 2025-01-31 RX ADMIN — MAGNESIUM HYDROXIDE 60 ML: 1200 LIQUID ORAL at 08:50

## 2025-01-31 RX ADMIN — ASPIRIN 81 MG: 81 TABLET, COATED ORAL at 08:51

## 2025-01-31 RX ADMIN — FUROSEMIDE 60 MG: 20 TABLET ORAL at 17:09

## 2025-01-31 RX ADMIN — Medication 2 L/MIN: at 07:47

## 2025-01-31 RX ADMIN — BENZONATATE 100 MG: 100 CAPSULE ORAL at 20:41

## 2025-01-31 RX ADMIN — CHOLECALCIFEROL TAB 25 MCG (1000 UNIT) 1000 UNITS: 25 TAB at 08:51

## 2025-01-31 RX ADMIN — INSULIN LISPRO 4 UNITS: 100 INJECTION, SOLUTION INTRAVENOUS; SUBCUTANEOUS at 14:18

## 2025-01-31 RX ADMIN — INSULIN LISPRO 6 UNITS: 100 INJECTION, SOLUTION INTRAVENOUS; SUBCUTANEOUS at 17:10

## 2025-01-31 RX ADMIN — LISINOPRIL 5 MG: 5 TABLET ORAL at 08:51

## 2025-01-31 RX ADMIN — TRIAMCINOLONE ACETONIDE: 1 CREAM TOPICAL at 20:43

## 2025-01-31 RX ADMIN — INSULIN GLARGINE 30 UNITS: 100 INJECTION, SOLUTION SUBCUTANEOUS at 20:43

## 2025-01-31 RX ADMIN — METOPROLOL TARTRATE 25 MG: 25 TABLET, FILM COATED ORAL at 08:51

## 2025-01-31 RX ADMIN — GUAIFENESIN 600 MG: 600 TABLET, MULTILAYER, EXTENDED RELEASE ORAL at 08:51

## 2025-01-31 RX ADMIN — TRIAMCINOLONE ACETONIDE: 1 CREAM TOPICAL at 08:52

## 2025-01-31 RX ADMIN — BENZONATATE 100 MG: 100 CAPSULE ORAL at 08:50

## 2025-01-31 RX ADMIN — PREDNISONE 2.5 MG: 5 TABLET ORAL at 08:51

## 2025-01-31 RX ADMIN — PANTOPRAZOLE SODIUM 40 MG: 40 TABLET, DELAYED RELEASE ORAL at 08:59

## 2025-01-31 RX ADMIN — ATORVASTATIN CALCIUM 80 MG: 80 TABLET, FILM COATED ORAL at 20:41

## 2025-01-31 RX ADMIN — NYSTATIN 1 APPLICATION: 100000 POWDER TOPICAL at 20:43

## 2025-01-31 RX ADMIN — GUAIFENESIN 600 MG: 600 TABLET, MULTILAYER, EXTENDED RELEASE ORAL at 20:41

## 2025-01-31 ASSESSMENT — COGNITIVE AND FUNCTIONAL STATUS - GENERAL
MOBILITY SCORE: 15
MOBILITY SCORE: 14
HELP NEEDED FOR BATHING: A LOT
MOBILITY SCORE: 16
DRESSING REGULAR UPPER BODY CLOTHING: A LOT
DRESSING REGULAR UPPER BODY CLOTHING: A LITTLE
EATING MEALS: A LITTLE
TOILETING: A LOT
WALKING IN HOSPITAL ROOM: TOTAL
DRESSING REGULAR LOWER BODY CLOTHING: A LOT
HELP NEEDED FOR BATHING: A LOT
CLIMB 3 TO 5 STEPS WITH RAILING: A LOT
STANDING UP FROM CHAIR USING ARMS: A LOT
DRESSING REGULAR UPPER BODY CLOTHING: A LITTLE
DRESSING REGULAR LOWER BODY CLOTHING: A LITTLE
PERSONAL GROOMING: A LITTLE
DRESSING REGULAR LOWER BODY CLOTHING: A LOT
TOILETING: A LOT
MOVING TO AND FROM BED TO CHAIR: A LITTLE
STANDING UP FROM CHAIR USING ARMS: A LOT
DRESSING REGULAR LOWER BODY CLOTHING: A LITTLE
HELP NEEDED FOR BATHING: A LOT
TURNING FROM BACK TO SIDE WHILE IN FLAT BAD: A LITTLE
MOVING TO AND FROM BED TO CHAIR: A LOT
PERSONAL GROOMING: A LITTLE
CLIMB 3 TO 5 STEPS WITH RAILING: A LOT
DRESSING REGULAR UPPER BODY CLOTHING: A LITTLE
CLIMB 3 TO 5 STEPS WITH RAILING: TOTAL
MOVING TO AND FROM BED TO CHAIR: A LOT
HELP NEEDED FOR BATHING: A LITTLE
TOILETING: A LITTLE
TOILETING: A LITTLE
WALKING IN HOSPITAL ROOM: A LOT
STANDING UP FROM CHAIR USING ARMS: A LOT
EATING MEALS: A LITTLE
DAILY ACTIVITIY SCORE: 19
DAILY ACTIVITIY SCORE: 13
PERSONAL GROOMING: A LITTLE
DAILY ACTIVITIY SCORE: 16
WALKING IN HOSPITAL ROOM: A LOT
DAILY ACTIVITIY SCORE: 17
MOVING FROM LYING ON BACK TO SITTING ON SIDE OF FLAT BED WITH BEDRAILS: A LITTLE
PERSONAL GROOMING: A LOT

## 2025-01-31 ASSESSMENT — PAIN - FUNCTIONAL ASSESSMENT
PAIN_FUNCTIONAL_ASSESSMENT: 0-10

## 2025-01-31 ASSESSMENT — PAIN SCALES - GENERAL
PAINLEVEL_OUTOF10: 0 - NO PAIN

## 2025-01-31 ASSESSMENT — ACTIVITIES OF DAILY LIVING (ADL): HOME_MANAGEMENT_TIME_ENTRY: 14

## 2025-01-31 NOTE — NURSING NOTE
Assumed care of patient. Handoff given at bedside. Patient is currently sitting up in bed. No complaints of pain at this time. O2 running at 2L. Bed in lowest position, call light within reach. Bed alarm active and turned on.

## 2025-01-31 NOTE — PROGRESS NOTES
"Navneet Thompson is a 89 y.o. male on day 5 of admission presenting with Acute on chronic congestive heart failure, unspecified heart failure type.    Subjective   Patient resting comfortably in bed. Denies chest pain or pressure.        Objective     Physical Exam  Vitals reviewed.   HENT:      Head: Normocephalic and atraumatic.   Cardiovascular:      Rate and Rhythm: Normal rate and regular rhythm.      Heart sounds: No murmur heard.     No friction rub. No gallop.   Pulmonary:      Effort: Pulmonary effort is normal.      Breath sounds: No wheezing, rhonchi or rales.      Comments: Diminished breath sounds  Abdominal:      General: Bowel sounds are normal.      Palpations: Abdomen is soft.   Musculoskeletal:      Right lower leg: No edema.      Left lower leg: No edema.   Skin:     General: Skin is warm and dry.      Capillary Refill: Capillary refill takes less than 2 seconds.   Neurological:      Mental Status: He is alert and oriented to person, place, and time.   Psychiatric:         Mood and Affect: Mood normal.         Behavior: Behavior normal.         Last Recorded Vitals  Blood pressure (!) 112/39, pulse 79, temperature 36.5 °C (97.7 °F), temperature source Temporal, resp. rate 18, height 1.778 m (5' 10\"), weight 84 kg (185 lb 3 oz), SpO2 97%.  Intake/Output last 24 Hours:    Intake/Output Summary (Last 24 hours) at 1/31/2025 0809  Last data filed at 1/31/2025 0448  Gross per 24 hour   Intake --   Output 1100 ml   Net -1100 ml         Relevant Results  Results for orders placed or performed during the hospital encounter of 01/26/25 (from the past 24 hours)   POCT GLUCOSE   Result Value Ref Range    POCT Glucose 119 (H) 74 - 99 mg/dL   POCT GLUCOSE   Result Value Ref Range    POCT Glucose 148 (H) 74 - 99 mg/dL   Basic Metabolic Panel   Result Value Ref Range    Glucose 143 (H) 74 - 99 mg/dL    Sodium 140 136 - 145 mmol/L    Potassium 4.0 3.5 - 5.3 mmol/L    Chloride 101 98 - 107 mmol/L    Bicarbonate 33 " (H) 21 - 32 mmol/L    Anion Gap 10 10 - 20 mmol/L    Urea Nitrogen 29 (H) 6 - 23 mg/dL    Creatinine 0.98 0.50 - 1.30 mg/dL    eGFR 73 >60 mL/min/1.73m*2    Calcium 8.7 8.6 - 10.3 mg/dL   CBC   Result Value Ref Range    WBC 7.9 4.4 - 11.3 x10*3/uL    nRBC 0.0 0.0 - 0.0 /100 WBCs    RBC 3.91 (L) 4.50 - 5.90 x10*6/uL    Hemoglobin 11.4 (L) 13.5 - 17.5 g/dL    Hematocrit 37.2 (L) 41.0 - 52.0 %    MCV 95 80 - 100 fL    MCH 29.2 26.0 - 34.0 pg    MCHC 30.6 (L) 32.0 - 36.0 g/dL    RDW 14.8 (H) 11.5 - 14.5 %    Platelets 188 150 - 450 x10*3/uL   Protime-INR   Result Value Ref Range    Protime 24.3 (H) 9.3 - 12.7 seconds    INR 2.4 (H) 0.9 - 1.2     *Note: Due to a large number of results and/or encounters for the requested time period, some results have not been displayed. A complete set of results can be found in Results Review.               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  Admitting team changed patient's home 40mg Lasix from p.o. to IV BID, which the patient is tolerating well.  Will continue with this dose. Overnight in the ED patient had increased work of breathing and was placed on BiPAP O2 therapy and given a dose of IV Lasix at that time and showed improvement, now being on 4L nasal cannula with slight conversational  dyspnea.  Patient denies chest pain, palpitations, pressure and no JVD was noted upon assessment. States the feeling of SOB has improved.  Patient's Coumadin is on hold, will continue to hold due to a supratherapeutic INR of 6.1.  Coumadin management will be done by the admitting team. Current vitals are 85, in a ventricular paced rhythm, blood pressure 148/74 satting 99% on 4 L.  Reordered his home medication of lisinopril and his Imdur.  Transthoracic echocardiogram was ordered, and will be completed sometime today.  Will also interrogate ICD. Will continue to follow this patient with you.     1/28:  minimal improvement over the past 24 hours.  Patient's respiratory status is similar.  He continues to require increased nasal cannula oxygen and has conversational dyspnea.  Wheezing noted in upper lobes and significantly diminished in bilateral bases with faint crackles.  Believe the patient will benefit from further IV diuresis.  Will continue on current dose.  Additionally have prescribed albuterol treatments.  On telemetry monitor he remains in a rate controlled atrial fibrillation.  His warfarin is on hold for elevated INR yesterday of 6.1.  He did receive 2.5 mg of vitamin K from admitting, however his INR is further risen today to 6.9.  At this point there is no obvious liver etiology with AST and ALT as well as a total bili within normal limits.  Given the patient has no active bleeding and a stable hemoglobin of 11.0 from my perspective would feel okay to continue to watch his INR for 1 day further without giving extra vitamin K.  However if he does continue to rise tomorrow would give 5 mg of vitamin K.  Otherwise his blood pressure is stable with most recent 117/60.  Creatinine 1.33.  He is chest pain-free.  Anticipate a further 48 hours hospitalization.  Will follow with you.     1/29: Patient is resting comfortably in bed.  He reports he is frustrated that they are trying to wean the oxygen when he feels  that he needs it.  Of note his current pulse oximetry is 99% on 3 L nasal cannula.  I did have a long discussion with the patient regarding nasal cannula oxygen and the weaning process and he states he is agreeable to slowly weaning the oxygen today.  He does continue to diurese well with IV furosemide with an approximate -2 L fluid balance over the last 24 hours.  Blood pressures overall normotensive with last recorded at 150/61.  INR has improved significantly as morning to 2.0.  Hemoglobin 11.8.  Other lab work revealed sodium 140, potassium 3.6, creatinine stable at 1.32.  Will continue IV Lasix 40 mg twice daily.  Warfarin therapy to be managed by admitting team.  Will continue to follow this patient with you.     1/30: As above.  Patient states he feel likes his breathing is slowly improving. He remains mildly conversationally dyspneic. Patient has been weaned to 2 L nasal cannula with an oxygen saturation of 99% this morning, and believe he could continue to be weaned off oxygen today.  Blood pressures overall normotensive last recorded 113/47.  He remains in a demand paced rhythm on telemetry without significant ectopy.  Lab works morning reveals a sodium of 142, potassium 3.4, creatinine proved 1.09 and hemoglobin 11.5.  INR subtherapeutic at 1.8, Coumadin therapy to be managed by admitting team. I am going to have a chest x-ray completed at the bedside this morning to evaluate for improvement in his pulmonary vascular congestion. Otherwise, will continue with IV furosemide 40 mg twice daily and will order additional oral potassium supplementation this morning. Will continue to follow this patient with you.     1/31: Patient resting comfortably in bed this morning.  He denies chest pain, pressure or palpitations.  He remains on supplemental nasal cannula at 2 L with pulse oximetry of 97%.  He does not have any lower extremity edema or JVD. Blood pressures yesterday were intermittently hypotensive, last  recorded was normotensive at 112/39.  I did discontinue his Imdur to avoid further hypotension especially since the patient is not complaining of any active anginal symptoms.  Parameters for blood pressure were placed on both metoprolol and lisinopril medications.  Reviewed lab work this morning which revealed a sodium of 140, potassium 4.0, creatinine improved to 0.98 and hemoglobin of 11.4.  INR this morning is therapeutic at 2.4, Coumadin therapy to be managed by admitting.  Chest x-ray completed yesterday revealing improving CHF with decreased size of bilateral pleural effusions. I am going to discontinue the IV diuretic therapy and start the patient on 60 mg oral furosemide twice daily. He was previously on 40 mg oral furosemide twice daily at home. He will need a basic metabolic panel one week following discharge and should follow up with Dr. Bahena two weeks following discharge. Discharge planning has begun with plans for the patient to go to skilled nursing facility at discharge. Will follow this patient with you one day further.         Martha Peck, APRN-CNP

## 2025-01-31 NOTE — PROGRESS NOTES
01/31/25 1508   Discharge Planning   Home or Post Acute Services Post acute facilities (Rehab/SNF/etc)   Type of Post Acute Facility Services Skilled nursing   Expected Discharge Disposition SNF  ([facility])   Does the patient need discharge transport arranged? Yes   RoundTrip coordination needed? Yes     Accepted by [facility].  No precert is needed.  [facility] is ready and able to accept patient today.  Daughter asking if patient can discharge tomorrow.  Kellie CRAWFORD is agreeable to discharge tomorrow morning as long as patient will not lose bed at [facility].  Will need goldenrod signed/certified.  Will need 7000 completed.    [facility] will allow patient to provide his own TALTZ medication while at the facility.     PLEASE NOTIFY CARE COORDINATION PRIOR TO DISCHARGE

## 2025-01-31 NOTE — ASSESSMENT & PLAN NOTE
Positive for cough.    - Tessalon pearles and Mucinex   No fever or chills  Possible left lower lobe pneumonia on imaging   Zithromax and Rocephin - complete 5 day course today   follow-up cultures - final result negative blood cultures

## 2025-01-31 NOTE — CARE PLAN
The patient's goals for the shift include rest     The clinical goals for the shift include lower O2 needs

## 2025-01-31 NOTE — ASSESSMENT & PLAN NOTE
INR supratherapeutic on admission-Received 2.5 mg vitamin K for INR 6.9.   INR 2.4 today, up from 1.8 yesterday with only 2 doses - give lower dose of 4.5 mg tonight in the setting of recent azithromycin  Patient normally takes 6 mg of Coumadin every day except Wednesday and Sunday he takes 4.5 mg  PT/INR daily

## 2025-01-31 NOTE — PROGRESS NOTES
"Navneet Thompson is a 89 y.o. male on day 5 of admission presenting with Acute on chronic congestive heart failure, unspecified heart failure type.    Subjective   Patient sitting up in bed. Denies chest pain, abdominal pain, fevers, chills. Feels breathing improved, but cough seems worse today.        Objective     Physical Exam  Constitutional:       Appearance: Normal appearance.   HENT:      Head: Normocephalic and atraumatic.      Mouth/Throat:      Mouth: Mucous membranes are moist.      Pharynx: Oropharynx is clear.   Eyes:      Extraocular Movements: Extraocular movements intact.      Conjunctiva/sclera: Conjunctivae normal.      Pupils: Pupils are equal, round, and reactive to light.   Cardiovascular:      Rate and Rhythm: Normal rate and regular rhythm.      Pulses: Normal pulses.      Heart sounds: Normal heart sounds.   Pulmonary:      Effort: Tachypnea present.      Breath sounds: Examination of the right-upper field reveals rhonchi. Examination of the left-upper field reveals rhonchi. Decreased breath sounds and rhonchi present.   Abdominal:      General: Bowel sounds are normal.      Palpations: Abdomen is soft.      Tenderness: There is no abdominal tenderness.   Musculoskeletal:         General: Normal range of motion.      Cervical back: Normal range of motion and neck supple.   Skin:     General: Skin is warm and dry.      Capillary Refill: Capillary refill takes less than 2 seconds.   Neurological:      General: No focal deficit present.      Mental Status: He is alert and oriented to person, place, and time.   Psychiatric:         Mood and Affect: Mood normal.         Behavior: Behavior normal.         Last Recorded Vitals  Blood pressure 126/63, pulse 83, temperature 36.2 °C (97.2 °F), temperature source Temporal, resp. rate 19, height 1.778 m (5' 10\"), weight 84 kg (185 lb 3 oz), SpO2 98%.  Intake/Output last 3 Shifts:  I/O last 3 completed shifts:  In: - (0 mL/kg)   Out: 1400 (16.7 mL/kg) " [Urine:1400 (0.5 mL/kg/hr)]  Weight: 84 kg     Relevant Results  Results for orders placed or performed during the hospital encounter of 01/26/25 (from the past 24 hours)   POCT GLUCOSE   Result Value Ref Range    POCT Glucose 119 (H) 74 - 99 mg/dL   POCT GLUCOSE   Result Value Ref Range    POCT Glucose 148 (H) 74 - 99 mg/dL   Basic Metabolic Panel   Result Value Ref Range    Glucose 143 (H) 74 - 99 mg/dL    Sodium 140 136 - 145 mmol/L    Potassium 4.0 3.5 - 5.3 mmol/L    Chloride 101 98 - 107 mmol/L    Bicarbonate 33 (H) 21 - 32 mmol/L    Anion Gap 10 10 - 20 mmol/L    Urea Nitrogen 29 (H) 6 - 23 mg/dL    Creatinine 0.98 0.50 - 1.30 mg/dL    eGFR 73 >60 mL/min/1.73m*2    Calcium 8.7 8.6 - 10.3 mg/dL   CBC   Result Value Ref Range    WBC 7.9 4.4 - 11.3 x10*3/uL    nRBC 0.0 0.0 - 0.0 /100 WBCs    RBC 3.91 (L) 4.50 - 5.90 x10*6/uL    Hemoglobin 11.4 (L) 13.5 - 17.5 g/dL    Hematocrit 37.2 (L) 41.0 - 52.0 %    MCV 95 80 - 100 fL    MCH 29.2 26.0 - 34.0 pg    MCHC 30.6 (L) 32.0 - 36.0 g/dL    RDW 14.8 (H) 11.5 - 14.5 %    Platelets 188 150 - 450 x10*3/uL   Protime-INR   Result Value Ref Range    Protime 24.3 (H) 9.3 - 12.7 seconds    INR 2.4 (H) 0.9 - 1.2   POCT GLUCOSE   Result Value Ref Range    POCT Glucose 131 (H) 74 - 99 mg/dL     *Note: Due to a large number of results and/or encounters for the requested time period, some results have not been displayed. A complete set of results can be found in Results Review.     XR chest 1 view    Result Date: 1/30/2025  Interpreted By:  Rachana Salcedo, STUDY: XR CHEST 1 VIEW 1/30/2025 9:07 am   INDICATION: Signs/Symptoms:CHF   COMPARISON: 01/26/2025   ACCESSION NUMBER(S): [id number]   ORDERING CLINICIAN: ANJEL SIMENTAL   TECHNIQUE: AP erect view of the chest   FINDINGS: There is postoperative change from median sternotomy and coronary revascularization as well as TAVR with ICD leads seen in the right side of the heart and coronary sinus.   The heart is enlarged. The pulmonary  vascular congestion seen on prior study has improved. I suspect that there are small residual bilateral pleural effusions, decreased in size.       Improving CHF with decreased size of bilateral pleural effusions as well.   Signed by: Rachana Salcedo 1/30/2025 11:42 AM Dictation workstation:   [id number]       Assessment/Plan   Assessment & Plan  Acute on chronic congestive heart failure, unspecified heart failure type  Patient gained 7 to 8 pounds in the last week prior to admission.  +3 pitting edema both lower extremities on admission   - Edema resolved   Echo 10/2023 LVEF 30-35% - 1/27/25 LVEF 25-30% with grade 3 diastolic dysfunction and elevated RVSP   Fluid restriction  IV lasix stopped today. Resume oral diuretics per cardiology recs   Consult cardiology - appreciate recs   ICD in situ     Pneumonia due to infectious organism  Positive for cough.    - Tessalon pearles and Mucinex   No fever or chills  Possible left lower lobe pneumonia on imaging   Zithromax and Rocephin - complete 5 day course today   follow-up cultures - final result negative blood cultures     Atrial fibrillation (Multi)  INR supratherapeutic on admission-Received 2.5 mg vitamin K for INR 6.9.   INR 2.4 today, up from 1.8 yesterday with only 2 doses - give lower dose of 4.5 mg tonight in the setting of recent azithromycin  Patient normally takes 6 mg of Coumadin every day except Wednesday and Sunday he takes 4.5 mg  PT/INR daily  BETH (acute kidney injury) (CMS-[facility])  Creatinine at baseline 1.1-1.2  Resolved with diuresis   Acid reflux  Continue with omeprazole  Hypertension  Continue with home meds   Vitals as ordered   History of coronary artery bypass surgery  Angina free, stable   Continue ASA, statin, beta blocker   Troponin elevated with flat pattern.   DVT prophylaxis   Warfarin     Kellie Henley, APRN-CNP

## 2025-01-31 NOTE — ASSESSMENT & PLAN NOTE
Patient gained 7 to 8 pounds in the last week prior to admission.  +3 pitting edema both lower extremities on admission   - Edema resolved   Echo 10/2023 LVEF 30-35% - 1/27/25 LVEF 25-30% with grade 3 diastolic dysfunction and elevated RVSP   Fluid restriction  IV lasix stopped today. Resume oral diuretics per cardiology recs   Consult cardiology - appreciate recs   ICD in situ

## 2025-01-31 NOTE — PROGRESS NOTES
Occupational Therapy    OT Treatment    Patient Name: Navneet Thompson  MRN: [id number]  Department: [facility] E  Room: 13/13-B  Today's Date: 1/31/2025  Time Calculation  Start Time: 1305  Stop Time: 1330  Time Calculation (min): 25 min        Assessment:  OT Assessment: Tolerated session well, demonstrating progression towards POC with increased time + effort required for functional tasks. Pt would benefit from continued skilled OT services to improve strength, balance, and functional tolerance to increase independence with ADL tasks  Barriers to Discharge Home: Physical needs, Caregiver assistance  End of Session Communication: Bedside nurse  End of Session Patient Position: Up in chair, Alarm on  OT Assessment Results: Decreased ADL status, Decreased endurance, Decreased functional mobility  Plan:  Treatment Interventions: ADL retraining, Functional transfer training, UE strengthening/ROM, Endurance training, Equipment evaluation/education, Compensatory technique education  OT Frequency: 4 times per week  OT Discharge Recommendations: Moderate intensity level of continued care  Equipment Recommended upon Discharge: Wheeled walker  OT Recommended Transfer Status: Assist of 1, Minimal assist  OT - OK to Discharge: Yes  Treatment Interventions: ADL retraining, Functional transfer training, UE strengthening/ROM, Endurance training, Equipment evaluation/education, Compensatory technique education    Subjective   Previous Visit Info:  OT Last Visit  OT Received On: 01/31/25  General:  General  Reason for Referral: ADL impairment; + acute on chronic CHF exacerbation  Family/Caregiver Present: Yes  Caregiver Feedback: family member present  Prior to Session Communication: Bedside nurse  Patient Position Received: Up in chair, Alarm on  General Comment: Cleared for therapy per RN. 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 appears to have slow and delayed movements.  Activities of Daily Living: Feeding  Feeding Comments: s/u for feeding task at end of session    Grooming  Grooming Level of Assistance: Setup, Close supervision  Grooming Where Assessed: Sitting sinkside  Grooming Comments: face washing, hair brushing, and oral hygiene tasks seated sinkside with increased time required for task completion    LE Dressing  LE Dressing: Yes  Pants Level of Assistance: Close supervision, Minimum assistance  LE Dressing Where Assessed: Toilet  LE Dressing Comments: pt able to doff pants with min A, able to pull up and don over hips in standing with S    Toileting  Toileting Level of Assistance: Close supervision  Where Assessed: Toilet  Toileting Comments: increased time required for toileting task, completing pericare hygiene in seated    Bed Mobility/Transfers:    Transfers  Transfer: Yes  Transfer 1  Technique 1: Sit to stand, Stand to sit  Transfer Device 1: Walker  Transfer Level of Assistance 1: Minimum assistance, Minimal verbal cues  Trials/Comments 1: assist for trunk elevation and forward weightshifting with cues for proper hand placement, poor carryover with decreased eccentric lowering to chair    Toilet Transfers  Toilet Transfer Type: To and from  Toilet Transfer to: Standard toilet  Toilet Transfers: Minimal assistance, Contact guard  Toilet Transfers Comments: assist  for trunk elevation with cues for use of grab bars for UE support    Functional Mobility:  Functional Mobility  Functional Mobility Performed: Yes  Functional Mobility 1  Device 1: Rolling walker  Assistance 1: Minimum assistance, Minimal verbal cues  Comments 1: functional mobility short household distance at W with cues for pacing and postural alignment for safety, demonstrating fair- balance throughout task    Standing Balance:  Static Standing Balance  Static Standing-Level of Assistance: Minimum assistance  Static Standing-Comment/Number of Minutes: fair- balance during functional mobility task    Therapy/Activity:    Therapeutic Activity  Therapeutic Activity Performed: Yes  Therapeutic Activity 1: tolerated sitting sinkside during grooming tasks for increased time  Outcome Measures:[facility] Daily Activity  Putting on and taking off regular lower body clothing: A little  Bathing (including washing, rinsing, drying): A lot  Putting on and taking off regular upper body clothing: A little  Toileting, which includes using toilet, bedpan or urinal: A lot  Taking care of personal grooming such as brushing teeth: A little  Eating Meals: A little  Daily Activity - Total Score: 16    Education Documentation  Body Mechanics, taught by JORDAN Jacobson at 1/31/2025  1:57 PM.  Learner: Patient  Readiness: Acceptance  Method: Explanation  Response: Needs Reinforcement, Verbalizes Understanding  Comment: educated on importance of safety + use of energy conservation techniques    ADL Training, taught by JORDAN Jacobson at 1/31/2025  1:57 PM.  Learner: Patient  Readiness: Acceptance  Method: Explanation  Response: Needs Reinforcement, Verbalizes Understanding  Comment: educated on importance of safety + use of energy conservation techniques    Education Comments  No comments found.      Problem: OT Goals  Goal: ADL  Description: Patient will perform ADLs with modified independence utilizing AE as needed.    Outcome: Progressing  Goal: Functional Transfer   Description: Patient will transfer from sit to stand with modified independence utilizing a rolling walker.   Outcome: Progressing  Goal: Functional Mobility   Description: Patient will perform household distance functional mobility with modified independence utilizing a rolling walker.   Outcome: Progressing

## 2025-01-31 NOTE — CARE PLAN
The patient's goals for the shift include      The clinical goals for the shift include safety, wean o2, labs, discharge    Over the shift, the patient did not make progress toward the following goals. Barriers to progression include . Recommendations to address these barriers include .

## 2025-02-01 VITALS
HEART RATE: 82 BPM | BODY MASS INDEX: 20.5 KG/M2 | WEIGHT: 143.2 LBS | RESPIRATION RATE: 17 BRPM | HEIGHT: 70 IN | SYSTOLIC BLOOD PRESSURE: 91 MMHG | TEMPERATURE: 98.1 F | DIASTOLIC BLOOD PRESSURE: 52 MMHG | OXYGEN SATURATION: 94 %

## 2025-02-01 PROBLEM — J18.9 PNEUMONIA DUE TO INFECTIOUS ORGANISM: Status: RESOLVED | Noted: 2023-10-11 | Resolved: 2025-02-01

## 2025-02-01 PROBLEM — N17.9 AKI (ACUTE KIDNEY INJURY) (CMS-HCC): Status: RESOLVED | Noted: 2025-01-26 | Resolved: 2025-02-01

## 2025-02-01 PROBLEM — I50.9 ACUTE ON CHRONIC CONGESTIVE HEART FAILURE, UNSPECIFIED HEART FAILURE TYPE: Status: RESOLVED | Noted: 2025-01-26 | Resolved: 2025-02-01

## 2025-02-01 LAB
ANION GAP SERPL CALCULATED.3IONS-SCNC: 9 MMOL/L (ref 10–20)
BUN SERPL-MCNC: 26 MG/DL (ref 6–23)
CALCIUM SERPL-MCNC: 8.7 MG/DL (ref 8.6–10.3)
CHLORIDE SERPL-SCNC: 100 MMOL/L (ref 98–107)
CO2 SERPL-SCNC: 33 MMOL/L (ref 21–32)
CREAT SERPL-MCNC: 1.16 MG/DL (ref 0.5–1.3)
EGFRCR SERPLBLD CKD-EPI 2021: 59 ML/MIN/1.73M*2
ERYTHROCYTE [DISTWIDTH] IN BLOOD BY AUTOMATED COUNT: 14.6 % (ref 11.5–14.5)
GLUCOSE BLD MANUAL STRIP-MCNC: 164 MG/DL (ref 74–99)
GLUCOSE BLD MANUAL STRIP-MCNC: 177 MG/DL (ref 74–99)
GLUCOSE SERPL-MCNC: 149 MG/DL (ref 74–99)
HCT VFR BLD AUTO: 35.4 % (ref 41–52)
HGB BLD-MCNC: 11.6 G/DL (ref 13.5–17.5)
INR PPP: 3.2 (ref 0.9–1.2)
MCH RBC QN AUTO: 29.6 PG (ref 26–34)
MCHC RBC AUTO-ENTMCNC: 32.8 G/DL (ref 32–36)
MCV RBC AUTO: 90 FL (ref 80–100)
NRBC BLD-RTO: 0 /100 WBCS (ref 0–0)
PLATELET # BLD AUTO: 218 X10*3/UL (ref 150–450)
POTASSIUM SERPL-SCNC: 3.8 MMOL/L (ref 3.5–5.3)
PROTHROMBIN TIME: 32.2 SECONDS (ref 9.3–12.7)
RBC # BLD AUTO: 3.92 X10*6/UL (ref 4.5–5.9)
SODIUM SERPL-SCNC: 138 MMOL/L (ref 136–145)
WBC # BLD AUTO: 8.2 X10*3/UL (ref 4.4–11.3)

## 2025-02-01 PROCEDURE — 85610 PROTHROMBIN TIME: CPT | Performed by: NURSE PRACTITIONER

## 2025-02-01 PROCEDURE — 2500000001 HC RX 250 WO HCPCS SELF ADMINISTERED DRUGS (ALT 637 FOR MEDICARE OP): Performed by: NURSE PRACTITIONER

## 2025-02-01 PROCEDURE — 2500000002 HC RX 250 W HCPCS SELF ADMINISTERED DRUGS (ALT 637 FOR MEDICARE OP, ALT 636 FOR OP/ED): Performed by: NURSE PRACTITIONER

## 2025-02-01 PROCEDURE — 99232 SBSQ HOSP IP/OBS MODERATE 35: CPT

## 2025-02-01 PROCEDURE — 36415 COLL VENOUS BLD VENIPUNCTURE: CPT | Performed by: NURSE PRACTITIONER

## 2025-02-01 PROCEDURE — 2500000001 HC RX 250 WO HCPCS SELF ADMINISTERED DRUGS (ALT 637 FOR MEDICARE OP)

## 2025-02-01 PROCEDURE — 2500000002 HC RX 250 W HCPCS SELF ADMINISTERED DRUGS (ALT 637 FOR MEDICARE OP, ALT 636 FOR OP/ED): Performed by: INTERNAL MEDICINE

## 2025-02-01 PROCEDURE — 80048 BASIC METABOLIC PNL TOTAL CA: CPT | Performed by: NURSE PRACTITIONER

## 2025-02-01 PROCEDURE — 82947 ASSAY GLUCOSE BLOOD QUANT: CPT

## 2025-02-01 PROCEDURE — 99239 HOSP IP/OBS DSCHRG MGMT >30: CPT | Performed by: NURSE PRACTITIONER

## 2025-02-01 PROCEDURE — 2500000005 HC RX 250 GENERAL PHARMACY W/O HCPCS: Performed by: INTERNAL MEDICINE

## 2025-02-01 PROCEDURE — 2500000004 HC RX 250 GENERAL PHARMACY W/ HCPCS (ALT 636 FOR OP/ED): Performed by: NURSE PRACTITIONER

## 2025-02-01 PROCEDURE — 85027 COMPLETE CBC AUTOMATED: CPT | Performed by: NURSE PRACTITIONER

## 2025-02-01 RX ORDER — WARFARIN 3 MG/1
3 TABLET ORAL EVERY EVENING
Qty: 180 TABLET | Refills: 2 | Status: SHIPPED | OUTPATIENT
Start: 2025-02-01

## 2025-02-01 RX ORDER — INSULIN GLARGINE 100 [IU]/ML
30 INJECTION, SOLUTION SUBCUTANEOUS NIGHTLY
Start: 2025-02-01

## 2025-02-01 RX ORDER — NYSTATIN 100000 [USP'U]/G
1 POWDER TOPICAL 2 TIMES DAILY
Start: 2025-02-01

## 2025-02-01 RX ORDER — BENZONATATE 100 MG/1
100 CAPSULE ORAL 3 TIMES DAILY PRN
Start: 2025-02-01

## 2025-02-01 RX ORDER — PREDNISONE 2.5 MG/1
2.5 TABLET ORAL DAILY
Start: 2025-02-02

## 2025-02-01 RX ORDER — ADHESIVE BANDAGE
60 BANDAGE TOPICAL DAILY
Start: 2025-02-02

## 2025-02-01 RX ORDER — POTASSIUM CHLORIDE 750 MG/1
10 TABLET, FILM COATED, EXTENDED RELEASE ORAL DAILY
Status: DISCONTINUED | OUTPATIENT
Start: 2025-02-02 | End: 2025-02-01 | Stop reason: HOSPADM

## 2025-02-01 RX ORDER — SPIRONOLACTONE 25 MG/1
12.5 TABLET ORAL
Start: 2025-02-01

## 2025-02-01 RX ORDER — SPIRONOLACTONE 25 MG/1
12.5 TABLET ORAL
Status: DISCONTINUED | OUTPATIENT
Start: 2025-02-01 | End: 2025-02-01 | Stop reason: HOSPADM

## 2025-02-01 RX ORDER — FUROSEMIDE 20 MG/1
60 TABLET ORAL
Start: 2025-02-01

## 2025-02-01 RX ORDER — GUAIFENESIN 600 MG/1
600 TABLET, EXTENDED RELEASE ORAL 2 TIMES DAILY
Start: 2025-02-01

## 2025-02-01 RX ORDER — ACETAMINOPHEN 325 MG/1
650 TABLET ORAL EVERY 4 HOURS PRN
Start: 2025-02-01

## 2025-02-01 RX ORDER — ALBUTEROL SULFATE 0.83 MG/ML
2.5 SOLUTION RESPIRATORY (INHALATION) EVERY 6 HOURS PRN
Start: 2025-02-01

## 2025-02-01 RX ADMIN — TRIAMCINOLONE ACETONIDE: 1 CREAM TOPICAL at 08:29

## 2025-02-01 RX ADMIN — GUAIFENESIN 600 MG: 600 TABLET, MULTILAYER, EXTENDED RELEASE ORAL at 08:28

## 2025-02-01 RX ADMIN — NEOMYCIN SULFATE, POLYMYXIN B SULFATE AND BACITRACIN ZINC 0.5 INCH: 3.5; 10000; 4 OINTMENT OPHTHALMIC at 08:29

## 2025-02-01 RX ADMIN — PREDNISONE 2.5 MG: 5 TABLET ORAL at 08:29

## 2025-02-01 RX ADMIN — BENZONATATE 100 MG: 100 CAPSULE ORAL at 08:28

## 2025-02-01 RX ADMIN — SPIRONOLACTONE 12.5 MG: 25 TABLET ORAL at 10:07

## 2025-02-01 RX ADMIN — Medication 3 L/MIN: at 08:37

## 2025-02-01 RX ADMIN — POTASSIUM CHLORIDE 20 MEQ: 1500 TABLET, EXTENDED RELEASE ORAL at 08:29

## 2025-02-01 RX ADMIN — MAGNESIUM HYDROXIDE 60 ML: 1200 LIQUID ORAL at 08:28

## 2025-02-01 RX ADMIN — METOPROLOL TARTRATE 25 MG: 25 TABLET, FILM COATED ORAL at 08:28

## 2025-02-01 RX ADMIN — CHOLECALCIFEROL TAB 25 MCG (1000 UNIT) 1000 UNITS: 25 TAB at 08:28

## 2025-02-01 RX ADMIN — FAMOTIDINE 20 MG: 20 TABLET, FILM COATED ORAL at 08:28

## 2025-02-01 RX ADMIN — NYSTATIN 1 APPLICATION: 100000 POWDER TOPICAL at 08:29

## 2025-02-01 RX ADMIN — LISINOPRIL 5 MG: 5 TABLET ORAL at 08:28

## 2025-02-01 RX ADMIN — ASPIRIN 81 MG: 81 TABLET, COATED ORAL at 08:27

## 2025-02-01 RX ADMIN — FUROSEMIDE 60 MG: 20 TABLET ORAL at 08:27

## 2025-02-01 RX ADMIN — PANTOPRAZOLE SODIUM 40 MG: 40 TABLET, DELAYED RELEASE ORAL at 08:27

## 2025-02-01 ASSESSMENT — PAIN SCALES - GENERAL: PAINLEVEL_OUTOF10: 0 - NO PAIN

## 2025-02-01 ASSESSMENT — PAIN - FUNCTIONAL ASSESSMENT: PAIN_FUNCTIONAL_ASSESSMENT: 0-10

## 2025-02-01 NOTE — DISCHARGE SUMMARY
Discharge Diagnosis  Acute on chronic congestive heart failure, unspecified heart failure type    Issues Requiring Follow-Up  INR     Discharge Meds     Medication List      START taking these medications     albuterol 2.5 mg /3 mL (0.083 %) nebulizer solution; Take 3 mL (2.5 mg)   by nebulization every 6 hours if needed for wheezing.   benzonatate 100 mg capsule; Commonly known as: Tessalon; Take 1 capsule   (100 mg) by mouth 3 times a day as needed for cough. Do not crush or chew.   * glucagon 1 mg injection; Commonly known as: Glucagen; Inject 1 mg into   the muscle every 15 minutes if needed for low blood sugar - see comments   (glucose < 41).   * glucagon 1 mg injection; Commonly known as: Glucagen; Inject 1 mg into   the muscle every 15 minutes if needed for low blood sugar - see comments   (For blood glucose less than or equal to 70 mg/dL and no IV access).   guaiFENesin 600 mg 12 hr tablet; Commonly known as: Mucinex; Take 1   tablet (600 mg) by mouth 2 times a day. Do not crush, chew, or split.   nystatin 100,000 unit/gram powder; Commonly known as: Mycostatin; Apply   1 Application topically 2 times a day.   oxygen gas therapy; Commonly known as: O2; Inhale 1 each continuously.   Wean as tolerated   predniSONE 2.5 mg tablet; Commonly known as: Deltasone; Take 1 tablet   (2.5 mg) by mouth once daily.; Start taking on: February 2, 2025;   Replaces: predniSONE 5 mg tablet,delayed release (DR/EC)   spironolactone 25 mg tablet; Commonly known as: Aldactone; Take 0.5   tablets (12.5 mg) by mouth once every 24 hours.  * This list has 2 medication(s) that are the same as other medications   prescribed for you. Read the directions carefully, and ask your doctor or   other care provider to review them with you.     CHANGE how you take these medications     acetaminophen 325 mg tablet; Commonly known as: Tylenol; Take 2 tablets   (650 mg) by mouth every 4 hours if needed for mild pain (1 - 3) or fever   (temp greater  than 38.0 C).; What changed: when to take this, reasons to   take this   dupilumab 300 mg/2 mL pen injector; Commonly known as: Dupixent; Inject   2 mL (300 mg) under the skin every 14 (fourteen) days. Patient to take   home medication; What changed: how much to take, when to take this,   additional instructions   furosemide 20 mg tablet; Commonly known as: Lasix; Take 3 tablets (60   mg) by mouth 2 times daily (morning and late afternoon).; What changed:   medication strength, how much to take   Lantus U-100 Insulin 100 unit/mL injection; Generic drug: insulin   glargine; Inject 30 Units under the skin once daily at bedtime. Take as   directed per insulin instructions.; What changed: how much to take,   additional instructions   magnesium hydroxide 400 mg/5 mL suspension; Commonly known as: Milk of   Magnesia; Take 60 mL by mouth once daily.; Start taking on: February 2, 2025; What changed: how much to take, when to take this, reasons to take   this   warfarin 3 mg tablet; Commonly known as: Coumadin; Take as directed. If   you are unsure how to take this medication, talk to your nurse or doctor.;   Original instructions: Take 1 tablet (3 mg) by mouth once daily in the   evening. Prior dose was 4.5 mg Sun, Wed. 6 mg all other days. Has been   supratherapeutic with IV antibiotics. Monitor and adjust as needed; What   changed: how much to take, how to take this, when to take this, additional   instructions     CONTINUE taking these medications     aspirin 81 mg EC tablet   atorvastatin 80 mg tablet; Commonly known as: Lipitor; Take 1 tablet (80   mg) by mouth once daily at bedtime.   cholecalciferol 25 MCG (1000 UT) tablet; Commonly known as: Vitamin D-3;   Take 1 tablet (1,000 Units) by mouth once daily. Do not start before   October 20, 2023.   empagliflozin 10 mg; Commonly known as: Jardiance; Take 1 tablet (10 mg)   by mouth once daily. Do not start before October 23, 2023.   FreeStyle Maria Elena 14 Day Sensor kit;  Generic drug: flash glucose sensor   kit   ICAPS AREDS ORAL   ixekizumab 80 mg/mL injection; Commonly known as: Taltz   lisinopril 5 mg tablet   lubricating eye drops ophthalmic solution; Administer 1 drop into both   eyes 2 times a day as needed for dry eyes.   magnesium oxide 420 mg tablet; Commonly known as: Mag-Ox   melatonin 3 mg tablet; Take 1 tablet (3 mg) by mouth as needed at   bedtime for sleep.   metoprolol tartrate 25 mg tablet; Commonly known as: Lopressor   neomycin-bacitracin-polymyxin ophthalmic ointment; Commonly known as:   Polysporin; Apply 0.5 inches to both eyes once daily.   NovoLOG U-100 Insulin aspart 100 unit/mL injection; Generic drug:   insulin aspart   omeprazole 20 mg DR capsule; Commonly known as: PriLOSEC; Take 1 capsule   (20 mg) by mouth once daily. Do not crush or chew.   potassium chloride CR 20 mEq ER tablet; Commonly known as: Klor-Con M20;   Take 1 tablet (20 mEq) by mouth once daily. Do not crush or chew.   triamcinolone 0.1 % cream; Commonly known as: Kenalog; Apply topically 2   times a day.     STOP taking these medications     HUMIRA(CF) PEDI CROHNS STARTER SUBQ   hydrocortisone 2.5 % cream   isosorbide mononitrate ER 30 mg 24 hr tablet; Commonly known as: Imdur   predniSONE 5 mg tablet,delayed release (DR/EC); Replaced by: predniSONE   2.5 mg tablet       Test Results Pending At Discharge  Pending Labs       No current pending labs.            Hospital Course   Presented to ER 1/26 with increasing shortness of breath over 4 days. Had weight gain and leg edema. Evaluation showed acute on chronic heart failure as well as possible pneumonia. Cardiology consulted. IV diuretics given with improvement. IV antibiotics given for pneumonia and O2 titrated as needed. Improved. PT/OT consulted and recommended moderate intensity rehab at discharge. Patient and family agreeable to SNF placement. Patient had supratherapeutic INR on admission. Warfarin held until INR at acceptable level.  Warfarin restarted at lower dose. Plan to follow closely at SNF and then return to coumadin clinic as outpatient. Cleared for discharge by consultants with close follow up as outpatient.     Pertinent Physical Exam At Time of Discharge  Physical Exam  Constitutional:       Appearance: Normal appearance.   HENT:      Head: Normocephalic and atraumatic.      Mouth/Throat:      Mouth: Mucous membranes are moist.      Pharynx: Oropharynx is clear.   Eyes:      Extraocular Movements: Extraocular movements intact.      Conjunctiva/sclera: Conjunctivae normal.      Pupils: Pupils are equal, round, and reactive to light.   Cardiovascular:      Rate and Rhythm: Normal rate and regular rhythm.      Pulses: Normal pulses.      Heart sounds: Normal heart sounds.   Pulmonary:      Effort: Pulmonary effort is normal.      Breath sounds: Examination of the right-upper field reveals rhonchi. Examination of the left-upper field reveals rhonchi. Rhonchi present.   Abdominal:      General: Bowel sounds are normal.      Palpations: Abdomen is soft.      Tenderness: There is no abdominal tenderness.   Musculoskeletal:         General: Normal range of motion.      Cervical back: Normal range of motion and neck supple.   Skin:     General: Skin is warm and dry.      Capillary Refill: Capillary refill takes less than 2 seconds.   Neurological:      General: No focal deficit present.      Mental Status: He is alert and oriented to person, place, and time.   Psychiatric:         Mood and Affect: Mood normal.         Behavior: Behavior normal.         Outpatient Follow-Up  Future Appointments   Date Time Provider Department Center   [facility]  [id number]   3/17/2025 11:30 AM Melvin Bahena MD [id number] [facility]         Kellie Henley APRN-CNP

## 2025-02-01 NOTE — PROGRESS NOTES
02/01/25 1019   Discharge Planning   Expected Discharge Disposition SNF  ([facility])     7000 Completed   AVS and goldenrod sent to facility   family updated by Kellie CRENSHAW   RN made aware and given report number

## 2025-02-01 NOTE — PROGRESS NOTES
"Navneet Thompson is a 89 y.o. male on day 6 of admission presenting with Acute on chronic congestive heart failure, unspecified heart failure type.    Subjective   Patient resting comfortably in bed with family at bedside. States he is ready for discharge today.        Objective     Physical Exam  Vitals reviewed.   HENT:      Head: Normocephalic and atraumatic.   Cardiovascular:      Rate and Rhythm: Normal rate and regular rhythm.      Heart sounds: No murmur heard.     No friction rub. No gallop.   Pulmonary:      Effort: Pulmonary effort is normal.      Comments: No conversational dyspnea noted. Lung sounds diminished throughout  Abdominal:      General: Bowel sounds are normal.      Palpations: Abdomen is soft.   Musculoskeletal:      Right lower leg: No edema.      Left lower leg: No edema.   Skin:     General: Skin is warm and dry.   Neurological:      Mental Status: He is alert and oriented to person, place, and time.   Psychiatric:         Mood and Affect: Mood normal.         Behavior: Behavior normal.         Last Recorded Vitals  Blood pressure 125/70, pulse 82, temperature 36.5 °C (97.7 °F), temperature source Temporal, resp. rate 19, height 1.778 m (5' 10\"), weight 65 kg (143 lb 3.2 oz), SpO2 98%.  Intake/Output last 24 Hours:    Intake/Output Summary (Last 24 hours) at 2/1/2025 0959  Last data filed at 2/1/2025 0731  Gross per 24 hour   Intake 370 ml   Output 500 ml   Net -130 ml        Relevant Results  Results for orders placed or performed during the hospital encounter of 01/26/25 (from the past 24 hours)   POCT GLUCOSE   Result Value Ref Range    POCT Glucose 249 (H) 74 - 99 mg/dL   POCT GLUCOSE   Result Value Ref Range    POCT Glucose 257 (H) 74 - 99 mg/dL   POCT GLUCOSE   Result Value Ref Range    POCT Glucose 205 (H) 74 - 99 mg/dL   Basic Metabolic Panel   Result Value Ref Range    Glucose 149 (H) 74 - 99 mg/dL    Sodium 138 136 - 145 mmol/L    Potassium 3.8 3.5 - 5.3 mmol/L    Chloride 100 98 - " 107 mmol/L    Bicarbonate 33 (H) 21 - 32 mmol/L    Anion Gap 9 (L) 10 - 20 mmol/L    Urea Nitrogen 26 (H) 6 - 23 mg/dL    Creatinine 1.16 0.50 - 1.30 mg/dL    eGFR 59 (L) >60 mL/min/1.73m*2    Calcium 8.7 8.6 - 10.3 mg/dL   CBC   Result Value Ref Range    WBC 8.2 4.4 - 11.3 x10*3/uL    nRBC 0.0 0.0 - 0.0 /100 WBCs    RBC 3.92 (L) 4.50 - 5.90 x10*6/uL    Hemoglobin 11.6 (L) 13.5 - 17.5 g/dL    Hematocrit 35.4 (L) 41.0 - 52.0 %    MCV 90 80 - 100 fL    MCH 29.6 26.0 - 34.0 pg    MCHC 32.8 32.0 - 36.0 g/dL    RDW 14.6 (H) 11.5 - 14.5 %    Platelets 218 150 - 450 x10*3/uL   Protime-INR   Result Value Ref Range    Protime 32.2 (H) 9.3 - 12.7 seconds    INR 3.2 (H) 0.9 - 1.2   POCT GLUCOSE   Result Value Ref Range    POCT Glucose 164 (H) 74 - 99 mg/dL     *Note: Due to a large number of results and/or encounters for the requested time period, some results have not been displayed. A complete set of results can be found in Results Review.                 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  Admitting team changed patient's home 40mg Lasix from p.o. to IV BID, which the patient is tolerating well.  Will continue with this dose. Overnight in the ED patient had increased work of breathing and was placed on BiPAP O2 therapy and given a  dose of IV Lasix at that time and showed improvement, now being on 4L nasal cannula with slight conversational dyspnea.  Patient denies chest pain, palpitations, pressure and no JVD was noted upon assessment. States the feeling of SOB has improved.  Patient's Coumadin is on hold, will continue to hold due to a supratherapeutic INR of 6.1.  Coumadin management will be done by the admitting team. Current vitals are 85, in a ventricular paced rhythm, blood pressure 148/74 satting 99% on 4 L.  Reordered his home medication of lisinopril and his Imdur.  Transthoracic echocardiogram was ordered, and will be completed sometime today.  Will also interrogate ICD. Will continue to follow this patient with you.     1/28:  minimal improvement over the past 24 hours.  Patient's respiratory status is similar.  He continues to require increased nasal cannula oxygen and has conversational dyspnea.  Wheezing noted in upper lobes and significantly diminished in bilateral bases with faint crackles.  Believe the patient will benefit from further IV diuresis.  Will continue on current dose.  Additionally have prescribed albuterol treatments.  On telemetry monitor he remains in a rate controlled atrial fibrillation.  His warfarin is on hold for elevated INR yesterday of 6.1.  He did receive 2.5 mg of vitamin K from admitting, however his INR is further risen today to 6.9.  At this point there is no obvious liver etiology with AST and ALT as well as a total bili within normal limits.  Given the patient has no active bleeding and a stable hemoglobin of 11.0 from my perspective would feel okay to continue to watch his INR for 1 day further without giving extra vitamin K.  However if he does continue to rise tomorrow would give 5 mg of vitamin K.  Otherwise his blood pressure is stable with most recent 117/60.  Creatinine 1.33.  He is chest pain-free.  Anticipate a further 48 hours hospitalization.  Will follow with you.     1/29: Patient is  resting comfortably in bed.  He reports he is frustrated that they are trying to wean the oxygen when he feels that he needs it.  Of note his current pulse oximetry is 99% on 3 L nasal cannula.  I did have a long discussion with the patient regarding nasal cannula oxygen and the weaning process and he states he is agreeable to slowly weaning the oxygen today.  He does continue to diurese well with IV furosemide with an approximate -2 L fluid balance over the last 24 hours.  Blood pressures overall normotensive with last recorded at 150/61.  INR has improved significantly as morning to 2.0.  Hemoglobin 11.8.  Other lab work revealed sodium 140, potassium 3.6, creatinine stable at 1.32.  Will continue IV Lasix 40 mg twice daily.  Warfarin therapy to be managed by admitting team.  Will continue to follow this patient with you.     1/30: As above.  Patient states he feel likes his breathing is slowly improving. He remains mildly conversationally dyspneic. Patient has been weaned to 2 L nasal cannula with an oxygen saturation of 99% this morning, and believe he could continue to be weaned off oxygen today.  Blood pressures overall normotensive last recorded 113/47.  He remains in a demand paced rhythm on telemetry without significant ectopy.  Lab works morning reveals a sodium of 142, potassium 3.4, creatinine proved 1.09 and hemoglobin 11.5.  INR subtherapeutic at 1.8, Coumadin therapy to be managed by admitting team. I am going to have a chest x-ray completed at the bedside this morning to evaluate for improvement in his pulmonary vascular congestion. Otherwise, will continue with IV furosemide 40 mg twice daily and will order additional oral potassium supplementation this morning. Will continue to follow this patient with you.      1/31: Patient resting comfortably in bed this morning.  He denies chest pain, pressure or palpitations.  He remains on supplemental nasal cannula at 2 L with pulse oximetry of 97%.  He does  not have any lower extremity edema or JVD. Blood pressures yesterday were intermittently hypotensive, last recorded was normotensive at 112/39.  I did discontinue his Imdur to avoid further hypotension especially since the patient is not complaining of any active anginal symptoms.  Parameters for blood pressure were placed on both metoprolol and lisinopril medications.  Reviewed lab work this morning which revealed a sodium of 140, potassium 4.0, creatinine improved to 0.98 and hemoglobin of 11.4.  INR this morning is therapeutic at 2.4, Coumadin therapy to be managed by admitting.  Chest x-ray completed yesterday revealing improving CHF with decreased size of bilateral pleural effusions. I am going to discontinue the IV diuretic therapy and start the patient on 60 mg oral furosemide twice daily. He was previously on 40 mg oral furosemide twice daily at home. He will need a basic metabolic panel one week following discharge and should follow up with Dr. Bahena two weeks following discharge. Discharge planning has begun with plans for the patient to go to skilled nursing facility at discharge. Will follow this patient with you one day further.     2/1: As above. Patient continues to do well from the cardiac perspective.  This morning he is resting comfortably in bed eating breakfast with family at bedside.  Denies chest pain, pressure or palpitations.  Blood pressures overall normotensive last recorded at 125/70.  Patient remains on supplemental nasal cannula oxygen at 2 L with pulse oximetry is 98%.  Appears rather euvolemic on examination with no peripheral edema or JVD. Lab work this morning revealed sodium of 138, potassium 3.8, creatinine 1.16 and hemoglobin 11.6.  Discussed this patient in detail with my collaborating physician Dr. Jimenez will begin spironolactone at a low dose of 12.5 mg daily for increased diuretic effect and additional GDMT for cardiomyopathy.  Will decrease his daily potassium  supplementation to 10 mill equivalents daily given the addition of a potassium sparing diuretic.  Will continue oral furosemide 60 mg twice daily.  Continue metoprolol tartrate 25 mg twice daily, Jardiance 10 mg daily and lisinopril 5 mg daily for guideline directed medical therapy for the patient's cardiomyopathy.  Otherwise no further recommendations of the cardiac perspective and the patient can discharge to skilled nursing facility today.  He should have a basic metabolic panel in 3 days and follow-up with Dr. Bahena in 1 to 2 weeks.         Martha Peck, APRN-CNP

## 2025-02-01 NOTE — CARE PLAN
The patient's goals for the shift include rest     The clinical goals for the shift include rest and discharge Saturday

## 2025-02-01 NOTE — PROGRESS NOTES
Physical Therapy                 Therapy Communication Note    Patient Name: Navneet Thompson  MRN: [id number]  Department: [address]  Room: 13/13-B  Today's Date: 2/1/2025     Discipline: Physical Therapy    PT Missed Visit: Yes     Missed Visit Reason: Missed Visit Reason: Patient refused (pt being discharged to SNF later today; Pleasantly refusing PT intervention; visiting with 3 siblings at this time.)    Missed Time: Attempt    Comment:

## 2025-02-03 ENCOUNTER — APPOINTMENT (OUTPATIENT)
Dept: CARDIOLOGY | Facility: CLINIC | Age: OVER 89
End: 2025-02-03
Payer: MEDICARE

## 2025-02-03 ENCOUNTER — TELEPHONE (OUTPATIENT)
Dept: CARDIOLOGY | Facility: CLINIC | Age: OVER 89
End: 2025-02-03
Payer: MEDICARE

## 2025-02-03 ENCOUNTER — NURSING HOME VISIT (OUTPATIENT)
Dept: PHYSICAL MEDICINE AND REHAB | Facility: HOSPITAL | Age: OVER 89
End: 2025-02-03
Payer: MEDICARE

## 2025-02-03 VITALS
WEIGHT: 175.6 LBS | SYSTOLIC BLOOD PRESSURE: 123 MMHG | DIASTOLIC BLOOD PRESSURE: 62 MMHG | HEART RATE: 74 BPM | BODY MASS INDEX: 25.2 KG/M2 | RESPIRATION RATE: 20 BRPM | TEMPERATURE: 98 F

## 2025-02-03 DIAGNOSIS — J18.9 PNEUMONIA DUE TO INFECTIOUS ORGANISM, UNSPECIFIED LATERALITY, UNSPECIFIED PART OF LUNG: ICD-10-CM

## 2025-02-03 DIAGNOSIS — I48.91 ATRIAL FIBRILLATION, UNSPECIFIED TYPE (MULTI): ICD-10-CM

## 2025-02-03 DIAGNOSIS — I50.21 HEART FAILURE, ACUTE SYSTOLIC: ICD-10-CM

## 2025-02-03 DIAGNOSIS — H02.135 SENILE ECTROPION OF BOTH LOWER EYELIDS: ICD-10-CM

## 2025-02-03 DIAGNOSIS — H02.132 SENILE ECTROPION OF BOTH LOWER EYELIDS: ICD-10-CM

## 2025-02-03 DIAGNOSIS — Z79.4 TYPE 2 DIABETES MELLITUS WITH DIABETIC POLYNEUROPATHY, WITH LONG-TERM CURRENT USE OF INSULIN: ICD-10-CM

## 2025-02-03 DIAGNOSIS — I50.9 ACUTE CONGESTIVE HEART FAILURE, UNSPECIFIED HEART FAILURE TYPE: Primary | ICD-10-CM

## 2025-02-03 DIAGNOSIS — I10 HYPERTENSION, UNSPECIFIED TYPE: ICD-10-CM

## 2025-02-03 DIAGNOSIS — E11.42 TYPE 2 DIABETES MELLITUS WITH DIABETIC POLYNEUROPATHY, WITH LONG-TERM CURRENT USE OF INSULIN: ICD-10-CM

## 2025-02-03 DIAGNOSIS — N18.30 STAGE 3 CHRONIC KIDNEY DISEASE, UNSPECIFIED WHETHER STAGE 3A OR 3B CKD (MULTI): ICD-10-CM

## 2025-02-03 PROCEDURE — 99309 SBSQ NF CARE MODERATE MDM 30: CPT | Performed by: PHYSICIAN ASSISTANT

## 2025-02-03 ASSESSMENT — ENCOUNTER SYMPTOMS
WEAKNESS: 0
NAUSEA: 0
DIARRHEA: 0
SHORTNESS OF BREATH: 0
VOMITING: 0
CONSTIPATION: 1
TREMORS: 0
DYSURIA: 0
HEMATURIA: 0
FREQUENCY: 0
CONFUSION: 0
NERVOUS/ANXIOUS: 0
CHILLS: 0
HEADACHES: 0
FEVER: 0
APPETITE CHANGE: 0
DIZZINESS: 0
COUGH: 1
ABDOMINAL PAIN: 0
WHEEZING: 0

## 2025-02-03 NOTE — TELEPHONE ENCOUNTER
Received call from pt's daughter Pao.  Pt was hospitalized with pneumonia and has been discharged to SNF.  Pao will call when pt is discharged from SNF and will reschedule anticoagulation appointment at that time.

## 2025-02-03 NOTE — PROGRESS NOTES
Subjective   [id number] : Navneet Thompson is a 89 y.o. male admitted to [facility] for rehab. No chief complaint on file..  HPI  Pt with a h/o afib, CAD s/p cabg, BETH, diabetes, HTN,  treated for acute on chronic CHF and pneumonia.  He was treated with IV antibiotics and diuretics.  He had supratherapeutic INR and coumadin is on hold with order to restart coumadin at 3mg daily when INR < 3.0  Today his INR is 16.   He continues to have a cough.   He is currently stable on 2liters of oxygen.  He did not have oxygen at home.   He reports issues with constipation and states that he takes milk of mag for constipation at home.   Pt lives alone.   Review of Systems   Constitutional:  Negative for appetite change, chills and fever.   Respiratory:  Positive for cough. Negative for shortness of breath and wheezing.    Cardiovascular:  Negative for chest pain and leg swelling.   Gastrointestinal:  Positive for constipation. Negative for abdominal pain, diarrhea, nausea and vomiting.   Genitourinary:  Negative for dysuria, frequency and hematuria.   Neurological:  Negative for dizziness, tremors, weakness and headaches.   Psychiatric/Behavioral:  Negative for confusion. The patient is not nervous/anxious.    All other systems reviewed and are negative.      Objective   /62   Pulse 74   Temp 36.7 °C (98 °F)   Resp 20   Wt 79.7 kg (175 lb 9.6 oz)   BMI 25.20 kg/m²    Physical Exam  Constitutional:       General: He is not in acute distress.  Eyes:      Conjunctiva/sclera: Conjunctivae normal.      Pupils: Pupils are equal, round, and reactive to light.      Comments: Chronic bilateral ectropion.  Lower eye lids are erythematous.    Cardiovascular:      Rate and Rhythm: Normal rate and regular rhythm.      Heart sounds: No murmur heard.  Pulmonary:      Effort: Pulmonary effort is normal.      Breath sounds: No wheezing, rhonchi or rales.      Comments: Moist cough  Abdominal:      General: Abdomen is flat. Bowel  "sounds are normal. There is no distension.      Palpations: Abdomen is soft. There is no mass.      Tenderness: There is no abdominal tenderness.   Musculoskeletal:         General: No swelling.   Skin:     General: Skin is warm and dry.      Findings: No rash.   Neurological:      Mental Status: He is alert and oriented to person, place, and time.       Results for orders placed or performed in visit on 02/03/25 (from the past 24 hours)   Basic Metabolic Panel   Result Value Ref Range    Glucose 121 (H) 74 - 99 mg/dL    Sodium 140 136 - 145 mmol/L    Potassium 4.1 3.5 - 5.3 mmol/L    Chloride 101 98 - 107 mmol/L    Bicarbonate 33 (H) 21 - 32 mmol/L    Anion Gap 10 10 - 20 mmol/L    Urea Nitrogen 31 (H) 6 - 23 mg/dL    Creatinine 1.18 0.50 - 1.30 mg/dL    eGFR 58 (L) >60 mL/min/1.73m*2    Calcium 9.1 8.6 - 10.3 mg/dL   CBC   Result Value Ref Range    WBC 9.1 4.4 - 11.3 x10*3/uL    nRBC 0.0 0.0 - 0.0 /100 WBCs    RBC 4.31 (L) 4.50 - 5.90 x10*6/uL    Hemoglobin 12.3 (L) 13.5 - 17.5 g/dL    Hematocrit 40.7 (L) 41.0 - 52.0 %    MCV 94 80 - 100 fL    MCH 28.5 26.0 - 34.0 pg    MCHC 30.2 (L) 32.0 - 36.0 g/dL    RDW 14.4 11.5 - 14.5 %    Platelets 268 150 - 450 x10*3/uL   B-Type Natriuretic Peptide   Result Value Ref Range     (H) 0 - 99 pg/mL   Protime-INR   Result Value Ref Range    Protime 16.1 (H) 9.3 - 12.7 seconds    INR 1.6 (H) 0.9 - 1.2     *Note: Due to a large number of results and/or encounters for the requested time period, some results have not been displayed. A complete set of results can be found in Results Review.      No lab exists for component: \"CBC BMP\"  Assessment/Plan   Problem List Items Addressed This Visit             ICD-10-CM    Atrial fibrillation (Multi) I48.91     Rate controlled with metoprolol.  INR is 1.6 today.  Restart coumadin 3mg daily.  PT/INR  on M/W/F. Monitor INR and adjust coumadin as needed.          Acute congestive heart failure - Primary I50.9     Continue lasix 60mg BID " and spironalactone.  Monitor weights and labs.  On 2000cc fluid restriction         Hypertension I10     Continue lisinopril.  Monitor blood pressures and adjust meds as needed.          Pneumonia due to infectious organism J18.9     Treated with antibiotics.   Continue mucinex.          Stage 3 chronic kidney disease (Multi) N18.30     Monitor labs.          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          Type 2 diabetes mellitus with diabetic polyneuropathy E11.42     Continue lantus 30u daily and SSI TID with meals.                 Time spent: 30 min in review of chart, labs and orders, consultation with pt and documentation.

## 2025-02-04 NOTE — ASSESSMENT & PLAN NOTE
Rate controlled with metoprolol.  INR is 1.6 today.  Restart coumadin 3mg daily.  PT/INR  on M/W/F. Monitor INR and adjust coumadin as needed.

## 2025-02-04 NOTE — ASSESSMENT & PLAN NOTE
Continue lasix 60mg BID and spironalactone.  Monitor weights and labs.  On 2000cc fluid restriction

## 2025-02-05 ENCOUNTER — NURSING HOME VISIT (OUTPATIENT)
Dept: POST ACUTE CARE | Facility: EXTERNAL LOCATION | Age: OVER 89
End: 2025-02-05
Payer: MEDICARE

## 2025-02-05 DIAGNOSIS — J44.9 CHRONIC OBSTRUCTIVE PULMONARY DISEASE, UNSPECIFIED COPD TYPE (MULTI): ICD-10-CM

## 2025-02-05 DIAGNOSIS — I48.91 ATRIAL FIBRILLATION, UNSPECIFIED TYPE (MULTI): Primary | ICD-10-CM

## 2025-02-05 DIAGNOSIS — I25.10 ATHEROSCLEROSIS OF NATIVE CORONARY ARTERY OF NATIVE HEART WITHOUT ANGINA PECTORIS: ICD-10-CM

## 2025-02-05 PROCEDURE — 99306 1ST NF CARE HIGH MDM 50: CPT | Performed by: INTERNAL MEDICINE

## 2025-02-05 ASSESSMENT — ENCOUNTER SYMPTOMS
ABDOMINAL PAIN: 0
COUGH: 1
SHORTNESS OF BREATH: 0
NAUSEA: 0
DIARRHEA: 0
CHILLS: 0
APPETITE CHANGE: 0
VOMITING: 0
HEADACHES: 0
FEVER: 0

## 2025-02-05 NOTE — LETTER
Patient: Navneet Thompson  : 1933    Encounter Date: 2025    HISTORY AND PHYSICAL    History of present illness:    Navneet Thompson is a 89 y.o. male admitted to SNF after hospitalization for shortness of breath, treated for acute on chronic heart failure as well as pneumonia.  He was diuresed and treated with IV antibiotics.  INR increased significantly, now has come back down to 1.6 and the warfarin was restarted at 3 mg daily.  No current complaints.  Remains on the oxygen.  Still has some cough but he says he is not short of breath.  He has not had a bowel movement in 3 days and asks for milk of magnesia.    Past Medical History:   Diagnosis Date   • Aortic stenosis     Status post transcatheter aortic valve replacement   • Atherosclerotic heart disease of native coronary artery without angina pectoris 2022    CAD (coronary artery disease)   • Atrial fibrillation (Multi)    • Chronic systolic heart failure    • Complete heart block     status post dual chamber pacemaker placemtn in 2008   • Diabetes mellitus, type 2 (Multi)    • Essential (primary) hypertension 2022    Hypertension   • Presence of automatic (implantable) cardiac defibrillator 2022    Cardiac defibrillator in place, upgraded from pacemaker in 2014        Past Surgical History:   Procedure Laterality Date   • AORTIC VALVE REPLACEMENT  2022    Transcatheter aortic valve replacement (TAVR) for aortic valve stenosis   • APPENDECTOMY  2014    Appendectomy   • CARDIAC ELECTROPHYSIOLOGY PROCEDURE N/A 2023    Procedure: ICD UPGRADE, DUAL TO BIV;  Surgeon: Luis Felipe Greene MD;  Location: [place] [address] Cardiac Cath Lab;  Service: Electrophysiology;  Laterality: N/A;  [id number] Bi-V ICD gen change [place]   • CORONARY ARTERY BYPASS GRAFT  2014    CABG   • CT ABDOMEN PELVIS ANGIOGRAM W AND/OR WO IV CONTRAST  2020    CT ABDOMEN PELVIS ANGIOGRAM W AND/OR WO IV CONTRAST 3/17/2020 [facility]  ANCILLARY LEGACY   • OTHER SURGICAL HISTORY  12/01/2014    Cardio-defib Pulse Generator Implantation Date        Family History   Problem Relation Name Age of Onset   • Drug abuse Mother          OVERDOSE       Social History     Socioeconomic History   • Marital status:      Spouse name: Not on file   • Number of children: Not on file   • Years of education: Not on file   • Highest education level: Not on file   Occupational History   • Not on file   Tobacco Use   • Smoking status: Never     Passive exposure: Never   • Smokeless tobacco: Never   Substance and Sexual Activity   • Alcohol use: Yes     Comment: a beer a year   • Drug use: Never   • Sexual activity: Not on file   Other Topics Concern   • Not on file   Social History Narrative   • Not on file     Social Drivers of Health     Financial Resource Strain: Low Risk  (1/27/2025)    Overall Financial Resource Strain (CARDIA)    • Difficulty of Paying Living Expenses: Not hard at all   Food Insecurity: No Food Insecurity (1/27/2025)    Hunger Vital Sign    • Worried About Running Out of Food in the Last Year: Never true    • Ran Out of Food in the Last Year: Never true   Transportation Needs: No Transportation Needs (1/27/2025)    PRAPARE - Transportation    • Lack of Transportation (Medical): No    • Lack of Transportation (Non-Medical): No   Physical Activity: Inactive (1/27/2025)    Exercise Vital Sign    • Days of Exercise per Week: 0 days    • Minutes of Exercise per Session: 0 min   Stress: No Stress Concern Present (1/27/2025)    [employer] [employer] - Occupational Stress Questionnaire    • Feeling of Stress : Not at all   Social Connections: Socially Isolated (1/27/2025)    Social Connection and Isolation Panel [NHANES]    • Frequency of Communication with Friends and Family: More than three times a week    • Frequency of Social Gatherings with Friends and Family: Once a week    • Attends Yazidism Services: Never    • Active  Member of Clubs or Organizations: No    • Attends Club or Organization Meetings: Never    • Marital Status:    Intimate Partner Violence: Not At Risk (1/27/2025)    Humiliation, Afraid, Rape, and Kick questionnaire    • Fear of Current or Ex-Partner: No    • Emotionally Abused: No    • Physically Abused: No    • Sexually Abused: No   Housing Stability: Low Risk  (1/27/2025)    Housing Stability Vital Sign    • Unable to Pay for Housing in the Last Year: No    • Number of Times Moved in the Last Year: 0    • Homeless in the Last Year: No       Review of Systems   Constitutional:  Negative for appetite change, chills and fever.   Respiratory:  Positive for cough. Negative for shortness of breath.    Cardiovascular:  Negative for chest pain.   Gastrointestinal:  Negative for abdominal pain, diarrhea, nausea and vomiting.   Neurological:  Negative for headaches.   All other systems reviewed and are negative.       Objective  Vital signs reviewed in [facility].    Physical Exam  Vitals reviewed.   Constitutional:       General: He is not in acute distress.     Appearance: He is not toxic-appearing.   HENT:      Head: Normocephalic and atraumatic.      Mouth/Throat:      Mouth: Mucous membranes are moist.   Eyes:      Pupils: Pupils are equal, round, and reactive to light.   Cardiovascular:      Rate and Rhythm: Normal rate and regular rhythm.      Heart sounds: No murmur heard.  Pulmonary:      Breath sounds: Normal breath sounds. No wheezing, rhonchi or rales.   Abdominal:      General: There is no distension.      Palpations: Abdomen is soft.   Musculoskeletal:      Right lower leg: No edema.      Left lower leg: No edema.   Neurological:      General: No focal deficit present.      Mental Status: He is alert and oriented to person, place, and time.          Assessment/Plan  Diagnoses and all orders for this visit:  Atrial fibrillation, unspecified type (Multi) (Primary)  Atherosclerosis of native coronary  artery of native heart without angina pectoris  Chronic obstructive pulmonary disease, unspecified COPD type (Multi)    Acute on chronic heart failure, continue the Lasix 60 mg twice daily and monitor the weights, volume status and electrolytes.  He is on the fluid restriction.    Hypertension on lisinopril.    Pneumonia, completing antibiotics, on the Mucinex, he says he is not short of breath at all and the cough is improving.    Atrial fibrillation rate controlled and anticoagulated, we have resumed the warfarin and the INR is, up from 1.6-1.9 today.    Diabetes mellitus, continue Lantus and sliding scale insulin.    The essential elements of the hospital History and Physical as well as the Discharge Summary have been confirmed to the best of my ability by means of interviewing the patient plus a review of the hospital records. Please note that this entry was created in a shared electronic medical record.     All available hospital and outpatient records have been reviewed. Will continue other current drug therapies as ordered on the continuation of care documents. Physical and Occupational Therapy will assess and treat per POC. Analgesia for identified pain symptoms. Continue the various medicines for bowel and bladder symptoms as well as the vitamins and supplements per hospital instructions. Will assess and provide local care to abnormalities of skin integrity. Drug to drug interaction data as noted by the pharmacy has been reviewed. The patient's condition warrants the continuation of these drugs as prescribed by the medical specialists. Discharge planning will be coordinated through the  department. I have reviewed any advanced directive documentation that is contained in the admission packet as directives indicating whether a surrogate decision maker has been identified.       Electronically Signed By: Teofilo Loyd MD   2/5/25 12:06 PM

## 2025-02-05 NOTE — PROGRESS NOTES
HISTORY AND PHYSICAL    History of present illness:    Navneet Thompson is a 89 y.o. male admitted to SNF after hospitalization for shortness of breath, treated for acute on chronic heart failure as well as pneumonia.  He was diuresed and treated with IV antibiotics.  INR increased significantly, now has come back down to 1.6 and the warfarin was restarted at 3 mg daily.  No current complaints.  Remains on the oxygen.  Still has some cough but he says he is not short of breath.  He has not had a bowel movement in 3 days and asks for milk of magnesia.    Past Medical History:   Diagnosis Date    Aortic stenosis     Status post transcatheter aortic valve replacement    Atherosclerotic heart disease of native coronary artery without angina pectoris 12/12/2022    CAD (coronary artery disease)    Atrial fibrillation (Multi)     Chronic systolic heart failure     Complete heart block     status post dual chamber pacemaker placemtn in 01/2008    Diabetes mellitus, type 2 (Multi)     Essential (primary) hypertension 12/12/2022    Hypertension    Presence of automatic (implantable) cardiac defibrillator 11/14/2022    Cardiac defibrillator in place, upgraded from pacemaker in 05/2014        Past Surgical History:   Procedure Laterality Date    AORTIC VALVE REPLACEMENT  04/07/2022    Transcatheter aortic valve replacement (TAVR) for aortic valve stenosis    APPENDECTOMY  12/01/2014    Appendectomy    CARDIAC ELECTROPHYSIOLOGY PROCEDURE N/A 12/19/2023    Procedure: ICD UPGRADE, DUAL TO BIV;  Surgeon: Luis Felipe Greene MD;  Location: [place] [address] Cardiac Cath Lab;  Service: Electrophysiology;  Laterality: N/A;  [id number] Bi-V ICD gen change [employer] [employer]    CORONARY ARTERY BYPASS GRAFT  12/01/2014    CABG    CT ABDOMEN PELVIS ANGIOGRAM W AND/OR WO IV CONTRAST  03/17/2020    CT ABDOMEN PELVIS ANGIOGRAM W AND/OR WO IV CONTRAST 3/17/2020 [facility] ANCILLARY LEGACY    OTHER SURGICAL HISTORY  12/01/2014    Cardio-defib Pulse Generator  Implantation Date        Family History   Problem Relation Name Age of Onset    Drug abuse Mother          OVERDOSE       Social History     Socioeconomic History    Marital status:      Spouse name: Not on file    Number of children: Not on file    Years of education: Not on file    Highest education level: Not on file   Occupational History    Not on file   Tobacco Use    Smoking status: Never     Passive exposure: Never    Smokeless tobacco: Never   Substance and Sexual Activity    Alcohol use: Yes     Comment: a beer a year    Drug use: Never    Sexual activity: Not on file   Other Topics Concern    Not on file   Social History Narrative    Not on file     Social Drivers of Health     Financial Resource Strain: Low Risk  (1/27/2025)    Overall Financial Resource Strain (CARDIA)     Difficulty of Paying Living Expenses: Not hard at all   Food Insecurity: No Food Insecurity (1/27/2025)    Hunger Vital Sign     Worried About Running Out of Food in the Last Year: Never true     Ran Out of Food in the Last Year: Never true   Transportation Needs: No Transportation Needs (1/27/2025)    PRAPARE - Transportation     Lack of Transportation (Medical): No     Lack of Transportation (Non-Medical): No   Physical Activity: Inactive (1/27/2025)    Exercise Vital Sign     Days of Exercise per Week: 0 days     Minutes of Exercise per Session: 0 min   Stress: No Stress Concern Present (1/27/2025)    [employer] [employer] - Occupational Stress Questionnaire     Feeling of Stress : Not at all   Social Connections: Socially Isolated (1/27/2025)    Social Connection and Isolation Panel [NHANES]     Frequency of Communication with Friends and Family: More than three times a week     Frequency of Social Gatherings with Friends and Family: Once a week     Attends Hindu Services: Never     Active Member of Clubs or Organizations: No     Attends Club or Organization Meetings: Never     Marital Status:     Intimate Partner Violence: Not At Risk (1/27/2025)    Humiliation, Afraid, Rape, and Kick questionnaire     Fear of Current or Ex-Partner: No     Emotionally Abused: No     Physically Abused: No     Sexually Abused: No   Housing Stability: Low Risk  (1/27/2025)    Housing Stability Vital Sign     Unable to Pay for Housing in the Last Year: No     Number of Times Moved in the Last Year: 0     Homeless in the Last Year: No       Review of Systems   Constitutional:  Negative for appetite change, chills and fever.   Respiratory:  Positive for cough. Negative for shortness of breath.    Cardiovascular:  Negative for chest pain.   Gastrointestinal:  Negative for abdominal pain, diarrhea, nausea and vomiting.   Neurological:  Negative for headaches.   All other systems reviewed and are negative.       Objective   Vital signs reviewed in [facility].    Physical Exam  Vitals reviewed.   Constitutional:       General: He is not in acute distress.     Appearance: He is not toxic-appearing.   HENT:      Head: Normocephalic and atraumatic.      Mouth/Throat:      Mouth: Mucous membranes are moist.   Eyes:      Pupils: Pupils are equal, round, and reactive to light.   Cardiovascular:      Rate and Rhythm: Normal rate and regular rhythm.      Heart sounds: No murmur heard.  Pulmonary:      Breath sounds: Normal breath sounds. No wheezing, rhonchi or rales.   Abdominal:      General: There is no distension.      Palpations: Abdomen is soft.   Musculoskeletal:      Right lower leg: No edema.      Left lower leg: No edema.   Neurological:      General: No focal deficit present.      Mental Status: He is alert and oriented to person, place, and time.          Assessment/Plan   Diagnoses and all orders for this visit:  Atrial fibrillation, unspecified type (Multi) (Primary)  Atherosclerosis of native coronary artery of native heart without angina pectoris  Chronic obstructive pulmonary disease, unspecified COPD type  (Multi)    Acute on chronic heart failure, continue the Lasix 60 mg twice daily and monitor the weights, volume status and electrolytes.  He is on the fluid restriction.    Hypertension on lisinopril.    Pneumonia, completing antibiotics, on the Mucinex, he says he is not short of breath at all and the cough is improving.    Atrial fibrillation rate controlled and anticoagulated, we have resumed the warfarin and the INR is, up from 1.6-1.9 today.    Diabetes mellitus, continue Lantus and sliding scale insulin.    The essential elements of the hospital History and Physical as well as the Discharge Summary have been confirmed to the best of my ability by means of interviewing the patient plus a review of the hospital records. Please note that this entry was created in a shared electronic medical record.     All available hospital and outpatient records have been reviewed. Will continue other current drug therapies as ordered on the continuation of care documents. Physical and Occupational Therapy will assess and treat per POC. Analgesia for identified pain symptoms. Continue the various medicines for bowel and bladder symptoms as well as the vitamins and supplements per hospital instructions. Will assess and provide local care to abnormalities of skin integrity. Drug to drug interaction data as noted by the pharmacy has been reviewed. The patient's condition warrants the continuation of these drugs as prescribed by the medical specialists. Discharge planning will be coordinated through the  department. I have reviewed any advanced directive documentation that is contained in the admission packet as directives indicating whether a surrogate decision maker has been identified.

## 2025-02-07 ENCOUNTER — NURSING HOME VISIT (OUTPATIENT)
Dept: POST ACUTE CARE | Facility: EXTERNAL LOCATION | Age: OVER 89
End: 2025-02-07
Payer: MEDICARE

## 2025-02-07 VITALS
HEART RATE: 71 BPM | DIASTOLIC BLOOD PRESSURE: 52 MMHG | OXYGEN SATURATION: 98 % | SYSTOLIC BLOOD PRESSURE: 114 MMHG | BODY MASS INDEX: 24.25 KG/M2 | WEIGHT: 169 LBS | RESPIRATION RATE: 20 BRPM | TEMPERATURE: 97.7 F

## 2025-02-07 DIAGNOSIS — I50.9 ACUTE CONGESTIVE HEART FAILURE, UNSPECIFIED HEART FAILURE TYPE: Primary | ICD-10-CM

## 2025-02-07 DIAGNOSIS — Z79.4 TYPE 2 DIABETES MELLITUS WITH DIABETIC NEPHROPATHY, WITH LONG-TERM CURRENT USE OF INSULIN: ICD-10-CM

## 2025-02-07 DIAGNOSIS — I10 HYPERTENSION, UNSPECIFIED TYPE: ICD-10-CM

## 2025-02-07 DIAGNOSIS — N17.9 AKI (ACUTE KIDNEY INJURY) (CMS-HCC): ICD-10-CM

## 2025-02-07 DIAGNOSIS — I48.91 ATRIAL FIBRILLATION, UNSPECIFIED TYPE (MULTI): ICD-10-CM

## 2025-02-07 DIAGNOSIS — J44.9 CHRONIC OBSTRUCTIVE PULMONARY DISEASE, UNSPECIFIED COPD TYPE (MULTI): ICD-10-CM

## 2025-02-07 DIAGNOSIS — E11.21 TYPE 2 DIABETES MELLITUS WITH DIABETIC NEPHROPATHY, WITH LONG-TERM CURRENT USE OF INSULIN: ICD-10-CM

## 2025-02-07 DIAGNOSIS — K59.00 CONSTIPATION, UNSPECIFIED CONSTIPATION TYPE: ICD-10-CM

## 2025-02-07 DIAGNOSIS — I50.21 HEART FAILURE, ACUTE SYSTOLIC: ICD-10-CM

## 2025-02-07 PROCEDURE — 99309 SBSQ NF CARE MODERATE MDM 30: CPT | Performed by: PHYSICIAN ASSISTANT

## 2025-02-07 ASSESSMENT — ENCOUNTER SYMPTOMS
FEVER: 0
NAUSEA: 0
WHEEZING: 0
CHILLS: 0
DIARRHEA: 0
HEMATURIA: 0
ABDOMINAL PAIN: 0
SHORTNESS OF BREATH: 0
DYSURIA: 0
NERVOUS/ANXIOUS: 0
APPETITE CHANGE: 0
CONFUSION: 0
CONSTIPATION: 1
COUGH: 0
TREMORS: 0
HEADACHES: 0
DIZZINESS: 0
FREQUENCY: 0
WEAKNESS: 0
VOMITING: 0

## 2025-02-07 NOTE — LETTER
Patient: Navneet Thompson  : 1933    Encounter Date: 2025      Subjective  [id number] : Navneet Thompson is a 89 y.o. male admitted to [facility] for rehab. No chief complaint on file..  HPI  Pt with a h/o afib, CAD s/p cabg, BETH, diabetes, HTN,  treated for acute on chronic CHF and pneumonia.  Pt seen while sitting up in wheelchair.  He states that is doing well.  No shortness of breath or cough.  He continues to be on 2liters of oxygen.  He does not feel like he still needs this oxygen.  He complains of constipation.  It has been a few days since his last bm.  He takes milk of mag daily at home.  He denies any abdominal pain. No nausea or emesis.  His INR is 2.1 today.   Pt lives alone.   Review of Systems   Constitutional:  Negative for appetite change, chills and fever.   Respiratory:  Negative for cough, shortness of breath and wheezing.    Cardiovascular:  Negative for chest pain and leg swelling.   Gastrointestinal:  Positive for constipation. Negative for abdominal pain, diarrhea, nausea and vomiting.   Genitourinary:  Negative for dysuria, frequency and hematuria.   Neurological:  Negative for dizziness, tremors, weakness and headaches.   Psychiatric/Behavioral:  Negative for confusion. The patient is not nervous/anxious.    All other systems reviewed and are negative.      Objective  /52   Pulse 71   Temp 36.5 °C (97.7 °F)   Resp 20   Wt 76.7 kg (169 lb)   SpO2 98%   BMI 24.25 kg/m²    Physical Exam  Constitutional:       General: He is not in acute distress.  Eyes:      Conjunctiva/sclera: Conjunctivae normal.      Pupils: Pupils are equal, round, and reactive to light.      Comments: Chronic bilateral ectropion.  Lower eye lids are erythematous.    Cardiovascular:      Rate and Rhythm: Normal rate and regular rhythm.      Heart sounds: No murmur heard.  Pulmonary:      Effort: Pulmonary effort is normal.      Breath sounds: No wheezing, rhonchi or rales.   Abdominal:       "General: Abdomen is flat. Bowel sounds are normal. There is no distension.      Palpations: Abdomen is soft. There is no mass.      Tenderness: There is no abdominal tenderness.   Musculoskeletal:         General: No swelling.   Skin:     General: Skin is warm and dry.      Findings: No rash.   Neurological:      Mental Status: He is alert and oriented to person, place, and time.       Results for orders placed or performed in visit on 02/07/25 (from the past 24 hours)   Protime-INR   Result Value Ref Range    Protime 21.8 (H) 9.3 - 12.7 seconds    INR 2.1 (H) 0.9 - 1.2     *Note: Due to a large number of results and/or encounters for the requested time period, some results have not been displayed. A complete set of results can be found in Results Review.      No lab exists for component: \"CBC BMP\"  Assessment/Plan  Atrial fibrillation (Multi) I48.91        Rate controlled with metoprolol.  INR is 2.1 today.  Continue coumadin 3mg daily.  PT/INR  on M/W/F. Monitor INR and adjust coumadin as needed.            Acute congestive heart failure - Primary I50.9       Continue lasix 60mg BID and spironalactone.  Monitor weights and labs.  On 2000cc fluid restriction           Hypertension I10       Continue lisinopril.  Monitor blood pressures and adjust meds as needed.            Pneumonia due to infectious organism J18.9       Treated with antibiotics.   Continue mucinex.  Symptoms improved.  Attempt to wean oxygen.  Maintain pulse ox > 92%           Stage 3 chronic kidney disease (Multi) N18.30       Monitor labs.            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            Type 2 diabetes mellitus with diabetic polyneuropathy E11.42       Continue lantus 30u daily and SSI TID with meals.            Chronic constipation:  milk of magnesia daily.         Time spent: 30 min in review of chart, labs and orders, consultation with pt and documentation.       Electronically Signed By: Freda MOHR" RUMA Martinez   2/7/25  5:43 PM

## 2025-02-07 NOTE — PROGRESS NOTES
Subjective   [id number] : Navneet Thompson is a 89 y.o. male admitted to [facility] for rehab. No chief complaint on file..  HPI  Pt with a h/o afib, CAD s/p cabg, BETH, diabetes, HTN,  treated for acute on chronic CHF and pneumonia.  Pt seen while sitting up in wheelchair.  He states that is doing well.  No shortness of breath or cough.  He continues to be on 2liters of oxygen.  He does not feel like he still needs this oxygen.  He complains of constipation.  It has been a few days since his last bm.  He takes milk of mag daily at home.  He denies any abdominal pain. No nausea or emesis.  His INR is 2.1 today.   Pt lives alone.   Review of Systems   Constitutional:  Negative for appetite change, chills and fever.   Respiratory:  Negative for cough, shortness of breath and wheezing.    Cardiovascular:  Negative for chest pain and leg swelling.   Gastrointestinal:  Positive for constipation. Negative for abdominal pain, diarrhea, nausea and vomiting.   Genitourinary:  Negative for dysuria, frequency and hematuria.   Neurological:  Negative for dizziness, tremors, weakness and headaches.   Psychiatric/Behavioral:  Negative for confusion. The patient is not nervous/anxious.    All other systems reviewed and are negative.      Objective   /52   Pulse 71   Temp 36.5 °C (97.7 °F)   Resp 20   Wt 76.7 kg (169 lb)   SpO2 98%   BMI 24.25 kg/m²    Physical Exam  Constitutional:       General: He is not in acute distress.  Eyes:      Conjunctiva/sclera: Conjunctivae normal.      Pupils: Pupils are equal, round, and reactive to light.      Comments: Chronic bilateral ectropion.  Lower eye lids are erythematous.    Cardiovascular:      Rate and Rhythm: Normal rate and regular rhythm.      Heart sounds: No murmur heard.  Pulmonary:      Effort: Pulmonary effort is normal.      Breath sounds: No wheezing, rhonchi or rales.   Abdominal:      General: Abdomen is flat. Bowel sounds are normal. There is no distension.     "  Palpations: Abdomen is soft. There is no mass.      Tenderness: There is no abdominal tenderness.   Musculoskeletal:         General: No swelling.   Skin:     General: Skin is warm and dry.      Findings: No rash.   Neurological:      Mental Status: He is alert and oriented to person, place, and time.       Results for orders placed or performed in visit on 02/07/25 (from the past 24 hours)   Protime-INR   Result Value Ref Range    Protime 21.8 (H) 9.3 - 12.7 seconds    INR 2.1 (H) 0.9 - 1.2     *Note: Due to a large number of results and/or encounters for the requested time period, some results have not been displayed. A complete set of results can be found in Results Review.      No lab exists for component: \"CBC BMP\"  Assessment/Plan   Atrial fibrillation (Multi) I48.91        Rate controlled with metoprolol.  INR is 2.1 today.  Continue coumadin 3mg daily.  PT/INR  on M/W/F. Monitor INR and adjust coumadin as needed.            Acute congestive heart failure - Primary I50.9       Continue lasix 60mg BID and spironalactone.  Monitor weights and labs.  On 2000cc fluid restriction           Hypertension I10       Continue lisinopril.  Monitor blood pressures and adjust meds as needed.            Pneumonia due to infectious organism J18.9       Treated with antibiotics.   Continue mucinex.  Symptoms improved.  Attempt to wean oxygen.  Maintain pulse ox > 92%           Stage 3 chronic kidney disease (Multi) N18.30       Monitor labs.            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            Type 2 diabetes mellitus with diabetic polyneuropathy E11.42       Continue lantus 30u daily and SSI TID with meals.            Chronic constipation:  milk of magnesia daily.         Time spent: 30 min in review of chart, labs and orders, consultation with pt and documentation.   " 70

## 2025-02-10 ENCOUNTER — NURSING HOME VISIT (OUTPATIENT)
Dept: POST ACUTE CARE | Facility: EXTERNAL LOCATION | Age: OVER 89
End: 2025-02-10
Payer: MEDICARE

## 2025-02-10 VITALS
TEMPERATURE: 97.1 F | DIASTOLIC BLOOD PRESSURE: 62 MMHG | RESPIRATION RATE: 19 BRPM | OXYGEN SATURATION: 98 % | HEART RATE: 70 BPM | BODY MASS INDEX: 23.85 KG/M2 | WEIGHT: 166.2 LBS | SYSTOLIC BLOOD PRESSURE: 99 MMHG

## 2025-02-10 DIAGNOSIS — E11.21 TYPE 2 DIABETES MELLITUS WITH DIABETIC NEPHROPATHY, WITH LONG-TERM CURRENT USE OF INSULIN: ICD-10-CM

## 2025-02-10 DIAGNOSIS — M25.511 ACUTE PAIN OF RIGHT SHOULDER: ICD-10-CM

## 2025-02-10 DIAGNOSIS — I25.10 ATHEROSCLEROSIS OF NATIVE CORONARY ARTERY OF NATIVE HEART WITHOUT ANGINA PECTORIS: ICD-10-CM

## 2025-02-10 DIAGNOSIS — I48.91 ATRIAL FIBRILLATION, UNSPECIFIED TYPE (MULTI): ICD-10-CM

## 2025-02-10 DIAGNOSIS — K59.00 CONSTIPATION, UNSPECIFIED CONSTIPATION TYPE: ICD-10-CM

## 2025-02-10 DIAGNOSIS — I50.9 ACUTE CONGESTIVE HEART FAILURE, UNSPECIFIED HEART FAILURE TYPE: Primary | ICD-10-CM

## 2025-02-10 DIAGNOSIS — I10 HYPERTENSION, UNSPECIFIED TYPE: ICD-10-CM

## 2025-02-10 DIAGNOSIS — Z79.4 TYPE 2 DIABETES MELLITUS WITH DIABETIC NEPHROPATHY, WITH LONG-TERM CURRENT USE OF INSULIN: ICD-10-CM

## 2025-02-10 DIAGNOSIS — J44.9 CHRONIC OBSTRUCTIVE PULMONARY DISEASE, UNSPECIFIED COPD TYPE (MULTI): ICD-10-CM

## 2025-02-10 PROCEDURE — 99309 SBSQ NF CARE MODERATE MDM 30: CPT | Performed by: PHYSICIAN ASSISTANT

## 2025-02-10 ASSESSMENT — ENCOUNTER SYMPTOMS
HEMATURIA: 0
DIARRHEA: 0
VOMITING: 0
FREQUENCY: 0
APPETITE CHANGE: 0
NAUSEA: 0
DYSURIA: 0
ABDOMINAL PAIN: 0
NERVOUS/ANXIOUS: 0
DIZZINESS: 0
WEAKNESS: 0
CONFUSION: 0
COUGH: 0
HEADACHES: 0
WHEEZING: 0
CONSTIPATION: 0
TREMORS: 0
SHORTNESS OF BREATH: 0
FEVER: 0
CHILLS: 0

## 2025-02-10 NOTE — LETTER
Patient: Navneet Thompson  : 1933    Encounter Date: 02/10/2025      Subjective  [id number] : Navneet Thompson is a 89 y.o. male admitted to [facility] for rehab. No chief complaint on file..  HPI  Pt with a h/o afib, CAD s/p cabg, BETH, diabetes, HTN,  treated for acute on chronic CHF and pneumonia.  Pt seen while resting in bed.   Pt reports dry mouth.  His blood pressures have been a little low.  He denies any dizziness or feeling light headed.    He is on lasix 60mg BID which was increased in the hospital from 40mg BID.  He is also on a fluid restriction of 2000cc/day.  He has had poor po fluid intake.  He says that he had a 1/2 cup of juice with his breakfast.  He has no lower leg edema and has a bit of an increase in his bun and creatinine on today's labs.   No shortness of breath or cough.  He has weaned off of the oxygen and is stable on room air.   He denies any abdominal pain. No n/v/d/c..  His INR is 1.8 today.   Pt lives alone.   Review of Systems   Constitutional:  Negative for appetite change, chills and fever.   Respiratory:  Negative for cough, shortness of breath and wheezing.    Cardiovascular:  Negative for chest pain and leg swelling.   Gastrointestinal:  Negative for abdominal pain, constipation, diarrhea, nausea and vomiting.   Genitourinary:  Negative for dysuria, frequency and hematuria.   Neurological:  Negative for dizziness, tremors, weakness and headaches.   Psychiatric/Behavioral:  Negative for confusion. The patient is not nervous/anxious.    All other systems reviewed and are negative.      Objective  BP 99/62   Pulse 70   Temp 36.2 °C (97.1 °F)   Resp 19   Wt 75.4 kg (166 lb 3.2 oz)   SpO2 98%   BMI 23.85 kg/m²    Physical Exam  Constitutional:       General: He is not in acute distress.  Eyes:      Conjunctiva/sclera: Conjunctivae normal.      Pupils: Pupils are equal, round, and reactive to light.      Comments: Chronic bilateral ectropion.  Lower eye lids are  "erythematous.    Cardiovascular:      Rate and Rhythm: Normal rate and regular rhythm.      Heart sounds: No murmur heard.  Pulmonary:      Effort: Pulmonary effort is normal.      Breath sounds: No wheezing, rhonchi or rales.   Abdominal:      General: Abdomen is flat. Bowel sounds are normal. There is no distension.      Palpations: Abdomen is soft. There is no mass.      Tenderness: There is no abdominal tenderness.   Musculoskeletal:         General: No swelling.   Skin:     General: Skin is warm and dry.      Findings: No rash.   Neurological:      Mental Status: He is alert and oriented to person, place, and time.       Results for orders placed or performed in visit on 02/10/25 (from the past 24 hours)   Basic Metabolic Panel   Result Value Ref Range    Glucose 108 (H) 74 - 99 mg/dL    Sodium 138 136 - 145 mmol/L    Potassium 4.5 3.5 - 5.3 mmol/L    Chloride 98 98 - 107 mmol/L    Bicarbonate 32 21 - 32 mmol/L    Anion Gap 13 10 - 20 mmol/L    Urea Nitrogen 44 (H) 6 - 23 mg/dL    Creatinine 1.37 (H) 0.50 - 1.30 mg/dL    eGFR 49 (L) >60 mL/min/1.73m*2    Calcium 9.0 8.6 - 10.3 mg/dL   CBC   Result Value Ref Range    WBC 8.8 4.4 - 11.3 x10*3/uL    nRBC 0.0 0.0 - 0.0 /100 WBCs    RBC 4.14 (L) 4.50 - 5.90 x10*6/uL    Hemoglobin 12.1 (L) 13.5 - 17.5 g/dL    Hematocrit 39.0 (L) 41.0 - 52.0 %    MCV 94 80 - 100 fL    MCH 29.2 26.0 - 34.0 pg    MCHC 31.0 (L) 32.0 - 36.0 g/dL    RDW 14.7 (H) 11.5 - 14.5 %    Platelets 249 150 - 450 x10*3/uL   Protime-INR   Result Value Ref Range    Protime 18.3 (H) 9.3 - 12.7 seconds    INR 1.8 (H) 0.9 - 1.2     *Note: Due to a large number of results and/or encounters for the requested time period, some results have not been displayed. A complete set of results can be found in Results Review.      No lab exists for component: \"CBC BMP\"  Assessment/Plan  Atrial fibrillation (Multi) I48.91        Rate controlled with metoprolol.  INR is 1.8 today.  increase coumadin 4mg daily.  PT/INR  " on M/W/F. Monitor INR and adjust coumadin as needed.            Acute congestive heart failure - Primary I50.9       Continue lasix 60mg BID and spironalactone.  Monitor weights and labs.   Weight is down. Looks a little dry on today's labs.  Will d/c 2000cc fluid restriction.  Hold dose of lasix this evening.  Encouraged fluids.  Recheck bmp in a couple of days.            Hypertension I10       Continue lisinopril.  Monitor blood pressures and adjust meds as needed.            Pneumonia due to infectious organism J18.9       Treated with antibiotics.   Continue mucinex.  Symptoms improved.  He has weaned off oxygen and is stable on room air.          Stage 3 chronic kidney disease (Multi) N18.30       Monitor labs.            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            Type 2 diabetes mellitus with diabetic polyneuropathy E11.42       Continue lantus 30u daily and SSI TID with meals.            Chronic constipation:  milk of magnesia daily.     Right shoulder pain:  called by nursing that pt reports that he has pain in right shoulder and difficulty moving the shoulder.  X-ray of right shoulder.  Lidoderm to right shoulder.  Asked PM&R provider to evaluate during visit tomorrow.         Time spent: 30 min in review of chart, labs and orders, consultation with pt and documentation.       Electronically Signed By: Freda Martinez PA-C   2/10/25  4:45 PM

## 2025-02-10 NOTE — PROGRESS NOTES
Subjective   [id number] : Navneet Thompson is a 89 y.o. male admitted to [facility] for rehab. No chief complaint on file..  HPI  Pt with a h/o afib, CAD s/p cabg, BETH, diabetes, HTN,  treated for acute on chronic CHF and pneumonia.  Pt seen while resting in bed.   Pt reports dry mouth.  His blood pressures have been a little low.  He denies any dizziness or feeling light headed.    He is on lasix 60mg BID which was increased in the hospital from 40mg BID.  He is also on a fluid restriction of 2000cc/day.  He has had poor po fluid intake.  He says that he had a 1/2 cup of juice with his breakfast.  He has no lower leg edema and has a bit of an increase in his bun and creatinine on today's labs.   No shortness of breath or cough.  He has weaned off of the oxygen and is stable on room air.   He denies any abdominal pain. No n/v/d/c..  His INR is 1.8 today.   Pt lives alone.   Review of Systems   Constitutional:  Negative for appetite change, chills and fever.   Respiratory:  Negative for cough, shortness of breath and wheezing.    Cardiovascular:  Negative for chest pain and leg swelling.   Gastrointestinal:  Negative for abdominal pain, constipation, diarrhea, nausea and vomiting.   Genitourinary:  Negative for dysuria, frequency and hematuria.   Neurological:  Negative for dizziness, tremors, weakness and headaches.   Psychiatric/Behavioral:  Negative for confusion. The patient is not nervous/anxious.    All other systems reviewed and are negative.      Objective   BP 99/62   Pulse 70   Temp 36.2 °C (97.1 °F)   Resp 19   Wt 75.4 kg (166 lb 3.2 oz)   SpO2 98%   BMI 23.85 kg/m²    Physical Exam  Constitutional:       General: He is not in acute distress.  Eyes:      Conjunctiva/sclera: Conjunctivae normal.      Pupils: Pupils are equal, round, and reactive to light.      Comments: Chronic bilateral ectropion.  Lower eye lids are erythematous.    Cardiovascular:      Rate and Rhythm: Normal rate and regular  "rhythm.      Heart sounds: No murmur heard.  Pulmonary:      Effort: Pulmonary effort is normal.      Breath sounds: No wheezing, rhonchi or rales.   Abdominal:      General: Abdomen is flat. Bowel sounds are normal. There is no distension.      Palpations: Abdomen is soft. There is no mass.      Tenderness: There is no abdominal tenderness.   Musculoskeletal:         General: No swelling.   Skin:     General: Skin is warm and dry.      Findings: No rash.   Neurological:      Mental Status: He is alert and oriented to person, place, and time.       Results for orders placed or performed in visit on 02/10/25 (from the past 24 hours)   Basic Metabolic Panel   Result Value Ref Range    Glucose 108 (H) 74 - 99 mg/dL    Sodium 138 136 - 145 mmol/L    Potassium 4.5 3.5 - 5.3 mmol/L    Chloride 98 98 - 107 mmol/L    Bicarbonate 32 21 - 32 mmol/L    Anion Gap 13 10 - 20 mmol/L    Urea Nitrogen 44 (H) 6 - 23 mg/dL    Creatinine 1.37 (H) 0.50 - 1.30 mg/dL    eGFR 49 (L) >60 mL/min/1.73m*2    Calcium 9.0 8.6 - 10.3 mg/dL   CBC   Result Value Ref Range    WBC 8.8 4.4 - 11.3 x10*3/uL    nRBC 0.0 0.0 - 0.0 /100 WBCs    RBC 4.14 (L) 4.50 - 5.90 x10*6/uL    Hemoglobin 12.1 (L) 13.5 - 17.5 g/dL    Hematocrit 39.0 (L) 41.0 - 52.0 %    MCV 94 80 - 100 fL    MCH 29.2 26.0 - 34.0 pg    MCHC 31.0 (L) 32.0 - 36.0 g/dL    RDW 14.7 (H) 11.5 - 14.5 %    Platelets 249 150 - 450 x10*3/uL   Protime-INR   Result Value Ref Range    Protime 18.3 (H) 9.3 - 12.7 seconds    INR 1.8 (H) 0.9 - 1.2     *Note: Due to a large number of results and/or encounters for the requested time period, some results have not been displayed. A complete set of results can be found in Results Review.      No lab exists for component: \"CBC BMP\"  Assessment/Plan   Atrial fibrillation (Multi) I48.91        Rate controlled with metoprolol.  INR is 1.8 today.  increase coumadin 4mg daily.  PT/INR  on M/W/F. Monitor INR and adjust coumadin as needed.            Acute " congestive heart failure - Primary I50.9       Continue lasix 60mg BID and spironalactone.  Monitor weights and labs.   Weight is down. Looks a little dry on today's labs.  Will d/c 2000cc fluid restriction.  Hold dose of lasix this evening.  Encouraged fluids.  Recheck bmp in a couple of days.            Hypertension I10       Continue lisinopril.  Monitor blood pressures and adjust meds as needed.            Pneumonia due to infectious organism J18.9       Treated with antibiotics.   Continue mucinex.  Symptoms improved.  He has weaned off oxygen and is stable on room air.          Stage 3 chronic kidney disease (Multi) N18.30       Monitor labs.            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            Type 2 diabetes mellitus with diabetic polyneuropathy E11.42       Continue lantus 30u daily and SSI TID with meals.            Chronic constipation:  milk of magnesia daily.     Right shoulder pain:  called by nursing that pt reports that he has pain in right shoulder and difficulty moving the shoulder.  X-ray of right shoulder.  Lidoderm to right shoulder.  Asked PM&R provider to evaluate during visit tomorrow.         Time spent: 30 min in review of chart, labs and orders, consultation with pt and documentation.

## 2025-02-14 ENCOUNTER — NURSING HOME VISIT (OUTPATIENT)
Dept: POST ACUTE CARE | Facility: EXTERNAL LOCATION | Age: OVER 89
End: 2025-02-14
Payer: MEDICARE

## 2025-02-14 VITALS
BODY MASS INDEX: 23.53 KG/M2 | OXYGEN SATURATION: 95 % | HEART RATE: 73 BPM | RESPIRATION RATE: 20 BRPM | SYSTOLIC BLOOD PRESSURE: 108 MMHG | TEMPERATURE: 97.7 F | WEIGHT: 164 LBS | DIASTOLIC BLOOD PRESSURE: 50 MMHG

## 2025-02-14 DIAGNOSIS — Z79.4 TYPE 2 DIABETES MELLITUS WITH DIABETIC NEPHROPATHY, WITH LONG-TERM CURRENT USE OF INSULIN: ICD-10-CM

## 2025-02-14 DIAGNOSIS — I10 HYPERTENSION, UNSPECIFIED TYPE: ICD-10-CM

## 2025-02-14 DIAGNOSIS — K59.00 CONSTIPATION, UNSPECIFIED CONSTIPATION TYPE: ICD-10-CM

## 2025-02-14 DIAGNOSIS — E11.21 TYPE 2 DIABETES MELLITUS WITH DIABETIC NEPHROPATHY, WITH LONG-TERM CURRENT USE OF INSULIN: ICD-10-CM

## 2025-02-14 DIAGNOSIS — I50.9 ACUTE CONGESTIVE HEART FAILURE, UNSPECIFIED HEART FAILURE TYPE: Primary | ICD-10-CM

## 2025-02-14 DIAGNOSIS — I25.10 ATHEROSCLEROSIS OF NATIVE CORONARY ARTERY OF NATIVE HEART WITHOUT ANGINA PECTORIS: ICD-10-CM

## 2025-02-14 DIAGNOSIS — I48.91 ATRIAL FIBRILLATION, UNSPECIFIED TYPE (MULTI): ICD-10-CM

## 2025-02-14 DIAGNOSIS — J44.9 CHRONIC OBSTRUCTIVE PULMONARY DISEASE, UNSPECIFIED COPD TYPE (MULTI): ICD-10-CM

## 2025-02-14 DIAGNOSIS — M25.511 ACUTE PAIN OF RIGHT SHOULDER: ICD-10-CM

## 2025-02-14 PROCEDURE — 99309 SBSQ NF CARE MODERATE MDM 30: CPT | Performed by: PHYSICIAN ASSISTANT

## 2025-02-14 ASSESSMENT — ENCOUNTER SYMPTOMS
VOMITING: 0
COUGH: 0
CHILLS: 0
DYSURIA: 0
CONSTIPATION: 0
WEAKNESS: 0
CONFUSION: 0
TREMORS: 0
NAUSEA: 0
WHEEZING: 0
FREQUENCY: 0
HEADACHES: 0
ABDOMINAL PAIN: 0
FEVER: 0
APPETITE CHANGE: 0
HEMATURIA: 0
NERVOUS/ANXIOUS: 0
DIARRHEA: 0
DIZZINESS: 0
SHORTNESS OF BREATH: 0

## 2025-02-14 NOTE — PROGRESS NOTES
Subjective   [id number] : Navneet Thompson is a 89 y.o. male admitted to [facility] for rehab. No chief complaint on file..  HPI  Pt with a h/o afib, CAD s/p cabg, BETH, diabetes, HTN,  treated for acute on chronic CHF and pneumonia.  Pt seen while resting in bed.  Pt is asking about his lubricating eye drops which he says nursing has not been giving him.  He has an order for these eye drops BID PRN.  He is upset that he has not been given the eye drops.  Will change order to routine TID. He has been ordered routine milk of mag and it does appear that he is having regular bowel movements.   He denies any shortness of breath or cough.  He has weaned off of oxygen and is stable on room air.  He has no lower leg edema and his weights are stable. He denies any abdominal pain. No n/v/d/c. Pt lives alone.   Review of Systems   Constitutional:  Negative for appetite change, chills and fever.   Respiratory:  Negative for cough, shortness of breath and wheezing.    Cardiovascular:  Negative for chest pain and leg swelling.   Gastrointestinal:  Negative for abdominal pain, constipation, diarrhea, nausea and vomiting.   Genitourinary:  Negative for dysuria, frequency and hematuria.   Neurological:  Negative for dizziness, tremors, weakness and headaches.   Psychiatric/Behavioral:  Negative for confusion. The patient is not nervous/anxious.    All other systems reviewed and are negative.      Objective   /50   Pulse 73   Temp 36.5 °C (97.7 °F)   Resp 20   Wt 74.4 kg (164 lb)   SpO2 95%   BMI 23.53 kg/m²    Physical Exam  Constitutional:       General: He is not in acute distress.  Eyes:      Conjunctiva/sclera: Conjunctivae normal.      Pupils: Pupils are equal, round, and reactive to light.      Comments: Chronic bilateral ectropion.  Lower eye lids are erythematous.    Cardiovascular:      Rate and Rhythm: Normal rate and regular rhythm.      Heart sounds: No murmur heard.  Pulmonary:      Effort: Pulmonary  "effort is normal.      Breath sounds: No wheezing, rhonchi or rales.   Abdominal:      General: Abdomen is flat. Bowel sounds are normal. There is no distension.      Palpations: Abdomen is soft. There is no mass.      Tenderness: There is no abdominal tenderness.   Musculoskeletal:         General: No swelling.   Skin:     General: Skin is warm and dry.      Findings: No rash.   Neurological:      Mental Status: He is alert and oriented to person, place, and time.       No results found. However, due to the size of the patient record, not all encounters were searched. Please check Results Review for a complete set of results.     No lab exists for component: \"CBC BMP\"  Assessment/Plan   Atrial fibrillation (Multi) I48.91        Rate controlled with metoprolol.  Last INR is 1.7 today.  on coumadin 5mg daily.  PT/INR  on M/W/F. Monitor INR and adjust coumadin as needed.   INR was not done today so reordered for tomorrow.            Acute congestive heart failure - Primary I50.9       Continue lasix 60mg BID and spironalactone.  Monitor weights and labs.   Weight is down.  2000cc fluid restriction was discontinued.   discharge planning.         Hypertension I10       Continue lisinopril.  Monitor blood pressures and adjust meds as needed.            Pneumonia due to infectious organism J18.9       Treated with antibiotics.  Symptoms improved.  He has weaned off oxygen and is stable on room air.          Stage 3 chronic kidney disease (Multi) N18.30       Monitor labs.            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   Lubricating eye drops - order changed to TID routine.            Type 2 diabetes mellitus with diabetic polyneuropathy E11.42       Continue lantus 30u daily and SSI TID with meals.            Chronic constipation:  milk of magnesia daily.     Right shoulder pain:  called by nursing that pt reports that he has pain in right shoulder and difficulty moving the shoulder.  " X-ray of right shoulder.  Lidoderm to right shoulder.   PM&R provider following.         Time spent: 30 min in review of chart, labs and orders, consultation with pt and documentation.

## 2025-02-14 NOTE — LETTER
Patient: Navneet Thompson  : 1933    Encounter Date: 2025      Subjective  [id number] : Navneet Thompson is a 89 y.o. male admitted to [facility] for rehab. No chief complaint on file..  HPI  Pt with a h/o afib, CAD s/p cabg, BETH, diabetes, HTN,  treated for acute on chronic CHF and pneumonia.  Pt seen while resting in bed.  Pt is asking about his lubricating eye drops which he says nursing has not been giving him.  He has an order for these eye drops BID PRN.  He is upset that he has not been given the eye drops.  Will change order to routine TID. He has been ordered routine milk of mag and it does appear that he is having regular bowel movements.   He denies any shortness of breath or cough.  He has weaned off of oxygen and is stable on room air.  He has no lower leg edema and his weights are stable. He denies any abdominal pain. No n/v/d/c. Pt lives alone.   Review of Systems   Constitutional:  Negative for appetite change, chills and fever.   Respiratory:  Negative for cough, shortness of breath and wheezing.    Cardiovascular:  Negative for chest pain and leg swelling.   Gastrointestinal:  Negative for abdominal pain, constipation, diarrhea, nausea and vomiting.   Genitourinary:  Negative for dysuria, frequency and hematuria.   Neurological:  Negative for dizziness, tremors, weakness and headaches.   Psychiatric/Behavioral:  Negative for confusion. The patient is not nervous/anxious.    All other systems reviewed and are negative.      Objective  /50   Pulse 73   Temp 36.5 °C (97.7 °F)   Resp 20   Wt 74.4 kg (164 lb)   SpO2 95%   BMI 23.53 kg/m²    Physical Exam  Constitutional:       General: He is not in acute distress.  Eyes:      Conjunctiva/sclera: Conjunctivae normal.      Pupils: Pupils are equal, round, and reactive to light.      Comments: Chronic bilateral ectropion.  Lower eye lids are erythematous.    Cardiovascular:      Rate and Rhythm: Normal rate and regular rhythm.       "Heart sounds: No murmur heard.  Pulmonary:      Effort: Pulmonary effort is normal.      Breath sounds: No wheezing, rhonchi or rales.   Abdominal:      General: Abdomen is flat. Bowel sounds are normal. There is no distension.      Palpations: Abdomen is soft. There is no mass.      Tenderness: There is no abdominal tenderness.   Musculoskeletal:         General: No swelling.   Skin:     General: Skin is warm and dry.      Findings: No rash.   Neurological:      Mental Status: He is alert and oriented to person, place, and time.       No results found. However, due to the size of the patient record, not all encounters were searched. Please check Results Review for a complete set of results.     No lab exists for component: \"CBC BMP\"  Assessment/Plan  Atrial fibrillation (Multi) I48.91        Rate controlled with metoprolol.  Last INR is 1.7 today.  on coumadin 5mg daily.  PT/INR  on M/W/F. Monitor INR and adjust coumadin as needed.   INR was not done today so reordered for tomorrow.            Acute congestive heart failure - Primary I50.9       Continue lasix 60mg BID and spironalactone.  Monitor weights and labs.   Weight is down.  2000cc fluid restriction was discontinued.   discharge planning.         Hypertension I10       Continue lisinopril.  Monitor blood pressures and adjust meds as needed.            Pneumonia due to infectious organism J18.9       Treated with antibiotics.  Symptoms improved.  He has weaned off oxygen and is stable on room air.          Stage 3 chronic kidney disease (Multi) N18.30       Monitor labs.            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   Lubricating eye drops - order changed to TID routine.            Type 2 diabetes mellitus with diabetic polyneuropathy E11.42       Continue lantus 30u daily and SSI TID with meals.            Chronic constipation:  milk of magnesia daily.     Right shoulder pain:  called by nursing that pt reports that he has " pain in right shoulder and difficulty moving the shoulder.  X-ray of right shoulder.  Lidoderm to right shoulder.   PM&R provider following.         Time spent: 30 min in review of chart, labs and orders, consultation with pt and documentation.       Electronically Signed By: Freda Martinez PA-C   2/14/25  2:09 PM

## 2025-02-17 ENCOUNTER — NURSING HOME VISIT (OUTPATIENT)
Dept: POST ACUTE CARE | Facility: EXTERNAL LOCATION | Age: OVER 89
End: 2025-02-17
Payer: MEDICARE

## 2025-02-17 VITALS
RESPIRATION RATE: 18 BRPM | HEART RATE: 70 BPM | BODY MASS INDEX: 23.62 KG/M2 | OXYGEN SATURATION: 97 % | SYSTOLIC BLOOD PRESSURE: 105 MMHG | WEIGHT: 164.6 LBS | DIASTOLIC BLOOD PRESSURE: 55 MMHG | TEMPERATURE: 97.7 F

## 2025-02-17 DIAGNOSIS — I50.9 ACUTE CONGESTIVE HEART FAILURE, UNSPECIFIED HEART FAILURE TYPE: Primary | ICD-10-CM

## 2025-02-17 DIAGNOSIS — I10 HYPERTENSION, UNSPECIFIED TYPE: ICD-10-CM

## 2025-02-17 DIAGNOSIS — K59.00 CONSTIPATION, UNSPECIFIED CONSTIPATION TYPE: ICD-10-CM

## 2025-02-17 DIAGNOSIS — E11.21 TYPE 2 DIABETES MELLITUS WITH DIABETIC NEPHROPATHY, WITH LONG-TERM CURRENT USE OF INSULIN: ICD-10-CM

## 2025-02-17 DIAGNOSIS — M25.511 ACUTE PAIN OF RIGHT SHOULDER: ICD-10-CM

## 2025-02-17 DIAGNOSIS — J44.9 CHRONIC OBSTRUCTIVE PULMONARY DISEASE, UNSPECIFIED COPD TYPE (MULTI): ICD-10-CM

## 2025-02-17 DIAGNOSIS — Z79.4 TYPE 2 DIABETES MELLITUS WITH DIABETIC NEPHROPATHY, WITH LONG-TERM CURRENT USE OF INSULIN: ICD-10-CM

## 2025-02-17 DIAGNOSIS — I48.91 ATRIAL FIBRILLATION, UNSPECIFIED TYPE (MULTI): ICD-10-CM

## 2025-02-17 PROCEDURE — 99309 SBSQ NF CARE MODERATE MDM 30: CPT | Performed by: PHYSICIAN ASSISTANT

## 2025-02-17 ASSESSMENT — ENCOUNTER SYMPTOMS
CONSTIPATION: 0
APPETITE CHANGE: 0
CHILLS: 0
FEVER: 0
FREQUENCY: 0
DIZZINESS: 0
HEMATURIA: 0
HEADACHES: 0
WHEEZING: 0
VOMITING: 0
CONFUSION: 0
COUGH: 0
SHORTNESS OF BREATH: 0
DYSURIA: 0
ABDOMINAL PAIN: 0
TREMORS: 0
WEAKNESS: 0
NAUSEA: 0
NERVOUS/ANXIOUS: 0
DIARRHEA: 0

## 2025-02-17 NOTE — PROGRESS NOTES
Subjective   [id number] : Navneet Thompson is a 89 y.o. male admitted to [facility] for rehab. No chief complaint on file..  HPI  Pt with a h/o afib, CAD s/p cabg, BETH, diabetes, HTN,  treated for acute on chronic CHF and pneumonia.  Pt seen while resting in bed.  He offers no new complaints today.  He had routine labs today and bun and creatinine are trending up.  He reports that he has not been drinking much.  Fluid restriction was stopped.   He is on lasix 60mg BID.  He says that he is getting his eye drops now.   He denies any shortness of breath or cough.  He has weaned off of oxygen and is stable on room air.  He has no lower leg edema and his weights are stable. He denies any abdominal pain. No n/v/d/c. Pt lives alone.   Review of Systems   Constitutional:  Negative for appetite change, chills and fever.   Respiratory:  Negative for cough, shortness of breath and wheezing.    Cardiovascular:  Negative for chest pain and leg swelling.   Gastrointestinal:  Negative for abdominal pain, constipation, diarrhea, nausea and vomiting.   Genitourinary:  Negative for dysuria, frequency and hematuria.   Neurological:  Negative for dizziness, tremors, weakness and headaches.   Psychiatric/Behavioral:  Negative for confusion. The patient is not nervous/anxious.    All other systems reviewed and are negative.      Objective   /55   Pulse 70   Temp 36.5 °C (97.7 °F)   Resp 18   Wt 74.7 kg (164 lb 9.6 oz)   SpO2 97%   BMI 23.62 kg/m²    Physical Exam  Constitutional:       General: He is not in acute distress.  Eyes:      Conjunctiva/sclera: Conjunctivae normal.      Pupils: Pupils are equal, round, and reactive to light.      Comments: Chronic bilateral ectropion.  Lower eye lids are erythematous.    Cardiovascular:      Rate and Rhythm: Normal rate and regular rhythm.      Heart sounds: No murmur heard.  Pulmonary:      Effort: Pulmonary effort is normal.      Breath sounds: No wheezing, rhonchi or rales.  "  Abdominal:      General: Abdomen is flat. Bowel sounds are normal. There is no distension.      Palpations: Abdomen is soft. There is no mass.      Tenderness: There is no abdominal tenderness.   Musculoskeletal:         General: No swelling.   Skin:     General: Skin is warm and dry.      Findings: No rash.   Neurological:      Mental Status: He is alert and oriented to person, place, and time.       Results for orders placed or performed in visit on 02/17/25 (from the past 24 hours)   Basic Metabolic Panel   Result Value Ref Range    Glucose 131 (H) 74 - 99 mg/dL    Sodium 141 136 - 145 mmol/L    Potassium 4.9 3.5 - 5.3 mmol/L    Chloride 100 98 - 107 mmol/L    Bicarbonate 29 21 - 32 mmol/L    Anion Gap 17 10 - 20 mmol/L    Urea Nitrogen 61 (H) 6 - 23 mg/dL    Creatinine 1.48 (H) 0.50 - 1.30 mg/dL    eGFR 44 (L) >60 mL/min/1.73m*2    Calcium 9.1 8.6 - 10.3 mg/dL   CBC   Result Value Ref Range    WBC 7.0 4.4 - 11.3 x10*3/uL    nRBC 0.0 0.0 - 0.0 /100 WBCs    RBC 4.01 (L) 4.50 - 5.90 x10*6/uL    Hemoglobin 11.5 (L) 13.5 - 17.5 g/dL    Hematocrit 37.4 (L) 41.0 - 52.0 %    MCV 93 80 - 100 fL    MCH 28.7 26.0 - 34.0 pg    MCHC 30.7 (L) 32.0 - 36.0 g/dL    RDW 15.0 (H) 11.5 - 14.5 %    Platelets 205 150 - 450 x10*3/uL   Protime-INR   Result Value Ref Range    Protime 19.0 (H) 9.3 - 12.7 seconds    INR 1.8 (H) 0.9 - 1.2     *Note: Due to a large number of results and/or encounters for the requested time period, some results have not been displayed. A complete set of results can be found in Results Review.        No lab exists for component: \"CBC BMP\"  Assessment/Plan   Atrial fibrillation (Multi) I48.91        Rate controlled with metoprolol.  Last INR is 1.8 today.  Increase coumadin 6mg daily.  PT/INR  on M/W/F. Monitor INR and adjust coumadin as needed.            Acute congestive heart failure - Primary I50.9       Mild BETH on today's labs.  Will decrease lasix to 40mg BID and spironalactone.  Monitor weights and " labs.   Weight is down.  2000cc fluid restriction was discontinued.   discharge planning.         Hypertension I10       Continue lisinopril.  Monitor blood pressures and adjust meds as needed.            Pneumonia due to infectious organism J18.9       Treated with antibiotics.  Symptoms improved.  He has weaned off oxygen and is stable on room air.          Stage 3 chronic kidney disease (Multi) N18.30       Monitor labs.            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   Lubricating eye drops - order changed to TID routine.            Type 2 diabetes mellitus with diabetic polyneuropathy E11.42       Continue lantus 30u daily and SSI TID with meals.            Chronic constipation:  milk of magnesia daily.     Right shoulder pain:  called by nursing that pt reports that he has pain in right shoulder and difficulty moving the shoulder.  X-ray of right shoulder.  Lidoderm to right shoulder.   PM&R provider following.         Time spent: 30 min in review of chart, labs and orders, consultation with pt and documentation.

## 2025-02-17 NOTE — LETTER
Patient: Navneet Thompson  : 1933    Encounter Date: 2025      Subjective  [id number] : Navneet Thompson is a 89 y.o. male admitted to [facility] for rehab. No chief complaint on file..  HPI  Pt with a h/o afib, CAD s/p cabg, BETH, diabetes, HTN,  treated for acute on chronic CHF and pneumonia.  Pt seen while resting in bed.  He offers no new complaints today.  He had routine labs today and bun and creatinine are trending up.  He reports that he has not been drinking much.  Fluid restriction was stopped.   He is on lasix 60mg BID.  He says that he is getting his eye drops now.   He denies any shortness of breath or cough.  He has weaned off of oxygen and is stable on room air.  He has no lower leg edema and his weights are stable. He denies any abdominal pain. No n/v/d/c. Pt lives alone.   Review of Systems   Constitutional:  Negative for appetite change, chills and fever.   Respiratory:  Negative for cough, shortness of breath and wheezing.    Cardiovascular:  Negative for chest pain and leg swelling.   Gastrointestinal:  Negative for abdominal pain, constipation, diarrhea, nausea and vomiting.   Genitourinary:  Negative for dysuria, frequency and hematuria.   Neurological:  Negative for dizziness, tremors, weakness and headaches.   Psychiatric/Behavioral:  Negative for confusion. The patient is not nervous/anxious.    All other systems reviewed and are negative.      Objective  /55   Pulse 70   Temp 36.5 °C (97.7 °F)   Resp 18   Wt 74.7 kg (164 lb 9.6 oz)   SpO2 97%   BMI 23.62 kg/m²    Physical Exam  Constitutional:       General: He is not in acute distress.  Eyes:      Conjunctiva/sclera: Conjunctivae normal.      Pupils: Pupils are equal, round, and reactive to light.      Comments: Chronic bilateral ectropion.  Lower eye lids are erythematous.    Cardiovascular:      Rate and Rhythm: Normal rate and regular rhythm.      Heart sounds: No murmur heard.  Pulmonary:      Effort: Pulmonary  "effort is normal.      Breath sounds: No wheezing, rhonchi or rales.   Abdominal:      General: Abdomen is flat. Bowel sounds are normal. There is no distension.      Palpations: Abdomen is soft. There is no mass.      Tenderness: There is no abdominal tenderness.   Musculoskeletal:         General: No swelling.   Skin:     General: Skin is warm and dry.      Findings: No rash.   Neurological:      Mental Status: He is alert and oriented to person, place, and time.       Results for orders placed or performed in visit on 02/17/25 (from the past 24 hours)   Basic Metabolic Panel   Result Value Ref Range    Glucose 131 (H) 74 - 99 mg/dL    Sodium 141 136 - 145 mmol/L    Potassium 4.9 3.5 - 5.3 mmol/L    Chloride 100 98 - 107 mmol/L    Bicarbonate 29 21 - 32 mmol/L    Anion Gap 17 10 - 20 mmol/L    Urea Nitrogen 61 (H) 6 - 23 mg/dL    Creatinine 1.48 (H) 0.50 - 1.30 mg/dL    eGFR 44 (L) >60 mL/min/1.73m*2    Calcium 9.1 8.6 - 10.3 mg/dL   CBC   Result Value Ref Range    WBC 7.0 4.4 - 11.3 x10*3/uL    nRBC 0.0 0.0 - 0.0 /100 WBCs    RBC 4.01 (L) 4.50 - 5.90 x10*6/uL    Hemoglobin 11.5 (L) 13.5 - 17.5 g/dL    Hematocrit 37.4 (L) 41.0 - 52.0 %    MCV 93 80 - 100 fL    MCH 28.7 26.0 - 34.0 pg    MCHC 30.7 (L) 32.0 - 36.0 g/dL    RDW 15.0 (H) 11.5 - 14.5 %    Platelets 205 150 - 450 x10*3/uL   Protime-INR   Result Value Ref Range    Protime 19.0 (H) 9.3 - 12.7 seconds    INR 1.8 (H) 0.9 - 1.2     *Note: Due to a large number of results and/or encounters for the requested time period, some results have not been displayed. A complete set of results can be found in Results Review.        No lab exists for component: \"CBC BMP\"  Assessment/Plan  Atrial fibrillation (Multi) I48.91        Rate controlled with metoprolol.  Last INR is 1.8 today.  Increase coumadin 6mg daily.  PT/INR  on M/W/F. Monitor INR and adjust coumadin as needed.            Acute congestive heart failure - Primary I50.9       Mild BETH on today's labs.  Will " decrease lasix to 40mg BID and spironalactone.  Monitor weights and labs.   Weight is down.  2000cc fluid restriction was discontinued.   discharge planning.         Hypertension I10       Continue lisinopril.  Monitor blood pressures and adjust meds as needed.            Pneumonia due to infectious organism J18.9       Treated with antibiotics.  Symptoms improved.  He has weaned off oxygen and is stable on room air.          Stage 3 chronic kidney disease (Multi) N18.30       Monitor labs.            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   Lubricating eye drops - order changed to TID routine.            Type 2 diabetes mellitus with diabetic polyneuropathy E11.42       Continue lantus 30u daily and SSI TID with meals.            Chronic constipation:  milk of magnesia daily.     Right shoulder pain:  called by nursing that pt reports that he has pain in right shoulder and difficulty moving the shoulder.  X-ray of right shoulder.  Lidoderm to right shoulder.   PM&R provider following.         Time spent: 30 min in review of chart, labs and orders, consultation with pt and documentation.       Electronically Signed By: Freda Martinez PA-C   2/17/25  6:46 PM

## 2025-02-19 ENCOUNTER — TELEPHONE (OUTPATIENT)
Dept: CARDIOLOGY | Facility: CLINIC | Age: OVER 89
End: 2025-02-19

## 2025-02-19 ENCOUNTER — NURSING HOME VISIT (OUTPATIENT)
Dept: POST ACUTE CARE | Facility: EXTERNAL LOCATION | Age: OVER 89
End: 2025-02-19
Payer: MEDICARE

## 2025-02-19 DIAGNOSIS — I50.9 ACUTE CONGESTIVE HEART FAILURE, UNSPECIFIED HEART FAILURE TYPE: Primary | ICD-10-CM

## 2025-02-19 DIAGNOSIS — I48.91 ATRIAL FIBRILLATION, UNSPECIFIED TYPE (MULTI): ICD-10-CM

## 2025-02-19 DIAGNOSIS — J44.9 CHRONIC OBSTRUCTIVE PULMONARY DISEASE, UNSPECIFIED COPD TYPE (MULTI): ICD-10-CM

## 2025-02-19 PROCEDURE — 99309 SBSQ NF CARE MODERATE MDM 30: CPT | Performed by: INTERNAL MEDICINE

## 2025-02-19 NOTE — LETTER
Patient: Navneet Thompson  : 1933    Encounter Date: 2025    PROGRESS NOTE      Navneet Thompson is a 89 y.o. male seen in follow up at [facility].    ASSESSMENT / PLAN:  Atrial fibrillation, unspecified type (Multi) (Primary)  Atherosclerosis of native coronary artery of native heart without angina pectoris  Chronic obstructive pulmonary disease, unspecified COPD type (Multi)    Acute on chronic heart failure, continue the Lasix was decreased and we will continue to monitor the weights, volume status and electrolytes.  He is on the fluid restriction.    BETH/CKD - likely on account of diuresis - Scr grossly stable today.    Hypertension on lisinopril.    Pneumonia, completing antibiotics, on the Mucinex, he says he is not short of breath at all and the cough is improving.    Atrial fibrillation rate controlled and anticoagulated, we have resumed the warfarin and the INR is rising now 1.9 - cont m/w/f    Diabetes mellitus, continue Lantus and sliding scale insulin.    All identified medical problems have been addressed prior to admission, except as noted here. SNF services are necessary and appropriate for this person. The patient's medical needs can be met at a skilled nursing facility. This resident's rehabilitative prognosis is adequate to respond to the recommended skilled therapies. Client will likely be discharged to their former living situation with additional home health services. These rehabilitative efforts will improve their functional status and chances of residing in the community.    Subjective  The swelling has improved.  No orthopnea/PND.  Participating in therapy.  Bowels moving.    Objective  Vital signs reviewed in [facility].    Physical Exam  Constitutional:       General: He is not in acute distress.     Appearance: He is not toxic-appearing.   HENT:      Head: Normocephalic and atraumatic.      Mouth/Throat:      Mouth: Mucous membranes are moist.   Eyes:      Pupils:  Pupils are equal, round, and reactive to light.   Cardiovascular:      Rate and Rhythm: Normal rate and regular rhythm.      Heart sounds: No murmur heard.  Pulmonary:      Breath sounds: Normal breath sounds. No wheezing, rhonchi or rales.   Abdominal:      General: There is no distension.      Palpations: Abdomen is soft.   Musculoskeletal:      Right lower leg: No edema.      Left lower leg: No edema.   Neurological:      General: No focal deficit present.      Mental Status: He is alert and oriented to person, place, and time.     Medical History as seen below has been reviewed and updated in the chart.  Past Medical History:   Diagnosis Date   • Aortic stenosis     Status post transcatheter aortic valve replacement   • Atherosclerotic heart disease of native coronary artery without angina pectoris 12/12/2022    CAD (coronary artery disease)   • Atrial fibrillation (Multi)    • Chronic systolic heart failure    • Complete heart block     status post dual chamber pacemaker placemtn in 01/2008   • Diabetes mellitus, type 2 (Multi)    • Essential (primary) hypertension 12/12/2022    Hypertension   • Presence of automatic (implantable) cardiac defibrillator 11/14/2022    Cardiac defibrillator in place, upgraded from pacemaker in 05/2014     Past Surgical History:   Procedure Laterality Date   • AORTIC VALVE REPLACEMENT  04/07/2022    Transcatheter aortic valve replacement (TAVR) for aortic valve stenosis   • APPENDECTOMY  12/01/2014    Appendectomy   • CARDIAC ELECTROPHYSIOLOGY PROCEDURE N/A 12/19/2023    Procedure: ICD UPGRADE, DUAL TO BIV;  Surgeon: Luis Felipe Greene MD;  Location: [place] [address] Cardiac Cath Lab;  Service: Electrophysiology;  Laterality: N/A;  [id number] Bi-V ICD gen change [employer]   • CORONARY ARTERY BYPASS GRAFT  12/01/2014    CABG   • CT ABDOMEN PELVIS ANGIOGRAM W AND/OR WO IV CONTRAST  03/17/2020    CT ABDOMEN PELVIS ANGIOGRAM W AND/OR WO IV CONTRAST 3/17/2020 [facility] ANCILLARY LEGACY   •  OTHER SURGICAL HISTORY  12/01/2014    Cardio-defib Pulse Generator Implantation Date     Family History   Problem Relation Name Age of Onset   • Drug abuse Mother          OVERDOSE     Allergies   Allergen Reactions   • Linagliptin Itching and Rash   • Dicloxacillin Nausea/vomiting   • Doxycycline Nausea/vomiting     VIOLENTLY SICK   • Scopolamine Syncope     PASSED OUT, ALTERED MENTAL STATE     Current Outpatient Medications on File Prior to Visit   Medication Sig Dispense Refill   • acetaminophen (Tylenol) 325 mg tablet Take 2 tablets (650 mg) by mouth every 4 hours if needed for mild pain (1 - 3) or fever (temp greater than 38.0 C).     • albuterol 2.5 mg /3 mL (0.083 %) nebulizer solution Take 3 mL (2.5 mg) by nebulization every 6 hours if needed for wheezing.     • aspirin 81 mg EC tablet Take 1 tablet (81 mg) by mouth once daily.     • atorvastatin (Lipitor) 80 mg tablet Take 1 tablet (80 mg) by mouth once daily at bedtime. 30 tablet 0   • benzonatate (Tessalon) 100 mg capsule Take 1 capsule (100 mg) by mouth 3 times a day as needed for cough. Do not crush or chew.     • cholecalciferol (Vitamin D-3) 25 MCG (1000 UT) tablet Take 1 tablet (1,000 Units) by mouth once daily. Do not start before October 20, 2023.     • dupilumab (Dupixent) 300 mg/2 mL pen injector Inject 2 mL (300 mg) under the skin every 14 (fourteen) days. Patient to take home medication     • empagliflozin (Jardiance) 10 mg Take 1 tablet (10 mg) by mouth once daily. Do not start before October 23, 2023.     • FreeStyle Maria Elena 14 Day Sensor kit USE AS DIRECTED EVERY 14 DAYS     • furosemide (Lasix) 20 mg tablet Take 3 tablets (60 mg) by mouth 2 times daily (morning and late afternoon).     • glucagon (Glucagen) 1 mg injection Inject 1 mg into the muscle every 15 minutes if needed for low blood sugar - see comments (glucose < 41).     • glucagon (Glucagen) 1 mg injection Inject 1 mg into the muscle every 15 minutes if needed for low blood sugar -  see comments (For blood glucose less than or equal to 70 mg/dL and no IV access).     • guaiFENesin (Mucinex) 600 mg 12 hr tablet Take 1 tablet (600 mg) by mouth 2 times a day. Do not crush, chew, or split.     • insulin aspart (NovoLOG U-100 Insulin aspart) 100 unit/mL injection INJECT 6 UNITS UNDER THE SKIN WITH BREAKFAST/LUNCH/DINNER, IF BLOOD GLUCOSE GREATER THAN 180 GIVE 8 UNITS, BLOOD GLUCOSE GREATER THAN 240 GIVE 10 UNITS AND GREATER THAN 300 GIVE 12 UNITS     • insulin glargine (Lantus U-100 Insulin) 100 unit/mL injection Inject 30 Units under the skin once daily at bedtime. Take as directed per insulin instructions.     • ixekizumab (Taltz) 80 mg/mL injection Inject 1 mL (80 mg) under the skin every 14 (fourteen) days.     • lisinopril 5 mg tablet Take 1 tablet (5 mg) by mouth once daily.     • lubricating eye drops ophthalmic solution Administer 1 drop into both eyes 2 times a day as needed for dry eyes.     • magnesium hydroxide (Milk of Magnesia) 400 mg/5 mL suspension Take 60 mL by mouth once daily.     • magnesium oxide (Mag-Ox) 420 mg tablet 1 tablet (420 mg) by nasoduodenal tube route once daily.     • melatonin 3 mg tablet Take 1 tablet (3 mg) by mouth as needed at bedtime for sleep.  0   • metoprolol tartrate (Lopressor) 25 mg tablet Take 1 tablet (25 mg) by mouth 2 times a day.     • neomycin-bacitracin-polymyxin (Polysporin) ophthalmic ointment Apply 0.5 inches to both eyes once daily.     • nystatin (Mycostatin) 100,000 unit/gram powder Apply 1 Application topically 2 times a day.     • omeprazole (PriLOSEC) 20 mg DR capsule Take 1 capsule (20 mg) by mouth once daily. Do not crush or chew.     • oxygen (O2) gas therapy Inhale 1 each continuously. Wean as tolerated     • potassium chloride CR (Klor-Con M20) 20 mEq ER tablet Take 1 tablet (20 mEq) by mouth once daily. Do not crush or chew. 30 tablet 5   • predniSONE (Deltasone) 2.5 mg tablet Take 1 tablet (2.5 mg) by mouth once daily.     •  spironolactone (Aldactone) 25 mg tablet Take 0.5 tablets (12.5 mg) by mouth once every 24 hours.     • triamcinolone (Kenalog) 0.1 % cream Apply topically 2 times a day.     • vit A/vit C/vit E/zinc/copper (ICAPS AREDS ORAL) Take 1 capsule by mouth 2 times a day.     • warfarin (Coumadin) 3 mg tablet Take 1 tablet (3 mg) by mouth once daily in the evening. Prior dose was 4.5 mg Sun, Wed. 6 mg all other days. Has been supratherapeutic with IV antibiotics. Monitor and adjust as needed 180 tablet 2     No current facility-administered medications on file prior to visit.     Immunization History   Administered Date(s) Administered   • Pfizer Purple Cap SARS-CoV-2 01/27/2021, 02/15/2021   • Tdap vaccine, age 7 year and older (BOOSTRIX, ADACEL) 11/17/2024     Teofilo Loyd MD      Electronically Signed By: Teofilo Loyd MD   2/19/25 12:01 PM

## 2025-02-19 NOTE — PROGRESS NOTES
PROGRESS NOTE      Navneet Thompson is a 89 y.o. male seen in follow up at [facility].    ASSESSMENT / PLAN:  Atrial fibrillation, unspecified type (Multi) (Primary)  Atherosclerosis of native coronary artery of native heart without angina pectoris  Chronic obstructive pulmonary disease, unspecified COPD type (Multi)    Acute on chronic heart failure, continue the Lasix was decreased and we will continue to monitor the weights, volume status and electrolytes.  He is on the fluid restriction.    BETH/CKD - likely on account of diuresis - Scr grossly stable today.    Hypertension on lisinopril.    Pneumonia, completing antibiotics, on the Mucinex, he says he is not short of breath at all and the cough is improving.    Atrial fibrillation rate controlled and anticoagulated, we have resumed the warfarin and the INR is rising now 1.9 - cont m/w/f    Diabetes mellitus, continue Lantus and sliding scale insulin.    All identified medical problems have been addressed prior to admission, except as noted here. SNF services are necessary and appropriate for this person. The patient's medical needs can be met at a skilled nursing facility. This resident's rehabilitative prognosis is adequate to respond to the recommended skilled therapies. Client will likely be discharged to their former living situation with additional home health services. These rehabilitative efforts will improve their functional status and chances of residing in the community.    Subjective   The swelling has improved.  No orthopnea/PND.  Participating in therapy.  Bowels moving.    Objective   Vital signs reviewed in [facility].    Physical Exam  Constitutional:       General: He is not in acute distress.     Appearance: He is not toxic-appearing.   HENT:      Head: Normocephalic and atraumatic.      Mouth/Throat:      Mouth: Mucous membranes are moist.   Eyes:      Pupils: Pupils are equal, round, and reactive to light.   Cardiovascular:       Rate and Rhythm: Normal rate and regular rhythm.      Heart sounds: No murmur heard.  Pulmonary:      Breath sounds: Normal breath sounds. No wheezing, rhonchi or rales.   Abdominal:      General: There is no distension.      Palpations: Abdomen is soft.   Musculoskeletal:      Right lower leg: No edema.      Left lower leg: No edema.   Neurological:      General: No focal deficit present.      Mental Status: He is alert and oriented to person, place, and time.     Medical History as seen below has been reviewed and updated in the chart.  Past Medical History:   Diagnosis Date    Aortic stenosis     Status post transcatheter aortic valve replacement    Atherosclerotic heart disease of native coronary artery without angina pectoris 12/12/2022    CAD (coronary artery disease)    Atrial fibrillation (Multi)     Chronic systolic heart failure     Complete heart block     status post dual chamber pacemaker placemtn in 01/2008    Diabetes mellitus, type 2 (Multi)     Essential (primary) hypertension 12/12/2022    Hypertension    Presence of automatic (implantable) cardiac defibrillator 11/14/2022    Cardiac defibrillator in place, upgraded from pacemaker in 05/2014     Past Surgical History:   Procedure Laterality Date    AORTIC VALVE REPLACEMENT  04/07/2022    Transcatheter aortic valve replacement (TAVR) for aortic valve stenosis    APPENDECTOMY  12/01/2014    Appendectomy    CARDIAC ELECTROPHYSIOLOGY PROCEDURE N/A 12/19/2023    Procedure: ICD UPGRADE, DUAL TO BIV;  Surgeon: Luis Felipe Greene MD;  Location: [place] [address] Cardiac Cath Lab;  Service: Electrophysiology;  Laterality: N/A;  [id number] Bi-V ICD gen change [employer]    CORONARY ARTERY BYPASS GRAFT  12/01/2014    CABG    CT ABDOMEN PELVIS ANGIOGRAM W AND/OR WO IV CONTRAST  03/17/2020    CT ABDOMEN PELVIS ANGIOGRAM W AND/OR WO IV CONTRAST 3/17/2020 [facility] [place] ANCILLARY LEGACY    OTHER SURGICAL HISTORY  12/01/2014    Cardio-defib Pulse Generator Implantation Date      Family History   Problem Relation Name Age of Onset    Drug abuse Mother          OVERDOSE     Allergies   Allergen Reactions    Linagliptin Itching and Rash    Dicloxacillin Nausea/vomiting    Doxycycline Nausea/vomiting     VIOLENTLY SICK    Scopolamine Syncope     PASSED OUT, ALTERED MENTAL STATE     Current Outpatient Medications on File Prior to Visit   Medication Sig Dispense Refill    acetaminophen (Tylenol) 325 mg tablet Take 2 tablets (650 mg) by mouth every 4 hours if needed for mild pain (1 - 3) or fever (temp greater than 38.0 C).      albuterol 2.5 mg /3 mL (0.083 %) nebulizer solution Take 3 mL (2.5 mg) by nebulization every 6 hours if needed for wheezing.      aspirin 81 mg EC tablet Take 1 tablet (81 mg) by mouth once daily.      atorvastatin (Lipitor) 80 mg tablet Take 1 tablet (80 mg) by mouth once daily at bedtime. 30 tablet 0    benzonatate (Tessalon) 100 mg capsule Take 1 capsule (100 mg) by mouth 3 times a day as needed for cough. Do not crush or chew.      cholecalciferol (Vitamin D-3) 25 MCG (1000 UT) tablet Take 1 tablet (1,000 Units) by mouth once daily. Do not start before October 20, 2023.      dupilumab (Dupixent) 300 mg/2 mL pen injector Inject 2 mL (300 mg) under the skin every 14 (fourteen) days. Patient to take home medication      empagliflozin (Jardiance) 10 mg Take 1 tablet (10 mg) by mouth once daily. Do not start before October 23, 2023.      FreeStyle Maria Elena 14 Day Sensor kit USE AS DIRECTED EVERY 14 DAYS      furosemide (Lasix) 20 mg tablet Take 3 tablets (60 mg) by mouth 2 times daily (morning and late afternoon).      glucagon (Glucagen) 1 mg injection Inject 1 mg into the muscle every 15 minutes if needed for low blood sugar - see comments (glucose < 41).      glucagon (Glucagen) 1 mg injection Inject 1 mg into the muscle every 15 minutes if needed for low blood sugar - see comments (For blood glucose less than or equal to 70 mg/dL and no IV access).      guaiFENesin  (Mucinex) 600 mg 12 hr tablet Take 1 tablet (600 mg) by mouth 2 times a day. Do not crush, chew, or split.      insulin aspart (NovoLOG U-100 Insulin aspart) 100 unit/mL injection INJECT 6 UNITS UNDER THE SKIN WITH BREAKFAST/LUNCH/DINNER, IF BLOOD GLUCOSE GREATER THAN 180 GIVE 8 UNITS, BLOOD GLUCOSE GREATER THAN 240 GIVE 10 UNITS AND GREATER THAN 300 GIVE 12 UNITS      insulin glargine (Lantus U-100 Insulin) 100 unit/mL injection Inject 30 Units under the skin once daily at bedtime. Take as directed per insulin instructions.      ixekizumab (Taltz) 80 mg/mL injection Inject 1 mL (80 mg) under the skin every 14 (fourteen) days.      lisinopril 5 mg tablet Take 1 tablet (5 mg) by mouth once daily.      lubricating eye drops ophthalmic solution Administer 1 drop into both eyes 2 times a day as needed for dry eyes.      magnesium hydroxide (Milk of Magnesia) 400 mg/5 mL suspension Take 60 mL by mouth once daily.      magnesium oxide (Mag-Ox) 420 mg tablet 1 tablet (420 mg) by nasoduodenal tube route once daily.      melatonin 3 mg tablet Take 1 tablet (3 mg) by mouth as needed at bedtime for sleep.  0    metoprolol tartrate (Lopressor) 25 mg tablet Take 1 tablet (25 mg) by mouth 2 times a day.      neomycin-bacitracin-polymyxin (Polysporin) ophthalmic ointment Apply 0.5 inches to both eyes once daily.      nystatin (Mycostatin) 100,000 unit/gram powder Apply 1 Application topically 2 times a day.      omeprazole (PriLOSEC) 20 mg DR capsule Take 1 capsule (20 mg) by mouth once daily. Do not crush or chew.      oxygen (O2) gas therapy Inhale 1 each continuously. Wean as tolerated      potassium chloride CR (Klor-Con M20) 20 mEq ER tablet Take 1 tablet (20 mEq) by mouth once daily. Do not crush or chew. 30 tablet 5    predniSONE (Deltasone) 2.5 mg tablet Take 1 tablet (2.5 mg) by mouth once daily.      spironolactone (Aldactone) 25 mg tablet Take 0.5 tablets (12.5 mg) by mouth once every 24 hours.      triamcinolone  (Kenalog) 0.1 % cream Apply topically 2 times a day.      vit A/vit C/vit E/zinc/copper (ICAPS AREDS ORAL) Take 1 capsule by mouth 2 times a day.      warfarin (Coumadin) 3 mg tablet Take 1 tablet (3 mg) by mouth once daily in the evening. Prior dose was 4.5 mg Sun, Wed. 6 mg all other days. Has been supratherapeutic with IV antibiotics. Monitor and adjust as needed 180 tablet 2     No current facility-administered medications on file prior to visit.     Immunization History   Administered Date(s) Administered    Pfizer Purple Cap SARS-CoV-2 01/27/2021, 02/15/2021    Tdap vaccine, age 7 year and older (BOOSTRIX, ADACEL) 11/17/2024     Teofilo Loyd MD

## 2025-02-21 ENCOUNTER — NURSING HOME VISIT (OUTPATIENT)
Dept: POST ACUTE CARE | Facility: EXTERNAL LOCATION | Age: OVER 89
End: 2025-02-21
Payer: MEDICARE

## 2025-02-21 VITALS
TEMPERATURE: 98.1 F | SYSTOLIC BLOOD PRESSURE: 122 MMHG | DIASTOLIC BLOOD PRESSURE: 56 MMHG | HEART RATE: 73 BPM | OXYGEN SATURATION: 98 % | BODY MASS INDEX: 24.34 KG/M2 | WEIGHT: 169.6 LBS | RESPIRATION RATE: 20 BRPM

## 2025-02-21 DIAGNOSIS — I10 HYPERTENSION, UNSPECIFIED TYPE: ICD-10-CM

## 2025-02-21 DIAGNOSIS — Z79.4 TYPE 2 DIABETES MELLITUS WITH DIABETIC NEPHROPATHY, WITH LONG-TERM CURRENT USE OF INSULIN: ICD-10-CM

## 2025-02-21 DIAGNOSIS — I50.9 ACUTE CONGESTIVE HEART FAILURE, UNSPECIFIED HEART FAILURE TYPE: Primary | ICD-10-CM

## 2025-02-21 DIAGNOSIS — E11.21 TYPE 2 DIABETES MELLITUS WITH DIABETIC NEPHROPATHY, WITH LONG-TERM CURRENT USE OF INSULIN: ICD-10-CM

## 2025-02-21 DIAGNOSIS — K59.00 CONSTIPATION, UNSPECIFIED CONSTIPATION TYPE: ICD-10-CM

## 2025-02-21 DIAGNOSIS — I48.91 ATRIAL FIBRILLATION, UNSPECIFIED TYPE (MULTI): ICD-10-CM

## 2025-02-21 DIAGNOSIS — J44.9 CHRONIC OBSTRUCTIVE PULMONARY DISEASE, UNSPECIFIED COPD TYPE (MULTI): ICD-10-CM

## 2025-02-21 PROCEDURE — 99309 SBSQ NF CARE MODERATE MDM 30: CPT | Performed by: PHYSICIAN ASSISTANT

## 2025-02-21 ASSESSMENT — ENCOUNTER SYMPTOMS
ABDOMINAL PAIN: 0
TREMORS: 0
DIZZINESS: 0
WEAKNESS: 0
CHILLS: 0
FREQUENCY: 0
COUGH: 0
WHEEZING: 0
CONSTIPATION: 0
SHORTNESS OF BREATH: 0
HEADACHES: 0
VOMITING: 0
CONFUSION: 0
NERVOUS/ANXIOUS: 0
APPETITE CHANGE: 0
DYSURIA: 0
DIARRHEA: 0
FEVER: 0
NAUSEA: 0
HEMATURIA: 0

## 2025-02-21 NOTE — PROGRESS NOTES
Subjective   [id number] : Navneet Thompson is a 89 y.o. male admitted to [facility] for rehab. No chief complaint on file..  HPI  Pt with a h/o afib, CAD s/p cabg, BETH, diabetes, HTN,  treated for acute on chronic CHF and pneumonia.  Pt seen while resting in bed.  He offers no new complaints today.  His bun continues to be elevated at 67 today.  He is currently on lasix 40mg BID and we have held a couple doses of his evening lasix.   He admits that he has not been drinking much fluid.  He is also not eating well. He states that he does not like the food at the facility and his po intake is down.   Weight was down a few pounds yesterday but is back up today - not entirely sure how accurate the weights are.   Lasix dose has been decreased and fluid restriction stopped.  He has no lower leg edema.  He denies any shortness of breath or cough.  He reports a dry mouth. He denies any abdominal pain. No n/v/d/c. Pt lives alone.   Review of Systems   Constitutional:  Negative for appetite change, chills and fever.   Respiratory:  Negative for cough, shortness of breath and wheezing.    Cardiovascular:  Negative for chest pain and leg swelling.   Gastrointestinal:  Negative for abdominal pain, constipation, diarrhea, nausea and vomiting.   Genitourinary:  Negative for dysuria, frequency and hematuria.   Neurological:  Negative for dizziness, tremors, weakness and headaches.   Psychiatric/Behavioral:  Negative for confusion. The patient is not nervous/anxious.    All other systems reviewed and are negative.      Objective   /56   Pulse 73   Temp 36.7 °C (98.1 °F)   Resp 20   Wt 76.9 kg (169 lb 9.6 oz)   SpO2 98%   BMI 24.34 kg/m²    Physical Exam  Constitutional:       General: He is not in acute distress.  Eyes:      Conjunctiva/sclera: Conjunctivae normal.      Pupils: Pupils are equal, round, and reactive to light.      Comments: Chronic bilateral ectropion.  Lower eye lids are erythematous.   "  Cardiovascular:      Rate and Rhythm: Normal rate and regular rhythm.      Heart sounds: No murmur heard.  Pulmonary:      Effort: Pulmonary effort is normal.      Breath sounds: No wheezing, rhonchi or rales.   Abdominal:      General: Abdomen is flat. Bowel sounds are normal. There is no distension.      Palpations: Abdomen is soft. There is no mass.      Tenderness: There is no abdominal tenderness.   Musculoskeletal:         General: No swelling.   Skin:     General: Skin is warm and dry.      Findings: No rash.   Neurological:      Mental Status: He is alert and oriented to person, place, and time.       Results for orders placed or performed in visit on 02/21/25 (from the past 24 hours)   Basic Metabolic Panel   Result Value Ref Range    Glucose 95 74 - 99 mg/dL    Sodium 136 136 - 145 mmol/L    Potassium 4.7 3.5 - 5.3 mmol/L    Chloride 100 98 - 107 mmol/L    Bicarbonate 30 21 - 32 mmol/L    Anion Gap 11 10 - 20 mmol/L    Urea Nitrogen 67 (H) 6 - 23 mg/dL    Creatinine 1.41 (H) 0.50 - 1.30 mg/dL    eGFR 47 (L) >60 mL/min/1.73m*2    Calcium 9.6 8.6 - 10.3 mg/dL   Protime-INR   Result Value Ref Range    Protime 16.0 (H) 9.3 - 12.7 seconds    INR 1.5 (H) 0.9 - 1.2     *Note: Due to a large number of results and/or encounters for the requested time period, some results have not been displayed. A complete set of results can be found in Results Review.        No lab exists for component: \"CBC BMP\"  Assessment/Plan   Atrial fibrillation (Multi) I48.91        Rate controlled with metoprolol. INR is 1.5 today.  Increase coumadin 7.5mg daily.  PT/INR in am and  on M/W/F. Monitor INR and adjust coumadin as needed.            Acute congestive heart failure - Primary I50.9       Mild BETH on today's labs.  continue lasix to 40mg BID and spironalactone.  Monitor weights and labs.   Weight is down.  2000cc fluid restriction was discontinued.   discussed with pt and clinically he is stable.   Encouraged fluids.  Recheck " labs on Monday. discharge planning.         Hypertension I10       Continue lisinopril.  Monitor blood pressures and adjust meds as needed.            Pneumonia due to infectious organism J18.9       Treated with antibiotics.  Symptoms improved.  He has weaned off oxygen and is stable on room air.          Stage 3 chronic kidney disease (Multi) N18.30       Monitor labs.            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   Lubricating eye drops - order changed to TID routine.            Type 2 diabetes mellitus with diabetic polyneuropathy E11.42       Continue lantus 30u daily and SSI TID with meals.            Chronic constipation:  milk of magnesia daily.     Right shoulder pain: X-ray was done and showed arthritis.  Lidoderm to right shoulder. Improved.   PM&R provider following.         Time spent: 30 min in review of chart, labs and orders, consultation with pt and documentation.

## 2025-02-21 NOTE — LETTER
Patient: Navneet Thompson  : 1933    Encounter Date: 2025      Subjective  [id number] : Navneet Thompson is a 89 y.o. male admitted to [facility] for rehab. No chief complaint on file..  HPI  Pt with a h/o afib, CAD s/p cabg, BETH, diabetes, HTN,  treated for acute on chronic CHF and pneumonia.  Pt seen while resting in bed.  He offers no new complaints today.  His bun continues to be elevated at 67 today.  He is currently on lasix 40mg BID and we have held a couple doses of his evening lasix.   He admits that he has not been drinking much fluid.  He is also not eating well. He states that he does not like the food at the facility and his po intake is down.   Weight was down a few pounds yesterday but is back up today - not entirely sure how accurate the weights are.   Lasix dose has been decreased and fluid restriction stopped.  He has no lower leg edema.  He denies any shortness of breath or cough.  He reports a dry mouth. He denies any abdominal pain. No n/v/d/c. Pt lives alone.   Review of Systems   Constitutional:  Negative for appetite change, chills and fever.   Respiratory:  Negative for cough, shortness of breath and wheezing.    Cardiovascular:  Negative for chest pain and leg swelling.   Gastrointestinal:  Negative for abdominal pain, constipation, diarrhea, nausea and vomiting.   Genitourinary:  Negative for dysuria, frequency and hematuria.   Neurological:  Negative for dizziness, tremors, weakness and headaches.   Psychiatric/Behavioral:  Negative for confusion. The patient is not nervous/anxious.    All other systems reviewed and are negative.      Objective  /56   Pulse 73   Temp 36.7 °C (98.1 °F)   Resp 20   Wt 76.9 kg (169 lb 9.6 oz)   SpO2 98%   BMI 24.34 kg/m²    Physical Exam  Constitutional:       General: He is not in acute distress.  Eyes:      Conjunctiva/sclera: Conjunctivae normal.      Pupils: Pupils are equal, round, and reactive to light.      Comments: Chronic  "bilateral ectropion.  Lower eye lids are erythematous.    Cardiovascular:      Rate and Rhythm: Normal rate and regular rhythm.      Heart sounds: No murmur heard.  Pulmonary:      Effort: Pulmonary effort is normal.      Breath sounds: No wheezing, rhonchi or rales.   Abdominal:      General: Abdomen is flat. Bowel sounds are normal. There is no distension.      Palpations: Abdomen is soft. There is no mass.      Tenderness: There is no abdominal tenderness.   Musculoskeletal:         General: No swelling.   Skin:     General: Skin is warm and dry.      Findings: No rash.   Neurological:      Mental Status: He is alert and oriented to person, place, and time.       Results for orders placed or performed in visit on 02/21/25 (from the past 24 hours)   Basic Metabolic Panel   Result Value Ref Range    Glucose 95 74 - 99 mg/dL    Sodium 136 136 - 145 mmol/L    Potassium 4.7 3.5 - 5.3 mmol/L    Chloride 100 98 - 107 mmol/L    Bicarbonate 30 21 - 32 mmol/L    Anion Gap 11 10 - 20 mmol/L    Urea Nitrogen 67 (H) 6 - 23 mg/dL    Creatinine 1.41 (H) 0.50 - 1.30 mg/dL    eGFR 47 (L) >60 mL/min/1.73m*2    Calcium 9.6 8.6 - 10.3 mg/dL   Protime-INR   Result Value Ref Range    Protime 16.0 (H) 9.3 - 12.7 seconds    INR 1.5 (H) 0.9 - 1.2     *Note: Due to a large number of results and/or encounters for the requested time period, some results have not been displayed. A complete set of results can be found in Results Review.        No lab exists for component: \"CBC BMP\"  Assessment/Plan  Atrial fibrillation (Multi) I48.91        Rate controlled with metoprolol. INR is 1.5 today.  Increase coumadin 7.5mg daily.  PT/INR in am and  on M/W/F. Monitor INR and adjust coumadin as needed.            Acute congestive heart failure - Primary I50.9       Mild BETH on today's labs.  continue lasix to 40mg BID and spironalactone.  Monitor weights and labs.   Weight is down.  2000cc fluid restriction was discontinued.   discussed with pt and " clinically he is stable.   Encouraged fluids.  Recheck labs on Monday. discharge planning.         Hypertension I10       Continue lisinopril.  Monitor blood pressures and adjust meds as needed.            Pneumonia due to infectious organism J18.9       Treated with antibiotics.  Symptoms improved.  He has weaned off oxygen and is stable on room air.          Stage 3 chronic kidney disease (Multi) N18.30       Monitor labs.            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   Lubricating eye drops - order changed to TID routine.            Type 2 diabetes mellitus with diabetic polyneuropathy E11.42       Continue lantus 30u daily and SSI TID with meals.            Chronic constipation:  milk of magnesia daily.     Right shoulder pain: X-ray was done and showed arthritis.  Lidoderm to right shoulder. Improved.   PM&R provider following.         Time spent: 30 min in review of chart, labs and orders, consultation with pt and documentation.       Electronically Signed By: Freda Martinez PA-C   2/21/25  2:11 PM

## 2025-02-24 ENCOUNTER — NURSING HOME VISIT (OUTPATIENT)
Dept: POST ACUTE CARE | Facility: EXTERNAL LOCATION | Age: OVER 89
End: 2025-02-24
Payer: MEDICARE

## 2025-02-24 VITALS
RESPIRATION RATE: 18 BRPM | BODY MASS INDEX: 23.56 KG/M2 | OXYGEN SATURATION: 94 % | DIASTOLIC BLOOD PRESSURE: 66 MMHG | SYSTOLIC BLOOD PRESSURE: 116 MMHG | TEMPERATURE: 98 F | HEART RATE: 75 BPM | WEIGHT: 164.2 LBS

## 2025-02-24 DIAGNOSIS — I10 HYPERTENSION, UNSPECIFIED TYPE: ICD-10-CM

## 2025-02-24 DIAGNOSIS — K59.00 CONSTIPATION, UNSPECIFIED CONSTIPATION TYPE: ICD-10-CM

## 2025-02-24 DIAGNOSIS — J44.9 CHRONIC OBSTRUCTIVE PULMONARY DISEASE, UNSPECIFIED COPD TYPE (MULTI): ICD-10-CM

## 2025-02-24 DIAGNOSIS — E11.21 TYPE 2 DIABETES MELLITUS WITH DIABETIC NEPHROPATHY, WITH LONG-TERM CURRENT USE OF INSULIN: ICD-10-CM

## 2025-02-24 DIAGNOSIS — I48.91 ATRIAL FIBRILLATION, UNSPECIFIED TYPE (MULTI): ICD-10-CM

## 2025-02-24 DIAGNOSIS — I50.9 ACUTE CONGESTIVE HEART FAILURE, UNSPECIFIED HEART FAILURE TYPE: Primary | ICD-10-CM

## 2025-02-24 DIAGNOSIS — Z79.4 TYPE 2 DIABETES MELLITUS WITH DIABETIC NEPHROPATHY, WITH LONG-TERM CURRENT USE OF INSULIN: ICD-10-CM

## 2025-02-24 PROCEDURE — 99309 SBSQ NF CARE MODERATE MDM 30: CPT | Performed by: PHYSICIAN ASSISTANT

## 2025-02-24 ASSESSMENT — ENCOUNTER SYMPTOMS
CONFUSION: 0
APPETITE CHANGE: 0
ABDOMINAL PAIN: 0
NERVOUS/ANXIOUS: 0
COUGH: 0
SHORTNESS OF BREATH: 0
FEVER: 0
CHILLS: 0
DYSURIA: 0
DIZZINESS: 0
TREMORS: 0
VOMITING: 0
DIARRHEA: 0
FREQUENCY: 0
CONSTIPATION: 0
HEMATURIA: 0
HEADACHES: 0
WHEEZING: 0
WEAKNESS: 0
NAUSEA: 0

## 2025-02-24 NOTE — PROGRESS NOTES
Subjective   [id number] : Navneet Thompson is a 89 y.o. male admitted to [facility] for rehab. No chief complaint on file..  HPI  Pt with a h/o afib, CAD s/p cabg, BETH, diabetes, HTN,  treated for acute on chronic CHF and pneumonia.  Pt seen while resting in bed.    He is planning for discharge home tomorrow.  He had routine labs today which have improved.   He is stable on lasix 40mg BID.  He has no lower leg edema.  His weight is stable.  He denies any shortness of breath or cough. He denies any abdominal pain. No n/v/d/c. Pt lives alone.   Review of Systems   Constitutional:  Negative for appetite change, chills and fever.   Respiratory:  Negative for cough, shortness of breath and wheezing.    Cardiovascular:  Negative for chest pain and leg swelling.   Gastrointestinal:  Negative for abdominal pain, constipation, diarrhea, nausea and vomiting.   Genitourinary:  Negative for dysuria, frequency and hematuria.   Neurological:  Negative for dizziness, tremors, weakness and headaches.   Psychiatric/Behavioral:  Negative for confusion. The patient is not nervous/anxious.    All other systems reviewed and are negative.      Objective   /66   Pulse 75   Temp 36.7 °C (98 °F)   Resp 18   Wt 74.5 kg (164 lb 3.2 oz)   SpO2 94%   BMI 23.56 kg/m²    Physical Exam  Constitutional:       General: He is not in acute distress.  Eyes:      Conjunctiva/sclera: Conjunctivae normal.      Pupils: Pupils are equal, round, and reactive to light.      Comments: Chronic bilateral ectropion.  Lower eye lids are erythematous.    Cardiovascular:      Rate and Rhythm: Normal rate and regular rhythm.      Heart sounds: No murmur heard.  Pulmonary:      Effort: Pulmonary effort is normal.      Breath sounds: No wheezing, rhonchi or rales.   Abdominal:      General: Abdomen is flat. Bowel sounds are normal. There is no distension.      Palpations: Abdomen is soft. There is no mass.      Tenderness: There is no abdominal  "tenderness.   Musculoskeletal:         General: No swelling.   Skin:     General: Skin is warm and dry.      Findings: No rash.   Neurological:      Mental Status: He is alert and oriented to person, place, and time.       Results for orders placed or performed in visit on 02/24/25 (from the past 24 hours)   Basic Metabolic Panel   Result Value Ref Range    Glucose 143 (H) 74 - 99 mg/dL    Sodium 136 136 - 145 mmol/L    Potassium 5.0 3.5 - 5.3 mmol/L    Chloride 103 98 - 107 mmol/L    Bicarbonate 27 21 - 32 mmol/L    Anion Gap 11 10 - 20 mmol/L    Urea Nitrogen 58 (H) 6 - 23 mg/dL    Creatinine 1.37 (H) 0.50 - 1.30 mg/dL    eGFR 49 (L) >60 mL/min/1.73m*2    Calcium 9.2 8.6 - 10.3 mg/dL   CBC   Result Value Ref Range    WBC 7.0 4.4 - 11.3 x10*3/uL    nRBC 0.0 0.0 - 0.0 /100 WBCs    RBC 4.23 (L) 4.50 - 5.90 x10*6/uL    Hemoglobin 12.2 (L) 13.5 - 17.5 g/dL    Hematocrit 39.5 (L) 41.0 - 52.0 %    MCV 93 80 - 100 fL    MCH 28.8 26.0 - 34.0 pg    MCHC 30.9 (L) 32.0 - 36.0 g/dL    RDW 15.0 (H) 11.5 - 14.5 %    Platelets 194 150 - 450 x10*3/uL   Protime-INR   Result Value Ref Range    Protime 22.3 (H) 9.3 - 12.7 seconds    INR 2.2 (H) 0.9 - 1.2     *Note: Due to a large number of results and/or encounters for the requested time period, some results have not been displayed. A complete set of results can be found in Results Review.        No lab exists for component: \"CBC BMP\"  Assessment/Plan   Atrial fibrillation (Multi) I48.91        Rate controlled with metoprolol. INR is 2.2 today.  Continue coumadin 7.5mg daily.   Order given for home health to draw PT/INR at first visit with results to PCP for continued anticoagulation management.             Acute congestive heart failure - Primary I50.9       continue lasix to 40mg BID and spironalactone.  Monitor weights and labs.   Planning for discharge home tomorrow.  Home health for RN/PT/OT recommended.         Hypertension I10       Continue lisinopril.  Monitor blood " pressures and adjust meds as needed.            Pneumonia due to infectious organism J18.9       Treated with antibiotics.  Symptoms improved.  He has weaned off oxygen and is stable on room air.          Stage 3 chronic kidney disease (Multi) N18.30       Monitor labs.            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   Lubricating eye drops - order changed to TID routine.            Type 2 diabetes mellitus with diabetic polyneuropathy E11.42       Continue lantus 30u daily and SSI TID with meals.            Chronic constipation:  milk of magnesia daily.     Right shoulder pain: X-ray was done and showed arthritis.  Lidoderm to right shoulder. Improved.   PM&R provider following.         Time spent: 30 min in review of chart, labs and orders, consultation with pt and documentation.

## 2025-02-24 NOTE — LETTER
Patient: Navneet Thompson  : 1933    Encounter Date: 2025      Subjective  [id number] : Navneet Thompson is a 89 y.o. male admitted to [facility] for rehab. No chief complaint on file..  HPI  Pt with a h/o afib, CAD s/p cabg, BETH, diabetes, HTN,  treated for acute on chronic CHF and pneumonia.  Pt seen while resting in bed.    He is planning for discharge home tomorrow.  He had routine labs today which have improved.   He is stable on lasix 40mg BID.  He has no lower leg edema.  His weight is stable.  He denies any shortness of breath or cough. He denies any abdominal pain. No n/v/d/c. Pt lives alone.   Review of Systems   Constitutional:  Negative for appetite change, chills and fever.   Respiratory:  Negative for cough, shortness of breath and wheezing.    Cardiovascular:  Negative for chest pain and leg swelling.   Gastrointestinal:  Negative for abdominal pain, constipation, diarrhea, nausea and vomiting.   Genitourinary:  Negative for dysuria, frequency and hematuria.   Neurological:  Negative for dizziness, tremors, weakness and headaches.   Psychiatric/Behavioral:  Negative for confusion. The patient is not nervous/anxious.    All other systems reviewed and are negative.      Objective  /66   Pulse 75   Temp 36.7 °C (98 °F)   Resp 18   Wt 74.5 kg (164 lb 3.2 oz)   SpO2 94%   BMI 23.56 kg/m²    Physical Exam  Constitutional:       General: He is not in acute distress.  Eyes:      Conjunctiva/sclera: Conjunctivae normal.      Pupils: Pupils are equal, round, and reactive to light.      Comments: Chronic bilateral ectropion.  Lower eye lids are erythematous.    Cardiovascular:      Rate and Rhythm: Normal rate and regular rhythm.      Heart sounds: No murmur heard.  Pulmonary:      Effort: Pulmonary effort is normal.      Breath sounds: No wheezing, rhonchi or rales.   Abdominal:      General: Abdomen is flat. Bowel sounds are normal. There is no distension.      Palpations: Abdomen is  "soft. There is no mass.      Tenderness: There is no abdominal tenderness.   Musculoskeletal:         General: No swelling.   Skin:     General: Skin is warm and dry.      Findings: No rash.   Neurological:      Mental Status: He is alert and oriented to person, place, and time.       Results for orders placed or performed in visit on 02/24/25 (from the past 24 hours)   Basic Metabolic Panel   Result Value Ref Range    Glucose 143 (H) 74 - 99 mg/dL    Sodium 136 136 - 145 mmol/L    Potassium 5.0 3.5 - 5.3 mmol/L    Chloride 103 98 - 107 mmol/L    Bicarbonate 27 21 - 32 mmol/L    Anion Gap 11 10 - 20 mmol/L    Urea Nitrogen 58 (H) 6 - 23 mg/dL    Creatinine 1.37 (H) 0.50 - 1.30 mg/dL    eGFR 49 (L) >60 mL/min/1.73m*2    Calcium 9.2 8.6 - 10.3 mg/dL   CBC   Result Value Ref Range    WBC 7.0 4.4 - 11.3 x10*3/uL    nRBC 0.0 0.0 - 0.0 /100 WBCs    RBC 4.23 (L) 4.50 - 5.90 x10*6/uL    Hemoglobin 12.2 (L) 13.5 - 17.5 g/dL    Hematocrit 39.5 (L) 41.0 - 52.0 %    MCV 93 80 - 100 fL    MCH 28.8 26.0 - 34.0 pg    MCHC 30.9 (L) 32.0 - 36.0 g/dL    RDW 15.0 (H) 11.5 - 14.5 %    Platelets 194 150 - 450 x10*3/uL   Protime-INR   Result Value Ref Range    Protime 22.3 (H) 9.3 - 12.7 seconds    INR 2.2 (H) 0.9 - 1.2     *Note: Due to a large number of results and/or encounters for the requested time period, some results have not been displayed. A complete set of results can be found in Results Review.        No lab exists for component: \"CBC BMP\"  Assessment/Plan  Atrial fibrillation (Multi) I48.91        Rate controlled with metoprolol. INR is 2.2 today.  Continue coumadin 7.5mg daily.   Order given for home health to draw PT/INR at first visit with results to PCP for continued anticoagulation management.             Acute congestive heart failure - Primary I50.9       continue lasix to 40mg BID and spironalactone.  Monitor weights and labs.   Planning for discharge home tomorrow.  Home health for RN/PT/OT recommended.         " Hypertension I10       Continue lisinopril.  Monitor blood pressures and adjust meds as needed.            Pneumonia due to infectious organism J18.9       Treated with antibiotics.  Symptoms improved.  He has weaned off oxygen and is stable on room air.          Stage 3 chronic kidney disease (Multi) N18.30       Monitor labs.            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   Lubricating eye drops - order changed to TID routine.            Type 2 diabetes mellitus with diabetic polyneuropathy E11.42       Continue lantus 30u daily and SSI TID with meals.            Chronic constipation:  milk of magnesia daily.     Right shoulder pain: X-ray was done and showed arthritis.  Lidoderm to right shoulder. Improved.   PM&R provider following.         Time spent: 30 min in review of chart, labs and orders, consultation with pt and documentation.       Electronically Signed By: Freda Martinez PA-C   2/24/25  4:28 PM

## 2025-02-26 NOTE — TELEPHONE ENCOUNTER
Received call from Pt's daughter Pao.  Pt is now home following stay in skilled nursing facility.  Pao requested dosing instructions [facility].  I did inform her that we cannot provide dosing instruction without knowing pt's current INR and current dosing.  Reviewed discharge instructions from SNF which instruct pt to continue with 7.5 mg daily and testing to be performed by [facility].  Pt's INR on 2/24 was 2.2.  Reviewed with Pao, pt's current tablet strength of 3mg would be 2.5 tablets to equal 7.5mg.  Pao states [facility] will be set up and she will reach out to us as needed.

## 2025-03-05 ENCOUNTER — ANTICOAGULATION - WARFARIN VISIT (OUTPATIENT)
Dept: CARDIOLOGY | Facility: CLINIC | Age: OVER 89
End: 2025-03-05
Payer: MEDICARE

## 2025-03-05 DIAGNOSIS — I48.20 CHRONIC ATRIAL FIBRILLATION (MULTI): ICD-10-CM

## 2025-03-05 DIAGNOSIS — I48.91 ATRIAL FIBRILLATION, UNSPECIFIED TYPE (MULTI): Primary | ICD-10-CM

## 2025-03-05 LAB
INR IN PPP BY COAGULATION ASSAY EXTERNAL: 6.2
PROTHROMBIN TIME (PT) IN PPP BY COAGULATION ASSAY EXTERNAL: NORMAL

## 2025-03-05 NOTE — PROGRESS NOTES
Patient identification verified with 2 identifiers.    Location: [facility] - [address] [address] [facility]. [place], [place] [address] [phone]     Referring Physician: Dr. Melvin Gardner  Enrollment/ Re-enrollment date:  25  INR Goal: 2.0-3.0  INR monitoring is per [facility] protocol.  Anticoagulation Medication: warfarin  Indication: Atrial Fibrillation/Atrial Flutter    Subjective   Bleeding signs/symptoms: No    Bruising: No   Major bleeding event: No  Thrombosis signs/symptoms: No  Thromboembolic event: No  Missed doses: No  Extra doses: No  Medication changes: No  Dietary changes: No  Change in health: No  Change in activity: No  Alcohol: No  Other concerns: No    Upcoming Procedures:  Does the Patient Have any upcoming procedures that require interruption in anticoagulation therapy? no  Does the patient require bridging? no      Anticoagulation Summary  As of 3/5/2025      INR goal:  2.0-3.0   TTR:  77.5% (1.3 y)   INR used for dosin.20 (3/5/2025)   Weekly warfarin total:  39 mg               Assessment/Plan   Supratherapeutic     1. New dose: [facility] ( [facility] TO [facility]PORT INR OF 6.2 TODAY.   SHE STATES THAT PT WAS DISCHARGED FROM [facility] ON - PER NOTE FROM 25 HE WAS TO BE TAKING 7.5 MG DAILY OF WARFARIN - 52.5 MG WEEKLY).  PT WAS PREVIOUSLY THERAPEUTIC IN JANUARY ON 39 MG WEEKLY.  PT DENIES ANY BLEEDING.  I WILL HAVE PATIENT HOLD WARFARIN TODAY ( 3/5), THURSDAY 3/6, AND FRIDAY 3/7.  I WILL PUT HIM BACK ON THE LAST KNOWN THERAPEUTIC DOSING OF 39 MG WEEKLY AND WILL HAVE HIM TAKE 6 MG ON 3/8 AND 4.5 MG ON 3/9.  RAMON WILL RE-CHECK INR ON 3/10 AND WILL CALL IN RESULTS.  I HAVE CONTACTED MD GARDNER AND INFORMED HIM OF POC TO FOLLOW PROTOCOL TO HOLD AND RE-START ON LAST KNOWN THERAPEUTIC DOSING PER RN JUDGEMENT.  I HAVE INSTRUCTED PATIENT THAT HE MUST CALL 911 IF ANY BLEEDING OR FALLS IN THE NEXT SEVERAL DAYS AND HE VERBALIZED  UNDERSTANDING.  I HAVE ALSO CALLED AND SPOKE WITH DAUGHTER TRIXIE REGARDING.     2. Next INR: MONDAY 3/10/25      Education provided to patient during the visit:  Patient instructed to call in interim with questions, concerns and changes.   Patient educated on interactions between medications and warfarin.   Patient educated on dietary consistency in vitamin k consumption.   Patient educated on signs of bleeding/clotting.   Patient educated on compliance with dosing, follow up appointments, and prescribed plan of care.

## 2025-03-05 NOTE — TELEPHONE ENCOUNTER
SEE ANTICOAGULATION NOTE FROM TODAY.  PT'S INR 6.2- WILL BE RE-CHECKED ON 3/10/25 BY RAMON JACKSON FROM [facility]. HER PHONE NUMBER -[phone].  CHET WALLACE RN

## 2025-03-10 ENCOUNTER — ANTICOAGULATION - WARFARIN VISIT (OUTPATIENT)
Dept: CARDIOLOGY | Facility: CLINIC | Age: OVER 89
End: 2025-03-10
Payer: MEDICARE

## 2025-03-10 DIAGNOSIS — I48.20 CHRONIC ATRIAL FIBRILLATION (MULTI): ICD-10-CM

## 2025-03-10 DIAGNOSIS — I48.91 ATRIAL FIBRILLATION, UNSPECIFIED TYPE (MULTI): Primary | ICD-10-CM

## 2025-03-10 LAB
INR IN PPP BY COAGULATION ASSAY EXTERNAL: 1.9
PROTHROMBIN TIME (PT) IN PPP BY COAGULATION ASSAY EXTERNAL: NORMAL

## 2025-03-10 NOTE — PROGRESS NOTES
Patient identification verified with 2 identifiers.    Location: [facility] - [address] [address] [place], [place] [address] [phone]     Referring Physician: Dr. Melvin Bahena  Enrollment/ Re-enrollment date:  25  INR Goal: 2.0-3.0  INR monitoring is per [facility] protocol.  Anticoagulation Medication: warfarin  Indication: Atrial Fibrillation/Atrial Flutter  Subjective   Bleeding signs/symptoms: No    Bruising: No   Major bleeding event: No  Thrombosis signs/symptoms: No  Thromboembolic event: No  Missed doses: No  Extra doses: No  Medication changes: No  Dietary changes: No  Change in health: No  Change in activity: No  Alcohol: No  Other concerns: No    Upcoming Procedures:  Does the Patient Have any upcoming procedures that require interruption in anticoagulation therapy? no  Does the patient require bridging? no      Anticoagulation Summary  As of 3/10/2025      INR goal:  2.0-3.0   TTR:  77.0% (1.3 y)   INR used for dosin.90 (3/10/2025)   Weekly warfarin total:  39 mg               Assessment/Plan   Subtherapeutic     1. New dose: no change    2. Next INR: 1 week      Education provided to patient during the visit:  Patient instructed to call in interim with questions, concerns and changes.   Patient educated on compliance with dosing, follow up appointments, and prescribed plan of care.

## 2025-03-11 PROBLEM — H61.21 IMPACTED CERUMEN, RIGHT EAR: Status: ACTIVE | Noted: 2025-03-11

## 2025-03-17 ENCOUNTER — OFFICE VISIT (OUTPATIENT)
Dept: CARDIOLOGY | Facility: CLINIC | Age: OVER 89
End: 2025-03-17
Payer: MEDICARE

## 2025-03-17 ENCOUNTER — ANTICOAGULATION - WARFARIN VISIT (OUTPATIENT)
Dept: CARDIOLOGY | Facility: CLINIC | Age: OVER 89
End: 2025-03-17
Payer: MEDICARE

## 2025-03-17 VITALS
BODY MASS INDEX: 25.97 KG/M2 | HEART RATE: 88 BPM | OXYGEN SATURATION: 97 % | DIASTOLIC BLOOD PRESSURE: 65 MMHG | SYSTOLIC BLOOD PRESSURE: 110 MMHG | WEIGHT: 181 LBS

## 2025-03-17 DIAGNOSIS — I48.91 ATRIAL FIBRILLATION, UNSPECIFIED TYPE (MULTI): Primary | ICD-10-CM

## 2025-03-17 DIAGNOSIS — R39.9 URINARY TRACT INFECTION SYMPTOMS: ICD-10-CM

## 2025-03-17 DIAGNOSIS — I48.20 CHRONIC ATRIAL FIBRILLATION (MULTI): ICD-10-CM

## 2025-03-17 LAB
ATRIAL RATE: 79 BPM
POC INR: 4
POC PROTHROMBIN TIME: NORMAL
Q ONSET: 228 MS
QRS COUNT: 14 BEATS
QRS DURATION: 142 MS
QT INTERVAL: 414 MS
QTC CALCULATION(BAZETT): 500 MS
QTC FREDERICIA: 470 MS
R AXIS: 212 DEGREES
T AXIS: 4 DEGREES
T OFFSET: 435 MS
VENTRICULAR RATE: 88 BPM

## 2025-03-17 PROCEDURE — 3074F SYST BP LT 130 MM HG: CPT | Performed by: INTERNAL MEDICINE

## 2025-03-17 PROCEDURE — 85610 PROTHROMBIN TIME: CPT | Mod: QW

## 2025-03-17 PROCEDURE — 1159F MED LIST DOCD IN RCRD: CPT | Performed by: INTERNAL MEDICINE

## 2025-03-17 PROCEDURE — 1036F TOBACCO NON-USER: CPT | Performed by: INTERNAL MEDICINE

## 2025-03-17 PROCEDURE — 99214 OFFICE O/P EST MOD 30 MIN: CPT | Performed by: INTERNAL MEDICINE

## 2025-03-17 PROCEDURE — 3078F DIAST BP <80 MM HG: CPT | Performed by: INTERNAL MEDICINE

## 2025-03-17 PROCEDURE — 93010 ELECTROCARDIOGRAM REPORT: CPT | Performed by: INTERNAL MEDICINE

## 2025-03-17 PROCEDURE — 1160F RVW MEDS BY RX/DR IN RCRD: CPT | Performed by: INTERNAL MEDICINE

## 2025-03-17 PROCEDURE — 1157F ADVNC CARE PLAN IN RCRD: CPT | Performed by: INTERNAL MEDICINE

## 2025-03-17 PROCEDURE — 99211 OFF/OP EST MAY X REQ PHY/QHP: CPT | Mod: 25

## 2025-03-17 PROCEDURE — 1126F AMNT PAIN NOTED NONE PRSNT: CPT | Performed by: INTERNAL MEDICINE

## 2025-03-17 PROCEDURE — 99214 OFFICE O/P EST MOD 30 MIN: CPT | Mod: 25 | Performed by: INTERNAL MEDICINE

## 2025-03-17 PROCEDURE — 93005 ELECTROCARDIOGRAM TRACING: CPT | Performed by: INTERNAL MEDICINE

## 2025-03-17 ASSESSMENT — PAIN SCALES - GENERAL: PAINLEVEL_OUTOF10: 0-NO PAIN

## 2025-03-17 NOTE — PROGRESS NOTES
Subjective:  Patient returns for a hospital follow-up.  We last saw him about 3 months ago.  He unfortunately was in the hospital at the end of January/early February for apparent pneumonia and probable heart failure.    Of note, he is a pleasant but frail 89-year-old gentleman.  He has known coronary disease status post very remote CABG surgery.  He also has known aortic valve disease status post TAVR.  He also has chronic atrial fibrillation.  He also has a cardiomyopathy and had a pacer placed initially which was subsequently upgraded to a BiV ICD.    He was treated with antibiotics and diuresed significantly in the hospital and was at rehab for a short-term.  He is now back at home.  He feels his breathing is a bit better, but he still gets winded at times.  He is working with physical therapy to improve his activity tolerance.  He has not any recent blood work done and is complaining of some dysuria.    He denies any chest discomfort and denies any palpitations, presyncope or ICD discharges.  He denies any bleeding problems despite good compliance with his warfarin.  He continues to have his INR is monitored at the [facility].    Objective:  General: Alert, pleasant but frail elderly male.  HEENT: Unchanged.  Lungs: Mildly diminished air exchange without kirit crackles or wheezing.  Cardiac: S1 and S2 with soft systolic murmur.  Abdomen: Nontender.  Extremities: No edema.  Skin: No acute rash.  Neuro: Grossly intact and unchanged.    EKG: Underlying atrial fibrillation.  Ventricular paced rhythm.    Impression/plan:  Navneet generally appears to be reasonably compensated at this time although he certainly remains tenuous and frail.    He appears to be nearly euvolemic, so we will try to maintain his weight at his current level.    I will plan on checking a BMP with mag and a CBC today and will also check a urine culture.  I will review these results with him by phone.    His heart rate and blood  pressure remain under good control, so we will continue his current regimen unchanged.    He remains appropriately anticoagulated for his chronic atrial fibrillation.    He remains on appropriate high-dose statin therapy for known CAD.    Given his recent clinical stability I will see him back for follow-up in 2 to 3 months.  He and his daughter know to call for any intercurrent concerns.    Patient instructions:    Continue current medications unchanged.    Obtain blood work today and call for results.    Return to clinic in 2 to 3 months.

## 2025-03-17 NOTE — PROGRESS NOTES
Patient identification verified with 2 identifiers.    Location: [facility] - [address] [address] [facility]. [place], [place] [address] [phone]     Referring Physician: Dr. Melvin Bahena  Enrollment/ Re-enrollment date:  25  INR Goal: 2.0-3.0  INR monitoring is per [facility] protocol.  Anticoagulation Medication: warfarin  Indication: Atrial Fibrillation/Atrial Flutter    Subjective   Bleeding signs/symptoms: No    Bruising: No   Major bleeding event: No  Thrombosis signs/symptoms: No  Thromboembolic event: No  Missed doses: No  Extra doses: No  Medication changes: No  Dietary changes: No  Change in health: No  Change in activity: No  Alcohol: No  Other concerns: No    Upcoming Procedures:  Does the Patient Have any upcoming procedures that require interruption in anticoagulation therapy? no  Does the patient require bridging? no      Anticoagulation Summary  As of 3/17/2025      INR goal:  2.0-3.0   TTR:  76.5% (1.4 y)   INR used for dosin.00 (3/17/2025)   Weekly warfarin total:  33 mg               Assessment/Plan   Supratherapeutic     1. New dose: WILL HOLD DOSE X2 AND WILL DECREASE TWD PER PROTOCOL.      2. Next INR: 1 week      Education provided to patient during the visit:  Patient instructed to call in interim with questions, concerns and changes.   Patient educated on interactions between medications and warfarin.   Patient educated on dietary consistency in vitamin k consumption.   Patient educated on affects of alcohol consumption while taking warfarin.   Patient educated on signs of bleeding/clotting.   Patient educated on compliance with dosing, follow up appointments, and prescribed plan of care.

## 2025-03-20 LAB
ANION GAP SERPL CALCULATED.4IONS-SCNC: 12 MMOL/L (CALC) (ref 7–17)
BACTERIA UR CULT: ABNORMAL
BUN SERPL-MCNC: 32 MG/DL (ref 7–25)
BUN/CREAT SERPL: 25 (CALC) (ref 6–22)
CALCIUM SERPL-MCNC: 8.9 MG/DL (ref 8.6–10.3)
CHLORIDE SERPL-SCNC: 104 MMOL/L (ref 98–110)
CO2 SERPL-SCNC: 23 MMOL/L (ref 20–32)
CREAT SERPL-MCNC: 1.29 MG/DL (ref 0.7–1.22)
EGFRCR SERPLBLD CKD-EPI 2021: 52 ML/MIN/1.73M2
ERYTHROCYTE [DISTWIDTH] IN BLOOD BY AUTOMATED COUNT: 13.6 % (ref 11–15)
GLUCOSE SERPL-MCNC: 136 MG/DL (ref 65–99)
HCT VFR BLD AUTO: 35.8 % (ref 38.5–50)
HGB BLD-MCNC: 11.2 G/DL (ref 13.2–17.1)
MAGNESIUM SERPL-MCNC: 2.1 MG/DL (ref 1.5–2.5)
MCH RBC QN AUTO: 28.9 PG (ref 27–33)
MCHC RBC AUTO-ENTMCNC: 31.3 G/DL (ref 32–36)
MCV RBC AUTO: 92.5 FL (ref 80–100)
PLATELET # BLD AUTO: 208 THOUSAND/UL (ref 140–400)
PMV BLD REES-ECKER: 10.7 FL (ref 7.5–12.5)
POTASSIUM SERPL-SCNC: 4.2 MMOL/L (ref 3.5–5.3)
RBC # BLD AUTO: 3.87 MILLION/UL (ref 4.2–5.8)
SODIUM SERPL-SCNC: 139 MMOL/L (ref 135–146)
WBC # BLD AUTO: 9.2 THOUSAND/UL (ref 3.8–10.8)

## 2025-03-26 ENCOUNTER — ANTICOAGULATION - WARFARIN VISIT (OUTPATIENT)
Dept: CARDIOLOGY | Facility: CLINIC | Age: OVER 89
End: 2025-03-26
Payer: MEDICARE

## 2025-03-26 DIAGNOSIS — I48.20 CHRONIC ATRIAL FIBRILLATION (MULTI): ICD-10-CM

## 2025-03-26 DIAGNOSIS — I48.91 ATRIAL FIBRILLATION, UNSPECIFIED TYPE (MULTI): Primary | ICD-10-CM

## 2025-03-26 NOTE — PROGRESS NOTES
Patient identification verified with 2 identifiers.    Location: [facility] - [address] [address] [place], [place] [address] [phone]     Referring Physician: Dr. Melvin Bahena  Enrollment/ Re-enrollment date:  25  INR Goal: 2.0-3.0  INR monitoring is per [facility] protocol.  Anticoagulation Medication: warfarin  Indication: Atrial Fibrillation/Atrial Flutter    Subjective   Bleeding signs/symptoms: No    Bruising: No   Major bleeding event: No  Thrombosis signs/symptoms: No  Thromboembolic event: No  Missed doses: No  Extra doses: No  Medication changes: No  Dietary changes: No  Change in health: No  Change in activity: No  Alcohol: No  Other concerns: No    Upcoming Procedures:  Does the Patient Have any upcoming procedures that require interruption in anticoagulation therapy? no  Does the patient require bridging? no      Anticoagulation Summary  As of 3/26/2025      INR goal:  2.0-3.0   TTR:  76.1% (1.4 y)   INR used for dosin.90 (3/25/2025)   Weekly warfarin total:  33 mg               Assessment/Plan   Subtherapeutic     1. New dose: no change    2. Next INR: 1 week      Education provided to patient during the visit:  Patient instructed to call in interim with questions, concerns and changes.   Patient educated on interactions between medications and warfarin.   Patient educated on compliance with dosing, follow up appointments, and prescribed plan of care.

## 2025-03-31 LAB
INR IN PPP BY COAGULATION ASSAY EXTERNAL: 1.8
PROTHROMBIN TIME (PT) IN PPP BY COAGULATION ASSAY EXTERNAL: NORMAL

## 2025-04-02 ENCOUNTER — ANTICOAGULATION - WARFARIN VISIT (OUTPATIENT)
Dept: CARDIOLOGY | Facility: CLINIC | Age: OVER 89
End: 2025-04-02
Payer: MEDICARE

## 2025-04-02 DIAGNOSIS — I48.91 ATRIAL FIBRILLATION, UNSPECIFIED TYPE (MULTI): Primary | ICD-10-CM

## 2025-04-02 DIAGNOSIS — I48.20 CHRONIC ATRIAL FIBRILLATION (MULTI): ICD-10-CM

## 2025-04-07 ENCOUNTER — ANTICOAGULATION - WARFARIN VISIT (OUTPATIENT)
Dept: CARDIOLOGY | Facility: CLINIC | Age: OVER 89
End: 2025-04-07
Payer: MEDICARE

## 2025-04-07 DIAGNOSIS — I48.91 ATRIAL FIBRILLATION, UNSPECIFIED TYPE (MULTI): Primary | ICD-10-CM

## 2025-04-07 DIAGNOSIS — I48.20 CHRONIC ATRIAL FIBRILLATION (MULTI): ICD-10-CM

## 2025-04-07 LAB
POC INR: 1.9
POC PROTHROMBIN TIME: NORMAL

## 2025-04-07 PROCEDURE — 99211 OFF/OP EST MAY X REQ PHY/QHP: CPT

## 2025-04-07 PROCEDURE — 85610 PROTHROMBIN TIME: CPT | Mod: QW

## 2025-04-07 NOTE — PROGRESS NOTES
Patient identification verified with 2 identifiers.    Location: [facility] - [address] [address] [place], [place] [address] [phone]     Referring Physician: Dr. Melvin Bahena  Enrollment/ Re-enrollment date:  25  INR Goal: 2.0-3.0  INR monitoring is per [facility] protocol.  Anticoagulation Medication: warfarin  Indication: Atrial Fibrillation/Atrial Flutter    Subjective   Bleeding signs/symptoms: No    Bruising: No   Major bleeding event: No  Thrombosis signs/symptoms: No  Thromboembolic event: No  Missed doses: No  Extra doses: No  Medication changes: No  Dietary changes: No  Change in health: No  Change in activity: No  Alcohol: No  Other concerns: No    Upcoming Procedures:  Does the Patient Have any upcoming procedures that require interruption in anticoagulation therapy? no  Does the patient require bridging? no      Anticoagulation Summary  As of 2025      INR goal:  2.0-3.0   TTR:  74.1% (1.4 y)   INR used for dosin.90 (2025)   Weekly warfarin total:  36 mg               Assessment/Plan   Subtherapeutic     1. New dose: increase TWD per protocol  2. Next INR: 1 week      Education provided to patient during the visit:  Patient instructed to call in interim with questions, concerns and changes.   Patient educated on compliance with dosing, follow up appointments, and prescribed plan of care.

## 2025-04-09 ENCOUNTER — APPOINTMENT (OUTPATIENT)
Dept: CARDIOLOGY | Facility: CLINIC | Age: OVER 89
End: 2025-04-09
Payer: MEDICARE

## 2025-04-14 ENCOUNTER — ANTICOAGULATION - WARFARIN VISIT (OUTPATIENT)
Dept: CARDIOLOGY | Facility: CLINIC | Age: OVER 89
End: 2025-04-14
Payer: MEDICARE

## 2025-04-14 DIAGNOSIS — I48.20 CHRONIC ATRIAL FIBRILLATION (MULTI): ICD-10-CM

## 2025-04-14 DIAGNOSIS — I48.91 ATRIAL FIBRILLATION, UNSPECIFIED TYPE (MULTI): Primary | ICD-10-CM

## 2025-04-14 LAB
POC INR: 1.9
POC PROTHROMBIN TIME: NORMAL

## 2025-04-14 PROCEDURE — 85610 PROTHROMBIN TIME: CPT | Mod: QW

## 2025-04-14 PROCEDURE — 99211 OFF/OP EST MAY X REQ PHY/QHP: CPT

## 2025-04-14 NOTE — PROGRESS NOTES
Patient identification verified with 2 identifiers.    Location: [facility] - [address] [address] [facility]. [place], [place] [address] [phone]     Referring Physician: Dr. Melvin Bahena  Enrollment/ Re-enrollment date: 25  INR Goal: 2.0-3.0  INR monitoring is per [facility] protocol.  Anticoagulation Medication: warfarin  Indication: Atrial Fibrillation/Atrial Flutter    Subjective   Bleeding signs/symptoms: No  Bruising: No   Major bleeding event: No  Thrombosis signs/symptoms: No  Thromboembolic event: No  Missed doses: No  Extra doses: No  Medication changes: No  Dietary changes: No  Change in health: No  Change in activity: No  Alcohol: No  Other concerns: No    Upcoming Procedures:  Does the Patient Have any upcoming procedures that require interruption in anticoagulation therapy? no  Does the patient require bridging? no      Anticoagulation Summary  As of 2025      INR goal:  2.0-3.0   TTR:  73.2% (1.4 y)   INR used for dosin.90 (2025)   Weekly warfarin total:  37.5 mg               Assessment/Plan   Subtherapeutic     1. New dose: INCREASE TWD BY APPROXIMATELY 5% PER PROTOCOL  2. Next INR: 1 week      Education provided to patient during the visit:  Patient instructed to call in interim with questions, concerns and changes.   Patient educated on interactions between medications and warfarin.   Patient educated on dietary consistency in vitamin k consumption.   Patient educated on affects of alcohol consumption while taking warfarin.   Patient educated on signs of bleeding/clotting.   Patient educated on compliance with dosing, follow up appointments, and prescribed plan of care.

## 2025-04-21 ENCOUNTER — OFFICE VISIT (OUTPATIENT)
Dept: CARDIOLOGY | Facility: CLINIC | Age: OVER 89
End: 2025-04-21
Payer: MEDICARE

## 2025-04-21 ENCOUNTER — ANTICOAGULATION - WARFARIN VISIT (OUTPATIENT)
Dept: CARDIOLOGY | Facility: CLINIC | Age: OVER 89
End: 2025-04-21
Payer: MEDICARE

## 2025-04-21 VITALS
OXYGEN SATURATION: 98 % | SYSTOLIC BLOOD PRESSURE: 111 MMHG | DIASTOLIC BLOOD PRESSURE: 66 MMHG | BODY MASS INDEX: 26.54 KG/M2 | WEIGHT: 185 LBS

## 2025-04-21 DIAGNOSIS — Z95.2 S/P TAVR (TRANSCATHETER AORTIC VALVE REPLACEMENT): ICD-10-CM

## 2025-04-21 DIAGNOSIS — I48.91 ATRIAL FIBRILLATION, UNSPECIFIED TYPE (MULTI): Primary | ICD-10-CM

## 2025-04-21 DIAGNOSIS — I50.21 HEART FAILURE, ACUTE SYSTOLIC: ICD-10-CM

## 2025-04-21 DIAGNOSIS — I42.8 OTHER CARDIOMYOPATHY: ICD-10-CM

## 2025-04-21 DIAGNOSIS — I48.20 CHRONIC ATRIAL FIBRILLATION (MULTI): ICD-10-CM

## 2025-04-21 LAB
POC INR: 1.9 (ref 0.9–1.1)
POC PROTHROMBIN TIME: ABNORMAL (ref 9.3–12.5)

## 2025-04-21 PROCEDURE — 1157F ADVNC CARE PLAN IN RCRD: CPT | Performed by: NURSE PRACTITIONER

## 2025-04-21 PROCEDURE — 3074F SYST BP LT 130 MM HG: CPT | Performed by: NURSE PRACTITIONER

## 2025-04-21 PROCEDURE — 1160F RVW MEDS BY RX/DR IN RCRD: CPT | Performed by: NURSE PRACTITIONER

## 2025-04-21 PROCEDURE — 99211 OFF/OP EST MAY X REQ PHY/QHP: CPT

## 2025-04-21 PROCEDURE — 3078F DIAST BP <80 MM HG: CPT | Performed by: NURSE PRACTITIONER

## 2025-04-21 PROCEDURE — 85610 PROTHROMBIN TIME: CPT | Mod: QW

## 2025-04-21 PROCEDURE — 99214 OFFICE O/P EST MOD 30 MIN: CPT | Performed by: NURSE PRACTITIONER

## 2025-04-21 PROCEDURE — 1125F AMNT PAIN NOTED PAIN PRSNT: CPT | Performed by: NURSE PRACTITIONER

## 2025-04-21 PROCEDURE — 1036F TOBACCO NON-USER: CPT | Performed by: NURSE PRACTITIONER

## 2025-04-21 PROCEDURE — 1159F MED LIST DOCD IN RCRD: CPT | Performed by: NURSE PRACTITIONER

## 2025-04-21 RX ORDER — FUROSEMIDE 40 MG/1
80 TABLET ORAL DAILY
Start: 2025-04-21

## 2025-04-21 ASSESSMENT — PAIN SCALES - GENERAL: PAINLEVEL_OUTOF10: 10-WORST PAIN EVER

## 2025-04-21 ASSESSMENT — ENCOUNTER SYMPTOMS: SHORTNESS OF BREATH: 1

## 2025-04-21 NOTE — PATIENT INSTRUCTIONS
Take lasix 3 tablets (120 mg) Wednesday, Thursday and Friday     Go back to taking 2 tablets (80 mg) on Saturday and Sunday    Call on Friday with an update     Follow up in one week

## 2025-04-21 NOTE — PROGRESS NOTES
Subjective   Navneet Thompson is a 89 y.o. male.    Chief Complaint:  Shortness of Breath (2-3 days /), Hypertension, and Hyperlipidemia    Mr. Thompson returns for an interval follow up. He is seen in collaboration with Dr. Bahena. He comes in today with complaints of shortness of breath. He was treated for pneumonia and a CHF excerebration back in February. We had listed that he was taking lasix 20 mg, (3 tablets) twice daily, though [facility] and he apparently has been taking 40 mg (2 tablets) daily. His weight is up about 20 lbs since February. He as well as his daughter offer no other cardiovascular complaints or concerns today. He denies any complaints of chest pain, lightheadedness, dizziness, palpitations, syncope, orthopnea, paroxysmal nocturnal dyspnea, lower extremity swelling or bleeding concerns.        Review of Systems   Respiratory:  Positive for shortness of breath.    All other systems reviewed and are negative.      Objective   Physical Exam  Constitutional:       Appearance: Healthy appearance. In no distress  Pulmonary:      Effort: Pulmonary effort is normal.      Breath sounds: Normal breath sounds.   Cardiovascular:      Normal rate. Regular rhythm. Normal S1. Normal S2.       Murmurs: There is no murmur.      Carotids: right carotid pulse +2, no bruit heard over the right carotid. left carotid pulse +2, no bruit heard over the left carotid.  Edema:     Peripheral edema absent.   Abdominal:      Palpations: Abdomen is soft.   Musculoskeletal:       Cervical back: Normal range of motion.   Skin:     General: Skin is warm and dry. Normal color and pigmentation   Neurological:      Mental Status: Alert and oriented to person, place and time.   Psychiatric:     Mood and Affect: appropriate mood and appropriate affect.       Lab Review:   Lab Results   Component Value Date     03/17/2025    K 4.2 03/17/2025     03/17/2025    CO2 23 03/17/2025    BUN 32 (H)  03/17/2025    CREATININE 1.29 (H) 03/17/2025    GLUCOSE 136 (H) 03/17/2025    CALCIUM 8.9 03/17/2025     Lab Results   Component Value Date    WBC 9.2 03/17/2025    HGB 11.2 (L) 03/17/2025    HCT 35.8 (L) 03/17/2025    MCV 92.5 03/17/2025     03/17/2025     Lab Results   Component Value Date    CHOL 139 06/14/2023    TRIG 122 06/14/2023    HDL 35.0 (A) 06/14/2023       Assessment/Plan   Mr. Thompson is a pleasant 89 year old  male with a past medical history significant for CAD s/p CABG using LIMA-LAD, SVG-OM3 and [id number], patent by heart catheterization 2/2020, atrial fibrillation s/p several cardioversion's (now chronic) on warfarin anticoagulation, complete heart block s/p PPM placement in 2008 upgraded to BiVICD in 2012 with most recent generator change in 2024, HFrEF, diabetes and severe aortic stenosis s/p TAVR with Evolute Pro Plus 26 mm valve 4/2020. He also has a mildly dilated ascending aorta measuring 4.1 cm by CT scan 11/2022. Echocardiogram 1/2025 showed a stable EF of 25-30% with moderate MR, a moderate pleural effusion and moderately elevated RVSP. He presents today with complaints of increased dyspnea on exertion. His weight is up 20 lbs since February. I will increase his lasix to 3 tablets (120 mg) for the next 3 days. He will call on Friday with an update on his weight. He will follow up with us in clinic in one week, at which time we will plan on getting some repeat blood work. He as well as his daughter know to call for any concerns.

## 2025-04-21 NOTE — PROGRESS NOTES
Patient identification verified with 2 identifiers.    Location: [facility] - [address] [address] [facility]. [place], [place] [address] [phone]     Referring Physician: Dr. Melvin Bahena  Enrollment/ Re-enrollment date: 25  INR Goal: 2.0-3.0  INR monitoring is per [facility] protocol.  Anticoagulation Medication: warfarin  Indication: Atrial Fibrillation/Atrial Flutter    Subjective   Bleeding signs/symptoms: No  Bruising: No   Major bleeding event: No  Thrombosis signs/symptoms: No  Thromboembolic event: No  Missed doses: No  Extra doses: No  Medication changes: No  Dietary changes: No  Change in health: No  Change in activity: No  Alcohol: No  Other concerns: No    Upcoming Procedures:  Does the Patient Have any upcoming procedures that require interruption in anticoagulation therapy? no  Does the patient require bridging? no      Anticoagulation Summary  As of 2025      INR goal:  2.0-3.0   TTR:  72.2% (1.5 y)   INR used for dosin.90 (2025)   Weekly warfarin total:  39 mg               Assessment/Plan   Subtherapeutic     1. New dose: AGAIN, INCREASE TWD BY APPROXIMATELY 5% PER PROTOCOL  2. Next INR: 1 week      Education provided to patient during the visit:  Patient instructed to call in interim with questions, concerns and changes.   Patient educated on interactions between medications and warfarin.   Patient educated on dietary consistency in vitamin k consumption.   Patient educated on affects of alcohol consumption while taking warfarin.   Patient educated on signs of bleeding/clotting.   Patient educated on compliance with dosing, follow up appointments, and prescribed plan of care.

## 2025-04-23 PROBLEM — E11.649 TYPE 2 DIABETES MELLITUS WITH HYPOGLYCEMIA WITHOUT COMA: Status: ACTIVE | Noted: 2025-04-23

## 2025-04-25 ENCOUNTER — TELEPHONE (OUTPATIENT)
Dept: CARDIOLOGY | Facility: CLINIC | Age: OVER 89
End: 2025-04-25
Payer: MEDICARE

## 2025-04-25 NOTE — TELEPHONE ENCOUNTER
I spoke to michael. Navneet's weight is down about 5 lbs and is dyspnea is improving. He can go back to taking 80 mg of lasix on Saturday and Sunday. We will see him on Monday and make further adjustments at that time.

## 2025-04-28 ENCOUNTER — ANTICOAGULATION - WARFARIN VISIT (OUTPATIENT)
Dept: CARDIOLOGY | Facility: CLINIC | Age: OVER 89
End: 2025-04-28
Payer: MEDICARE

## 2025-04-28 ENCOUNTER — HOSPITAL ENCOUNTER (OUTPATIENT)
Dept: CARDIOLOGY | Facility: CLINIC | Age: OVER 89
Discharge: HOME | End: 2025-04-28
Payer: MEDICARE

## 2025-04-28 ENCOUNTER — OFFICE VISIT (OUTPATIENT)
Dept: CARDIOLOGY | Facility: CLINIC | Age: OVER 89
End: 2025-04-28
Payer: MEDICARE

## 2025-04-28 VITALS
HEART RATE: 97 BPM | BODY MASS INDEX: 25.77 KG/M2 | WEIGHT: 180 LBS | DIASTOLIC BLOOD PRESSURE: 82 MMHG | SYSTOLIC BLOOD PRESSURE: 150 MMHG | OXYGEN SATURATION: 94 % | HEIGHT: 70 IN

## 2025-04-28 DIAGNOSIS — I48.20 CHRONIC ATRIAL FIBRILLATION (MULTI): ICD-10-CM

## 2025-04-28 DIAGNOSIS — I42.0 DILATED CARDIOMYOPATHY (MULTI): ICD-10-CM

## 2025-04-28 DIAGNOSIS — I10 PRIMARY HYPERTENSION: ICD-10-CM

## 2025-04-28 DIAGNOSIS — I48.91 ATRIAL FIBRILLATION, UNSPECIFIED TYPE (MULTI): Primary | ICD-10-CM

## 2025-04-28 DIAGNOSIS — Z95.0 PRESENCE OF CARDIAC PACEMAKER: ICD-10-CM

## 2025-04-28 DIAGNOSIS — I42.8 OTHER CARDIOMYOPATHY: ICD-10-CM

## 2025-04-28 LAB
POC INR: 2.1 (ref 2–3)
POC PROTHROMBIN TIME: ABNORMAL (ref 9.3–12.5)

## 2025-04-28 PROCEDURE — 1159F MED LIST DOCD IN RCRD: CPT | Performed by: NURSE PRACTITIONER

## 2025-04-28 PROCEDURE — 3079F DIAST BP 80-89 MM HG: CPT | Performed by: NURSE PRACTITIONER

## 2025-04-28 PROCEDURE — 1126F AMNT PAIN NOTED NONE PRSNT: CPT | Performed by: NURSE PRACTITIONER

## 2025-04-28 PROCEDURE — 1036F TOBACCO NON-USER: CPT | Performed by: NURSE PRACTITIONER

## 2025-04-28 PROCEDURE — 99213 OFFICE O/P EST LOW 20 MIN: CPT | Performed by: NURSE PRACTITIONER

## 2025-04-28 PROCEDURE — 85610 PROTHROMBIN TIME: CPT | Mod: QW

## 2025-04-28 PROCEDURE — 3077F SYST BP >= 140 MM HG: CPT | Performed by: NURSE PRACTITIONER

## 2025-04-28 PROCEDURE — 1160F RVW MEDS BY RX/DR IN RCRD: CPT | Performed by: NURSE PRACTITIONER

## 2025-04-28 PROCEDURE — 93294 REM INTERROG EVL PM/LDLS PM: CPT | Performed by: INTERNAL MEDICINE

## 2025-04-28 PROCEDURE — 93296 REM INTERROG EVL PM/IDS: CPT

## 2025-04-28 PROCEDURE — 1157F ADVNC CARE PLAN IN RCRD: CPT | Performed by: NURSE PRACTITIONER

## 2025-04-28 ASSESSMENT — ENCOUNTER SYMPTOMS
LOSS OF SENSATION IN FEET: 0
OCCASIONAL FEELINGS OF UNSTEADINESS: 1
DEPRESSION: 0

## 2025-04-28 ASSESSMENT — PAIN SCALES - GENERAL: PAINLEVEL_OUTOF10: 0-NO PAIN

## 2025-04-28 NOTE — PROGRESS NOTES
Patient identification verified with 2 identifiers.    Location: [facility] - [address] [address] [place], [place] [address] [phone]     Referring Physician: Dr. Melvin Bahena  Enrollment/ Re-enrollment date:  25  INR Goal: 2.0-3.0  INR monitoring is per [facility] protocol.  Anticoagulation Medication: warfarin  Indication: Atrial Fibrillation/Atrial Flutter    Subjective   Bleeding signs/symptoms: No    Bruising: No   Major bleeding event: No  Thrombosis signs/symptoms: No  Thromboembolic event: No  Missed doses: No  Extra doses: No  Medication changes: No  Dietary changes: No  Change in health: No  Change in activity: No  Alcohol: No  Other concerns: No    Upcoming Procedures:  Does the Patient Have any upcoming procedures that require interruption in anticoagulation therapy? no  Does the patient require bridging? no      Anticoagulation Summary  As of 2025      INR goal:  2.0-3.0   TTR:  71.9% (1.5 y)   INR used for dosin.10 (2025)   Weekly warfarin total:  39 mg               Assessment/Plan   Therapeutic     1. New dose: no change    2. Next INR: 1 week      Education provided to patient during the visit:  Patient instructed to call in interim with questions, concerns and changes.   Patient educated on interactions between medications and warfarin.   Patient educated on compliance with dosing, follow up appointments, and prescribed plan of care.

## 2025-04-28 NOTE — PATIENT INSTRUCTIONS
Take lasix 120 mg Tuesday, Wednesday and Thursday     Call on Friday with an update     Obtain blood work next Monday     Follow up in June as scheduled

## 2025-04-28 NOTE — PROGRESS NOTES
Subjective   Navneet Thompson is a 89 y.o. male.    Chief Complaint:  Follow-up, Coronary Artery Disease, Atrial Fibrillation, and Shortness of Breath    Mr. Thompson returns for a routine one week follow up. He is seen in collaboration with Dr. Bahena. Since adjusting his lasix, he has lost 5 lbs, and feels his dyspnea has improved though still remains bothersome. He feels he could loose another 5 lbs. He offers no other cardiovascular complaints or concerns today. He denies any complaints of chest pain,  lightheadedness, dizziness, palpitations, syncope, orthopnea, paroxysmal nocturnal dyspnea, lower extremity swelling or bleeding concerns.          Review of Systems   All other systems reviewed and are negative.      Objective   Physical Exam  Constitutional:       Appearance: Healthy appearance. In no distress  Pulmonary:      Effort: Pulmonary effort is normal.      Breath sounds: Normal breath sounds.   Cardiovascular:      Normal rate. Regular rhythm. Normal S1. Normal S2.       Murmurs: There is no murmur.      Carotids: right carotid pulse +2, no bruit heard over the right carotid. left carotid pulse +2, no bruit heard over the left carotid.  Edema:     Peripheral edema absent.   Abdominal:      Palpations: Abdomen is soft.   Musculoskeletal:       Cervical back: Normal range of motion.   Skin:     General: Skin is warm and dry. Normal color and pigmentation   Neurological:      Mental Status: Alert and oriented to person, place and time.   Psychiatric:     Mood and Affect: appropriate mood and appropriate affect.       Lab Review:   Lab Results   Component Value Date     03/17/2025    K 4.2 03/17/2025     03/17/2025    CO2 23 03/17/2025    BUN 32 (H) 03/17/2025    CREATININE 1.29 (H) 03/17/2025    GLUCOSE 136 (H) 03/17/2025    CALCIUM 8.9 03/17/2025     Lab Results   Component Value Date    WBC 9.2 03/17/2025    HGB 11.2 (L) 03/17/2025    HCT 35.8 (L) 03/17/2025    MCV 92.5 03/17/2025    PLT  208 03/17/2025     Lab Results   Component Value Date    CHOL 139 06/14/2023    TRIG 122 06/14/2023    HDL 35.0 (A) 06/14/2023       Assessment/Plan   Mr. Thompson is a 89 year old  male with a past medical history significant for CAD s/p CABG using LIMA-LAD, SVG-OM3 and PV-ADMX4-QS2, patent by heart catheterization 2/2020, atrial fibrillation s/p several cardioversion's (now chronic) on warfarin anticoagulation, complete heart block s/p PPM placement in 2008 upgraded to BiV ICD in 2012 with most recent generator change in 2024, HFrEF, diabetes and severe aortic stenosis s/p TAVR with Evolute Pro Plus 26 mm valve 4/2020. He also has a mildly dilated ascending aorta measuring 4.1 cm by CT scan 11/2022. Echocardiogram 1/2025 showed a stable EF of 25-30% with moderate MR, a moderate pleural effusion and moderately elevated RVSP. He presents today for routine follow up stable from a cardiac standpoint. His VS remain stable. He has lost 5 lbs with adjusting lasix, though feels he could loose at least another 5 lbs. I will have him take lasix 3 tablets (120 mg)  again for the next 3 days. He will call on Friday with an update on his weight. I will have him obtain blood work next week with a BMP and magnesium level. He will follow up with us in clinic in June as previously scheduled. He know to call for any concerns.

## 2025-05-05 ENCOUNTER — ANTICOAGULATION - WARFARIN VISIT (OUTPATIENT)
Dept: CARDIOLOGY | Facility: CLINIC | Age: OVER 89
End: 2025-05-05
Payer: MEDICARE

## 2025-05-05 DIAGNOSIS — I48.91 ATRIAL FIBRILLATION, UNSPECIFIED TYPE (MULTI): Primary | ICD-10-CM

## 2025-05-05 DIAGNOSIS — I48.91 ATRIAL FIBRILLATION, UNSPECIFIED TYPE (MULTI): ICD-10-CM

## 2025-05-05 DIAGNOSIS — I48.20 CHRONIC ATRIAL FIBRILLATION (MULTI): ICD-10-CM

## 2025-05-05 LAB
POC INR: 2.4 (ref 0.9–1.1)
POC PROTHROMBIN TIME: ABNORMAL (ref 9.3–12.5)

## 2025-05-05 PROCEDURE — 99211 OFF/OP EST MAY X REQ PHY/QHP: CPT

## 2025-05-05 PROCEDURE — 85610 PROTHROMBIN TIME: CPT | Mod: QW

## 2025-05-05 RX ORDER — WARFARIN 3 MG/1
TABLET ORAL
Qty: 180 TABLET | Refills: 2 | Status: ON HOLD | OUTPATIENT
Start: 2025-05-05

## 2025-05-05 NOTE — PROGRESS NOTES
Patient identification verified with 2 identifiers.    Location: [facility] - [address] [address] [place], [place] [address] [phone]     Referring Physician: Dr. Melvin Bahena  Enrollment/ Re-enrollment date:  25 ( sent to MD Bahena on 25)  INR Goal: 2.0-3.0  INR monitoring is per [facility] protocol.  Anticoagulation Medication: warfarin  Indication: Atrial Fibrillation/Atrial Flutter    Subjective   Bleeding signs/symptoms: No    Bruising: No   Major bleeding event: No  Thrombosis signs/symptoms: No  Thromboembolic event: No  Missed doses: No  Extra doses: No  Medication changes: No  Dietary changes: No  Change in health: No  Change in activity: No  Alcohol: No  Other concerns: No    Upcoming Procedures:  Does the Patient Have any upcoming procedures that require interruption in anticoagulation therapy? no  Does the patient require bridging? no      Anticoagulation Summary  As of 2025      INR goal:  2.0-3.0   TTR:  72.2% (1.5 y)   INR used for dosin.40 (2025)   Weekly warfarin total:  39 mg               Assessment/Plan   Therapeutic     1. New dose: no change    2. Next INR: 2 weeks      Education provided to patient during the visit:  Patient instructed to call in interim with questions, concerns and changes.   Patient educated on compliance with dosing, follow up appointments, and prescribed plan of care.

## 2025-05-06 LAB
ANION GAP SERPL CALCULATED.4IONS-SCNC: 11 MMOL/L (CALC) (ref 7–17)
BUN SERPL-MCNC: 32 MG/DL (ref 7–25)
BUN/CREAT SERPL: 27 (CALC) (ref 6–22)
CALCIUM SERPL-MCNC: 9.1 MG/DL (ref 8.6–10.3)
CHLORIDE SERPL-SCNC: 105 MMOL/L (ref 98–110)
CO2 SERPL-SCNC: 26 MMOL/L (ref 20–32)
CREAT SERPL-MCNC: 1.19 MG/DL (ref 0.7–1.22)
EGFRCR SERPLBLD CKD-EPI 2021: 58 ML/MIN/1.73M2
GLUCOSE SERPL-MCNC: 155 MG/DL (ref 65–99)
MAGNESIUM SERPL-MCNC: 2.2 MG/DL (ref 1.5–2.5)
POTASSIUM SERPL-SCNC: 4 MMOL/L (ref 3.5–5.3)
SODIUM SERPL-SCNC: 142 MMOL/L (ref 135–146)

## 2025-05-07 DIAGNOSIS — I48.91 ATRIAL FIBRILLATION, UNSPECIFIED TYPE (MULTI): ICD-10-CM

## 2025-05-07 RX ORDER — WARFARIN 3 MG/1
TABLET ORAL
Qty: 180 TABLET | Refills: 0 | OUTPATIENT
Start: 2025-05-07

## 2025-05-17 ENCOUNTER — APPOINTMENT (OUTPATIENT)
Dept: RADIOLOGY | Facility: HOSPITAL | Age: OVER 89
DRG: 291 | End: 2025-05-17
Payer: MEDICARE

## 2025-05-17 ENCOUNTER — APPOINTMENT (OUTPATIENT)
Dept: CARDIOLOGY | Facility: HOSPITAL | Age: OVER 89
DRG: 291 | End: 2025-05-17
Payer: MEDICARE

## 2025-05-17 ENCOUNTER — HOSPITAL ENCOUNTER (INPATIENT)
Facility: HOSPITAL | Age: OVER 89
DRG: 291 | End: 2025-05-17
Attending: STUDENT IN AN ORGANIZED HEALTH CARE EDUCATION/TRAINING PROGRAM | Admitting: INTERNAL MEDICINE
Payer: MEDICARE

## 2025-05-17 DIAGNOSIS — R06.09 DYSPNEA ON EXERTION: Primary | ICD-10-CM

## 2025-05-17 DIAGNOSIS — I48.91 ATRIAL FIBRILLATION, UNSPECIFIED TYPE (MULTI): ICD-10-CM

## 2025-05-17 DIAGNOSIS — I50.9 ACUTE ON CHRONIC HEART FAILURE, UNSPECIFIED HEART FAILURE TYPE: ICD-10-CM

## 2025-05-17 DIAGNOSIS — I42.0 DILATED CARDIOMYOPATHY (MULTI): ICD-10-CM

## 2025-05-17 DIAGNOSIS — J81.0 ACUTE PULMONARY EDEMA: ICD-10-CM

## 2025-05-17 DIAGNOSIS — N30.00 ACUTE CYSTITIS WITHOUT HEMATURIA: ICD-10-CM

## 2025-05-17 DIAGNOSIS — I50.43: ICD-10-CM

## 2025-05-17 DIAGNOSIS — E78.2 MODERATE MIXED HYPERLIPIDEMIA NOT REQUIRING STATIN THERAPY: ICD-10-CM

## 2025-05-17 LAB
ANION GAP SERPL CALCULATED.3IONS-SCNC: 11 MMOL/L (ref 10–20)
APPEARANCE UR: CLEAR
BASOPHILS # BLD AUTO: 0.03 X10*3/UL (ref 0–0.1)
BASOPHILS NFR BLD AUTO: 0.4 %
BILIRUB UR STRIP.AUTO-MCNC: NEGATIVE MG/DL
BNP SERPL-MCNC: 282 PG/ML (ref 0–99)
BUN SERPL-MCNC: 37 MG/DL (ref 6–23)
CALCIUM SERPL-MCNC: 9 MG/DL (ref 8.6–10.3)
CARDIAC TROPONIN I PNL SERPL HS: 56 NG/L (ref 0–20)
CARDIAC TROPONIN I PNL SERPL HS: 59 NG/L (ref 0–20)
CHLORIDE SERPL-SCNC: 103 MMOL/L (ref 98–107)
CO2 SERPL-SCNC: 28 MMOL/L (ref 21–32)
COLOR UR: ABNORMAL
CREAT SERPL-MCNC: 1.28 MG/DL (ref 0.5–1.3)
EGFRCR SERPLBLD CKD-EPI 2021: 53 ML/MIN/1.73M*2
EOSINOPHIL # BLD AUTO: 0.22 X10*3/UL (ref 0–0.4)
EOSINOPHIL NFR BLD AUTO: 2.7 %
ERYTHROCYTE [DISTWIDTH] IN BLOOD BY AUTOMATED COUNT: 16.5 % (ref 11.5–14.5)
FLUAV RNA RESP QL NAA+PROBE: NOT DETECTED
FLUBV RNA RESP QL NAA+PROBE: NOT DETECTED
GLUCOSE BLD MANUAL STRIP-MCNC: 185 MG/DL (ref 74–99)
GLUCOSE SERPL-MCNC: 240 MG/DL (ref 74–99)
GLUCOSE UR STRIP.AUTO-MCNC: NORMAL MG/DL
HCT VFR BLD AUTO: 35.5 % (ref 41–52)
HGB BLD-MCNC: 10.5 G/DL (ref 13.5–17.5)
IMM GRANULOCYTES # BLD AUTO: 0.02 X10*3/UL (ref 0–0.5)
IMM GRANULOCYTES NFR BLD AUTO: 0.2 % (ref 0–0.9)
INR PPP: 3.8 (ref 0.9–1.2)
KETONES UR STRIP.AUTO-MCNC: NEGATIVE MG/DL
LEUKOCYTE ESTERASE UR QL STRIP.AUTO: ABNORMAL
LYMPHOCYTES # BLD AUTO: 1.6 X10*3/UL (ref 0.8–3)
LYMPHOCYTES NFR BLD AUTO: 19.8 %
MCH RBC QN AUTO: 25.1 PG (ref 26–34)
MCHC RBC AUTO-ENTMCNC: 29.6 G/DL (ref 32–36)
MCV RBC AUTO: 85 FL (ref 80–100)
MONOCYTES # BLD AUTO: 0.84 X10*3/UL (ref 0.05–0.8)
MONOCYTES NFR BLD AUTO: 10.4 %
NEUTROPHILS # BLD AUTO: 5.38 X10*3/UL (ref 1.6–5.5)
NEUTROPHILS NFR BLD AUTO: 66.5 %
NITRITE UR QL STRIP.AUTO: NEGATIVE
NRBC BLD-RTO: 0 /100 WBCS (ref 0–0)
PH UR STRIP.AUTO: 7 [PH]
PLATELET # BLD AUTO: 188 X10*3/UL (ref 150–450)
POTASSIUM SERPL-SCNC: 4 MMOL/L (ref 3.5–5.3)
PROT UR STRIP.AUTO-MCNC: ABNORMAL MG/DL
PROTHROMBIN TIME: 37.4 SECONDS (ref 9.3–12.7)
RBC # BLD AUTO: 4.19 X10*6/UL (ref 4.5–5.9)
RBC # UR STRIP.AUTO: NEGATIVE MG/DL
RBC #/AREA URNS AUTO: ABNORMAL /HPF
RSV RNA RESP QL NAA+PROBE: NOT DETECTED
SARS-COV-2 RNA RESP QL NAA+PROBE: NOT DETECTED
SODIUM SERPL-SCNC: 138 MMOL/L (ref 136–145)
SP GR UR STRIP.AUTO: 1.01
UROBILINOGEN UR STRIP.AUTO-MCNC: NORMAL MG/DL
WBC # BLD AUTO: 8.1 X10*3/UL (ref 4.4–11.3)
WBC #/AREA URNS AUTO: ABNORMAL /HPF

## 2025-05-17 PROCEDURE — 71046 X-RAY EXAM CHEST 2 VIEWS: CPT

## 2025-05-17 PROCEDURE — 2500000005 HC RX 250 GENERAL PHARMACY W/O HCPCS: Performed by: INTERNAL MEDICINE

## 2025-05-17 PROCEDURE — 36415 COLL VENOUS BLD VENIPUNCTURE: CPT

## 2025-05-17 PROCEDURE — 83880 ASSAY OF NATRIURETIC PEPTIDE: CPT

## 2025-05-17 PROCEDURE — 87637 SARSCOV2&INF A&B&RSV AMP PRB: CPT

## 2025-05-17 PROCEDURE — 82374 ASSAY BLOOD CARBON DIOXIDE: CPT

## 2025-05-17 PROCEDURE — 96374 THER/PROPH/DIAG INJ IV PUSH: CPT

## 2025-05-17 PROCEDURE — 99285 EMERGENCY DEPT VISIT HI MDM: CPT | Mod: 25 | Performed by: STUDENT IN AN ORGANIZED HEALTH CARE EDUCATION/TRAINING PROGRAM

## 2025-05-17 PROCEDURE — 85025 COMPLETE CBC W/AUTO DIFF WBC: CPT

## 2025-05-17 PROCEDURE — 82947 ASSAY GLUCOSE BLOOD QUANT: CPT

## 2025-05-17 PROCEDURE — 93005 ELECTROCARDIOGRAM TRACING: CPT

## 2025-05-17 PROCEDURE — 71046 X-RAY EXAM CHEST 2 VIEWS: CPT | Performed by: RADIOLOGY

## 2025-05-17 PROCEDURE — 84484 ASSAY OF TROPONIN QUANT: CPT

## 2025-05-17 PROCEDURE — 85610 PROTHROMBIN TIME: CPT

## 2025-05-17 PROCEDURE — 81001 URINALYSIS AUTO W/SCOPE: CPT | Performed by: INTERNAL MEDICINE

## 2025-05-17 PROCEDURE — 1200000002 HC GENERAL ROOM WITH TELEMETRY DAILY

## 2025-05-17 PROCEDURE — 2500000001 HC RX 250 WO HCPCS SELF ADMINISTERED DRUGS (ALT 637 FOR MEDICARE OP): Performed by: INTERNAL MEDICINE

## 2025-05-17 PROCEDURE — 93010 ELECTROCARDIOGRAM REPORT: CPT | Performed by: INTERNAL MEDICINE

## 2025-05-17 PROCEDURE — 2500000004 HC RX 250 GENERAL PHARMACY W/ HCPCS (ALT 636 FOR OP/ED): Mod: JZ

## 2025-05-17 PROCEDURE — 2500000002 HC RX 250 W HCPCS SELF ADMINISTERED DRUGS (ALT 637 FOR MEDICARE OP, ALT 636 FOR OP/ED): Performed by: INTERNAL MEDICINE

## 2025-05-17 RX ORDER — INSULIN LISPRO 100 [IU]/ML
6 INJECTION, SOLUTION INTRAVENOUS; SUBCUTANEOUS
Status: DISCONTINUED | OUTPATIENT
Start: 2025-05-18 | End: 2025-05-23 | Stop reason: HOSPADM

## 2025-05-17 RX ORDER — POLYETHYLENE GLYCOL 3350 17 G/17G
17 POWDER, FOR SOLUTION ORAL DAILY PRN
Status: DISCONTINUED | OUTPATIENT
Start: 2025-05-17 | End: 2025-05-23 | Stop reason: HOSPADM

## 2025-05-17 RX ORDER — FUROSEMIDE 10 MG/ML
60 INJECTION INTRAMUSCULAR; INTRAVENOUS ONCE
Status: COMPLETED | OUTPATIENT
Start: 2025-05-17 | End: 2025-05-17

## 2025-05-17 RX ORDER — NEOMYCIN AND POLYMYXIN B SULFATES AND BACITRACIN ZINC 400; 3.5; 1 [USP'U]/G; MG/G; [USP'U]/G
0.5 OINTMENT OPHTHALMIC NIGHTLY
Status: DISCONTINUED | OUTPATIENT
Start: 2025-05-17 | End: 2025-05-23 | Stop reason: HOSPADM

## 2025-05-17 RX ORDER — GUAIFENESIN/DEXTROMETHORPHAN 100-10MG/5
5 SYRUP ORAL EVERY 4 HOURS PRN
Status: DISCONTINUED | OUTPATIENT
Start: 2025-05-17 | End: 2025-05-23 | Stop reason: HOSPADM

## 2025-05-17 RX ORDER — NEOMYCIN AND POLYMYXIN B SULFATES AND BACITRACIN ZINC 400; 3.5; 1 [USP'U]/G; MG/G; [USP'U]/G
0.5 OINTMENT OPHTHALMIC DAILY
Status: DISCONTINUED | OUTPATIENT
Start: 2025-05-18 | End: 2025-05-17

## 2025-05-17 RX ORDER — ACETAMINOPHEN 650 MG/1
650 SUPPOSITORY RECTAL EVERY 4 HOURS PRN
Status: DISCONTINUED | OUTPATIENT
Start: 2025-05-17 | End: 2025-05-23 | Stop reason: HOSPADM

## 2025-05-17 RX ORDER — ATORVASTATIN CALCIUM 80 MG/1
80 TABLET, FILM COATED ORAL NIGHTLY
Status: DISCONTINUED | OUTPATIENT
Start: 2025-05-17 | End: 2025-05-20

## 2025-05-17 RX ORDER — GUAIFENESIN 600 MG/1
600 TABLET, EXTENDED RELEASE ORAL EVERY 12 HOURS PRN
Status: DISCONTINUED | OUTPATIENT
Start: 2025-05-17 | End: 2025-05-23 | Stop reason: HOSPADM

## 2025-05-17 RX ORDER — METOPROLOL TARTRATE 25 MG/1
25 TABLET, FILM COATED ORAL 2 TIMES DAILY
Status: DISCONTINUED | OUTPATIENT
Start: 2025-05-17 | End: 2025-05-23 | Stop reason: HOSPADM

## 2025-05-17 RX ORDER — NYSTATIN 100000 [USP'U]/G
1 POWDER TOPICAL 2 TIMES DAILY
Status: DISCONTINUED | OUTPATIENT
Start: 2025-05-17 | End: 2025-05-23 | Stop reason: HOSPADM

## 2025-05-17 RX ORDER — CHOLECALCIFEROL (VITAMIN D3) 25 MCG
25 TABLET ORAL DAILY
Status: DISCONTINUED | OUTPATIENT
Start: 2025-05-18 | End: 2025-05-23 | Stop reason: HOSPADM

## 2025-05-17 RX ORDER — DEXTROSE 50 % IN WATER (D50W) INTRAVENOUS SYRINGE
25
Status: DISCONTINUED | OUTPATIENT
Start: 2025-05-17 | End: 2025-05-23 | Stop reason: HOSPADM

## 2025-05-17 RX ORDER — POTASSIUM CHLORIDE 20 MEQ/1
20 TABLET, EXTENDED RELEASE ORAL DAILY
Status: DISCONTINUED | OUTPATIENT
Start: 2025-05-18 | End: 2025-05-23 | Stop reason: HOSPADM

## 2025-05-17 RX ORDER — SPIRONOLACTONE 25 MG/1
12.5 TABLET ORAL DAILY
Status: DISCONTINUED | OUTPATIENT
Start: 2025-05-18 | End: 2025-05-23 | Stop reason: HOSPADM

## 2025-05-17 RX ORDER — ACETAMINOPHEN 325 MG/1
650 TABLET ORAL EVERY 4 HOURS PRN
Status: DISCONTINUED | OUTPATIENT
Start: 2025-05-17 | End: 2025-05-23 | Stop reason: HOSPADM

## 2025-05-17 RX ORDER — INSULIN LISPRO 100 [IU]/ML
0-15 INJECTION, SOLUTION INTRAVENOUS; SUBCUTANEOUS
Status: DISCONTINUED | OUTPATIENT
Start: 2025-05-18 | End: 2025-05-23 | Stop reason: HOSPADM

## 2025-05-17 RX ORDER — ASPIRIN 81 MG/1
81 TABLET ORAL DAILY
Status: DISCONTINUED | OUTPATIENT
Start: 2025-05-18 | End: 2025-05-23 | Stop reason: HOSPADM

## 2025-05-17 RX ORDER — DEXTROSE 50 % IN WATER (D50W) INTRAVENOUS SYRINGE
12.5
Status: DISCONTINUED | OUTPATIENT
Start: 2025-05-17 | End: 2025-05-23 | Stop reason: HOSPADM

## 2025-05-17 RX ORDER — TRIAMCINOLONE ACETONIDE 1 MG/G
CREAM TOPICAL 2 TIMES DAILY
Status: DISCONTINUED | OUTPATIENT
Start: 2025-05-17 | End: 2025-05-23 | Stop reason: HOSPADM

## 2025-05-17 RX ORDER — ACETAMINOPHEN 325 MG/1
650 TABLET ORAL EVERY 4 HOURS PRN
Status: DISCONTINUED | OUTPATIENT
Start: 2025-05-17 | End: 2025-05-17 | Stop reason: SDUPTHER

## 2025-05-17 RX ORDER — WARFARIN 3 MG/1
3 TABLET ORAL DAILY
Status: DISCONTINUED | OUTPATIENT
Start: 2025-05-18 | End: 2025-05-23 | Stop reason: HOSPADM

## 2025-05-17 RX ORDER — ALBUTEROL SULFATE 0.83 MG/ML
2.5 SOLUTION RESPIRATORY (INHALATION) EVERY 6 HOURS PRN
Status: DISCONTINUED | OUTPATIENT
Start: 2025-05-17 | End: 2025-05-23 | Stop reason: HOSPADM

## 2025-05-17 RX ORDER — ACETAMINOPHEN 160 MG/5ML
650 SOLUTION ORAL EVERY 4 HOURS PRN
Status: DISCONTINUED | OUTPATIENT
Start: 2025-05-17 | End: 2025-05-23 | Stop reason: HOSPADM

## 2025-05-17 RX ORDER — LISINOPRIL 5 MG/1
5 TABLET ORAL DAILY
Status: DISCONTINUED | OUTPATIENT
Start: 2025-05-18 | End: 2025-05-23 | Stop reason: HOSPADM

## 2025-05-17 RX ORDER — FUROSEMIDE 80 MG/1
80 TABLET ORAL DAILY
Status: DISCONTINUED | OUTPATIENT
Start: 2025-05-18 | End: 2025-05-20

## 2025-05-17 RX ORDER — INSULIN GLARGINE 100 [IU]/ML
30 INJECTION, SOLUTION SUBCUTANEOUS NIGHTLY
Status: DISCONTINUED | OUTPATIENT
Start: 2025-05-17 | End: 2025-05-20

## 2025-05-17 RX ORDER — ADHESIVE BANDAGE
60 BANDAGE TOPICAL DAILY
Status: DISCONTINUED | OUTPATIENT
Start: 2025-05-18 | End: 2025-05-23 | Stop reason: HOSPADM

## 2025-05-17 RX ORDER — LANOLIN ALCOHOL/MO/W.PET/CERES
420 CREAM (GRAM) TOPICAL DAILY
Status: DISCONTINUED | OUTPATIENT
Start: 2025-05-17 | End: 2025-05-17

## 2025-05-17 RX ORDER — ESOMEPRAZOLE MAGNESIUM 20 MG/1
20 GRANULE, DELAYED RELEASE ORAL
Status: DISCONTINUED | OUTPATIENT
Start: 2025-05-18 | End: 2025-05-23 | Stop reason: HOSPADM

## 2025-05-17 RX ORDER — LANOLIN ALCOHOL/MO/W.PET/CERES
400 CREAM (GRAM) TOPICAL DAILY
Status: DISCONTINUED | OUTPATIENT
Start: 2025-05-18 | End: 2025-05-23 | Stop reason: HOSPADM

## 2025-05-17 RX ORDER — BENZONATATE 100 MG/1
100 CAPSULE ORAL 3 TIMES DAILY PRN
Status: DISCONTINUED | OUTPATIENT
Start: 2025-05-17 | End: 2025-05-17 | Stop reason: SDDI

## 2025-05-17 RX ADMIN — METOPROLOL TARTRATE 25 MG: 25 TABLET, FILM COATED ORAL at 21:55

## 2025-05-17 RX ADMIN — ATORVASTATIN CALCIUM 80 MG: 80 TABLET, FILM COATED ORAL at 21:13

## 2025-05-17 RX ADMIN — INSULIN GLARGINE 30 UNITS: 100 INJECTION, SOLUTION SUBCUTANEOUS at 21:26

## 2025-05-17 RX ADMIN — TRIAMCINOLONE ACETONIDE: 1 CREAM TOPICAL at 21:54

## 2025-05-17 RX ADMIN — NEOMYCIN SULFATE, POLYMYXIN B SULFATE AND BACITRACIN ZINC 0.5 INCH: 3.5; 10000; 4 OINTMENT OPHTHALMIC at 23:33

## 2025-05-17 RX ADMIN — FUROSEMIDE 60 MG: 10 INJECTION, SOLUTION INTRAMUSCULAR; INTRAVENOUS at 14:22

## 2025-05-17 ASSESSMENT — PAIN - FUNCTIONAL ASSESSMENT: PAIN_FUNCTIONAL_ASSESSMENT: 0-10

## 2025-05-17 ASSESSMENT — COLUMBIA-SUICIDE SEVERITY RATING SCALE - C-SSRS
2. HAVE YOU ACTUALLY HAD ANY THOUGHTS OF KILLING YOURSELF?: NO
1. IN THE PAST MONTH, HAVE YOU WISHED YOU WERE DEAD OR WISHED YOU COULD GO TO SLEEP AND NOT WAKE UP?: NO
6. HAVE YOU EVER DONE ANYTHING, STARTED TO DO ANYTHING, OR PREPARED TO DO ANYTHING TO END YOUR LIFE?: NO

## 2025-05-17 ASSESSMENT — PAIN SCALES - GENERAL: PAINLEVEL_OUTOF10: 0 - NO PAIN

## 2025-05-17 NOTE — NURSING NOTE
Received pt from er via cart transferred to new bed. Oriented to room call system etc., Pure wick on and ble leggings on. Oxygen 3 liters.Family at bedside

## 2025-05-17 NOTE — ED PROVIDER NOTES
HPI   Chief Complaint   Patient presents with    Shortness of Breath       Patient is a 89-year-old male who presents emergency department for evaluation of shortness of breath.  Patient states that he has been having symptoms for the last several months worsening over the last several weeks.  He notes a history of atrial fibrillation on Coumadin and history of heart failure.  He states he is on Lasix medication and reports that he has been taking it as prescribed does not seem to be helping with his symptoms.  He is noticed increased swelling in his lower extremities and has had an increase in weight gain over the last week.  He denies any pain in the chest at this time.  He states that shortness of breath is exceptionally worse with any exertion and only a few steps.  He feels completely winded.  He denies any recent cough, congestion, fevers, chills lightheadedness, dizziness, abdominal pain, nausea, vomiting.      History provided by:  Patient   used: No            Patient History   Medical History[1]  Surgical History[2]  Family History[3]  Social History[4]    Physical Exam   ED Triage Vitals [05/17/25 1252]   Temperature Heart Rate Respirations BP   36.8 °C (98.2 °F) 79 (!) 26 125/68      SpO2 Temp Source Heart Rate Source Patient Position   -- Oral -- Lying      BP Location FiO2 (%)     Left arm --       Physical Exam  Constitutional:       Appearance: He is well-developed.   Cardiovascular:      Rate and Rhythm: Normal rate and regular rhythm.   Pulmonary:      Effort: Pulmonary effort is normal.      Breath sounds: Examination of the right-lower field reveals decreased breath sounds. Examination of the left-lower field reveals decreased breath sounds. Decreased breath sounds present.   Abdominal:      General: Bowel sounds are normal.      Palpations: Abdomen is soft.      Tenderness: There is no abdominal tenderness.   Musculoskeletal:         General: Normal range of motion.       Right lower leg: Edema present.      Left lower leg: Edema present.   Skin:     General: Skin is warm and dry.      Comments: Trace lower extremity edema.   Neurological:      General: No focal deficit present.      Mental Status: He is alert and oriented to person, place, and time.           ED Course & MDM   ED Course as of 05/17/25 1545   Sat May 17, 2025   1247 I personally reviewed and interpreted the EKG @1246: V-paced 85, no appreciable ischemia, prolonged Qtc 511 ms, non-specific TW findings, prior EKG on 1/26/2025 reviewed without any appreciable specific/identifiable changes, and does not meet modified Sgarbossa criteria [BC]      ED Course User Index  [BC] Rock Phillips MD         Diagnoses as of 05/17/25 1545   Dyspnea on exertion   Acute on chronic heart failure, unspecified heart failure type   Acute pulmonary edema                 No data recorded     Belinda Coma Scale Score: 15 (05/17/25 1318 : Myrtle Soto RN)                           Medical Decision Making  Patient is a 89-year-old male presents emergency department for evaluation of shortness of breath.    EKG was interpreted by attending physician and reviewed by me.    Lab work done today included BMP, CBC, troponins, proBNP, and RSV/flu/COVID swabs.  Lab work with initial troponin of 59 with repeat troponin of 56, supratherapeutic INR at 3.8, elevated proBNP, anemia.    Scans done today were interpreted/confirmed by radiologist and also interpreted by me which included chest x-ray.  Chest x-ray shows slight interval worsening of bibasilar atelectasis and right pleural effusion with cardiomegaly with CHF edema not excluded.    Medications given at today's visit include IV Lasix    I saw this patient in conjunction with Dr. Phillips.  Patient significantly symptomatic with minimal exertion and only a few steps.  He is borderline hypoxic 92 to 93% with minimal exertion in bed and put on 2 L nasal cannula for comfort.  EKG shows no  evidence of ischemia with ventricular paced rhythm.  Lab work today shows elevated troponin, but flat with anemia and elevated proBNP.  Chest x-ray is consistent with likely edema and CHF.  Patient would benefit from admission to observation for further evaluation by cardiology and continued IV diuresis given likely acute heart failure exacerbation contributing to his symptoms today.  I spoke with medicine on-call Dr. Callejas who is agreeable to admission of patient for further management.    The patient/family was counseled on clinical impression, expectations, and plan along with recommendations to admission.  All questions were answered and involved parties were understanding and agreeable to course of treatment.  Case was discussed with admitting physician and any consultants. Bed type, ED treatment and further ED workup decided by joint decision making with admitting team and any consultants. Patient stable for admission per my assessment and further management of patient will be deferred to the inpatient setting.    ** Disclaimer:  Parts of this document were written utilizing a voice to text dictation software.  Note may contain minor transcription or typographical errors that were inadvertently transcribed by the computer software.        Procedure  Procedures       [1]   Past Medical History:  Diagnosis Date    Aortic stenosis     Status post transcatheter aortic valve replacement    Atherosclerotic heart disease of native coronary artery without angina pectoris 12/12/2022    CAD (coronary artery disease)    Atrial fibrillation (Multi)     Chronic systolic heart failure     Complete heart block     status post dual chamber pacemaker placemtn in 01/2008    Diabetes mellitus, type 2 (Multi)     Essential (primary) hypertension 12/12/2022    Hypertension    Presence of automatic (implantable) cardiac defibrillator 11/14/2022    Cardiac defibrillator in place, upgraded from pacemaker in 05/2014   [2]   Past  Surgical History:  Procedure Laterality Date    AORTIC VALVE REPLACEMENT  04/07/2022    Transcatheter aortic valve replacement (TAVR) for aortic valve stenosis    APPENDECTOMY  12/01/2014    Appendectomy    CARDIAC ELECTROPHYSIOLOGY PROCEDURE N/A 12/19/2023    Procedure: ICD UPGRADE, DUAL TO BIV;  Surgeon: Luis Felipe Greene MD;  Location: [place] [address] Cardiac Cath Lab;  Service: Electrophysiology;  Laterality: N/A;  [id number] Bi-V ICD gen change [employer]    CORONARY ARTERY BYPASS GRAFT  12/01/2014    CABG    CT ABDOMEN PELVIS ANGIOGRAM W AND/OR WO IV CONTRAST  03/17/2020    CT ABDOMEN PELVIS ANGIOGRAM W AND/OR WO IV CONTRAST 3[place]17/2020 [facility]    OTHER SURGICAL HISTORY  12/01/2014    Cardio-defib Pulse Generator Implantation Date   [3]   Family History  Problem Relation Name Age of Onset    Drug abuse Mother          OVERDOSE   [4]   Social History  Tobacco Use    Smoking status: Never     Passive exposure: Never    Smokeless tobacco: Never   Substance Use Topics    Alcohol use: Yes     Comment: a beer a year    Drug use: Never        Ashely Dahl PA-C  05/17/25 1544

## 2025-05-17 NOTE — ED PROVIDER NOTES
The patient was seen in conjunction with the advanced practice provider, and I performed a substantive portion of the encounter. The following is my supervisory note.    History:  Patient presents to ED with family for concern of shortness of breath.  Patient does not wear home supplemental O2 at baseline.  Patient endorses PND and orthopnea along with significant dyspnea on exertion and decreased amatory distance.  Family notes patient appears to be mildly conversationally dyspneic.  Patient notes chronic waxing waning BLE edema.  Notes minimal cough without any significant production.  Does not endorse any retrosternal or pleuritic chest pain.  Notes no lightheadedness/dizziness, diaphoresis, or syncopal episodes.  Denies any significant changes in bowel/bladder habits but feels he may not be urinating is much as he should while on his diuretics.  Denies any appreciable fever/chills.  Has not appreciated unexpected weight gain and has not noted any abdominal distention.  Patient denies any other significant systemic symptoms or complaints.    VS:  /75 (BP Location: Right arm, Patient Position: Lying)   Pulse 73   Temp 36.4 °C (97.5 °F) (Oral)   Resp 20   Ht 1.829 m (6')   Wt 79.8 kg (176 lb)   SpO2 96%   BMI 23.87 kg/m²      Physical exam:  CONST: alert, normal appearance, no acute distress, does not appear ill/toxic  NECK: Mild JVD 1-2 cm above bilateral clavicles  CV:  RRR, no murmurs, 2+ equal/symmetrical pulses x4, mild BLE with compression stockings on otherwise no unilateral leg swelling/edema  PULM: Mild scattered bibasilar Rales and mild rhonchi of mid lung zones bilaterally, mild to moderate respiratory distress with moderate conversational dyspnea and increased WOB with accessory muscle use, moderate tachypnea, placed on supplemental O2 for borderline hypoxia and comfort  CHEST: non-tender, no crepitus/deformities, no paradoxical chest wall movement, no ecchymosis/erythema, no edema  ABD:  soft, flat/non-tender/non-distended, no mass  SKIN: warm/dry, no pallor or jaundice, no rash  NEURO: A&Ox4, no focal neuro deficits      I personally reviewed and interpreted the following studies: EKG is V paced 85, prolonged QTc 511 ms, no appreciable ischemia with nonspecific T wave findings, labs are significant for elevated/interment and stable/flat troponinemia, mildly elevated BNP, moderately supratherapeutic INR, baseline renal function, hyperglycemia without AGMA, baseline normocytic anemia, images are notable for CXR moderate cardiomegaly with bibasilar lung zone fluffy opacities and mild right-sided pleural effusion, no evidence PTX.      MDM:  Patient presented to the ED for worsening shortness of breath over the last few days with significant dyspnea exertion/decreased amatory distance, mild to moderate respiratory distress with moderate conversational dyspnea.  Concerning PMHx of A-fib on Coumadin, cardiomyopathy, HLD, HTN, COPD, TAVR, CKD 3, DM 2.    Per Chart Review: Echo obtained on 1/27/2025 notable for severely decreased left ventricular systolic function with EF 25-30% with global hypokinesis, LA moderately to severely dilated, RA mildly dilated, MVR, TVR.    Assessment/evaluation consistent with insidiously worsening acute on chronic HF and T2 MI, and so identified a supratherapeutic INR on Coumadin. No concerning history, clinical evidence/work-up, or exam findings for the concerning differentials of ACS/MI, myocarditis/pericarditis, PTX, focal/multifocal lobar PNA, lower suspicion for PE, acute renal dysfunction/BETH, significant electrolyte/metabolic derangement. These conditions have been thoroughly evaluated and determined to be sufficiently unlikely to be the etiology of patient's presenting symptoms.     Scores: Heart 6 (12-16% MACE)    ED Course/Diagnosis:  ED Course as of 05/18/25 0622   Sat May 17, 2025   1247 I personally reviewed and interpreted the EKG @1246: V-paced 85, no  appreciable ischemia, prolonged Qtc 511 ms, non-specific TW findings, prior EKG on 1/26/2025 reviewed without any appreciable specific/identifiable changes, and does not meet modified Sgarbossa criteria [BC]      ED Course User Index  [BC] Rock Phillips MD         Diagnoses as of 05/18/25 0622   Dyspnea on exertion   Acute on chronic heart failure, unspecified heart failure type   Acute pulmonary edema       1. Dyspnea on exertion        2. Acute on chronic heart failure, unspecified heart failure type        3. Acute pulmonary edema                 Rock Phillips MD  05/18/25 0622

## 2025-05-17 NOTE — ED TRIAGE NOTES
C/O sob x few months, hx of PNE. Stated he hasn't been able to complete sentences in months. Ewiiaapaayp in b/l ears. Three children are at bedside. Pt pleasant and resting

## 2025-05-18 VITALS
WEIGHT: 176 LBS | DIASTOLIC BLOOD PRESSURE: 58 MMHG | HEART RATE: 70 BPM | SYSTOLIC BLOOD PRESSURE: 123 MMHG | TEMPERATURE: 97.9 F | OXYGEN SATURATION: 96 % | HEIGHT: 72 IN | BODY MASS INDEX: 23.84 KG/M2 | RESPIRATION RATE: 18 BRPM

## 2025-05-18 LAB
ALBUMIN SERPL BCP-MCNC: 3 G/DL (ref 3.4–5)
ALP SERPL-CCNC: 80 U/L (ref 33–136)
ALT SERPL W P-5'-P-CCNC: 15 U/L (ref 10–52)
ANION GAP SERPL CALCULATED.3IONS-SCNC: 12 MMOL/L (ref 10–20)
AST SERPL W P-5'-P-CCNC: 21 U/L (ref 9–39)
BILIRUB SERPL-MCNC: 0.9 MG/DL (ref 0–1.2)
BUN SERPL-MCNC: 33 MG/DL (ref 6–23)
CALCIUM SERPL-MCNC: 8.8 MG/DL (ref 8.6–10.3)
CHLORIDE SERPL-SCNC: 106 MMOL/L (ref 98–107)
CO2 SERPL-SCNC: 28 MMOL/L (ref 21–32)
CREAT SERPL-MCNC: 1.23 MG/DL (ref 0.5–1.3)
EGFRCR SERPLBLD CKD-EPI 2021: 55 ML/MIN/1.73M*2
ERYTHROCYTE [DISTWIDTH] IN BLOOD BY AUTOMATED COUNT: 16.6 % (ref 11.5–14.5)
GLUCOSE BLD MANUAL STRIP-MCNC: 124 MG/DL (ref 74–99)
GLUCOSE BLD MANUAL STRIP-MCNC: 139 MG/DL (ref 74–99)
GLUCOSE BLD MANUAL STRIP-MCNC: 220 MG/DL (ref 74–99)
GLUCOSE BLD MANUAL STRIP-MCNC: 97 MG/DL (ref 74–99)
GLUCOSE SERPL-MCNC: 119 MG/DL (ref 74–99)
HCT VFR BLD AUTO: 33.2 % (ref 41–52)
HGB BLD-MCNC: 10.4 G/DL (ref 13.5–17.5)
INR PPP: 4.6 (ref 0.9–1.2)
MCH RBC QN AUTO: 25.3 PG (ref 26–34)
MCHC RBC AUTO-ENTMCNC: 31.3 G/DL (ref 32–36)
MCV RBC AUTO: 81 FL (ref 80–100)
NRBC BLD-RTO: 0 /100 WBCS (ref 0–0)
PLATELET # BLD AUTO: 198 X10*3/UL (ref 150–450)
POTASSIUM SERPL-SCNC: 3.5 MMOL/L (ref 3.5–5.3)
PROT SERPL-MCNC: 6.1 G/DL (ref 6.4–8.2)
PROTHROMBIN TIME: 44.5 SECONDS (ref 9.3–12.7)
RBC # BLD AUTO: 4.11 X10*6/UL (ref 4.5–5.9)
SODIUM SERPL-SCNC: 142 MMOL/L (ref 136–145)
WBC # BLD AUTO: 8.6 X10*3/UL (ref 4.4–11.3)

## 2025-05-18 PROCEDURE — 97162 PT EVAL MOD COMPLEX 30 MIN: CPT | Mod: GP

## 2025-05-18 PROCEDURE — 2500000005 HC RX 250 GENERAL PHARMACY W/O HCPCS: Performed by: INTERNAL MEDICINE

## 2025-05-18 PROCEDURE — 2500000004 HC RX 250 GENERAL PHARMACY W/ HCPCS (ALT 636 FOR OP/ED): Performed by: INTERNAL MEDICINE

## 2025-05-18 PROCEDURE — 99221 1ST HOSP IP/OBS SF/LOW 40: CPT

## 2025-05-18 PROCEDURE — 85610 PROTHROMBIN TIME: CPT | Performed by: INTERNAL MEDICINE

## 2025-05-18 PROCEDURE — 2500000002 HC RX 250 W HCPCS SELF ADMINISTERED DRUGS (ALT 637 FOR MEDICARE OP, ALT 636 FOR OP/ED): Performed by: INTERNAL MEDICINE

## 2025-05-18 PROCEDURE — 36415 COLL VENOUS BLD VENIPUNCTURE: CPT | Performed by: INTERNAL MEDICINE

## 2025-05-18 PROCEDURE — 2500000001 HC RX 250 WO HCPCS SELF ADMINISTERED DRUGS (ALT 637 FOR MEDICARE OP): Performed by: INTERNAL MEDICINE

## 2025-05-18 PROCEDURE — 97530 THERAPEUTIC ACTIVITIES: CPT | Mod: GP

## 2025-05-18 PROCEDURE — 84075 ASSAY ALKALINE PHOSPHATASE: CPT | Performed by: INTERNAL MEDICINE

## 2025-05-18 PROCEDURE — 82947 ASSAY GLUCOSE BLOOD QUANT: CPT

## 2025-05-18 PROCEDURE — 85027 COMPLETE CBC AUTOMATED: CPT | Performed by: INTERNAL MEDICINE

## 2025-05-18 PROCEDURE — 1200000002 HC GENERAL ROOM WITH TELEMETRY DAILY

## 2025-05-18 RX ORDER — FUROSEMIDE 10 MG/ML
40 INJECTION INTRAMUSCULAR; INTRAVENOUS EVERY 8 HOURS
Status: COMPLETED | OUTPATIENT
Start: 2025-05-18 | End: 2025-05-18

## 2025-05-18 RX ADMIN — NYSTATIN 1 APPLICATION: 100000 POWDER TOPICAL at 20:38

## 2025-05-18 RX ADMIN — METOPROLOL TARTRATE 25 MG: 25 TABLET, FILM COATED ORAL at 09:00

## 2025-05-18 RX ADMIN — LISINOPRIL 5 MG: 5 TABLET ORAL at 09:00

## 2025-05-18 RX ADMIN — ATORVASTATIN CALCIUM 80 MG: 80 TABLET, FILM COATED ORAL at 20:41

## 2025-05-18 RX ADMIN — Medication 1 TABLET: at 09:00

## 2025-05-18 RX ADMIN — POTASSIUM CHLORIDE 20 MEQ: 1500 TABLET, EXTENDED RELEASE ORAL at 09:00

## 2025-05-18 RX ADMIN — SPIRONOLACTONE 12.5 MG: 25 TABLET ORAL at 09:00

## 2025-05-18 RX ADMIN — FUROSEMIDE 40 MG: 10 INJECTION INTRAMUSCULAR; INTRAVENOUS at 17:45

## 2025-05-18 RX ADMIN — CHOLECALCIFEROL TAB 25 MCG (1000 UNIT) 25 MCG: 25 TAB at 09:00

## 2025-05-18 RX ADMIN — NYSTATIN 1 APPLICATION: 100000 POWDER TOPICAL at 09:01

## 2025-05-18 RX ADMIN — MAGNESIUM HYDROXIDE 60 ML: 1200 LIQUID ORAL at 09:00

## 2025-05-18 RX ADMIN — FUROSEMIDE 40 MG: 10 INJECTION INTRAMUSCULAR; INTRAVENOUS at 03:28

## 2025-05-18 RX ADMIN — TRIAMCINOLONE ACETONIDE: 1 CREAM TOPICAL at 21:00

## 2025-05-18 RX ADMIN — METOPROLOL TARTRATE 25 MG: 25 TABLET, FILM COATED ORAL at 20:38

## 2025-05-18 RX ADMIN — INSULIN LISPRO 6 UNITS: 100 INJECTION, SOLUTION INTRAVENOUS; SUBCUTANEOUS at 09:04

## 2025-05-18 RX ADMIN — INSULIN LISPRO 6 UNITS: 100 INJECTION, SOLUTION INTRAVENOUS; SUBCUTANEOUS at 12:13

## 2025-05-18 RX ADMIN — ASPIRIN 81 MG: 81 TABLET, COATED ORAL at 09:00

## 2025-05-18 RX ADMIN — FUROSEMIDE 40 MG: 10 INJECTION INTRAMUSCULAR; INTRAVENOUS at 09:54

## 2025-05-18 RX ADMIN — INSULIN GLARGINE 30 UNITS: 100 INJECTION, SOLUTION SUBCUTANEOUS at 20:38

## 2025-05-18 RX ADMIN — EMPAGLIFLOZIN 10 MG: 10 TABLET, FILM COATED ORAL at 09:04

## 2025-05-18 RX ADMIN — CARBOXYMETHYLCELLULOSE SODIUM 1 DROP: 0.5 SOLUTION/ DROPS OPHTHALMIC at 09:12

## 2025-05-18 RX ADMIN — TRIAMCINOLONE ACETONIDE: 1 CREAM TOPICAL at 09:01

## 2025-05-18 SDOH — HEALTH STABILITY: PHYSICAL HEALTH: ON AVERAGE, HOW MANY MINUTES DO YOU ENGAGE IN EXERCISE AT THIS LEVEL?: 0 MIN

## 2025-05-18 SDOH — SOCIAL STABILITY: SOCIAL INSECURITY: WITHIN THE LAST YEAR, HAVE YOU BEEN HUMILIATED OR EMOTIONALLY ABUSED IN OTHER WAYS BY YOUR PARTNER OR EX-PARTNER?: NO

## 2025-05-18 SDOH — SOCIAL STABILITY: SOCIAL INSECURITY: DO YOU FEEL UNSAFE GOING BACK TO THE PLACE WHERE YOU ARE LIVING?: NO

## 2025-05-18 SDOH — SOCIAL STABILITY: SOCIAL INSECURITY: DOES ANYONE TRY TO KEEP YOU FROM HAVING/CONTACTING OTHER FRIENDS OR DOING THINGS OUTSIDE YOUR HOME?: NO

## 2025-05-18 SDOH — SOCIAL STABILITY: SOCIAL INSECURITY: DO YOU FEEL ANYONE HAS EXPLOITED OR TAKEN ADVANTAGE OF YOU FINANCIALLY OR OF YOUR PERSONAL PROPERTY?: NO

## 2025-05-18 SDOH — ECONOMIC STABILITY: FOOD INSECURITY: WITHIN THE PAST 12 MONTHS, THE FOOD YOU BOUGHT JUST DIDN'T LAST AND YOU DIDN'T HAVE MONEY TO GET MORE.: NEVER TRUE

## 2025-05-18 SDOH — ECONOMIC STABILITY: HOUSING INSECURITY: IN THE PAST 12 MONTHS, HOW MANY TIMES HAVE YOU MOVED WHERE YOU WERE LIVING?: 0

## 2025-05-18 SDOH — ECONOMIC STABILITY: HOUSING INSECURITY: AT ANY TIME IN THE PAST 12 MONTHS, WERE YOU HOMELESS OR LIVING IN A SHELTER (INCLUDING NOW)?: NO

## 2025-05-18 SDOH — ECONOMIC STABILITY: HOUSING INSECURITY: IN THE LAST 12 MONTHS, WAS THERE A TIME WHEN YOU WERE NOT ABLE TO PAY THE MORTGAGE OR RENT ON TIME?: NO

## 2025-05-18 SDOH — SOCIAL STABILITY: SOCIAL INSECURITY: WITHIN THE LAST YEAR, HAVE YOU BEEN AFRAID OF YOUR PARTNER OR EX-PARTNER?: NO

## 2025-05-18 SDOH — ECONOMIC STABILITY: FOOD INSECURITY: WITHIN THE PAST 12 MONTHS, YOU WORRIED THAT YOUR FOOD WOULD RUN OUT BEFORE YOU GOT THE MONEY TO BUY MORE.: NEVER TRUE

## 2025-05-18 SDOH — HEALTH STABILITY: PHYSICAL HEALTH: ON AVERAGE, HOW MANY DAYS PER WEEK DO YOU ENGAGE IN MODERATE TO STRENUOUS EXERCISE (LIKE A BRISK WALK)?: 0 DAYS

## 2025-05-18 SDOH — ECONOMIC STABILITY: INCOME INSECURITY: IN THE PAST 12 MONTHS HAS THE ELECTRIC, GAS, OIL, OR WATER COMPANY THREATENED TO SHUT OFF SERVICES IN YOUR HOME?: NO

## 2025-05-18 SDOH — ECONOMIC STABILITY: FOOD INSECURITY: HOW HARD IS IT FOR YOU TO PAY FOR THE VERY BASICS LIKE FOOD, HOUSING, MEDICAL CARE, AND HEATING?: NOT VERY HARD

## 2025-05-18 SDOH — SOCIAL STABILITY: SOCIAL INSECURITY: ABUSE: ADULT

## 2025-05-18 SDOH — SOCIAL STABILITY: SOCIAL INSECURITY: ARE YOU OR HAVE YOU BEEN THREATENED OR ABUSED PHYSICALLY, EMOTIONALLY, OR SEXUALLY BY ANYONE?: NO

## 2025-05-18 SDOH — ECONOMIC STABILITY: TRANSPORTATION INSECURITY: IN THE PAST 12 MONTHS, HAS LACK OF TRANSPORTATION KEPT YOU FROM MEDICAL APPOINTMENTS OR FROM GETTING MEDICATIONS?: NO

## 2025-05-18 SDOH — SOCIAL STABILITY: SOCIAL INSECURITY: HAS ANYONE EVER THREATENED TO HURT YOUR FAMILY OR YOUR PETS?: NO

## 2025-05-18 SDOH — SOCIAL STABILITY: SOCIAL INSECURITY: HAVE YOU HAD ANY THOUGHTS OF HARMING ANYONE ELSE?: NO

## 2025-05-18 SDOH — SOCIAL STABILITY: SOCIAL INSECURITY: ARE THERE ANY APPARENT SIGNS OF INJURIES/BEHAVIORS THAT COULD BE RELATED TO ABUSE/NEGLECT?: NO

## 2025-05-18 SDOH — SOCIAL STABILITY: SOCIAL INSECURITY: HAVE YOU HAD THOUGHTS OF HARMING ANYONE ELSE?: NO

## 2025-05-18 ASSESSMENT — COGNITIVE AND FUNCTIONAL STATUS - GENERAL
CLIMB 3 TO 5 STEPS WITH RAILING: A LOT
CLIMB 3 TO 5 STEPS WITH RAILING: TOTAL
DRESSING REGULAR LOWER BODY CLOTHING: A LITTLE
DRESSING REGULAR LOWER BODY CLOTHING: A LITTLE
CLIMB 3 TO 5 STEPS WITH RAILING: TOTAL
WALKING IN HOSPITAL ROOM: A LOT
HELP NEEDED FOR BATHING: A LITTLE
MOVING TO AND FROM BED TO CHAIR: A LITTLE
PATIENT BASELINE BEDBOUND: NO
STANDING UP FROM CHAIR USING ARMS: A LITTLE
DRESSING REGULAR UPPER BODY CLOTHING: A LITTLE
DRESSING REGULAR UPPER BODY CLOTHING: A LITTLE
PERSONAL GROOMING: A LITTLE
HELP NEEDED FOR BATHING: A LITTLE
MOVING FROM LYING ON BACK TO SITTING ON SIDE OF FLAT BED WITH BEDRAILS: A LITTLE
TURNING FROM BACK TO SIDE WHILE IN FLAT BAD: A LITTLE
MOBILITY SCORE: 16
MOVING TO AND FROM BED TO CHAIR: A LITTLE
TOILETING: A LITTLE
WALKING IN HOSPITAL ROOM: A LITTLE
PERSONAL GROOMING: A LITTLE
MOVING FROM LYING ON BACK TO SITTING ON SIDE OF FLAT BED WITH BEDRAILS: A LITTLE
TURNING FROM BACK TO SIDE WHILE IN FLAT BAD: A LITTLE
STANDING UP FROM CHAIR USING ARMS: A LITTLE
TOILETING: A LITTLE
STANDING UP FROM CHAIR USING ARMS: A LITTLE
MOBILITY SCORE: 15
MOVING TO AND FROM BED TO CHAIR: A LITTLE
TURNING FROM BACK TO SIDE WHILE IN FLAT BAD: A LITTLE
DAILY ACTIVITIY SCORE: 19
MOVING FROM LYING ON BACK TO SITTING ON SIDE OF FLAT BED WITH BEDRAILS: A LITTLE
WALKING IN HOSPITAL ROOM: A LOT

## 2025-05-18 ASSESSMENT — LIFESTYLE VARIABLES
SKIP TO QUESTIONS 9-10: 1
HOW MANY STANDARD DRINKS CONTAINING ALCOHOL DO YOU HAVE ON A TYPICAL DAY: PATIENT DOES NOT DRINK
AUDIT-C TOTAL SCORE: 0
HOW OFTEN DO YOU HAVE 6 OR MORE DRINKS ON ONE OCCASION: NEVER
AUDIT-C TOTAL SCORE: 0
HOW OFTEN DO YOU HAVE A DRINK CONTAINING ALCOHOL: NEVER

## 2025-05-18 ASSESSMENT — ACTIVITIES OF DAILY LIVING (ADL)
FEEDING YOURSELF: INDEPENDENT
BATHING: INDEPENDENT
ADL_ASSISTANCE: INDEPENDENT
TOILETING: NEEDS ASSISTANCE
GROOMING: INDEPENDENT
LACK_OF_TRANSPORTATION: NO
HEARING - LEFT EAR: DIFFICULTY WITH NOISE
LACK_OF_TRANSPORTATION: NO
JUDGMENT_ADEQUATE_SAFELY_COMPLETE_DAILY_ACTIVITIES: YES
HEARING - RIGHT EAR: DEAF
DRESSING YOURSELF: INDEPENDENT
PATIENT'S MEMORY ADEQUATE TO SAFELY COMPLETE DAILY ACTIVITIES?: YES
ADEQUATE_TO_COMPLETE_ADL: YES
ASSISTIVE_DEVICE: WALKER
WALKS IN HOME: INDEPENDENT

## 2025-05-18 ASSESSMENT — PAIN - FUNCTIONAL ASSESSMENT
PAIN_FUNCTIONAL_ASSESSMENT: 0-10

## 2025-05-18 ASSESSMENT — PAIN SCALES - GENERAL
PAINLEVEL_OUTOF10: 0 - NO PAIN

## 2025-05-18 ASSESSMENT — PAIN SCALES - WONG BAKER: WONGBAKER_NUMERICALRESPONSE: NO HURT

## 2025-05-18 NOTE — ASSESSMENT & PLAN NOTE
Acute on chronic systolic congestive heart failure  COPD exacerbation  Acute on chronic respiratory failure  Acute on chronic renal failure  Normocytic anemia of chronic disease and kidney disease  Supratherapeutic INR  Paroxysmal A-fib    Plan: Continue current medicine continue diuretic permitted input output and daily weight.  Monitor pulse ox.  Consult cardiology.  Monitor electrolytes and replete as needed.  We will take DVT, fall, aspiration, decubitus, and DVT precautions.  This has been discussed with the patient and is agreeable to it.

## 2025-05-18 NOTE — PROGRESS NOTES
Physical Therapy    Physical Therapy Evaluation & Treatment    Patient Name: Navneet Thompson  MRN: [id number]  Department: [address]  Room: [facility]  Today's Date: 5/18/2025   Time Calculation  Start Time: 0831  Stop Time: 0851  Time Calculation (min): 20 min    Assessment/Plan   PT Assessment  PT Assessment Results: Decreased strength, Decreased endurance, Impaired balance, Decreased mobility, Impaired judgement, Decreased safety awareness  Rehab Prognosis: Good  Barriers to Discharge Home: Caregiver assistance, Physical needs  Caregiver Assistance: Patient lives alone and/or does not have reliable caregiver assistance  Physical Needs: Stair navigation into home limited by function/safety, Stair navigation to access bed limited by function/safety, Stair navigation to access bath limited by function/safety  Evaluation/Treatment Tolerance: Patient tolerated treatment well  Medical Staff Made Aware: Yes  Strengths: Ability to acquire knowledge, Support of extended family/friends  Barriers to Participation: Comorbidities  End of Session Communication: Bedside nurse  Assessment Comment: pt demonstrates impaired functional mobility from baseline level; pt with BLE weakness, impaired balance, and decreased overall activity tolerance; would benefit from 24 hr supervision/assist upon d/c.  End of Session Patient Position: Up in chair, Alarm on   IP OR SWING BED PT PLAN  Inpatient or Swing Bed: Inpatient  PT Plan  Treatment/Interventions: Bed mobility, Transfer training, Gait training, Stair training, Balance training, Neuromuscular re-education, Strengthening, Endurance training, Therapeutic exercise, Therapeutic activity  PT Plan: Ongoing PT  PT Frequency: 4 times per week  PT Discharge Recommendations: Moderate intensity level of continued care  Equipment Recommended upon Discharge: Wheeled walker  PT Recommended Transfer Status: Assist x1, Assistive device  PT - OK to Discharge: Yes    Subjective     PT Visit Info:  PT  Received On: 05/18/25  General Visit Information:  General  Reason for Referral: impaired functional mobility d/t PATRICK  Referred By: Dr. Júnior MD  Past Medical History Relevant to Rehab: Afib, BCC, pacemaker, CHF, COPD, HTN, HLD, aortic valve stenosis, psoriatic arthritis, PAVR, CKD, DM, DVT.  Family/Caregiver Present: No  Prior to Session Communication: Bedside nurse  Patient Position Received: Bed, 3 rail up, Alarm on  Preferred Learning Style: verbal  General Comment: Pt is an 89 year-old M admitted from home with c/o SOB; CXR (+) edema and CHF exacerbation.  Home Living:  Home Living  Type of Home: House  Lives With: Alone  Home Adaptive Equipment: Walker rolling or standard (rollator)  Home Layout: Multi-level, Laundry in basement, Stairs to alternate level with rails, Bed/bath upstairs  Alternate Level Stairs-Rails: Both  Alternate Level Stairs-Number of Steps: 6 up to bedroom/bathroom, 4 down to laundry  Home Access: Stairs to enter with rails  Entrance Stairs-Rails:  (unilateral)  Entrance Stairs-Number of Steps: 1  Bathroom Shower/Tub: Tub/shower unit  Bathroom Toilet: Standard  Bathroom Equipment: Grab bars in shower, Shower chair with back  Bathroom Accessibility: full bath on second floor  Prior Level of Function:  Prior Function Per Pt/Caregiver Report  Level of [place]: Independent with ADLs and functional transfers, Needs assistance with homemaking  Receives Help From: Family (daughter local, checks in daily)  ADL Assistance: Independent  Homemaking Assistance: Needs assistance (assist from daughter for laundry, shopping, cooking, driving)  Driving/Transportation: Family/Friend  Ambulatory Assistance: Independent (mod indep with rollator)  Prior Function Comments: denies h/o falls  Precautions:  Precautions  Hearing/Visual Limitations: glasses; deaf R ear  Medical Precautions: Fall precautions, Oxygen therapy device and L/min (2L O2 via NC)     Date/Time Vitals Session Patient Position Pulse  Resp SpO2 BP MAP (mmHg)    05/18/25 0812 --  --  71  19  93 %  123/54  71           Objective   Pain:  Pain Assessment  Pain Assessment: 0-10  0-10 (Numeric) Pain Score: 0 - No pain  Cognition:  Cognition  Overall Cognitive Status: Within Functional Limits  Orientation Level: Oriented X4  Safety Judgment: Decreased awareness of need for safety precautions  Insight: Mild    General Assessments:  General Observation  General Observation: cooperative; (+) male [place]     Activity Tolerance  Endurance: Tolerates less than 10 min exercise, no significant change in vital signs  Activity Tolerance Comments: fair-  Rate of Perceived Exertion (RPE): 6/10    Sensation  Light Touch: Partial deficits in the RUE, Partial deficits in the LUE  Sensation Comment: pt reports chronic paresthesias B hands    Strength  Strength Comments: BLE > 3+/5  Strength  Strength Comments: BLE > 3+/5    Coordination  Movements are Fluid and Coordinated: Yes    Postural Control  Postural Control: Within Functional Limits    Static Sitting Balance  Static Sitting-Balance Support: Feet supported, Bilateral upper extremity supported  Static Sitting-Level of Assistance: Close supervision    Static Standing Balance  Static Standing-Balance Support: Bilateral upper extremity supported  Static Standing-Level of Assistance: Minimum assistance  Static Standing-Comment/Number of Minutes: use of BUE on RW  Functional Assessments:     Bed Mobility  Bed Mobility: Yes  Bed Mobility 1  Bed Mobility 1: Supine to sitting  Level of Assistance 1: Minimum assistance  Bed Mobility Comments 1: assist for safety, cueing for sequencing; use of bedrails, HOB elevated    Transfers  Transfer: Yes  Transfer 1  Transfer From 1: Sit to, Stand to  Transfer to 1: Sit, Stand  Technique 1: Sit to stand, Stand to sit  Transfer Device 1: Walker  Transfer Level of Assistance 1: Minimum assistance  Trials/Comments 1: assist for balance; cueing for sequencing with limited carryover  demonstrated    Ambulation/Gait Training  Ambulation/Gait Training Performed: Yes  Ambulation/Gait Training 1  Surface 1: Level tile  Device 1: Rolling walker  Assistance 1: Minimum assistance  Quality of Gait 1: Decreased step length, Forward flexed posture (reciprocating pattern)  Comments/Distance (ft) 1: 3-4 steps to bedside chair; distance limited d/t pt declined further mobility    Stairs  Stairs: No    Extremity/Trunk Assessments:  RLE   RLE : Within Functional Limits  LLE   LLE : Within Functional Limits  Treatments:  Bed Mobility  Bed Mobility: Yes  Bed Mobility 1  Bed Mobility 1: Supine to sitting  Level of Assistance 1: Minimum assistance  Bed Mobility Comments 1: assist for safety, cueing for sequencing; use of bedrails, HOB elevated    Ambulation/Gait Training  Ambulation/Gait Training Performed: Yes  Ambulation/Gait Training 1  Surface 1: Level tile  Device 1: Rolling walker  Assistance 1: Minimum assistance  Quality of Gait 1: Decreased step length, Forward flexed posture (reciprocating pattern)  Comments/Distance (ft) 1: 3-4 steps to bedside chair; distance limited d/t pt declined further mobility  Transfers  Transfer: Yes  Transfer 1  Transfer From 1: Sit to, Stand to  Transfer to 1: Sit, Stand  Technique 1: Sit to stand, Stand to sit  Transfer Device 1: Walker  Transfer Level of Assistance 1: Minimum assistance  Trials/Comments 1: assist for balance; cueing for sequencing with limited carryover demonstrated  Outcome Measures:  [facility] Basic Mobility  Turning from your back to your side while in a flat bed without using bedrails: A little  Moving from lying on your back to sitting on the side of a flat bed without using bedrails: A little  Moving to and from bed to chair (including a wheelchair): A little  Standing up from a chair using your arms (e.g. wheelchair or bedside chair): A little  To walk in hospital room: A little  Climbing 3-5 steps with railing: Total  Basic Mobility - Total Score:  16    Encounter Problems       Encounter Problems (Active)       PT STG Problem       Patient will transfer supine to sit and sit to supine with independent assist to facilitate mobility. (Progressing)       Start:  05/18/25    Expected End:  06/18/25            Patient will transfer sit to stand and stand to sit with independent assist to facilitate mobility. (Progressing)       Start:  05/18/25    Expected End:  06/18/25            Patient will amb 50 feet rolling walker device including two turns on even surface with independent assist to facilitate safe mobility.   (Progressing)       Start:  05/18/25    Expected End:  06/18/25            Patient will negotiate 6 stairs with two rail(s) and supervision} assist with no device for in home and community. (Not Progressing)       Start:  05/18/25    Expected End:  06/18/25                   Education Documentation  Precautions, taught by Mignon Mendoza PT at 5/18/2025  9:54 AM.  Learner: Patient  Readiness: Acceptance  Method: Explanation, Demonstration  Response: Needs Reinforcement  Comment: educated on PT POC and safety/sequencing during functional mobility training    Mobility Training, taught by Mignon Mendoza PT at 5/18/2025  9:54 AM.  Learner: Patient  Readiness: Acceptance  Method: Explanation, Demonstration  Response: Needs Reinforcement  Comment: educated on PT POC and safety/sequencing during functional mobility training    Education Comments  No comments found.

## 2025-05-18 NOTE — CONSULTS
Pulmonary Consultation Note   Subjective    Navneet Thompson is a 89 y.o. year old male patient known with CHF, cor pulmonale, cardiomyopathy, hyperlipidemia, hypertension, TAVR, CKD 3, A-fib admitted on 5/17/2025 with following shortness of breath pulmonary was consulted for COPD    History of Present Illness:  Patient states about 2 weeks ago he started to become increasingly short of breath and then yesterday he started breathing a lot faster and was concerned so he came into the ED. he denies history of COPD states that he smoked about 50 years ago does not use home inhalers has not followed with a pulmonologist in the past.  Complains of orthopnea stated that his cardiologist wanted him to lose 15 pounds states he lost 10 pounds and within a week he gained 5 pounds back.  He remains on 2 L nasal cannula without any conversational dyspnea or increased work of breathing states that his shortness of breath has improved.  Chest x-ray with concerns of vascular congestion and right sided pleural effusion. Patient was admitted from 1/28/2025 to 2/1/2025 with similar symptoms cardiology recommended furosemide 60 mg twice daily and began spironolactone 12.5 mg daily to increase diuretic effect    Past Medical History  He has a past medical history of Aortic stenosis, Atherosclerotic heart disease of native coronary artery without angina pectoris (12/12/2022), Atrial fibrillation (Multi), Chronic systolic heart failure, Complete heart block, Diabetes mellitus, type 2 (Multi), Essential (primary) hypertension (12/12/2022), and Presence of automatic (implantable) cardiac defibrillator (11/14/2022).    Surgical History  He has a past surgical history that includes Appendectomy (12/01/2014); Coronary artery bypass graft (12/01/2014); Other surgical history (12/01/2014); CT angio abdomen pelvis w and or wo IV IV contrast (03/17/2020); Aortic valve replacement (04/07/2022); and Cardiac electrophysiology procedure (N/A,  12/19/2023).     Social History  He reports that he has never smoked. He has never been exposed to tobacco smoke. He has never used smokeless tobacco. He reports current alcohol use. He reports that he does not use drugs.  Cigarette smoking history: States he quit smoking about 50 years ago his smoking use to include cigarettes about 1 pack a week  Marijuana use:  Vaping:  Pets:   Asbestos:  Other specific exposure:      Family History  Family History[1]     Allergies  Linagliptin, Dicloxacillin, Doxycycline, and Scopolamine    Review of Systems         Meds    Scheduled medications  Scheduled Medications[2]  Continuous medications  Continuous Medications[3]  PRN medications  PRN Medications[4]     Objective      Last Recorded Vitals  /54 (BP Location: Left arm, Patient Position: Lying)   Pulse 71   Temp 36.4 °C (97.5 °F) (Oral)   Resp 19   Wt 79.8 kg (176 lb)   SpO2 93%     Blood pressure 123/54, pulse 71, temperature 36.4 °C (97.5 °F), temperature source Oral, resp. rate 19, height 1.829 m (6'), weight 79.8 kg (176 lb), SpO2 93%.   Physical Exam   GENERAL: normal appearance. well nourished. No respiratory distress redness to eyes  HEAD/SINUSES: 2 L nasal cannula  LUNGS: Crackles in the lung bases bilaterally  CARDIAC: normal S1 and S2; no gallops, rubs or murmurs. Regular rate and rhythm  EXTREMITIES: 1+ pitting edema bilateral lower legs  NEURO: grossly normal mental status, CN reflexes and motor strength.   SKIN: Skin turgor normal. No rashes or lesions.   PSYCH: Normal affect    Intake/Output Summary (Last 24 hours) at 5/18/2025 1012  Last data filed at 5/18/2025 0500  Gross per 24 hour   Intake --   Output 450 ml   Net -450 ml     Labs:   Results from last 72 hours   Lab Units 05/18/25  0631 05/17/25  1308   SODIUM mmol/L 142 138   POTASSIUM mmol/L 3.5 4.0   CHLORIDE mmol/L 106 103   CO2 mmol/L 28 28   BUN mg/dL 33* 37*   CREATININE mg/dL 1.23 1.28   GLUCOSE mg/dL 119* 240*   CALCIUM mg/dL 8.8 9.0    ANION GAP mmol/L 12 11   EGFR mL/min/1.73m*2 55* 53*      Results from last 72 hours   Lab Units 05/18/25  0631 05/17/25  1308   WBC AUTO x10*3/uL 8.6 8.1   HEMOGLOBIN g/dL 10.4* 10.5*   HEMATOCRIT % 33.2* 35.5*   PLATELETS AUTO x10*3/uL 198 188   NEUTROS PCT AUTO %  --  66.5   LYMPHS PCT AUTO %  --  19.8   MONOS PCT AUTO %  --  10.4   EOS PCT AUTO %  --  2.7               Micro/ID:   Lab Results   Component Value Date    URINECULTURE SEE NOTE (A) 03/17/2025    BLOODCULT No growth at 4 days -  FINAL REPORT 01/26/2025    BLOODCULT No growth at 4 days -  FINAL REPORT 01/26/2025     Summary of key imaging results from the last 24 hours  CXR - Stable to slight interval worsening of bibasilar atelectasis or  infiltrate and right pleural effusion. Recommend clinical correlation  and follow-up to document resolution. Given persistent cardiomegaly,  CHF/edema not excluded  TTE -  1. Left ventricular ejection fraction is severely decreased, by visual estimate at 25-30%.   2. There is global hypokinesis of the left ventricle with minor regional variations.   3. Spectral Doppler shows a a Grade III (restrictive pattern) of left ventricular diastolic filling with an elevated left atrial pressure.   4. Left ventricular cavity size is mildly dilated.   5. There is normal right ventricular global systolic function.   6. The left atrial size is moderate to severely dilated.   7. There is a moderate pleural effusion.   8. Moderate mitral valve regurgitation.   9. Moderately elevated right ventricular systolic pressure.  10. Verna; TAVR function    Impression   Navneet Thompson is a 89 y.o. year old male patient is being seen by the pulmonary service for   Volume overload  Shortness of breath  Cor pulmonale  Supratherapeutic INR  Acute on chronic systolic and diastolic heart failure    Recommendations   As follows:  Optimize diuresis  Hold Coumadin due to supratherapeutic INR  SCDs  Bronchopulmonary hygiene with incentive  spirometry  Wean supplemental oxygen as tolerated maintain pulse oximetry greater than 90%  If patient requires more than 2 L nasal cannula or has increased work of breathing would recommend getting a CT chest  We were consulted for COPD patient is not in acute COPD exacerbation, shortness of breath related to volume overload.    Pulmonary will follow peripherally while in house please reach out with any further questions or concerns    Bozena Sparks, MINOR-CNP   05/18/25 at 10:12 AM     -Only the Medical problems listed under impression were addressed today.   -Please contact primary team for all other concerns and medical problem  -Thank you for your consult     Disclaimer: Documentation completed with the information available at the time of input. Parts of this note may have been scribed or generated using voice dictation software, Dragon.  Homophonic errors may exist.  Please contact me directly if clarification is needed. The times in the chart may not be reflective of actual patient care times, interventions, or procedures. Documentation occurs after the physical care of the patient.         [1]   Family History  Problem Relation Name Age of Onset    Drug abuse Mother          OVERDOSE   [2] aspirin, 81 mg, oral, Daily  atorvastatin, 80 mg, oral, Nightly  cholecalciferol, 25 mcg, oral, Daily  empagliflozin, 10 mg, oral, Daily  esomeprazole, 20 mg, nasogastric tube, Daily before breakfast  furosemide, 40 mg, intravenous, q8h  furosemide, 80 mg, oral, Daily  insulin glargine, 30 Units, subcutaneous, Nightly  insulin lispro, 0-15 Units, subcutaneous, TID AC  insulin lispro, 6 Units, subcutaneous, TID AC  lisinopril, 5 mg, oral, Daily  magnesium hydroxide, 60 mL, oral, Daily  magnesium oxide, 400 mg of magnesium oxide, oral, Daily  metoprolol tartrate, 25 mg, oral, BID  neomycin-bacitracin-polymyxin, 0.5 inch, Both Eyes, Nightly  nystatin, 1 Application, Topical, BID  potassium chloride CR, 20 mEq, oral,  Daily  spironolactone, 12.5 mg, oral, Daily  triamcinolone, , Topical, BID  warfarin, 3 mg, oral, Daily  [3]    [4] PRN medications: acetaminophen **OR** acetaminophen **OR** acetaminophen, albuterol, benzocaine-menthol, dextromethorphan-guaifenesin, dextrose, dextrose, glucagon, glucagon, guaiFENesin, lubricating eye drops, polyethylene glycol

## 2025-05-18 NOTE — PROGRESS NOTES
Navneet Thompson is a 89 y.o. male on day 1 of admission presenting with Dyspnea on exertion.    Subjective   Patient was seen and examined.  Lying in bed.  Comfortable.  Not in acute distress.  The patient denies any headache or dizziness.  No nausea or vomiting.  No fever chills or rigor.  No cough or expectoration.  No diarrhea, dysuria, hematuria frequency.  Feeling slightly better as compared to yesterday.       Objective     Physical Exam  HEENT:  Head externally atraumatic,  extraocular movements intact, oral mucosa moist  Neck:  Supple, JVP elevated, no palpable adenopathy or thyromegaly.  No carotid bruit.  Chest: Occasional rales and decreased breath sounds.  Heart:  Regular rate and rhythm, no murmur or gallop could be appreciated.  Abdomen:  Soft, nontender, bowel sounds present, normoactive, no palpable hepatosplenomegaly.  Extremities: Mild edema, pulses present, no cyanosis or clubbing.  CNS:  Patient alert, oriented to time, place and person.    No new deficit.  Cranial nerves 2-12 grossly intact  Skin:  No active rash.  Musculoskeletal:  No  apparent joint swelling or erythema, range of movement normal.  Last Recorded Vitals  Heart Rate:  [70-73]   Temp:  [36.4 °C (97.5 °F)-36.6 °C (97.9 °F)]   Resp:  [18-25]   BP: (102-138)/(54-75)   Height:  [182.9 cm (6')]   Weight:  [79.8 kg (176 lb)]   SpO2:  [93 %-99 %]     Intake/Output last 3 Shifts:  I/O last 3 completed shifts:  In: - (0 mL/kg)   Out: 450 (5.6 mL/kg) [Urine:450 (0.2 mL/kg/hr)]  Weight: 79.8 kg     Relevant Results  No results found for the last 90 days.    Results for orders placed or performed during the hospital encounter of 05/17/25 (from the past 24 hours)   POCT GLUCOSE   Result Value Ref Range    POCT Glucose 185 (H) 74 - 99 mg/dL   Urinalysis with Reflex Microscopic   Result Value Ref Range    Color, Urine Light-Yellow Light-Yellow, Yellow, Dark-Yellow    Appearance, Urine Clear Clear    Specific Gravity, Urine 1.009 1.005 - 1.035     pH, Urine 7.0 5.0, 5.5, 6.0, 6.5, 7.0, 7.5, 8.0    Protein, Urine 100 (2+) (A) NEGATIVE, 10 (TRACE), 20 (TRACE) mg/dL    Glucose, Urine Normal Normal mg/dL    Blood, Urine NEGATIVE NEGATIVE mg/dL    Ketones, Urine NEGATIVE NEGATIVE mg/dL    Bilirubin, Urine NEGATIVE NEGATIVE mg/dL    Urobilinogen, Urine Normal Normal mg/dL    Nitrite, Urine NEGATIVE NEGATIVE    Leukocyte Esterase, Urine 25 Daniel/uL (A) NEGATIVE   Microscopic Only, Urine   Result Value Ref Range    WBC, Urine 11-20 (A) 1-5, NONE /HPF    RBC, Urine 3-5 NONE, 1-2, 3-5 /HPF   CBC   Result Value Ref Range    WBC 8.6 4.4 - 11.3 x10*3/uL    nRBC 0.0 0.0 - 0.0 /100 WBCs    RBC 4.11 (L) 4.50 - 5.90 x10*6/uL    Hemoglobin 10.4 (L) 13.5 - 17.5 g/dL    Hematocrit 33.2 (L) 41.0 - 52.0 %    MCV 81 80 - 100 fL    MCH 25.3 (L) 26.0 - 34.0 pg    MCHC 31.3 (L) 32.0 - 36.0 g/dL    RDW 16.6 (H) 11.5 - 14.5 %    Platelets 198 150 - 450 x10*3/uL   Comprehensive metabolic panel   Result Value Ref Range    Glucose 119 (H) 74 - 99 mg/dL    Sodium 142 136 - 145 mmol/L    Potassium 3.5 3.5 - 5.3 mmol/L    Chloride 106 98 - 107 mmol/L    Bicarbonate 28 21 - 32 mmol/L    Anion Gap 12 10 - 20 mmol/L    Urea Nitrogen 33 (H) 6 - 23 mg/dL    Creatinine 1.23 0.50 - 1.30 mg/dL    eGFR 55 (L) >60 mL/min/1.73m*2    Calcium 8.8 8.6 - 10.3 mg/dL    Albumin 3.0 (L) 3.4 - 5.0 g/dL    Alkaline Phosphatase 80 33 - 136 U/L    Total Protein 6.1 (L) 6.4 - 8.2 g/dL    AST 21 9 - 39 U/L    Bilirubin, Total 0.9 0.0 - 1.2 mg/dL    ALT 15 10 - 52 U/L   Protime-INR   Result Value Ref Range    Protime 44.5 (H) 9.3 - 12.7 seconds    INR 4.6 (H) 0.9 - 1.2   POCT GLUCOSE   Result Value Ref Range    POCT Glucose 124 (H) 74 - 99 mg/dL   POCT GLUCOSE   Result Value Ref Range    POCT Glucose 139 (H) 74 - 99 mg/dL   POCT GLUCOSE   Result Value Ref Range    POCT Glucose 97 74 - 99 mg/dL     *Note: Due to a large number of results and/or encounters for the requested time period, some results have not been  displayed. A complete set of results can be found in Results Review.      Current Medications[1]   Assessment/Plan   Assessment & Plan  Dyspnea on exertion  Acute on chronic systolic congestive heart failure  COPD exacerbation  Acute on chronic respiratory failure  Acute on chronic renal failure  Normocytic anemia of chronic disease and kidney disease  Supratherapeutic INR  Paroxysmal A-fib    Plan: Continue current medicine continue diuretic permitted input output and daily weight.  Monitor pulse ox.  Consult cardiology.  Monitor electrolytes and replete as needed.  We will take DVT, fall, aspiration, decubitus, and DVT precautions.  This has been discussed with the patient and is agreeable to it.             Buddy Callejas MD            [1]    Current Facility-Administered Medications:   •  acetaminophen (Tylenol) tablet 650 mg, 650 mg, oral, q4h PRN **OR** acetaminophen (Tylenol) oral liquid 650 mg, 650 mg, oral, q4h PRN **OR** acetaminophen (Tylenol) suppository 650 mg, 650 mg, rectal, q4h PRN, Buddy Callejas MD  •  albuterol 2.5 mg /3 mL (0.083 %) nebulizer solution 2.5 mg, 2.5 mg, nebulization, q6h PRN, Buddy Callejas MD  •  aspirin EC tablet 81 mg, 81 mg, oral, Daily, Buddy Callejas MD, 81 mg at 05/18/25 0900  •  atorvastatin (Lipitor) tablet 80 mg, 80 mg, oral, Nightly, Buddy Callejas MD, 80 mg at 05/17/25 2113  •  benzocaine-menthol (Cepastat Sore Throat) lozenge 1 lozenge, 1 lozenge, Mouth/Throat, q2h PRN, Buddy Callejas MD  •  cholecalciferol (Vitamin D-3) tablet 25 mcg, 25 mcg, oral, Daily, Buddy Callejas MD, 25 mcg at 05/18/25 0900  •  dextromethorphan-guaifenesin (Robitussin DM)  mg/5 mL oral liquid 5 mL, 5 mL, oral, q4h PRN, Buddy Callejas MD  •  dextrose 50 % injection 12.5 g, 12.5 g, intravenous, q15 min PRN, Buddy Callejas MD  •  dextrose 50 % injection 25 g, 25 g, intravenous, q15 min PRN, Buddy Callejas MD  •  empagliflozin (Jardiance)  tablet 10 mg, 10 mg, oral, Daily, Buddy Callejas MD, 10 mg at 05/18/25 0904  •  esomeprazole (NexIUM) suspension 20 mg, 20 mg, nasogastric tube, Daily before breakfast, Buddy Callejas MD  •  furosemide (Lasix) injection 40 mg, 40 mg, intravenous, q8h, Buddy Callejas MD, 40 mg at 05/18/25 0954  •  furosemide (Lasix) tablet 80 mg, 80 mg, oral, Daily, Buddy Callejas MD  •  glucagon (Glucagen) injection 1 mg, 1 mg, intramuscular, q15 min PRN, Buddy Callejas MD  •  glucagon (Glucagen) injection 1 mg, 1 mg, intramuscular, q15 min PRN, Buddy Callejas MD  •  guaiFENesin (Mucinex) 12 hr tablet 600 mg, 600 mg, oral, q12h PRN, Buddy Callejas MD  •  insulin glargine (Lantus) injection 30 Units, 30 Units, subcutaneous, Nightly, Buddy Callejas MD, 30 Units at 05/17/25 2126  •  insulin lispro injection 0-15 Units, 0-15 Units, subcutaneous, TID AC, Buddy Callejas MD  •  insulin lispro injection 6 Units, 6 Units, subcutaneous, TID AC, Buddy Callejas MD, 6 Units at 05/18/25 1213  •  lisinopril tablet 5 mg, 5 mg, oral, Daily, Buddy Callejas MD, 5 mg at 05/18/25 0900  •  lubricating eye drops ophthalmic solution 1 drop, 1 drop, Both Eyes, BID PRN, Buddy Callejas MD, 1 drop at 05/18/25 0912  •  magnesium hydroxide (Milk of Magnesia) 400 mg/5 mL suspension 60 mL, 60 mL, oral, Daily, Buddy Callejas MD, 60 mL at 05/18/25 0900  •  magnesium oxide (Mag-Ox) 400 mg (241.3 mg elemental) tablet 1 tablet, 400 mg of magnesium oxide, oral, Daily, Buddy Callejas MD, 1 tablet at 05/18/25 0900  •  metoprolol tartrate (Lopressor) tablet 25 mg, 25 mg, oral, BID, Buddy Callejas MD, 25 mg at 05/18/25 0900  •  neomycin-bacitracin-polymyxin (Polysporin) ophthalmic ointment 0.5 inch, 0.5 inch, Both Eyes, Nightly, Buddy Callejas MD, 0.5 inch at 05/17/25 2333  •  nystatin (Mycostatin) 100,000 unit/gram powder 1 Application, 1 Application, Topical, BID, Buddy Callejas MD, 1  Application at 05/18/25 0901  •  polyethylene glycol (Glycolax, Miralax) packet 17 g, 17 g, oral, Daily PRN, Buddy Callejas MD  •  potassium chloride CR (Klor-Con M20) ER tablet 20 mEq, 20 mEq, oral, Daily, Buddy Callejas MD, 20 mEq at 05/18/25 0900  •  spironolactone (Aldactone) tablet 12.5 mg, 12.5 mg, oral, Daily, Buddy Callejas MD, 12.5 mg at 05/18/25 0900  •  triamcinolone (Kenalog) 0.1 % cream, , Topical, BID, Buddy Callejas MD, Given at 05/18/25 0901  •  warfarin (Coumadin) tablet 3 mg, 3 mg, oral, Daily, Buddy Callejas MD

## 2025-05-18 NOTE — CARE PLAN
The patient's goals for the shift include No Sob    The clinical goals for the shift include No SOB    O  \

## 2025-05-18 NOTE — CARE PLAN
The patient's goals for the shift include No Sob    The clinical goals for the shift include No SOB

## 2025-05-19 ENCOUNTER — TELEPHONE (OUTPATIENT)
Dept: CARDIOLOGY | Facility: CLINIC | Age: OVER 89
End: 2025-05-19
Payer: MEDICARE

## 2025-05-19 ENCOUNTER — APPOINTMENT (OUTPATIENT)
Dept: CARDIOLOGY | Facility: CLINIC | Age: OVER 89
End: 2025-05-19
Payer: MEDICARE

## 2025-05-19 LAB
ANION GAP SERPL CALCULATED.3IONS-SCNC: 10 MMOL/L (ref 10–20)
APPEARANCE UR: CLEAR
BASOPHILS # BLD AUTO: 0.05 X10*3/UL (ref 0–0.1)
BASOPHILS NFR BLD AUTO: 0.5 %
BILIRUB UR STRIP.AUTO-MCNC: NEGATIVE MG/DL
BUN SERPL-MCNC: 36 MG/DL (ref 6–23)
CALCIUM SERPL-MCNC: 9.1 MG/DL (ref 8.6–10.3)
CHLORIDE SERPL-SCNC: 104 MMOL/L (ref 98–107)
CO2 SERPL-SCNC: 32 MMOL/L (ref 21–32)
COLOR UR: ABNORMAL
CREAT SERPL-MCNC: 1.22 MG/DL (ref 0.5–1.3)
EGFRCR SERPLBLD CKD-EPI 2021: 56 ML/MIN/1.73M*2
EOSINOPHIL # BLD AUTO: 0.25 X10*3/UL (ref 0–0.4)
EOSINOPHIL NFR BLD AUTO: 2.7 %
ERYTHROCYTE [DISTWIDTH] IN BLOOD BY AUTOMATED COUNT: 16.5 % (ref 11.5–14.5)
GLUCOSE BLD MANUAL STRIP-MCNC: 118 MG/DL (ref 74–99)
GLUCOSE BLD MANUAL STRIP-MCNC: 125 MG/DL (ref 74–99)
GLUCOSE BLD MANUAL STRIP-MCNC: 133 MG/DL (ref 74–99)
GLUCOSE BLD MANUAL STRIP-MCNC: 145 MG/DL (ref 74–99)
GLUCOSE BLD MANUAL STRIP-MCNC: 161 MG/DL (ref 74–99)
GLUCOSE SERPL-MCNC: 112 MG/DL (ref 74–99)
GLUCOSE UR STRIP.AUTO-MCNC: ABNORMAL MG/DL
HCT VFR BLD AUTO: 35.7 % (ref 41–52)
HGB BLD-MCNC: 10.5 G/DL (ref 13.5–17.5)
IMM GRANULOCYTES # BLD AUTO: 0.03 X10*3/UL (ref 0–0.5)
IMM GRANULOCYTES NFR BLD AUTO: 0.3 % (ref 0–0.9)
INR PPP: 3 (ref 0.9–1.2)
KETONES UR STRIP.AUTO-MCNC: NEGATIVE MG/DL
LEUKOCYTE ESTERASE UR QL STRIP.AUTO: ABNORMAL
LYMPHOCYTES # BLD AUTO: 1.73 X10*3/UL (ref 0.8–3)
LYMPHOCYTES NFR BLD AUTO: 18.9 %
MCH RBC QN AUTO: 25 PG (ref 26–34)
MCHC RBC AUTO-ENTMCNC: 29.4 G/DL (ref 32–36)
MCV RBC AUTO: 85 FL (ref 80–100)
MONOCYTES # BLD AUTO: 1.08 X10*3/UL (ref 0.05–0.8)
MONOCYTES NFR BLD AUTO: 11.8 %
NEUTROPHILS # BLD AUTO: 5.99 X10*3/UL (ref 1.6–5.5)
NEUTROPHILS NFR BLD AUTO: 65.8 %
NITRITE UR QL STRIP.AUTO: NEGATIVE
NRBC BLD-RTO: 0 /100 WBCS (ref 0–0)
PH UR STRIP.AUTO: 8 [PH]
PLATELET # BLD AUTO: 198 X10*3/UL (ref 150–450)
POTASSIUM SERPL-SCNC: 3.9 MMOL/L (ref 3.5–5.3)
PROT UR STRIP.AUTO-MCNC: NEGATIVE MG/DL
PROTHROMBIN TIME: 29.9 SECONDS (ref 9.3–12.7)
RBC # BLD AUTO: 4.2 X10*6/UL (ref 4.5–5.9)
RBC # UR STRIP.AUTO: NEGATIVE MG/DL
RBC #/AREA URNS AUTO: ABNORMAL /HPF
SODIUM SERPL-SCNC: 142 MMOL/L (ref 136–145)
SP GR UR STRIP.AUTO: 1.01
UROBILINOGEN UR STRIP.AUTO-MCNC: NORMAL MG/DL
WBC # BLD AUTO: 9.1 X10*3/UL (ref 4.4–11.3)
WBC #/AREA URNS AUTO: ABNORMAL /HPF

## 2025-05-19 PROCEDURE — 2500000005 HC RX 250 GENERAL PHARMACY W/O HCPCS: Performed by: INTERNAL MEDICINE

## 2025-05-19 PROCEDURE — 2500000002 HC RX 250 W HCPCS SELF ADMINISTERED DRUGS (ALT 637 FOR MEDICARE OP, ALT 636 FOR OP/ED): Performed by: INTERNAL MEDICINE

## 2025-05-19 PROCEDURE — 2500000004 HC RX 250 GENERAL PHARMACY W/ HCPCS (ALT 636 FOR OP/ED): Mod: JZ

## 2025-05-19 PROCEDURE — 97166 OT EVAL MOD COMPLEX 45 MIN: CPT | Mod: GO

## 2025-05-19 PROCEDURE — 2500000004 HC RX 250 GENERAL PHARMACY W/ HCPCS (ALT 636 FOR OP/ED): Performed by: INTERNAL MEDICINE

## 2025-05-19 PROCEDURE — 2500000001 HC RX 250 WO HCPCS SELF ADMINISTERED DRUGS (ALT 637 FOR MEDICARE OP): Performed by: INTERNAL MEDICINE

## 2025-05-19 PROCEDURE — 97535 SELF CARE MNGMENT TRAINING: CPT | Mod: GO

## 2025-05-19 PROCEDURE — 81001 URINALYSIS AUTO W/SCOPE: CPT | Performed by: NURSE PRACTITIONER

## 2025-05-19 PROCEDURE — 82947 ASSAY GLUCOSE BLOOD QUANT: CPT

## 2025-05-19 PROCEDURE — 99222 1ST HOSP IP/OBS MODERATE 55: CPT

## 2025-05-19 PROCEDURE — 85610 PROTHROMBIN TIME: CPT | Performed by: INTERNAL MEDICINE

## 2025-05-19 PROCEDURE — 80048 BASIC METABOLIC PNL TOTAL CA: CPT | Performed by: INTERNAL MEDICINE

## 2025-05-19 PROCEDURE — 85025 COMPLETE CBC W/AUTO DIFF WBC: CPT | Performed by: INTERNAL MEDICINE

## 2025-05-19 PROCEDURE — 36415 COLL VENOUS BLD VENIPUNCTURE: CPT | Performed by: INTERNAL MEDICINE

## 2025-05-19 PROCEDURE — 1200000002 HC GENERAL ROOM WITH TELEMETRY DAILY

## 2025-05-19 PROCEDURE — 87086 URINE CULTURE/COLONY COUNT: CPT | Mod: WESLAB | Performed by: NURSE PRACTITIONER

## 2025-05-19 RX ORDER — FUROSEMIDE 10 MG/ML
40 INJECTION INTRAMUSCULAR; INTRAVENOUS
Status: COMPLETED | OUTPATIENT
Start: 2025-05-19 | End: 2025-05-19

## 2025-05-19 RX ADMIN — ATORVASTATIN CALCIUM 80 MG: 80 TABLET, FILM COATED ORAL at 21:09

## 2025-05-19 RX ADMIN — EMPAGLIFLOZIN 10 MG: 10 TABLET, FILM COATED ORAL at 08:51

## 2025-05-19 RX ADMIN — TRIAMCINOLONE ACETONIDE: 1 CREAM TOPICAL at 21:09

## 2025-05-19 RX ADMIN — Medication 1 TABLET: at 08:48

## 2025-05-19 RX ADMIN — POTASSIUM CHLORIDE 20 MEQ: 1500 TABLET, EXTENDED RELEASE ORAL at 08:48

## 2025-05-19 RX ADMIN — NEOMYCIN SULFATE, POLYMYXIN B SULFATE AND BACITRACIN ZINC 0.5 INCH: 3.5; 10000; 4 OINTMENT OPHTHALMIC at 21:08

## 2025-05-19 RX ADMIN — FUROSEMIDE 40 MG: 10 INJECTION, SOLUTION INTRAMUSCULAR; INTRAVENOUS at 16:41

## 2025-05-19 RX ADMIN — CHOLECALCIFEROL TAB 25 MCG (1000 UNIT) 25 MCG: 25 TAB at 08:48

## 2025-05-19 RX ADMIN — NYSTATIN 1 APPLICATION: 100000 POWDER TOPICAL at 21:08

## 2025-05-19 RX ADMIN — WARFARIN SODIUM 3 MG: 3 TABLET ORAL at 16:42

## 2025-05-19 RX ADMIN — INSULIN GLARGINE 30 UNITS: 100 INJECTION, SOLUTION SUBCUTANEOUS at 21:09

## 2025-05-19 RX ADMIN — INSULIN LISPRO 6 UNITS: 100 INJECTION, SOLUTION INTRAVENOUS; SUBCUTANEOUS at 08:49

## 2025-05-19 RX ADMIN — ASPIRIN 81 MG: 81 TABLET, COATED ORAL at 08:50

## 2025-05-19 RX ADMIN — INSULIN LISPRO 6 UNITS: 100 INJECTION, SOLUTION INTRAVENOUS; SUBCUTANEOUS at 12:01

## 2025-05-19 RX ADMIN — LISINOPRIL 5 MG: 5 TABLET ORAL at 08:48

## 2025-05-19 RX ADMIN — SPIRONOLACTONE 12.5 MG: 25 TABLET ORAL at 08:48

## 2025-05-19 RX ADMIN — NYSTATIN 1 APPLICATION: 100000 POWDER TOPICAL at 08:52

## 2025-05-19 RX ADMIN — MAGNESIUM HYDROXIDE 60 ML: 1200 LIQUID ORAL at 08:47

## 2025-05-19 RX ADMIN — INSULIN LISPRO 3 UNITS: 100 INJECTION, SOLUTION INTRAVENOUS; SUBCUTANEOUS at 16:33

## 2025-05-19 RX ADMIN — FUROSEMIDE 40 MG: 10 INJECTION, SOLUTION INTRAMUSCULAR; INTRAVENOUS at 08:48

## 2025-05-19 RX ADMIN — METOPROLOL TARTRATE 25 MG: 25 TABLET, FILM COATED ORAL at 08:48

## 2025-05-19 ASSESSMENT — COGNITIVE AND FUNCTIONAL STATUS - GENERAL
WALKING IN HOSPITAL ROOM: A LITTLE
HELP NEEDED FOR BATHING: A LOT
WALKING IN HOSPITAL ROOM: A LITTLE
MOBILITY SCORE: 19
PERSONAL GROOMING: A LITTLE
EATING MEALS: A LITTLE
HELP NEEDED FOR BATHING: A LITTLE
PERSONAL GROOMING: A LITTLE
TOILETING: A LOT
MOBILITY SCORE: 19
PERSONAL GROOMING: A LITTLE
HELP NEEDED FOR BATHING: A LITTLE
DRESSING REGULAR LOWER BODY CLOTHING: A LITTLE
PERSONAL GROOMING: A LITTLE
TOILETING: A LITTLE
CLIMB 3 TO 5 STEPS WITH RAILING: A LOT
STANDING UP FROM CHAIR USING ARMS: A LITTLE
CLIMB 3 TO 5 STEPS WITH RAILING: A LOT
DRESSING REGULAR UPPER BODY CLOTHING: A LITTLE
DAILY ACTIVITIY SCORE: 18
DRESSING REGULAR LOWER BODY CLOTHING: A LITTLE
DRESSING REGULAR LOWER BODY CLOTHING: A LITTLE
DAILY ACTIVITIY SCORE: 16
MOVING TO AND FROM BED TO CHAIR: A LITTLE
CLIMB 3 TO 5 STEPS WITH RAILING: A LOT
MOVING TO AND FROM BED TO CHAIR: A LITTLE
TURNING FROM BACK TO SIDE WHILE IN FLAT BAD: A LITTLE
DRESSING REGULAR LOWER BODY CLOTHING: A LOT
TOILETING: A LITTLE
MOVING TO AND FROM BED TO CHAIR: A LITTLE
STANDING UP FROM CHAIR USING ARMS: A LITTLE
DRESSING REGULAR UPPER BODY CLOTHING: A LITTLE
DAILY ACTIVITIY SCORE: 19
DRESSING REGULAR UPPER BODY CLOTHING: A LITTLE
DRESSING REGULAR UPPER BODY CLOTHING: A LITTLE
EATING MEALS: A LITTLE
HELP NEEDED FOR BATHING: A LITTLE
MOBILITY SCORE: 16
DAILY ACTIVITIY SCORE: 18
TOILETING: A LITTLE
STANDING UP FROM CHAIR USING ARMS: A LITTLE
WALKING IN HOSPITAL ROOM: A LOT
MOVING FROM LYING ON BACK TO SITTING ON SIDE OF FLAT BED WITH BEDRAILS: A LITTLE

## 2025-05-19 ASSESSMENT — PAIN - FUNCTIONAL ASSESSMENT
PAIN_FUNCTIONAL_ASSESSMENT: 0-10

## 2025-05-19 ASSESSMENT — PAIN SCALES - GENERAL
PAINLEVEL_OUTOF10: 0 - NO PAIN

## 2025-05-19 ASSESSMENT — ACTIVITIES OF DAILY LIVING (ADL)
ADL_ASSISTANCE: INDEPENDENT
LACK_OF_TRANSPORTATION: NO
BATHING_ASSISTANCE: MODERATE
HOME_MANAGEMENT_TIME_ENTRY: 10

## 2025-05-19 ASSESSMENT — PAIN SCALES - WONG BAKER: WONGBAKER_NUMERICALRESPONSE: NO HURT

## 2025-05-19 NOTE — CARE PLAN
Problem: Skin  Goal: Decreased wound size/increased tissue granulation at next dressing change  Outcome: Progressing     Problem: Fall/Injury  Goal: Not fall by end of shift  Outcome: Progressing     Problem: Safety - Adult  Goal: Free from fall injury  Outcome: Progressing

## 2025-05-19 NOTE — PROGRESS NOTES
Navneet Thompson is a 89 y.o. male on day 2 of admission presenting with Dyspnea on exertion.    Subjective   Patient seen and examined.  Resting in bed in no acute distress.  Awake alert oriented.  Forgetful.  No complaints.  No shortness of breath, cough.  Review of systems is limited.  Spoke with nursing bedside, voiding with pure wick in place, no bowel movement, status post milk of magnesia + flatulence.    Objective     Physical Exam  Vitals and nursing note reviewed.   Constitutional:       General: He is not in acute distress.     Appearance: Normal appearance. He is normal weight. He is not ill-appearing, toxic-appearing or diaphoretic.   HENT:      Head: Normocephalic and atraumatic.      Right Ear: External ear normal.      Left Ear: External ear normal.      Nose: Nose normal.      Mouth/Throat:      Mouth: Mucous membranes are moist.      Pharynx: Oropharynx is clear.   Eyes:      Extraocular Movements: Extraocular movements intact.      Conjunctiva/sclera: Conjunctivae normal.      Pupils: Pupils are equal, round, and reactive to light.   Cardiovascular:      Rate and Rhythm: Normal rate and regular rhythm.      Pulses: Normal pulses.      Heart sounds: Normal heart sounds. No murmur heard.  Pulmonary:      Effort: Pulmonary effort is normal. No respiratory distress.      Breath sounds: Decreased breath sounds present. No wheezing, rhonchi or rales.      Comments: 2 liters nasal cannula.   Abdominal:      General: Bowel sounds are normal. There is no distension.      Palpations: Abdomen is soft.      Tenderness: There is no abdominal tenderness. There is no guarding.   Genitourinary:     Comments: : Male pure wick catheter in place draining clear dark yellow urine. Rectal examination limited.  Musculoskeletal:         General: No swelling or tenderness. Normal range of motion.      Cervical back: Normal range of motion and neck supple.      Right lower leg: No edema.      Left lower leg: No edema.    Skin:     General: Skin is warm and dry.      Capillary Refill: Capillary refill takes less than 2 seconds.   Neurological:      General: No focal deficit present.      Mental Status: He is alert and oriented to person, place, and time.      Comments: Hearing impaired.   Psychiatric:         Mood and Affect: Mood normal.         Behavior: Behavior normal.       Last Recorded Vitals  Blood pressure 113/62, pulse 72, temperature 36.4 °C (97.5 °F), temperature source Oral, resp. rate 18, height 1.829 m (6'), weight 79.5 kg (175 lb 4.3 oz), SpO2 96%.    Intake/Output last 3 Shifts:  I/O last 3 completed shifts:  In: 240 (3 mL/kg) [P.O.:240]  Out: 2100 (26.4 mL/kg) [Urine:2100 (0.7 mL/kg/hr)]  Weight: 79.5 kg     Telemetry -     Relevant Results  Results for orders placed or performed during the hospital encounter of 05/17/25 (from the past 24 hours)   POCT GLUCOSE   Result Value Ref Range    POCT Glucose 97 74 - 99 mg/dL   POCT GLUCOSE   Result Value Ref Range    POCT Glucose 220 (H) 74 - 99 mg/dL   Protime-INR   Result Value Ref Range    Protime 29.9 (H) 9.3 - 12.7 seconds    INR 3.0 (H) 0.9 - 1.2   CBC and Auto Differential   Result Value Ref Range    WBC 9.1 4.4 - 11.3 x10*3/uL    nRBC 0.0 0.0 - 0.0 /100 WBCs    RBC 4.20 (L) 4.50 - 5.90 x10*6/uL    Hemoglobin 10.5 (L) 13.5 - 17.5 g/dL    Hematocrit 35.7 (L) 41.0 - 52.0 %    MCV 85 80 - 100 fL    MCH 25.0 (L) 26.0 - 34.0 pg    MCHC 29.4 (L) 32.0 - 36.0 g/dL    RDW 16.5 (H) 11.5 - 14.5 %    Platelets 198 150 - 450 x10*3/uL    Neutrophils % 65.8 40.0 - 80.0 %    Immature Granulocytes %, Automated 0.3 0.0 - 0.9 %    Lymphocytes % 18.9 13.0 - 44.0 %    Monocytes % 11.8 2.0 - 10.0 %    Eosinophils % 2.7 0.0 - 6.0 %    Basophils % 0.5 0.0 - 2.0 %    Neutrophils Absolute 5.99 (H) 1.60 - 5.50 x10*3/uL    Immature Granulocytes Absolute, Automated 0.03 0.00 - 0.50 x10*3/uL    Lymphocytes Absolute 1.73 0.80 - 3.00 x10*3/uL    Monocytes Absolute 1.08 (H) 0.05 - 0.80 x10*3/uL     Eosinophils Absolute 0.25 0.00 - 0.40 x10*3/uL    Basophils Absolute 0.05 0.00 - 0.10 x10*3/uL   Basic Metabolic Panel   Result Value Ref Range    Glucose 112 (H) 74 - 99 mg/dL    Sodium 142 136 - 145 mmol/L    Potassium 3.9 3.5 - 5.3 mmol/L    Chloride 104 98 - 107 mmol/L    Bicarbonate 32 21 - 32 mmol/L    Anion Gap 10 10 - 20 mmol/L    Urea Nitrogen 36 (H) 6 - 23 mg/dL    Creatinine 1.22 0.50 - 1.30 mg/dL    eGFR 56 (L) >60 mL/min/1.73m*2    Calcium 9.1 8.6 - 10.3 mg/dL   POCT GLUCOSE   Result Value Ref Range    POCT Glucose 125 (H) 74 - 99 mg/dL   POCT GLUCOSE   Result Value Ref Range    POCT Glucose 133 (H) 74 - 99 mg/dL   POCT GLUCOSE   Result Value Ref Range    POCT Glucose 145 (H) 74 - 99 mg/dL     *Note: Due to a large number of results and/or encounters for the requested time period, some results have not been displayed. A complete set of results can be found in Results Review.     No results found for the last 90 days.    No results found.    Scheduled medications  Scheduled Medications[1]  Continuous medications  Continuous Medications[2]  PRN medications  PRN Medications[3]    ASSESSMENT:  Dyspnea - multifactorial  Acute hypoxic respiratory failure  Acute on chronic systolic, diastolic congestive heart failure  History of TAVR  Hypertension  Hyperlipidemia  Atrial fibrillation  Oral anticoagulation  Supra therapeutic INR - improved  COPD  Cor pulmonale  History of tobacco use  Chronic kidney disease  Normocytic anemia  Type 2 diabetes mellitus  Chronic constipation    PLAN:  No new issues.  Overall, condition is stable.  Cardiology and Pulmonology consultations input appreciated.  Respiratory status is stable on supplemental oxygen.  Negative fluid balance.  Diuresis per Cardiology.  Monitor daily weights, I's and O's, electrolytes, renal function and blood pressure.  Monitor urinary output.  Maintain male pure-wick in place.  Respiratory status, pulse oximetry stable on supplemental oxygen.  Continue  oxygen.  Titrate as saturations allow.  Monitor.  Per Pulmonology, if patient requires more than 2 L nasal cannula or has increased work of breathing, recommend CT chest.  Follow-up.  Labs reviewed.  INR 3.0.  Continue Coumadin.  Monitor PT/INR.  Point-of-care glucose reviewed.  Monitor point-of-care glucose AC/HS.  Continue insulin.  Adjust for glycemic control.  Hypoglycemia protocol.  + Chronic constipation, daily milk of magnesia.  No bowel movement today, + flatulence,  adjust bowel regimen if needed for bowel movement.  Increase activity as tolerated.  PT/OT.  Fall precautions.  Up with assistance only.  Bed and chair alarm at all times.  Pressure ulcer prevention measures.  Turn and reposition every 2 hours and as needed.  Heels off of bed.  Offloading.  DVT prophylaxis.  Coumadin.  Monitor PT/INR.  GI prophylaxis.  Nexium.  Supportive care.  Monitor.  PT/OT recommend moderate intensity level of continued care.  Case management following for discharge planning.  Discussed with patient, nursing.      ADDENDUM:  Met with patient's daughter, Pao and spouse bedside.  Updated.  Questions answered.  Report patient's urinary complaints.  Check urinalysis, culture, check PSA.  Also report patient's complains of burning pain in lower extremities, not new.  Check Vitamin B12.  Follow-up.  Discussed plan of care.  They are in agreement.  Discussed with Dr. Callejas.      MINOR Cisneros-CNP       [1] aspirin, 81 mg, oral, Daily  atorvastatin, 80 mg, oral, Nightly  cholecalciferol, 25 mcg, oral, Daily  empagliflozin, 10 mg, oral, Daily  esomeprazole, 20 mg, nasogastric tube, Daily before breakfast  furosemide, 40 mg, intravenous, BID  [Held by provider] furosemide, 80 mg, oral, Daily  insulin glargine, 30 Units, subcutaneous, Nightly  insulin lispro, 0-15 Units, subcutaneous, TID AC  insulin lispro, 6 Units, subcutaneous, TID AC  lisinopril, 5 mg, oral, Daily  magnesium hydroxide, 60 mL, oral, Daily  magnesium  oxide, 400 mg of magnesium oxide, oral, Daily  metoprolol tartrate, 25 mg, oral, BID  neomycin-bacitracin-polymyxin, 0.5 inch, Both Eyes, Nightly  nystatin, 1 Application, Topical, BID  potassium chloride CR, 20 mEq, oral, Daily  spironolactone, 12.5 mg, oral, Daily  triamcinolone, , Topical, BID  warfarin, 3 mg, oral, Daily  [2]    [3] PRN medications: acetaminophen **OR** acetaminophen **OR** acetaminophen, albuterol, benzocaine-menthol, dextromethorphan-guaifenesin, dextrose, dextrose, glucagon, glucagon, guaiFENesin, lubricating eye drops, polyethylene glycol

## 2025-05-19 NOTE — PROGRESS NOTES
Physical Therapy                 Therapy Communication Note    Patient Name: Navneet Thompson  MRN: [id number]  Department: [address]  Room: [address] [place] [address]  Today's Date: 5/19/2025     Discipline: Physical Therapy    Missed Visit: PT Missed Visit: Yes     Missed Visit Reason: Missed Visit Reason: Patient refused, Other (Comment) (pt refusing therapy this afternoon despite education/encouragement.)    Missed Time: Attempt

## 2025-05-19 NOTE — CONSULTS
Inpatient consult to Cardiology  Consult performed by: Laura Almaguer, APRN-CNP  Consult ordered by: Buddy Callejas MD  Reason for consult: Acute on Chronic Heart Failure          Reason For Consult  Acute on Chronic Heart Failure     History Of Present Illness  Navneet Thompson is a 89 y.o. male presenting with shortness of breath. He states that he has been experiencing shortness of breath symptoms over the past few month. He states to experiencing orthopnea, dyspnea on exertion, and has mild bilateral lower extremity edema. Family brought patient to the ED to be evaluated.  Patient denies chest pain, palpitations, pressure, or tightness. Patient on nasal cannula.  States that his shortness of breath has improved, but still present with exertion.   Past medical history includes CAD s/p CABG using LIMA-LAD, SVG-OM3 and [id number], patent by heart catheterization 02/2020, chronic atrial fibrillation s/p several cardioversion's- on warfarin, complete heart block s/p PPM placement in 2008 upgraded to Medtronic BiV ICD in 2012, with most recent generator change in 2024, HFrEF, severe aortic stenosis s/p TAVR with Evolute Pro Plus 26 mm valve 4/2020, hypertension, CKD stage III, ischemic cardiomyopathy, and diabetes.   Patient had an echocardiogram completed in January 2025 that showed an EF of 25 to 30%, global hypokinesis of the left ventricle with minor regional variations, grade II (restrictive pattern) of left ventricular diastolic filling with an elevated left atrial pressure, mildly dilated left ventricle, normal right ventricle global systolic function, moderate to severely dilated left atrium, moderate mitral valve regurgitation, moderate elevated right ventricular systolic pressure.   Labs this morning show potassium of 3.9, BUN 36, creatinine 1.22, GFR 56, INR 3.0, hemoglobin/hematocrit 10.5/35.7, platelets 198.  Patient had elevated but flat troponins of 59 and 56, and a mildly elevated BNP of 282  from the lab work collected on Saturday 5/17. Chest x-ray obtained on Saturday 5/17 show stable to slight interval worsening of bibasilar atelectasis or infiltrate, and right pleural effusion. Persistent cardiomegaly, CHF/edema not excluded.    In the ED, the patient was given a one-time dose of IV furosemide 60 mg.  Yesterday the patient was given 40 mg IV furosemide every 8 hours for a total of 3 doses, which equalled 120 mg total. The admitting team has restarted patient's home medications of aspirin, atorvastatin 80 mg daily, furosemide 80 mg daily, lisinopril 5 mg daily, metoprolol tartrate 25 mg twice daily, spironolactone 12.5 mg daily, and warfarin.  Patient see's cardiologist Dr. Bahena     Past Medical History  He has a past medical history of Aortic stenosis, Atherosclerotic heart disease of native coronary artery without angina pectoris (12/12/2022), Atrial fibrillation (Multi), Chronic systolic heart failure, Complete heart block, Diabetes mellitus, type 2 (Multi), Essential (primary) hypertension (12/12/2022), and Presence of automatic (implantable) cardiac defibrillator (11/14/2022).    Surgical History  He has a past surgical history that includes Appendectomy (12/01/2014); Coronary artery bypass graft (12/01/2014); Other surgical history (12/01/2014); CT angio abdomen pelvis w and or wo IV IV contrast (03/17/2020); Aortic valve replacement (04/07/2022); and Cardiac electrophysiology procedure (N/A, 12/19/2023).     Social History  He reports that he has never smoked. He has never been exposed to tobacco smoke. He has never used smokeless tobacco. He reports current alcohol use. He reports that he does not use drugs.    Family History  Family History[1]     Allergies  Linagliptin, Dicloxacillin, Doxycycline, and Scopolamine     Physical Exam  Vitals and nursing note reviewed.   Constitutional:       General: He is not in acute distress.  HENT:      Head: Normocephalic and atraumatic.      Ears:       Comments: Hard of hearing     Mouth/Throat:      Mouth: Mucous membranes are moist.      Pharynx: Oropharynx is clear.   Eyes:      Conjunctiva/sclera:      Right eye: Right conjunctiva is injected.      Left eye: Left conjunctiva is injected.   Cardiovascular:      Rate and Rhythm: Normal rate and regular rhythm.      Heart sounds: No murmur heard.     No friction rub. No gallop.      Comments: Biventricular pacemaker present.  Patient is ventricularly paced with PVCs heart rates in the 70s  Pulmonary:      Effort: No respiratory distress.      Breath sounds: No stridor. No wheezing, rhonchi or rales.      Comments: Patient on 2 L nasal cannula.  Bilateral upper lobes are clear, bilateral lower lobes clear and mildly diminished bases  Chest:      Chest wall: No tenderness.   Abdominal:      General: Bowel sounds are normal.      Palpations: Abdomen is soft.   Musculoskeletal:         General: Swelling (Bilateral anterior aspect of the foot) present.      Cervical back: Normal range of motion and neck supple.      Right lower leg: No edema.      Left lower leg: No edema.   Skin:     General: Skin is warm and dry.      Capillary Refill: Capillary refill takes less than 2 seconds.   Neurological:      Mental Status: He is alert. Mental status is at baseline.   Psychiatric:         Mood and Affect: Mood normal.         Behavior: Behavior normal.         Thought Content: Thought content normal.         Judgment: Judgment normal.          Last Recorded Vitals  Blood pressure 104/50, pulse 70, temperature 37.2 °C (99 °F), temperature source Oral, resp. rate 18, height 1.829 m (6'), weight 79.5 kg (175 lb 4.3 oz), SpO2 96%.    Relevant Results  Results for orders placed or performed during the hospital encounter of 05/17/25 (from the past 24 hours)   POCT GLUCOSE   Result Value Ref Range    POCT Glucose 124 (H) 74 - 99 mg/dL   POCT GLUCOSE   Result Value Ref Range    POCT Glucose 139 (H) 74 - 99 mg/dL   POCT GLUCOSE    Result Value Ref Range    POCT Glucose 97 74 - 99 mg/dL   POCT GLUCOSE   Result Value Ref Range    POCT Glucose 220 (H) 74 - 99 mg/dL   CBC and Auto Differential   Result Value Ref Range    WBC 9.1 4.4 - 11.3 x10*3/uL    nRBC 0.0 0.0 - 0.0 /100 WBCs    RBC 4.20 (L) 4.50 - 5.90 x10*6/uL    Hemoglobin 10.5 (L) 13.5 - 17.5 g/dL    Hematocrit 35.7 (L) 41.0 - 52.0 %    MCV 85 80 - 100 fL    MCH 25.0 (L) 26.0 - 34.0 pg    MCHC 29.4 (L) 32.0 - 36.0 g/dL    RDW 16.5 (H) 11.5 - 14.5 %    Platelets 198 150 - 450 x10*3/uL    Neutrophils % 65.8 40.0 - 80.0 %    Immature Granulocytes %, Automated 0.3 0.0 - 0.9 %    Lymphocytes % 18.9 13.0 - 44.0 %    Monocytes % 11.8 2.0 - 10.0 %    Eosinophils % 2.7 0.0 - 6.0 %    Basophils % 0.5 0.0 - 2.0 %    Neutrophils Absolute 5.99 (H) 1.60 - 5.50 x10*3/uL    Immature Granulocytes Absolute, Automated 0.03 0.00 - 0.50 x10*3/uL    Lymphocytes Absolute 1.73 0.80 - 3.00 x10*3/uL    Monocytes Absolute 1.08 (H) 0.05 - 0.80 x10*3/uL    Eosinophils Absolute 0.25 0.00 - 0.40 x10*3/uL    Basophils Absolute 0.05 0.00 - 0.10 x10*3/uL     *Note: Due to a large number of results and/or encounters for the requested time period, some results have not been displayed. A complete set of results can be found in Results Review.         Assessment/Plan     Acute on chronic mixed systolic and diastolic heart failure  Shortness of breath  Chronic atrial fibrillation on Coumadin  CKD stage III  Hypertension   Supratherapeutic INR  Medtronic biventricular ICD in situ  Hx ischemic cardiomyopathy  CAD- Hx of CABG X3  TAVR (4/2020)  Diabetes Type 2      5/19: As above, patient presenting with shortness of breath. He states that he has been experiencing shortness of breath symptoms over the past few month. He states to experiencing orthopnea, dyspnea on exertion, and has mild bilateral lower extremity edema. Family brought patient to the ED to be evaluated.  Patient denies chest pain, palpitations, pressure, or  tightness. Patient on nasal cannula.  States that his shortness of breath has improved, but still present with exertion. Patient does not wear oxygen at home.   Vitals this morning show a heart rate of 72, blood pressure 113/62, satting 96% on 2 L nasal cannula.  On telemetry patient is a ventricular paced rhythm with PVCs, heart rates in the 70s.   Yesterday 5/18, the patient was given  40 mg IV furosemide every 8 hours for a total of 3 doses, which equalled 120 mg total. Recorded I/O show a net loss of -1,410 mL. Admitting team reordered oral furosemide 80 mg daily to be started this morning. Will place this order on hold, and continue an additional day of IV diuresis. Patients shortness of breath has improved, but he is still on oxygen 2L. There is still some swelling on the lower extremities and the patients lower lung lobes are diminished. Will order IV furosemide 40 mg BID for 2 doses and reassess tomorrow. Continue aspirin, atorvastatin 80 mg daily, lisinopril 5 mg daily, metoprolol tartrate 25 mg twice daily, spironolactone 12.5 mg daily, and Coumadin.  Upon admission to the ED patient had a supratherapeutic INR, in which the Coumadin was being held.  Today it is 3.0, within normal range.  The admitting team is managing Coumadin administration.  Please monitor strict I/Os and daily weights. We will continue to follow along with you.     I spent 60 minutes in the professional and overall care of this patient.               [1]   Family History  Problem Relation Name Age of Onset    Drug abuse Mother          OVERDOSE

## 2025-05-19 NOTE — PROGRESS NOTES
TCC met with patient at bedside. Introduced self and explained role. Patient lives home alone. States daughter helps out. No reports of smoking or alcohol use.  PCP is Jennifer Kent. [facility]. Patient has established LW and POA. Daughter michael is listed on paperwork at this time. TCC spoke to pt regarding therapy recommendations for SNF. Patient refusing at this time would like to discharge home with [facility]. List provided        05/19/25 1867   Discharge Planning   Living Arrangements Alone   Support Systems Family members;Friends/neighbors   Assistance Needed patient has grba bars, double rails at the steps, patient uses a walker. daughter transports to appointments   Type of Residence Private residence   Number of Stairs to Enter Residence 1   Number of Stairs Within Residence 10   Do you have animals or pets at home? No   Who is requesting discharge planning? Provider   Home or Post Acute Services In home services   Expected Discharge Disposition Home H   Financial Resource Strain   How hard is it for you to pay for the very basics like food, housing, medical care, and heating? Not very   Housing Stability   In the last 12 months, was there a time when you were not able to pay the mortgage or rent on time? N   In the past 12 months, how many times have you moved where you were living? 0   At any time in the past 12 months, were you homeless or living in a shelter (including now)? N   Transportation Needs   In the past 12 months, has lack of transportation kept you from medical appointments or from getting medications? no   In the past 12 months, has lack of transportation kept you from meetings, work, or from getting things needed for daily living? No   Patient Choice   Provider Choice list and CMS website (https://medicare.gov/care-compare#search) for post-acute Quality and Resource Measure Data were provided and reviewed with: Patient   Patient / Family choosing  to utilize agency / facility established prior to hospitalization No

## 2025-05-19 NOTE — CARE PLAN
The patient's goals for the shift include No Sob    The clinical goals for the shift include rest

## 2025-05-19 NOTE — TELEPHONE ENCOUNTER
PT'S DAUGHTER CALLED TODAY, 5/19/25 AND LVM THAT PATIENT ADMITTED.  SHE WILL CALL FOR APPOINTMENT AT DISCHARGE.

## 2025-05-19 NOTE — PROGRESS NOTES
Occupational Therapy    Evaluation/Treatment    Patient Name: Navneet Thompson  MRN: [id number]  Department: [address]  Room: [facility]  Today's Date: 05/19/25  Time Calculation  Start Time: 1326  Stop Time: 1350  Time Calculation (min): 24 min       Assessment:  OT Assessment: Pt presents on eval with generalized weakness,  decreased activity tolerance, and impaired standing balance affecting self-care and functional transfers/mobility. Pt will benefit from continued skilled OT to address these deficits and facilitate returning to functional baseline.  Prognosis: Good  Barriers to Discharge Home: Caregiver assistance, Physical needs  Caregiver Assistance: Patient lives alone and/or does not have reliable caregiver assistance  Physical Needs: Ambulating household distances limited by function/safety, 24hr mobility assistance needed, 24hr ADL assistance needed  Evaluation/Treatment Tolerance: Patient tolerated treatment well  End of Session Communication: Bedside nurse  End of Session Patient Position: Bed, 3 rail up, Alarm on (Needs in reach.)  OT Assessment Results: Decreased ADL status, Decreased upper extremity strength, Decreased safe judgment during ADL, Decreased endurance, Decreased sensation, Decreased functional mobility  Strengths: Premorbid level of function, Support of extended family/friends  Barriers to Participation: Comorbidities    Plan:  Treatment Interventions: ADL retraining, Functional transfer training, UE strengthening/ROM, Endurance training, Patient/family training, Equipment evaluation/education, Neuromuscular reeducation, Compensatory technique education  OT Frequency: 4 times per week  OT Discharge Recommendations: Moderate intensity level of continued care  Equipment Recommended upon Discharge: Wheeled walker  OT Recommended Transfer Status: Moderate assist  OT - OK to Discharge: Yes      Subjective     OT Visit Info:  OT Received On: 05/19/25    General Visit Info:   General  Reason for  Referral: impaired ADL's/functional mobility d/t PATRICK  Referred By: Buddy Callejas MD  Past Medical History Relevant to Rehab: Afib, BCC, pacemaker, CHF, COPD, HTN, HLD, aortic valve stenosis, psoriatic arthritis, PAVR, CKD, DM, DVT.  Family/Caregiver Present: No  Prior to Session Communication: Bedside nurse  Patient Position Received: Up in bathroom  General Comment: Pt is an 89 year-old M admitted from home with c/o SOB; CXR (+) edema and CHF exacerbation.     Precautions:  Hearing/Visual Limitations: glasses; deaf R ear  Medical Precautions: Fall precautions, Oxygen therapy device and L/min (2L 02 on the wall, but pt with 02 doffed upon arrival when he was in the bathroom)    Vital Signs Comment: Pulse ox 87% when assessed in the bathroom on room air, so donned 2L 02 back on pt and it increased to 96%(nurse aware)     Pain:  Pain Assessment  Pain Assessment: 0-10  0-10 (Numeric) Pain Score: 0 - No pain    Objective     Cognition:  Overall Cognitive Status: Within Functional Limits  Orientation Level: Oriented X4  Safety Judgment: Decreased awareness of need for safety precautions  Insight: Mild    Home Living:  Type of Home: House  Lives With: Alone  Home Adaptive Equipment: Walker rolling or standard (rollator)  Home Layout: Multi-level, Laundry in basement, Stairs to alternate level with rails, Bed/bath upstairs  Alternate Level Stairs-Rails: Both  Alternate Level Stairs-Number of Steps: 6 up to bedroom/bathroom, 4 down to laundry  Home Access: Stairs to enter with rails  Entrance Stairs-Rails:  (unilateral)  Entrance Stairs-Number of Steps: 1  Bathroom Shower/Tub: Tub/shower unit  Bathroom Toilet: Standard  Bathroom Equipment: Grab bars in shower, Shower chair with back  Bathroom Accessibility: full bath on second floor    Prior Function:  Level of [place]: Independent with ADLs and functional transfers, Needs assistance with homemaking  Receives Help From: Family (daughter local, checks in  daily)  ADL Assistance: Independent  Homemaking Assistance: Needs assistance (assist from daughter for laundry, shopping, cooking, driving)  Ambulatory Assistance: Independent (mod indep with rollator)  Vocational: Retired  Hand Dominance: Right  Prior Function Comments: denies h/o falls    ADL:  Eating Assistance: Independent  Grooming Assistance: Minimal  Grooming Deficit: Steadying, Standing with assistive device  Bathing Assistance: Moderate  UE Dressing Assistance: Minimal  LE Dressing Assistance: Maximal  Toileting Assistance with Device: Moderate  Toileting Deficit: Clothing management down, Clothing management up, Steadying (Pt able to complete pericare seated on the toilet, but required mod A in standing for clothing management)    Activity Tolerance:  Activity Tolerance Comments: Fair    Bed Mobility/Transfers: Bed Mobility  Bed Mobility:  (Pt completed sit to supine in bed with CGA for safety and verbal cues for sequencing.)    Transfers  Transfer:  (Pt completed sit<>stand with mod A for initiating standing and for balance/safety.)    Functional Mobility:  Functional Mobility  Functional Mobility Performed:  (Pt completed functional mobility from the bathroom using a RW with min A for balance/safety and verbal cues as needed.)    Standing Balance:  Static Standing Balance  Static Standing-Balance Support: Bilateral upper extremity supported  Static Standing-Level of Assistance: Minimum assistance    Therapy/Activity: Therapeutic Activity  Therapeutic Activity Performed:  (See bed mobility, transfers, functional mobility, and toileting with verbal cues throughout.)    Sensation:  Sensation Comment: pt reports chronic paresthesias B hands    Strength:  Strength Comments: [place] 3+/5    Coordination:  Coordination Comment: NIMESH's WFL grossly     Hand Function:  Hand Function  Gross Grasp: Functional  Coordination: Functional      Outcome Measures: [facility] Daily Activity  Putting on and taking off regular lower  body clothing: A lot  Bathing (including washing, rinsing, drying): A lot  Putting on and taking off regular upper body clothing: A little  Toileting, which includes using toilet, bedpan or urinal: A lot  Taking care of personal grooming such as brushing teeth: A little  Eating Meals: None  Daily Activity - Total Score: 16    Education Documentation  Home Exercise Program, taught by Navneet Bullock Jr., OT at 5/19/2025  3:03 PM.  Learner: Patient  Readiness: Acceptance  Method: Explanation  Response: Verbalizes Understanding, Needs Reinforcement  Comment: Education and verbal cues provided throughout eval.    Body Mechanics, taught by Navneet Bullock Jr., OT at 5/19/2025  3:03 PM.  Learner: Patient  Readiness: Acceptance  Method: Explanation  Response: Verbalizes Understanding, Needs Reinforcement  Comment: Education and verbal cues provided throughout eval.    ADL Training, taught by Navneet Bullock Jr., OT at 5/19/2025  3:03 PM.  Learner: Patient  Readiness: Acceptance  Method: Explanation  Response: Verbalizes Understanding, Needs Reinforcement  Comment: Education and verbal cues provided throughout eval.    Education Comments  No comments found.    Goals:  Encounter Problems       Encounter Problems (Active)       OT Goals       Pt will complete all ADL's with mod indep/indep using adaptive equipment as needed. (Progressing)       Start:  05/19/25    Expected End:  06/19/25            Pt will complete all functional transfers and mobility with mod indep using a RW. (Progressing)       Start:  05/19/25    Expected End:  06/19/25            Pt will tolerate functional mobility for a household distance using a RW with mod indep. (Progressing)       Start:  05/19/25    Expected End:  06/19/25

## 2025-05-19 NOTE — H&P
History Of Present Illness  Navneet Thompson is a 89 y.o. male presenting with complaint of shortness of breath.  Complaint of mild trouble of the patient.  Patient to be neutropenia.  The patient also has dyspnea on exertion.  Patient also complains of mild leg edema which get worse when the patient sits for a long time.  Patient has mild cough but no expectoration.  No fever chills or rigor.  Patient denies any chest pain, palpitation, odynophagia, dysphagia recently invader..  Polydipsia, polyuria, joint pain, joint swelling or disability.  In the emergency room, an initial workup was done.  The patient has been admitted to the floor for further management..     Past Medical History  Medical History[1]    Surgical History  Surgical History[2]     Social History  He reports that he has never smoked. He has never been exposed to tobacco smoke. He has never used smokeless tobacco. He reports current alcohol use. He reports that he does not use drugs.    Family History  Family History[3]     Allergies  Linagliptin, Dicloxacillin, Doxycycline, and Scopolamine    Review of Systems  I reviewed all systems reviewed as above otherwise is negative.  Physical Exam  HEENT:  Head externally atraumatic, no pallor, no icterus, extraocular movements intact, pupils reactive to light, oral mucosa moist and throat clear.  Neck:  Supple, elevated JVP, no palpable adenopathy or thyromegaly.  No carotid bruit.  Chest: The patient has a occasional crackles and decreased breath sounds otherwise resonant to percussion.  Heart:  Regular rate and rhythm, no murmur or gallop could be appreciated.  Abdomen:  Soft, nontender, bowel sounds present, normoactive, no palpable hepatosplenomegaly.  Extremities:  No edema, pulses present, no cyanosis or clubbing.  CNS:  Patient alert, oriented to time, place and person.  Power 5/5 all over and deep tendon reflexes symmetrical, cranial nerves 2-12 grossly intact.  Skin:  No active  rash.  Musculoskeletal:  No joint swelling or erythema, range of movement normal.  Last Recorded Vitals  Heart Rate:  [70-72]   Temp:  [36.2 °C (97.2 °F)-37.2 °C (99 °F)]   Resp:  [18]   BP: (102-123)/(50-62)   Weight:  [79.5 kg (175 lb 4.3 oz)]   SpO2:  [96 %]       Relevant Results        Results for orders placed or performed during the hospital encounter of 05/17/25 (from the past 24 hours)   POCT GLUCOSE   Result Value Ref Range    POCT Glucose 97 74 - 99 mg/dL   POCT GLUCOSE   Result Value Ref Range    POCT Glucose 220 (H) 74 - 99 mg/dL   Protime-INR   Result Value Ref Range    Protime 29.9 (H) 9.3 - 12.7 seconds    INR 3.0 (H) 0.9 - 1.2   CBC and Auto Differential   Result Value Ref Range    WBC 9.1 4.4 - 11.3 x10*3/uL    nRBC 0.0 0.0 - 0.0 /100 WBCs    RBC 4.20 (L) 4.50 - 5.90 x10*6/uL    Hemoglobin 10.5 (L) 13.5 - 17.5 g/dL    Hematocrit 35.7 (L) 41.0 - 52.0 %    MCV 85 80 - 100 fL    MCH 25.0 (L) 26.0 - 34.0 pg    MCHC 29.4 (L) 32.0 - 36.0 g/dL    RDW 16.5 (H) 11.5 - 14.5 %    Platelets 198 150 - 450 x10*3/uL    Neutrophils % 65.8 40.0 - 80.0 %    Immature Granulocytes %, Automated 0.3 0.0 - 0.9 %    Lymphocytes % 18.9 13.0 - 44.0 %    Monocytes % 11.8 2.0 - 10.0 %    Eosinophils % 2.7 0.0 - 6.0 %    Basophils % 0.5 0.0 - 2.0 %    Neutrophils Absolute 5.99 (H) 1.60 - 5.50 x10*3/uL    Immature Granulocytes Absolute, Automated 0.03 0.00 - 0.50 x10*3/uL    Lymphocytes Absolute 1.73 0.80 - 3.00 x10*3/uL    Monocytes Absolute 1.08 (H) 0.05 - 0.80 x10*3/uL    Eosinophils Absolute 0.25 0.00 - 0.40 x10*3/uL    Basophils Absolute 0.05 0.00 - 0.10 x10*3/uL   Basic Metabolic Panel   Result Value Ref Range    Glucose 112 (H) 74 - 99 mg/dL    Sodium 142 136 - 145 mmol/L    Potassium 3.9 3.5 - 5.3 mmol/L    Chloride 104 98 - 107 mmol/L    Bicarbonate 32 21 - 32 mmol/L    Anion Gap 10 10 - 20 mmol/L    Urea Nitrogen 36 (H) 6 - 23 mg/dL    Creatinine 1.22 0.50 - 1.30 mg/dL    eGFR 56 (L) >60 mL/min/1.73m*2    Calcium 9.1  8.6 - 10.3 mg/dL   POCT GLUCOSE   Result Value Ref Range    POCT Glucose 125 (H) 74 - 99 mg/dL   POCT GLUCOSE   Result Value Ref Range    POCT Glucose 133 (H) 74 - 99 mg/dL   POCT GLUCOSE   Result Value Ref Range    POCT Glucose 145 (H) 74 - 99 mg/dL     *Note: Due to a large number of results and/or encounters for the requested time period, some results have not been displayed. A complete set of results can be found in Results Review.     Prior to Admission medications    Medication Sig Start Date End Date Taking? Authorizing Provider   acetaminophen (Tylenol) 325 mg tablet Take 2 tablets (650 mg) by mouth every 4 hours if needed for mild pain (1 - 3) or fever (temp greater than 38.0 C). 2/1/25   PARKER Shay   albuterol 2.5 mg /3 mL (0.083 %) nebulizer solution Take 3 mL (2.5 mg) by nebulization every 6 hours if needed for wheezing. 2/1/25   PARKER Shay   aspirin 81 mg EC tablet Take 1 tablet (81 mg) by mouth once daily.    Historical Provider, MD   atorvastatin (Lipitor) 80 mg tablet Take 1 tablet (80 mg) by mouth once daily at bedtime. 10/24/23 4/28/25  Kady Castillo MD   benzonatate (Tessalon) 100 mg capsule Take 1 capsule (100 mg) by mouth 3 times a day as needed for cough. Do not crush or chew.  Patient not taking: Reported on 4/28/2025 2/1/25   PARKER Shay   cholecalciferol (Vitamin D-3) 25 MCG (1000 UT) tablet Take 1 tablet (1,000 Units) by mouth once daily. Do not start before October 20, 2023. 10/20/23   Joe Burr MD   dupilumab (Dupixent) 300 mg/2 mL pen injector Inject 2 mL (300 mg) under the skin every 14 (fourteen) days. Patient to take home medication 2/1/25   PARKER Shay   empagliflozin (Jardiance) 10 mg Take 1 tablet (10 mg) by mouth once daily. Do not start before October 23, 2023. 10/23/23   Yahya Othman, MD   FreeStyle Maria Elena 14 Day Sensor kit USE AS DIRECTED EVERY 14 DAYS 12/19/22   Historical Provider, MD   furosemide (Lasix)  40 mg tablet Take 2 tablets (80 mg) by mouth once daily. 4/21/25   PARKER Bernal   glucagon (Glucagen) 1 mg injection Inject 1 mg into the muscle every 15 minutes if needed for low blood sugar - see comments (glucose < 41). 2/1/25   PARKER Shay   glucagon (Glucagen) 1 mg injection Inject 1 mg into the muscle every 15 minutes if needed for low blood sugar - see comments (For blood glucose less than or equal to 70 mg/dL and no IV access). 2/1/25   PARKER Shay   guaiFENesin (Mucinex) 600 mg 12 hr tablet Take 1 tablet (600 mg) by mouth 2 times a day. Do not crush, chew, or split.  Patient not taking: Reported on 4/28/2025 2/1/25   PARKER Shay   insulin aspart (NovoLOG U-100 Insulin aspart) 100 unit/mL injection INJECT 6 UNITS UNDER THE SKIN WITH BREAKFAST/LUNCH/DINNER, IF BLOOD GLUCOSE GREATER THAN 180 GIVE 8 UNITS, BLOOD GLUCOSE GREATER THAN 240 GIVE 10 UNITS AND GREATER THAN 300 GIVE 12 UNITS    Historical Provider, MD   insulin glargine (Lantus U-100 Insulin) 100 unit/mL injection Inject 30 Units under the skin once daily at bedtime. Take as directed per insulin instructions. 2/1/25   PARKER Shay   ixekizumab (Taltz) 80 mg/mL injection Inject 1 mL (80 mg) under the skin every 14 (fourteen) days. 11/13/24   Historical Provider, MD   lisinopril 5 mg tablet Take 1 tablet (5 mg) by mouth once daily. 11/18/13   Historical Provider, MD   lubricating eye drops ophthalmic solution Administer 1 drop into both eyes 2 times a day as needed for dry eyes. 10/19/23   Joe Burr MD   magnesium hydroxide (Milk of Magnesia) 400 mg/5 mL suspension Take 60 mL by mouth once daily. 2/2/25   PARKER Shay   magnesium oxide (Mag-Ox) 420 mg tablet 1 tablet (420 mg) by nasoduodenal tube route once daily.    Historical Provider, MD   melatonin 3 mg tablet Take 1 tablet (3 mg) by mouth as needed at bedtime for sleep.  Patient not taking: Reported on  4/28/2025 10/19/23   Joe Burr MD   metoprolol tartrate (Lopressor) 25 mg tablet Take 1 tablet (25 mg) by mouth 2 times a day. 11/18/13   Historical Provider, MD   neomycin-bacitracin-polymyxin (Polysporin) ophthalmic ointment Apply 0.5 inches to both eyes once daily. 10/19/23   Joe Burr MD   nystatin (Mycostatin) 100,000 unit/gram powder Apply 1 Application topically 2 times a day. 2/1/25   PARKER Shay   omeprazole (PriLOSEC) 20 mg DR capsule Take 1 capsule (20 mg) by mouth once daily. Do not crush or chew. 10/22/23   Nusrat Faye MD   oxygen (O2) gas therapy Inhale 1 each continuously. Wean as tolerated 2/1/25   PARKER Shay   potassium chloride CR (Klor-Con M20) 20 mEq ER tablet Take 1 tablet (20 mEq) by mouth once daily. Do not crush or chew. 11/15/24 11/15/25  PARKER Spence   predniSONE (Deltasone) 2.5 mg tablet Take 1 tablet (2.5 mg) by mouth once daily.  Patient not taking: Reported on 4/28/2025 2/2/25   PARKER Shay   spironolactone (Aldactone) 25 mg tablet Take 0.5 tablets (12.5 mg) by mouth once every 24 hours. 2/1/25   PARKER Shay   triamcinolone (Kenalog) 0.1 % cream Apply topically 2 times a day. 10/19/23   Joe Burr MD   vit A/vit C/vit E/zinc/copper (ICAPS AREDS ORAL) Take 1 capsule by mouth 2 times a day.    Historical Provider, MD   warfarin (Coumadin) 3 mg tablet Please follow instructions as directed by  coumadin clinic. 5/5/25   Melvin Bahena MD     Current Medications[4]  Imaging  XR chest 2 views  Result Date: 5/17/2025  Stable to slight interval worsening of bibasilar atelectasis or infiltrate and right pleural effusion. Recommend clinical correlation and follow-up to document resolution. Given persistent cardiomegaly, CHF/edema not excluded.     Signed by: Wandy Dixon 5/17/2025 1:32 PM Dictation workstation:   [id number]      Cardiology, Vascular, and Other Imaging  ECG 12 lead  Result  Date: 5/19/2025  Ventricular-paced rhythm Abnormal ECG No previous ECGs available      No results found for the last 90 days.       Assessment/Plan   Assessment & Plan  Dyspnea on exertion  Acute on chronic systolic congestive heart failure  COPD exacerbation  Acute on chronic respiratory failure  Acute on chronic renal failure  Normocytic anemia of chronic disease and kidney disease  Supratherapeutic INR  Paroxysmal A-fib    Plan: The patient was admitted with a complaint of shortness of breath and has acute respiratory failure.  Patient is up to 3 L of fluid.  Patient does not use any oxygen at home.  Patient has a leg swelling and crackles in the chest.  The patient has a history of systolic congestive heart failure with ejection fraction 25 to 30%.  Lactate has moderate started today.  Patient appeared to have a acute on chronic congestive heart failure.  Patient started on diuretics.  Input output.  Patient also has history of COPD.  Patient respiratory drive the albuterol Atrovent.  Monitor pulse ox.  Monitor renal function panel.  Monitor weight.  Monitor pulse ox.  Consult cardiology.  Patient has a history of COPD.  Worse on treatment.  Pulmonary consult has been obtained.  Patient's INR is supratherapeutic.  Coumadin has been ordered with parameters.  Monitor PT/INR.  We will take Dupee, fall, aspiration, decubitus, DVT precautions.  This has been discussed with the patient and he is agreeable with it.  Was also discussed with the daughter and son of the patient at the bedside.                 Buddy Callejas MD         [1]   Past Medical History:  Diagnosis Date    Aortic stenosis     Status post transcatheter aortic valve replacement    Atherosclerotic heart disease of native coronary artery without angina pectoris 12/12/2022    CAD (coronary artery disease)    Atrial fibrillation (Multi)     Chronic systolic heart failure     Complete heart block     status post dual chamber pacemaker placemtn in  01/2008    Diabetes mellitus, type 2 (Multi)     Essential (primary) hypertension 12/12/2022    Hypertension    Presence of automatic (implantable) cardiac defibrillator 11/14/2022    Cardiac defibrillator in place, upgraded from pacemaker in 05/2014   [2]   Past Surgical History:  Procedure Laterality Date    AORTIC VALVE REPLACEMENT  04/07/2022    Transcatheter aortic valve replacement (TAVR) for aortic valve stenosis    APPENDECTOMY  12/01/2014    Appendectomy    CARDIAC ELECTROPHYSIOLOGY PROCEDURE N/A 12/19/2023    Procedure: ICD UPGRADE, DUAL TO BIV;  Surgeon: Luis Felipe Greene MD;  Location: [place] [address] Cardiac Cath Lab;  Service: Electrophysiology;  Laterality: N/A;  [id number] Bi-V ICD gen change [employer]    CORONARY ARTERY BYPASS GRAFT  12/01/2014    CABG    CT ABDOMEN PELVIS ANGIOGRAM W AND/OR WO IV CONTRAST  03/17/2020    CT ABDOMEN PELVIS ANGIOGRAM W AND/OR WO IV CONTRAST 3/17/2020 [facility] ANCILLARY LEGACY    OTHER SURGICAL HISTORY  12/01/2014    Cardio-defib Pulse Generator Implantation Date   [3]   Family History  Problem Relation Name Age of Onset    Drug abuse Mother          OVERDOSE   [4]   Current Facility-Administered Medications:     acetaminophen (Tylenol) tablet 650 mg, 650 mg, oral, q4h PRN **OR** acetaminophen (Tylenol) oral liquid 650 mg, 650 mg, oral, q4h PRN **OR** acetaminophen (Tylenol) suppository 650 mg, 650 mg, rectal, q4h PRN, Buddy Callejas MD    albuterol 2.5 mg /3 mL (0.083 %) nebulizer solution 2.5 mg, 2.5 mg, nebulization, q6h PRN, Buddy Callejas MD    aspirin EC tablet 81 mg, 81 mg, oral, Daily, Buddy Callejas MD, 81 mg at 05/19/25 0850    atorvastatin (Lipitor) tablet 80 mg, 80 mg, oral, Nightly, Buddy Callejas MD, 80 mg at 05/18/25 2041    benzocaine-menthol (Cepastat Sore Throat) lozenge 1 lozenge, 1 lozenge, Mouth/Throat, q2h PRN, Buddy Callejas MD    cholecalciferol (Vitamin D-3) tablet 25 mcg, 25 mcg, oral, Daily, Buddy Callejas MD, 25  mcg at 05/19/25 0848    dextromethorphan-guaifenesin (Robitussin DM)  mg/5 mL oral liquid 5 mL, 5 mL, oral, q4h PRN, Buddy Callejas MD    dextrose 50 % injection 12.5 g, 12.5 g, intravenous, q15 min PRN, Buddy Callejas MD    dextrose 50 % injection 25 g, 25 g, intravenous, q15 min PRN, Buddy Callejas MD    empagliflozin (Jardiance) tablet 10 mg, 10 mg, oral, Daily, Buddy Callejas MD, 10 mg at 05/19/25 0851    esomeprazole (NexIUM) suspension 20 mg, 20 mg, nasogastric tube, Daily before breakfast, Buddy Callejas MD    furosemide (Lasix) injection 40 mg, 40 mg, intravenous, BID, Laura Almaguer, APRN-CNP, 40 mg at 05/19/25 0848    [Held by provider] furosemide (Lasix) tablet 80 mg, 80 mg, oral, Daily, Buddy Callejas MD    glucagon (Glucagen) injection 1 mg, 1 mg, intramuscular, q15 min PRN, Buddy Callejas MD    glucagon (Glucagen) injection 1 mg, 1 mg, intramuscular, q15 min PRN, Buddy Callejas MD    guaiFENesin (Mucinex) 12 hr tablet 600 mg, 600 mg, oral, q12h PRN, Buddy Callejas MD    insulin glargine (Lantus) injection 30 Units, 30 Units, subcutaneous, Nightly, Buddy Callejas MD, 30 Units at 05/18/25 2038    insulin lispro injection 0-15 Units, 0-15 Units, subcutaneous, TID AC, Buddy Callejsa MD    insulin lispro injection 6 Units, 6 Units, subcutaneous, TID AC, Buddy Callejas MD, 6 Units at 05/19/25 1201    lisinopril tablet 5 mg, 5 mg, oral, Daily, Buddy Callejas MD, 5 mg at 05/19/25 0848    lubricating eye drops ophthalmic solution 1 drop, 1 drop, Both Eyes, BID PRN, Buddy Callejas MD, 1 drop at 05/18/25 0912    magnesium hydroxide (Milk of Magnesia) 400 mg/5 mL suspension 60 mL, 60 mL, oral, Daily, Buddy Callejas MD, 60 mL at 05/19/25 0847    magnesium oxide (Mag-Ox) 400 mg (241.3 mg elemental) tablet 1 tablet, 400 mg of magnesium oxide, oral, Daily, Buddy Callejas MD, 1 tablet at 05/19/25 0848    metoprolol tartrate  (Lopressor) tablet 25 mg, 25 mg, oral, BID, Buddy Callejas MD, 25 mg at 05/19/25 0848    neomycin-bacitracin-polymyxin (Polysporin) ophthalmic ointment 0.5 inch, 0.5 inch, Both Eyes, Nightly, Buddy Callejas MD, 0.5 inch at 05/17/25 2333    nystatin (Mycostatin) 100,000 unit/gram powder 1 Application, 1 Application, Topical, BID, Buddy Callejas MD, 1 Application at 05/19/25 0852    polyethylene glycol (Glycolax, Miralax) packet 17 g, 17 g, oral, Daily PRN, Buddy Callejas MD    potassium chloride CR (Klor-Con M20) ER tablet 20 mEq, 20 mEq, oral, Daily, Buddy Callejas MD, 20 mEq at 05/19/25 0848    spironolactone (Aldactone) tablet 12.5 mg, 12.5 mg, oral, Daily, Buddy Callejas MD, 12.5 mg at 05/19/25 0848    triamcinolone (Kenalog) 0.1 % cream, , Topical, BID, Buddy Callejas MD, Given at 05/18/25 2100    warfarin (Coumadin) tablet 3 mg, 3 mg, oral, Daily, Buddy Callejas MD

## 2025-05-19 NOTE — CONSULTS
Nutrition Initial Assessment:   Nutrition Assessment    Reason for Assessment: Dietitian discretion    Patient is a 89 y.o. male presenting with Dyspnea. Seeing pt due to hx of heart failure. Pt eating well, around 75% of meals. Wt has been stable the past 3-6 months, no signs of significant changes. +2 edema noted. Denies N/V/D/C at this time. No nutritional concerns.     Nutrition History:  Energy Intake: Fair 50-75 %, Good > 75 %  Food and Nutrient History: Seeing pt due to hx of heart failure. Pt eating well, around 75% of meals. Wt has been stable the past 3-6 months, no signs of significant changes. +2 edema noted. Denies N/V/D/C at this time. No nutritional concerns.    Anthropometrics:  Height: 182.9 cm (6')   Weight: 79.5 kg (175 lb 4.3 oz)   BMI (Calculated): 23.77    Weight Change %:  Weight History / % Weight Change: wt stable past 3-6 months  Significant Weight Loss: No    Nutrition Focused Physical Exam Findings:  Subcutaneous Fat Loss:   Defer Subcutaneous Fat Loss Assessment: Defer all  Defer All Reason: unable to assess  Muscle Wasting:  Defer Muscle Wasting Assessment: Defer all  Defer All Reason: unable to assess  Edema:  Edema: +2 mild  Physical Findings:  Skin: Negative    Nutrition Significant Labs:  CBC Trend:   Results from last 7 days   Lab Units 05/19/25 0619 05/18/25  0631 05/17/25  1308   WBC AUTO x10*3/uL 9.1 8.6 8.1   RBC AUTO x10*6/uL 4.20* 4.11* 4.19*   HEMOGLOBIN g/dL 10.5* 10.4* 10.5*   HEMATOCRIT % 35.7* 33.2* 35.5*   MCV fL 85 81 85   PLATELETS AUTO x10*3/uL 198 198 188    , BMP Trend:   Results from last 7 days   Lab Units 05/19/25 0619 05/18/25  0631 05/17/25  1308   GLUCOSE mg/dL 112* 119* 240*   CALCIUM mg/dL 9.1 8.8 9.0   SODIUM mmol/L 142 142 138   POTASSIUM mmol/L 3.9 3.5 4.0   CO2 mmol/L 32 28 28   CHLORIDE mmol/L 104 106 103   BUN mg/dL 36* 33* 37*   CREATININE mg/dL 1.22 1.23 1.28    , A1C:  Lab Results   Component Value Date    HGBA1C 7.9 (H) 01/27/2025        Nutrition Specific Medications:  Scheduled Medications[1]     I/O:   Last BM Date: 05/17/25; Stool Appearance: Formed (05/19/25 0051)    Dietary Orders (From admission, onward)       Start     Ordered    05/18/25 0044  May Participate in Room Service  ( ROOM SERVICE MAY PARTICIPATE)  Once        Question:  .  Answer:  Yes    05/18/25 0043 05/17/25 1958  Adult diet Regular, Consistent Carb, Cardiac; CCD 60 gm/meal; 70 gm fat; 2 - 3 grams Sodium  Diet effective now        Question Answer Comment   Diet type Regular    Diet type Consistent Carb    Diet type Cardiac    Carb diet selection: CCD 60 gm/meal    Fat restriction: 70 gm fat    Sodium restriction: 2 - 3 grams Sodium        05/17/25 1957                     Estimated Needs:   Total Energy Estimated Needs in 24 hours (kCal): 2000 kCal  Method for Estimating Needs: 25kcals/kg BW  Total Protein Estimated Needs in 24 Hours (g):  (80-96)  Method for Estimating 24 Hour Protein Needs: 1-1.2g/kg BW     Method for Estimating 24 Hour Fluid Needs: 1 ml/kcal or per MD        Nutrition Diagnosis   Malnutrition Diagnosis  Patient has Malnutrition Diagnosis: No (unable to assess)    Nutrition Diagnosis  Patient has Nutrition Diagnosis: Yes  Diagnosis Status (1): New  Nutrition Diagnosis 1: Increased nutrient needs  Related to (1): increased demand for nutrients  As Evidenced by (1): CHF       Nutrition Interventions/Recommendations   Nutrition prescription for oral nutrition    Nutrition Recommendations:  Individualized Nutrition Prescription Provided for : Continue with cons cho, and cardiac diet order.    Nutrition Interventions/Goals:   Meals and Snacks: Carbohydrate-modified diet, Fat-modified diet, Mineral-modified diet  Goal: consume >75% of meals      Education Documentation  No documentation found.       Nutrition Monitoring and Evaluation   Food/Nutrient Related History Monitoring  Monitoring and Evaluation Plan: Intake / amount of food, Estimated Energy  Intake  Estimated Energy Intake: Energy intake greater or equal to 75% of estimated energy needs  Intake / Amount of food: Consumes at least 75% or more of meals/snacks/supplements, Meets > 75% estimated energy needs    Anthropometric Measurements  Monitoring and Evaluation Plan: Body weight  Body Weight: Body weight - Maintain stable weight    Biochemical Data, Medical Tests and Procedures  Monitoring and Evaluation Plan: Electrolyte/renal panel, Glucose/endocrine profile  Electrolyte and Renal Panel: Electrolytes within normal limits  Glucose/Endocrine Profile: Glucose within normal limits ( mg/dL)    Time Spent (min): 60 minutes       [1] aspirin, 81 mg, oral, Daily  atorvastatin, 80 mg, oral, Nightly  cholecalciferol, 25 mcg, oral, Daily  empagliflozin, 10 mg, oral, Daily  esomeprazole, 20 mg, nasogastric tube, Daily before breakfast  furosemide, 40 mg, intravenous, BID  [Held by provider] furosemide, 80 mg, oral, Daily  insulin glargine, 30 Units, subcutaneous, Nightly  insulin lispro, 0-15 Units, subcutaneous, TID AC  insulin lispro, 6 Units, subcutaneous, TID AC  lisinopril, 5 mg, oral, Daily  magnesium hydroxide, 60 mL, oral, Daily  magnesium oxide, 400 mg of magnesium oxide, oral, Daily  metoprolol tartrate, 25 mg, oral, BID  neomycin-bacitracin-polymyxin, 0.5 inch, Both Eyes, Nightly  nystatin, 1 Application, Topical, BID  potassium chloride CR, 20 mEq, oral, Daily  spironolactone, 12.5 mg, oral, Daily  triamcinolone, , Topical, BID  warfarin, 3 mg, oral, Daily

## 2025-05-19 NOTE — ASSESSMENT & PLAN NOTE
Acute on chronic systolic congestive heart failure  COPD exacerbation  Acute on chronic respiratory failure  Acute on chronic renal failure  Normocytic anemia of chronic disease and kidney disease  Supratherapeutic INR  Paroxysmal A-fib    Plan: The patient was admitted with a complaint of shortness of breath and has acute respiratory failure.  Patient is up to 3 L of fluid.  Patient does not use any oxygen at home.  Patient has a leg swelling and crackles in the chest.  The patient has a history of systolic congestive heart failure with ejection fraction 25 to 30%.  Lactate has moderate started today.  Patient appeared to have a acute on chronic congestive heart failure.  Patient started on diuretics.  Input output.  Patient also has history of COPD.  Patient respiratory drive the albuterol Atrovent.  Monitor pulse ox.  Monitor renal function panel.  Monitor weight.  Monitor pulse ox.  Consult cardiology.  Patient has a history of COPD.  Worse on treatment.  Pulmonary consult has been obtained.  Patient's INR is supratherapeutic.  Coumadin has been ordered with parameters.  Monitor PT/INR.  We will take Dupee, fall, aspiration, decubitus, DVT precautions.  This has been discussed with the patient and he is agreeable with it.  Was also discussed with the daughter and son of the patient at the bedside.

## 2025-05-20 LAB
ANION GAP SERPL CALCULATED.3IONS-SCNC: 11 MMOL/L (ref 10–20)
BUN SERPL-MCNC: 37 MG/DL (ref 6–23)
CALCIUM SERPL-MCNC: 9.3 MG/DL (ref 8.6–10.3)
CHLORIDE SERPL-SCNC: 103 MMOL/L (ref 98–107)
CO2 SERPL-SCNC: 31 MMOL/L (ref 21–32)
CREAT SERPL-MCNC: 1.22 MG/DL (ref 0.5–1.3)
EGFRCR SERPLBLD CKD-EPI 2021: 56 ML/MIN/1.73M*2
ERYTHROCYTE [DISTWIDTH] IN BLOOD BY AUTOMATED COUNT: 16.5 % (ref 11.5–14.5)
GLUCOSE BLD MANUAL STRIP-MCNC: 134 MG/DL (ref 74–99)
GLUCOSE BLD MANUAL STRIP-MCNC: 136 MG/DL (ref 74–99)
GLUCOSE BLD MANUAL STRIP-MCNC: 63 MG/DL (ref 74–99)
GLUCOSE BLD MANUAL STRIP-MCNC: 90 MG/DL (ref 74–99)
GLUCOSE BLD MANUAL STRIP-MCNC: 90 MG/DL (ref 74–99)
GLUCOSE SERPL-MCNC: 70 MG/DL (ref 74–99)
HCT VFR BLD AUTO: 35.8 % (ref 41–52)
HGB BLD-MCNC: 10.7 G/DL (ref 13.5–17.5)
HOLD SPECIMEN: NORMAL
INR PPP: 2.2 (ref 0.9–1.2)
MCH RBC QN AUTO: 25.1 PG (ref 26–34)
MCHC RBC AUTO-ENTMCNC: 29.9 G/DL (ref 32–36)
MCV RBC AUTO: 84 FL (ref 80–100)
NRBC BLD-RTO: 0 /100 WBCS (ref 0–0)
PLATELET # BLD AUTO: 226 X10*3/UL (ref 150–450)
POTASSIUM SERPL-SCNC: 3.8 MMOL/L (ref 3.5–5.3)
PROTHROMBIN TIME: 22.4 SECONDS (ref 9.3–12.7)
PSA SERPL-MCNC: 34.96 NG/ML
RBC # BLD AUTO: 4.27 X10*6/UL (ref 4.5–5.9)
SODIUM SERPL-SCNC: 141 MMOL/L (ref 136–145)
VIT B12 SERPL-MCNC: 483 PG/ML (ref 211–911)
WBC # BLD AUTO: 10.8 X10*3/UL (ref 4.4–11.3)

## 2025-05-20 PROCEDURE — 85610 PROTHROMBIN TIME: CPT | Performed by: INTERNAL MEDICINE

## 2025-05-20 PROCEDURE — 97535 SELF CARE MNGMENT TRAINING: CPT | Mod: GO

## 2025-05-20 PROCEDURE — 80048 BASIC METABOLIC PNL TOTAL CA: CPT | Performed by: NURSE PRACTITIONER

## 2025-05-20 PROCEDURE — 36415 COLL VENOUS BLD VENIPUNCTURE: CPT | Performed by: INTERNAL MEDICINE

## 2025-05-20 PROCEDURE — 1200000002 HC GENERAL ROOM WITH TELEMETRY DAILY

## 2025-05-20 PROCEDURE — 84153 ASSAY OF PSA TOTAL: CPT | Mod: WESLAB | Performed by: NURSE PRACTITIONER

## 2025-05-20 PROCEDURE — 2500000001 HC RX 250 WO HCPCS SELF ADMINISTERED DRUGS (ALT 637 FOR MEDICARE OP)

## 2025-05-20 PROCEDURE — 2500000004 HC RX 250 GENERAL PHARMACY W/ HCPCS (ALT 636 FOR OP/ED): Performed by: INTERNAL MEDICINE

## 2025-05-20 PROCEDURE — 99232 SBSQ HOSP IP/OBS MODERATE 35: CPT

## 2025-05-20 PROCEDURE — 2500000002 HC RX 250 W HCPCS SELF ADMINISTERED DRUGS (ALT 637 FOR MEDICARE OP, ALT 636 FOR OP/ED): Performed by: NURSE PRACTITIONER

## 2025-05-20 PROCEDURE — 85027 COMPLETE CBC AUTOMATED: CPT | Performed by: NURSE PRACTITIONER

## 2025-05-20 PROCEDURE — 2500000002 HC RX 250 W HCPCS SELF ADMINISTERED DRUGS (ALT 637 FOR MEDICARE OP, ALT 636 FOR OP/ED): Performed by: INTERNAL MEDICINE

## 2025-05-20 PROCEDURE — 82607 VITAMIN B-12: CPT | Mod: WESLAB | Performed by: NURSE PRACTITIONER

## 2025-05-20 PROCEDURE — 97530 THERAPEUTIC ACTIVITIES: CPT | Mod: GP

## 2025-05-20 PROCEDURE — 2500000001 HC RX 250 WO HCPCS SELF ADMINISTERED DRUGS (ALT 637 FOR MEDICARE OP): Performed by: INTERNAL MEDICINE

## 2025-05-20 PROCEDURE — 82947 ASSAY GLUCOSE BLOOD QUANT: CPT

## 2025-05-20 PROCEDURE — 97110 THERAPEUTIC EXERCISES: CPT | Mod: GP

## 2025-05-20 RX ORDER — INSULIN GLARGINE 100 [IU]/ML
10 INJECTION, SOLUTION SUBCUTANEOUS NIGHTLY
Status: DISCONTINUED | OUTPATIENT
Start: 2025-05-20 | End: 2025-05-23 | Stop reason: HOSPADM

## 2025-05-20 RX ORDER — ATORVASTATIN CALCIUM 80 MG/1
80 TABLET, FILM COATED ORAL
Status: DISCONTINUED | OUTPATIENT
Start: 2025-05-21 | End: 2025-05-23 | Stop reason: HOSPADM

## 2025-05-20 RX ORDER — INSULIN GLARGINE 100 [IU]/ML
20 INJECTION, SOLUTION SUBCUTANEOUS NIGHTLY
Status: DISCONTINUED | OUTPATIENT
Start: 2025-05-20 | End: 2025-05-20

## 2025-05-20 RX ORDER — FUROSEMIDE 40 MG/1
40 TABLET ORAL
Status: DISCONTINUED | OUTPATIENT
Start: 2025-05-20 | End: 2025-05-23 | Stop reason: HOSPADM

## 2025-05-20 RX ORDER — CIPROFLOXACIN 250 MG/1
250 TABLET, FILM COATED ORAL EVERY 12 HOURS SCHEDULED
Status: DISCONTINUED | OUTPATIENT
Start: 2025-05-20 | End: 2025-05-23 | Stop reason: HOSPADM

## 2025-05-20 RX ADMIN — INSULIN GLARGINE 10 UNITS: 100 INJECTION, SOLUTION SUBCUTANEOUS at 21:38

## 2025-05-20 RX ADMIN — METOPROLOL TARTRATE 25 MG: 25 TABLET, FILM COATED ORAL at 21:39

## 2025-05-20 RX ADMIN — EMPAGLIFLOZIN 10 MG: 10 TABLET, FILM COATED ORAL at 08:30

## 2025-05-20 RX ADMIN — MAGNESIUM HYDROXIDE 60 ML: 1200 LIQUID ORAL at 08:13

## 2025-05-20 RX ADMIN — NYSTATIN 1 APPLICATION: 100000 POWDER TOPICAL at 09:05

## 2025-05-20 RX ADMIN — FUROSEMIDE 40 MG: 40 TABLET ORAL at 17:12

## 2025-05-20 RX ADMIN — CIPROFLOXACIN HYDROCHLORIDE 250 MG: 250 TABLET, FILM COATED ORAL at 09:02

## 2025-05-20 RX ADMIN — ESOMEPRAZOLE MAGNESIUM 20 MG: 20 GRANULE, DELAYED RELEASE ORAL at 08:12

## 2025-05-20 RX ADMIN — WARFARIN SODIUM 3 MG: 3 TABLET ORAL at 17:12

## 2025-05-20 RX ADMIN — INSULIN LISPRO 6 UNITS: 100 INJECTION, SOLUTION INTRAVENOUS; SUBCUTANEOUS at 08:20

## 2025-05-20 RX ADMIN — FUROSEMIDE 40 MG: 40 TABLET ORAL at 11:35

## 2025-05-20 RX ADMIN — NYSTATIN 1 APPLICATION: 100000 POWDER TOPICAL at 21:39

## 2025-05-20 RX ADMIN — LISINOPRIL 5 MG: 5 TABLET ORAL at 08:13

## 2025-05-20 RX ADMIN — INSULIN LISPRO 6 UNITS: 100 INJECTION, SOLUTION INTRAVENOUS; SUBCUTANEOUS at 17:12

## 2025-05-20 RX ADMIN — ASPIRIN 81 MG: 81 TABLET, COATED ORAL at 08:34

## 2025-05-20 RX ADMIN — POTASSIUM CHLORIDE 20 MEQ: 1500 TABLET, EXTENDED RELEASE ORAL at 08:16

## 2025-05-20 RX ADMIN — NEOMYCIN SULFATE, POLYMYXIN B SULFATE AND BACITRACIN ZINC 0.5 INCH: 3.5; 10000; 4 OINTMENT OPHTHALMIC at 21:40

## 2025-05-20 RX ADMIN — SPIRONOLACTONE 12.5 MG: 25 TABLET ORAL at 08:13

## 2025-05-20 RX ADMIN — Medication 1 TABLET: at 08:13

## 2025-05-20 RX ADMIN — CIPROFLOXACIN HYDROCHLORIDE 250 MG: 250 TABLET, FILM COATED ORAL at 21:39

## 2025-05-20 RX ADMIN — METOPROLOL TARTRATE 25 MG: 25 TABLET, FILM COATED ORAL at 08:15

## 2025-05-20 RX ADMIN — TRIAMCINOLONE ACETONIDE: 1 CREAM TOPICAL at 21:39

## 2025-05-20 RX ADMIN — CHOLECALCIFEROL TAB 25 MCG (1000 UNIT) 25 MCG: 25 TAB at 08:16

## 2025-05-20 ASSESSMENT — COGNITIVE AND FUNCTIONAL STATUS - GENERAL
MOVING FROM LYING ON BACK TO SITTING ON SIDE OF FLAT BED WITH BEDRAILS: A LITTLE
MOBILITY SCORE: 16
DRESSING REGULAR UPPER BODY CLOTHING: A LITTLE
EATING MEALS: A LITTLE
WALKING IN HOSPITAL ROOM: A LITTLE
CLIMB 3 TO 5 STEPS WITH RAILING: TOTAL
DRESSING REGULAR UPPER BODY CLOTHING: A LITTLE
MOVING TO AND FROM BED TO CHAIR: A LITTLE
DAILY ACTIVITIY SCORE: 18
DAILY ACTIVITIY SCORE: 16
STANDING UP FROM CHAIR USING ARMS: A LITTLE
TURNING FROM BACK TO SIDE WHILE IN FLAT BAD: A LITTLE
TOILETING: A LITTLE
HELP NEEDED FOR BATHING: A LOT
PERSONAL GROOMING: A LITTLE
TOILETING: A LOT
DRESSING REGULAR LOWER BODY CLOTHING: A LITTLE
CLIMB 3 TO 5 STEPS WITH RAILING: A LOT
MOVING TO AND FROM BED TO CHAIR: A LITTLE
WALKING IN HOSPITAL ROOM: A LITTLE
DRESSING REGULAR LOWER BODY CLOTHING: A LOT
STANDING UP FROM CHAIR USING ARMS: A LITTLE
PERSONAL GROOMING: A LITTLE
MOBILITY SCORE: 19
HELP NEEDED FOR BATHING: A LITTLE

## 2025-05-20 ASSESSMENT — PAIN SCALES - GENERAL
PAINLEVEL_OUTOF10: 0 - NO PAIN

## 2025-05-20 ASSESSMENT — PAIN - FUNCTIONAL ASSESSMENT
PAIN_FUNCTIONAL_ASSESSMENT: 0-10
PAIN_FUNCTIONAL_ASSESSMENT: FLACC (FACE, LEGS, ACTIVITY, CRY, CONSOLABILITY)
PAIN_FUNCTIONAL_ASSESSMENT: 0-10

## 2025-05-20 ASSESSMENT — ACTIVITIES OF DAILY LIVING (ADL): HOME_MANAGEMENT_TIME_ENTRY: 13

## 2025-05-20 NOTE — CONSULTS
Reason For Consult  Elevated PSA    History Of Present Illness  Navneet Thompson is a 89 y.o. male presenting with shortness of breath.  He has been diagnosed with acute on chronic congestive heart failure and a COPD exacerbation.  He is being treated for this.  On admission he had some pyuria on his admission urinalysis.  Urine culture is pending.  He had a normal white blood cell count.  Patient was found to have an elevated PSA of 34.96.  We do not have any prior PSA values on him.  He describes a slow flow having to sit to void.  He says he does not have hesitancy nor intermittency and he usually feels empty.  He does have significant frequency and nocturia.  His urine output is good and he is on a diuretic.  He denies any prior history of bladder nor prostate abnormalities.  Past Medical History  He has a past medical history of Aortic stenosis, Atherosclerotic heart disease of native coronary artery without angina pectoris (12/12/2022), Atrial fibrillation (Multi), Chronic systolic heart failure, Complete heart block, Diabetes mellitus, type 2 (Multi), Essential (primary) hypertension (12/12/2022), and Presence of automatic (implantable) cardiac defibrillator (11/14/2022).    Surgical History  He has a past surgical history that includes Appendectomy (12/01/2014); Coronary artery bypass graft (12/01/2014); Other surgical history (12/01/2014); CT angio abdomen pelvis w and or wo IV IV contrast (03/17/2020); Aortic valve replacement (04/07/2022); and Cardiac electrophysiology procedure (N/A, 12/19/2023).     Social History  He reports that he has never smoked. He has never been exposed to tobacco smoke. He has never used smokeless tobacco. He reports current alcohol use. He reports that he does not use drugs.    Family History  Family History[1]     Allergies  Linagliptin, Dicloxacillin, Doxycycline, and Scopolamine    Review of Systems  As per admission HPI     Physical Exam  Awake and alert  HEENT: Normal  extraocular movements  Neck: Supple  Lungs: Normal respiratory pattern  Abdomen: Soft and nontender  : External drainage bag in place.     Last Recorded Vitals  Blood pressure (!) 107/46, pulse 69, temperature 36.5 °C (97.7 °F), temperature source Oral, resp. rate 17, height 1.829 m (6'), weight 79.5 kg (175 lb 4.3 oz), SpO2 97%.    Relevant Results  Results for orders placed or performed during the hospital encounter of 05/17/25 (from the past 24 hours)   POCT GLUCOSE   Result Value Ref Range    POCT Glucose 118 (H) 74 - 99 mg/dL   Protime-INR   Result Value Ref Range    Protime 22.4 (H) 9.3 - 12.7 seconds    INR 2.2 (H) 0.9 - 1.2   Vitamin B12   Result Value Ref Range    Vitamin B12 483 211 - 911 pg/mL   Prostate Specific Antigen   Result Value Ref Range    Prostate Specific AG 34.96 (H) <=4.00 ng/mL   CBC   Result Value Ref Range    WBC 10.8 4.4 - 11.3 x10*3/uL    nRBC 0.0 0.0 - 0.0 /100 WBCs    RBC 4.27 (L) 4.50 - 5.90 x10*6/uL    Hemoglobin 10.7 (L) 13.5 - 17.5 g/dL    Hematocrit 35.8 (L) 41.0 - 52.0 %    MCV 84 80 - 100 fL    MCH 25.1 (L) 26.0 - 34.0 pg    MCHC 29.9 (L) 32.0 - 36.0 g/dL    RDW 16.5 (H) 11.5 - 14.5 %    Platelets 226 150 - 450 x10*3/uL   Basic Metabolic Panel   Result Value Ref Range    Glucose 70 (L) 74 - 99 mg/dL    Sodium 141 136 - 145 mmol/L    Potassium 3.8 3.5 - 5.3 mmol/L    Chloride 103 98 - 107 mmol/L    Bicarbonate 31 21 - 32 mmol/L    Anion Gap 11 10 - 20 mmol/L    Urea Nitrogen 37 (H) 6 - 23 mg/dL    Creatinine 1.22 0.50 - 1.30 mg/dL    eGFR 56 (L) >60 mL/min/1.73m*2    Calcium 9.3 8.6 - 10.3 mg/dL   POCT GLUCOSE   Result Value Ref Range    POCT Glucose 63 (L) 74 - 99 mg/dL   POCT GLUCOSE   Result Value Ref Range    POCT Glucose 90 74 - 99 mg/dL   POCT GLUCOSE   Result Value Ref Range    POCT Glucose 90 74 - 99 mg/dL   POCT GLUCOSE   Result Value Ref Range    POCT Glucose 134 (H) 74 - 99 mg/dL     *Note: Due to a large number of results and/or encounters for the requested time  period, some results have not been displayed. A complete set of results can be found in Results Review.         Assessment/Plan     Pyuria: A urine culture is currently pending.  He has some mild pyuria and if the culture is positive then he should be treated.    Elevated PSA: He does have an elevated PSA and some urinary symptoms.  His renal function is adequate.  At this point I would just pursue a course of watchful waiting.  Can follow-up with me in the office.    Urinary symptoms: He does have urinary frequency and some incontinence.  This most likely is due to his diuretics and enlarged prostate.  His postvoid residual is 100 cc so he is emptying adequately.    Please have him follow-up with me as an outpatient.      Bart March MD         [1]   Family History  Problem Relation Name Age of Onset    Drug abuse Mother          OVERDOSE

## 2025-05-20 NOTE — PROGRESS NOTES
Occupational Therapy    OT Treatment    Patient Name: Navneet Thompson  MRN: [id number]  Department: [address]  Room: [facility]423-  Today's Date: 5/20/2025  Time Calculation  Start Time: 1050  Stop Time: 1103  Time Calculation (min): 13 min        Assessment:  OT Assessment: Pt is self-limiting and appears to only want to participate in skilled OT treatment when it involves using the bathroom. Prior to this, pt had refused treatment and then contacted the nurse to let me know he wants to use the bathroom. Continue POC.  Prognosis: Good  Barriers to Discharge Home: Caregiver assistance, Physical needs  Caregiver Assistance: Patient lives alone and/or does not have reliable caregiver assistance  Physical Needs: Ambulating household distances limited by function/safety, 24hr mobility assistance needed, 24hr ADL assistance needed  Evaluation/Treatment Tolerance: Patient tolerated treatment well  End of Session Communication: Bedside nurse  End of Session Patient Position: Bed, 3 rail up, Alarm off, caregiver present (Sitting EOB and nurse at bedside to replace his purewick.)  OT Assessment Results: Decreased ADL status, Decreased upper extremity strength, Decreased safe judgment during ADL, Decreased endurance, Decreased sensation, Decreased functional mobility  Strengths: Premorbid level of function, Support of extended family/friends  Barriers to Participation: Comorbidities    Plan:  Treatment Interventions: ADL retraining, Functional transfer training, UE strengthening/ROM, Endurance training, Patient/family training, Equipment evaluation/education, Neuromuscular reeducation, Compensatory technique education  OT Frequency: 4 times per week  OT Discharge Recommendations: Moderate intensity level of continued care  Equipment Recommended upon Discharge: Wheeled walker  OT Recommended Transfer Status: Moderate assist  OT - OK to Discharge: Yes      Subjective     OT Visit Info:  OT Received On: 05/20/25    General Visit  Info:  General  Reason for Referral: impaired ADL's/functional mobility d/t PATRICK  Referred By: Buddy Callejas MD  Past Medical History Relevant to Rehab: Afib, BCC, pacemaker, CHF, COPD, HTN, HLD, aortic valve stenosis, psoriatic arthritis, PAVR, CKD, DM, DVT.  Family/Caregiver Present: No  Prior to Session Communication: Bedside nurse  Patient Position Received: Up in bathroom  General Comment: Cleared by nurse prior to session and pt needing assist in the bathroom while having a BM.    Precautions:  Hearing/Visual Limitations: glasses; deaf R ear  Medical Precautions: Fall precautions, Oxygen therapy device and L/min (1L 02)    Pain:  Pain Assessment  Pain Assessment: FLACC (Face, Legs, Activity, Cry, Consolability)  0-10 (Numeric) Pain Score: 0 - No pain    Objective      Cognition:  Cognition  Overall Cognitive Status: Within Functional Limits  Orientation Level: Oriented X4  Safety Judgment: Decreased awareness of need for safety precautions  Insight: Mild    Activities of Daily Living: Toileting  Toileting Level of Assistance: Moderate assistance  Where Assessed: Toilet  Toileting Comments: Pt completed pericare seated on the toilet independently, but required mod A in standing for balance during clothing management.    Bed Mobility/Transfers: Bed Mobility  Bed Mobility:  (Pt completed sit to supine in bed with CGA for safety and verbal cues for sequencing.)    Transfers  Transfer: Yes  Transfer 1  Transfer From 1: Stand to  Transfer to 1: Bed  Technique 1: Stand to sit  Transfer Device 1: Walker  Transfer Level of Assistance 1: Minimum assistance, Minimal verbal cues  Trials/Comments 1: Steadying assist for balance and verbal cues for safety.    Toilet Transfers  Toilet Transfer From: Rolling walker  Toilet Transfer Type: To and from  Toilet Transfer to: Standard toilet  Toilet Transfer Technique: Ambulating  Toilet Transfers: Moderate assistance, Verbal cues  Toilet Transfers Comments: Tactile assist for  balance and pt abandoning the walker.    Functional Mobility:  Functional Mobility  Functional Mobility Performed: Yes  Functional Mobility 1  Device 1: Rolling walker  Assistance 1: Minimum assistance, Minimal verbal cues  Comments 1: Pt completed functional mobility from the bathroom using a RW with min A for balance/safety and verbal cues as needed.      Outcome Measures:[facility] Daily Activity  Putting on and taking off regular lower body clothing: A lot  Bathing (including washing, rinsing, drying): A lot  Putting on and taking off regular upper body clothing: A little  Toileting, which includes using toilet, bedpan or urinal: A lot  Taking care of personal grooming such as brushing teeth: A little  Eating Meals: None  Daily Activity - Total Score: 16    Education Documentation  Home Exercise Program, taught by Navneet Bullock Jr., OT at 5/19/2025  3:03 PM.  Learner: Patient  Readiness: Acceptance  Method: Explanation  Response: Verbalizes Understanding, Needs Reinforcement  Comment: Education and verbal cues provided throughout eval.    Body Mechanics, taught by Navneet Bullock Jr., OT at 5/19/2025  3:03 PM.  Learner: Patient  Readiness: Acceptance  Method: Explanation  Response: Verbalizes Understanding, Needs Reinforcement  Comment: Education and verbal cues provided throughout eval.    ADL Training, taught by Navneet Bullock Jr., OT at 5/19/2025  3:03 PM.  Learner: Patient  Readiness: Acceptance  Method: Explanation  Response: Verbalizes Understanding, Needs Reinforcement  Comment: Education and verbal cues provided throughout eval.    Education Comments  No comments found.    Goals:  Encounter Problems       Encounter Problems (Active)       OT Goals       Pt will complete all ADL's with mod indep/indep using adaptive equipment as needed. (Progressing)       Start:  05/19/25    Expected End:  06/19/25            Pt will complete all functional transfers and mobility with mod indep using a RW. (Progressing)        Start:  05/19/25    Expected End:  06/19/25            Pt will tolerate functional mobility for a household distance using a RW with mod indep. (Progressing)       Start:  05/19/25    Expected End:  06/19/25

## 2025-05-20 NOTE — CARE PLAN
Problem: Skin  Goal: Prevent/manage excess moisture  Outcome: Met     Problem: Fall/Injury  Goal: Verbalize understanding of personal risk factors for fall in the hospital  Outcome: Met

## 2025-05-20 NOTE — PROGRESS NOTES
Navneet Thompson is a 89 y.o. male on day 3 of admission presenting with Dyspnea on exertion.    Subjective   Patient seen and examined.  Resting in bed in no acute distress.  Awake alert oriented x 3.  Family bedside.  Better.  Shortness of breath at rest, improved.  No cough.  No pain.    Objective     Physical Exam  Vitals and nursing note reviewed.   Constitutional:       General: He is not in acute distress.     Appearance: Normal appearance. He is normal weight. He is not ill-appearing, toxic-appearing or diaphoretic.   HENT:      Head: Normocephalic and atraumatic.      Right Ear: External ear normal.      Left Ear: External ear normal.      Nose: Nose normal.      Mouth/Throat:      Mouth: Mucous membranes are moist.      Pharynx: Oropharynx is clear.   Eyes:      Extraocular Movements: Extraocular movements intact.      Conjunctiva/sclera: Conjunctivae normal.      Pupils: Pupils are equal, round, and reactive to light.   Cardiovascular:      Rate and Rhythm: Normal rate and regular rhythm.      Pulses: Normal pulses.      Heart sounds: Normal heart sounds. No murmur heard.  Pulmonary:      Effort: Pulmonary effort is normal. No respiratory distress.      Breath sounds: Decreased breath sounds present. No wheezing, rhonchi or rales.      Comments: 2 liters nasal cannula.   Abdominal:      General: Bowel sounds are normal. There is no distension.      Palpations: Abdomen is soft.      Tenderness: There is no abdominal tenderness. There is no guarding.   Genitourinary:     Comments: : Male pure wick catheter in place draining clear dark yellow urine. Rectal examination limited.  Musculoskeletal:         General: No swelling or tenderness. Normal range of motion.      Cervical back: Normal range of motion and neck supple.      Right lower leg: No edema.      Left lower leg: No edema.   Skin:     General: Skin is warm and dry.      Capillary Refill: Capillary refill takes less than 2 seconds.   Neurological:       General: No focal deficit present.      Mental Status: He is alert and oriented to person, place, and time.      Comments: Hearing impaired.   Psychiatric:         Mood and Affect: Mood normal.         Behavior: Behavior normal.       Last Recorded Vitals  Blood pressure (!) 107/46, pulse 69, temperature 36.5 °C (97.7 °F), temperature source Oral, resp. rate 17, height 1.829 m (6'), weight 79.5 kg (175 lb 4.3 oz), SpO2 97%.    Intake/Output last 3 Shifts:  I/O last 3 completed shifts:  In: 300 (3.8 mL/kg) [P.O.:300]  Out: 2650 (33.3 mL/kg) [Urine:2650 (0.9 mL/kg/hr)]  Weight: 79.5 kg     Relevant Results  Results for orders placed or performed during the hospital encounter of 05/17/25 (from the past 24 hours)   POCT GLUCOSE   Result Value Ref Range    POCT Glucose 118 (H) 74 - 99 mg/dL   Protime-INR   Result Value Ref Range    Protime 22.4 (H) 9.3 - 12.7 seconds    INR 2.2 (H) 0.9 - 1.2   Vitamin B12   Result Value Ref Range    Vitamin B12 483 211 - 911 pg/mL   Prostate Specific Antigen   Result Value Ref Range    Prostate Specific AG 34.96 (H) <=4.00 ng/mL   CBC   Result Value Ref Range    WBC 10.8 4.4 - 11.3 x10*3/uL    nRBC 0.0 0.0 - 0.0 /100 WBCs    RBC 4.27 (L) 4.50 - 5.90 x10*6/uL    Hemoglobin 10.7 (L) 13.5 - 17.5 g/dL    Hematocrit 35.8 (L) 41.0 - 52.0 %    MCV 84 80 - 100 fL    MCH 25.1 (L) 26.0 - 34.0 pg    MCHC 29.9 (L) 32.0 - 36.0 g/dL    RDW 16.5 (H) 11.5 - 14.5 %    Platelets 226 150 - 450 x10*3/uL   Basic Metabolic Panel   Result Value Ref Range    Glucose 70 (L) 74 - 99 mg/dL    Sodium 141 136 - 145 mmol/L    Potassium 3.8 3.5 - 5.3 mmol/L    Chloride 103 98 - 107 mmol/L    Bicarbonate 31 21 - 32 mmol/L    Anion Gap 11 10 - 20 mmol/L    Urea Nitrogen 37 (H) 6 - 23 mg/dL    Creatinine 1.22 0.50 - 1.30 mg/dL    eGFR 56 (L) >60 mL/min/1.73m*2    Calcium 9.3 8.6 - 10.3 mg/dL   POCT GLUCOSE   Result Value Ref Range    POCT Glucose 63 (L) 74 - 99 mg/dL   POCT GLUCOSE   Result Value Ref Range    POCT  Glucose 90 74 - 99 mg/dL   POCT GLUCOSE   Result Value Ref Range    POCT Glucose 90 74 - 99 mg/dL     *Note: Due to a large number of results and/or encounters for the requested time period, some results have not been displayed. A complete set of results can be found in Results Review.     No results found for the last 90 days.    No results found.    Scheduled medications  Scheduled Medications[1]  Continuous medications  Continuous Medications[2]  PRN medications  PRN Medications[3]    ASSESSMENT:  Dyspnea - multifactorial  Acute hypoxic respiratory failure  Acute on chronic systolic, diastolic congestive heart failure  History of TAVR  Hypertension  Hyperlipidemia  Atrial fibrillation  Oral anticoagulation  Supra therapeutic INR - improved  COPD  Cor pulmonale  History of tobacco use  Chronic kidney disease  Normocytic anemia  Type 2 diabetes mellitus  Chronic constipation  Urinary symptoms, frequency  Pyuria rule out UTI  Elevated PSA    PLAN:  No new issues.  Overall, condition is improved, stable.  More awake, alert, engaged today.  Dyspnea improved.  Respiratory status, pulse oximetry stable on supplemental oxygen.  Continue oxygen.  Titrate as saturations allow.  Diuresis per Cardiology.  Monitor daily weights, I's and O's, electrolytes, renal function and blood pressure.  Check post-void residual bladder scan.  Follow-up.  PSA elevated.  Urology consultation.  Urinalysis shows 75 leukocytes, 11-20 white blood cells.  Urine culture pending.  Start Ciprofloxacin 250 mg p.o. twice daily.  Follow-up urine culture.  Avoid urinary retention, constipation.  Bowel regimen.  Increase activity as tolerated.  Continue current medications.  PT/OT as tolerated.  Fall precautions.  Up with assistance only.  Bed intralateral times.  Pressure ulcer prevention measures.  Turn reposition in 2 hours and as needed.  Heels off bed.  Offloading.  DVT prophylaxis.  Coumadin.  Monitor PT/INR.  GI prophylaxis.  Nexium.  Supportive  care.  Patient and family reassured.  PT/OT recommend moderate intensity level of continued care.  Case management following for discharge planning.  Discharge plan home with home health care.  Discussed with Dr. Callejas.    ADDENDUM:  Discussed with Urology, Dr. March.  Outpatient follow-up.  Input appreciated.       Estrella Lewis, APRN-CNP       [1] aspirin, 81 mg, oral, Daily  [START ON 5/21/2025] atorvastatin, 80 mg, oral, q48h  cholecalciferol, 25 mcg, oral, Daily  ciprofloxacin, 250 mg, oral, q12h JESUS  empagliflozin, 10 mg, oral, Daily  esomeprazole, 20 mg, nasogastric tube, Daily before breakfast  furosemide, 40 mg, oral, BID  insulin glargine, 20 Units, subcutaneous, Nightly  insulin lispro, 0-15 Units, subcutaneous, TID AC  insulin lispro, 6 Units, subcutaneous, TID AC  lisinopril, 5 mg, oral, Daily  magnesium hydroxide, 60 mL, oral, Daily  magnesium oxide, 400 mg of magnesium oxide, oral, Daily  metoprolol tartrate, 25 mg, oral, BID  neomycin-bacitracin-polymyxin, 0.5 inch, Both Eyes, Nightly  nystatin, 1 Application, Topical, BID  potassium chloride CR, 20 mEq, oral, Daily  spironolactone, 12.5 mg, oral, Daily  triamcinolone, , Topical, BID  warfarin, 3 mg, oral, Daily     [2]    [3] PRN medications: acetaminophen **OR** acetaminophen **OR** acetaminophen, albuterol, benzocaine-menthol, dextromethorphan-guaifenesin, dextrose, dextrose, glucagon, glucagon, guaiFENesin, lubricating eye drops, polyethylene glycol

## 2025-05-20 NOTE — PROGRESS NOTES
Physical Therapy    Physical Therapy Treatment    Patient Name: Navneet Thompson  MRN: [id number]  Department: [address]  Room: [address] [place] [address]  Today's Date: 5/20/2025  Time Calculation  Start Time: 1030  Stop Time: 1055  Time Calculation (min): 25 min         Assessment/Plan   PT Assessment  Barriers to Discharge Home: Caregiver assistance, Physical needs  Caregiver Assistance: Patient lives alone and/or does not have reliable caregiver assistance  Physical Needs: Stair navigation into home limited by function/safety, Stair navigation to access bed limited by function/safety, Stair navigation to access bath limited by function/safety  Evaluation/Treatment Tolerance: Patient limited by fatigue  Medical Staff Made Aware: Yes  Strengths: Premorbid level of function  Barriers to Participation: Comorbidities  End of Session Communication: Bedside nurse  Assessment Comment: pt  demonstrates limited mobility tolerance, ease and safety with transfers, amb with FWW; SOB on room air after amb 14 ft. + increased fall risk due to functional deficits and decreased safety insight/carryover; Has 6 steps up to his bathroom.  End of Session Patient Position:  (sitting on commode with OT in room)     PT Plan  Treatment/Interventions: Bed mobility, Transfer training, Gait training, Stair training, Balance training, Neuromuscular re-education, Strengthening, Endurance training, Therapeutic exercise, Therapeutic activity  PT Plan: Ongoing PT  PT Frequency: 4 times per week  PT Discharge Recommendations: Moderate intensity level of continued care  Equipment Recommended upon Discharge: Wheeled walker  PT Recommended Transfer Status: Assist x1, Assistive device  PT - OK to Discharge: Yes    PT Visit Info:  PT Received On: 05/20/25     General Visit Information:   General  Family/Caregiver Present: No  Prior to Session Communication: Bedside nurse  Patient Position Received: Bed, 3 rail up, Alarm on  Preferred Learning Style: verbal  General  Comment: cleared by nurse for therapy; pt agreeable to therapy. + telemetry, purewick    Subjective   Precautions:  Precautions  Hearing/Visual Limitations: glasses; deaf R ear  Medical Precautions: Fall precautions, Oxygen therapy device and L/min (1 liter o2 via nc)      Vital Signs Comment: O2 sat 94% with HR 68 bpm in supine; o2 sat on room air dropped to 87% with activity; Donned 1 liter o2 back on pt after back to bed---O2 sat 94%.     Objective   Pain:  Pain Assessment  0-10 (Numeric) Pain Score: 0 - No pain  Patient Entered Pain Score: No pain  Cognition:  Cognition  Overall Cognitive Status: Within Functional Limits  Orientation Level: Oriented X4  Safety Judgment: Decreased awareness of need for safety precautions  Safety/Judgement:  (decreased safety insight during functional mobility)  Insight: Mild  Coordination:  Movements are Fluid and Coordinated: No  Alternating Toe Taps: Impaired (left drop foot)  Postural Control:  Postural Control  Posture Comment: mild forward head, protracted shldrs; mild trunk flexion on hips during amb with FWW  Extremity/Trunk Assessments:    Activity Tolerance:  Activity Tolerance  Endurance: Decreased tolerance for upright activites  Activity Tolerance Comments: fair due to fatigue, weakness, SOB  Treatments:  Therapeutic Exercise  Therapeutic Exercise Performed: Yes  Therapeutic Exercise Activity 1: supine varsha AP x 30 reps ( left drop foot with trace DF.  Therapeutic Exercise Activity 2: supine varsha heel slides x 25 reps  Therapeutic Exercise Activity 3: supine varsha hip abd x 25 reps  Therapeutic Exercise Activity 4: hooklying bridging x 1 set of 10 reps  Therapeutic Exercise Activity 5: seated varsha LAQ's x 25 reps    Therapeutic Activity  Therapeutic Activity Performed: Yes (see bed mobility, transfers, amb with FWW)    Bed Mobility  Bed Mobility: Yes  Bed Mobility 1  Bed Mobility 1: Supine to sitting  Level of Assistance 1: Close supervision  Bed Mobility Comments 1: head  of bed elevated; mild difficulty observed    Ambulation/Gait Training  Ambulation/Gait Training Performed: Yes  Ambulation/Gait Training 1  Surface 1: Level tile  Device 1: Rolling walker  Assistance 1: Minimum assistance, Moderate verbal cues  Quality of Gait 1: Diminished heel strike, Decreased step length, Forward flexed posture  Comments/Distance (ft) 1: pt amb 14 ft into bathroom, min assist of 1 to steady, audible mild SOb on room air, verbal cues for staying close to frame of FWW, erect posture, increased step height; overall balance fair at best  Transfers  Transfer: Yes  Transfer 1  Transfer From 1: Bed to  Transfer to 1: Stand  Technique 1: Sit to stand  Transfer Device 1: Walker  Transfer Level of Assistance 1: Minimum assistance, Minimal verbal cues  Trials/Comments 1: min assist of 1 for trunk up, verbal cues for varsha hand placement on bed  Transfers 2  Transfer From 2: Stand to  Transfer to 2: Toilet  Technique 2: Stand to sit  Transfer Device 2: Walker  Transfer Level of Assistance 2: Minimum assistance, Minimal verbal cues  Trials/Comments 2: min assist of 1 for trunk down, pt grasping grabbar with both hands    Stairs  Stairs: No    Outcome Measures:  [facility] Basic Mobility  Turning from your back to your side while in a flat bed without using bedrails: A little  Moving from lying on your back to sitting on the side of a flat bed without using bedrails: A little  Moving to and from bed to chair (including a wheelchair): A little  Standing up from a chair using your arms (e.g. wheelchair or bedside chair): A little  To walk in hospital room: A little  Climbing 3-5 steps with railing: Total  Basic Mobility - Total Score: 16    Education Documentation  Mobility Training, taught by Freda Beebe PT at 5/20/2025 12:44 PM.  Learner: Patient  Readiness: Acceptance  Method: Explanation  Response: Needs Reinforcement  Comment: PT educated pt in safe transfer technique and safe use of FWW    Education  Comments  No comments found.               Encounter Problems       Encounter Problems (Active)       PT STG Problem       Patient will transfer supine to sit and sit to supine with independent assist to facilitate mobility. (Progressing)       Start:  05/18/25    Expected End:  06/18/25            Patient will transfer sit to stand and stand to sit with independent assist to facilitate mobility. (Progressing)       Start:  05/18/25    Expected End:  06/18/25            Patient will amb 50 feet rolling walker device including two turns on even surface with independent assist to facilitate safe mobility.   (Progressing)       Start:  05/18/25    Expected End:  06/18/25            Patient will negotiate 6 stairs with two rail(s) and supervision} assist with no device for in home and community. (Progressing)       Start:  05/18/25    Expected End:  06/18/25

## 2025-05-20 NOTE — NURSING NOTE
Assumed care for patient at 1900. Patient in bed. No complaint of pain at the moment. Patient was given night meds. Will continue to follow plan of care.

## 2025-05-20 NOTE — CARE PLAN
The patient's goals for the shift include No Sob    The clinical goals for the shift include Ambulate patient to the bathroom    Problem: Skin  Goal: Decreased wound size/increased tissue granulation at next dressing change  Outcome: Progressing  Goal: Participates in plan/prevention/treatment measures  Outcome: Progressing  Goal: Prevent/manage excess moisture  Outcome: Progressing  Goal: Prevent/minimize sheer/friction injuries  Outcome: Progressing  Goal: Promote/optimize nutrition  Outcome: Progressing  Goal: Promote skin healing  Outcome: Progressing     Problem: Fall/Injury  Goal: Not fall by end of shift  Outcome: Progressing  Goal: Be free from injury by end of the shift  Outcome: Progressing  Goal: Verbalize understanding of personal risk factors for fall in the hospital  Outcome: Progressing  Goal: Verbalize understanding of risk factor reduction measures to prevent injury from fall in the home  Outcome: Progressing  Goal: Use assistive devices by end of the shift  Outcome: Progressing  Goal: Pace activities to prevent fatigue by end of the shift  Outcome: Progressing     Problem: Safety - Adult  Goal: Free from fall injury  Outcome: Progressing     Problem: Discharge Planning  Goal: Discharge to home or other facility with appropriate resources  Outcome: Progressing     Problem: Chronic Conditions and Co-morbidities  Goal: Patient's chronic conditions and co-morbidity symptoms are monitored and maintained or improved  Outcome: Progressing     Problem: Nutrition  Goal: Nutrient intake appropriate for maintaining nutritional needs  Outcome: Progressing

## 2025-05-20 NOTE — PROGRESS NOTES
Navneet Thompson is a 89 y.o. male on day 3 of admission presenting with Dyspnea on exertion.    Subjective   Patient laying up in the bed.  Denies chest pain, palpitations, pressure, tightness.  Also denies worsening shortness of breath.  Currently on 2 L nasal cannula, will decrease him to 1 L nasal cannula       Objective     Physical Exam  Vitals and nursing note reviewed.   Constitutional:       General: He is not in acute distress.  HENT:      Head: Normocephalic and atraumatic.      Mouth/Throat:      Mouth: Mucous membranes are moist.      Pharynx: Oropharynx is clear.   Eyes:      Extraocular Movements: Extraocular movements intact.   Cardiovascular:      Rate and Rhythm: Normal rate and regular rhythm.      Heart sounds: No murmur heard.     No friction rub. No gallop.      Comments: Patient has a biventricular ICD.  On telemetry biventricularly  paced with heart rates 90s-100s  Pulmonary:      Effort: No respiratory distress.      Breath sounds: No stridor. No wheezing, rhonchi or rales.      Comments: On 2 L nasal cannula.  Bilateral upper lobes are clear, bilateral lower lobes are diminished  Chest:      Chest wall: No tenderness.   Abdominal:      General: Bowel sounds are normal.      Palpations: Abdomen is soft.   Musculoskeletal:         General: No swelling. Normal range of motion.      Cervical back: Normal range of motion and neck supple.      Right lower leg: No edema.      Left lower leg: No edema.      Comments: Improved pedal edema   Skin:     General: Skin is warm and dry.      Capillary Refill: Capillary refill takes less than 2 seconds.   Neurological:      Mental Status: He is alert and oriented to person, place, and time.   Psychiatric:         Mood and Affect: Mood normal.         Behavior: Behavior normal.         Thought Content: Thought content normal.         Judgment: Judgment normal.         Last Recorded Vitals  Blood pressure (!) 107/48, pulse 69, temperature 36.6 °C (97.9 °F),  temperature source Oral, resp. rate 18, height 1.829 m (6'), weight 79.5 kg (175 lb 4.3 oz), SpO2 94%.  Intake/Output last 3 Shifts:  I/O last 3 completed shifts:  In: 300 (3.8 mL/kg) [P.O.:300]  Out: 2650 (33.3 mL/kg) [Urine:2650 (0.9 mL/kg/hr)]  Weight: 79.5 kg     Relevant Results  Results for orders placed or performed during the hospital encounter of 05/17/25 (from the past 24 hours)   POCT GLUCOSE   Result Value Ref Range    POCT Glucose 133 (H) 74 - 99 mg/dL   POCT GLUCOSE   Result Value Ref Range    POCT Glucose 145 (H) 74 - 99 mg/dL   Urinalysis with Reflex Culture and Microscopic   Result Value Ref Range    Color, Urine Light-Yellow Light-Yellow, Yellow, Dark-Yellow    Appearance, Urine Clear Clear    Specific Gravity, Urine 1.009 1.005 - 1.035    pH, Urine 8.0 5.0, 5.5, 6.0, 6.5, 7.0, 7.5, 8.0    Protein, Urine NEGATIVE NEGATIVE, 10 (TRACE), 20 (TRACE) mg/dL    Glucose, Urine 1000 (4+) (A) Normal mg/dL    Blood, Urine NEGATIVE NEGATIVE mg/dL    Ketones, Urine NEGATIVE NEGATIVE mg/dL    Bilirubin, Urine NEGATIVE NEGATIVE mg/dL    Urobilinogen, Urine Normal Normal mg/dL    Nitrite, Urine NEGATIVE NEGATIVE    Leukocyte Esterase, Urine 75 Daniel/uL (A) NEGATIVE   Extra Urine Gray Tube   Result Value Ref Range    Extra Tube Hold for add-ons.    Microscopic Only, Urine   Result Value Ref Range    WBC, Urine 11-20 (A) 1-5, NONE /HPF    RBC, Urine 1-2 NONE, 1-2, 3-5 /HPF   POCT GLUCOSE   Result Value Ref Range    POCT Glucose 161 (H) 74 - 99 mg/dL   POCT GLUCOSE   Result Value Ref Range    POCT Glucose 118 (H) 74 - 99 mg/dL   Protime-INR   Result Value Ref Range    Protime 22.4 (H) 9.3 - 12.7 seconds    INR 2.2 (H) 0.9 - 1.2   CBC   Result Value Ref Range    WBC 10.8 4.4 - 11.3 x10*3/uL    nRBC 0.0 0.0 - 0.0 /100 WBCs    RBC 4.27 (L) 4.50 - 5.90 x10*6/uL    Hemoglobin 10.7 (L) 13.5 - 17.5 g/dL    Hematocrit 35.8 (L) 41.0 - 52.0 %    MCV 84 80 - 100 fL    MCH 25.1 (L) 26.0 - 34.0 pg    MCHC 29.9 (L) 32.0 - 36.0 g/dL     RDW 16.5 (H) 11.5 - 14.5 %    Platelets 226 150 - 450 x10*3/uL   Basic Metabolic Panel   Result Value Ref Range    Glucose 70 (L) 74 - 99 mg/dL    Sodium 141 136 - 145 mmol/L    Potassium 3.8 3.5 - 5.3 mmol/L    Chloride 103 98 - 107 mmol/L    Bicarbonate 31 21 - 32 mmol/L    Anion Gap 11 10 - 20 mmol/L    Urea Nitrogen 37 (H) 6 - 23 mg/dL    Creatinine 1.22 0.50 - 1.30 mg/dL    eGFR 56 (L) >60 mL/min/1.73m*2    Calcium 9.3 8.6 - 10.3 mg/dL   POCT GLUCOSE   Result Value Ref Range    POCT Glucose 63 (L) 74 - 99 mg/dL   POCT GLUCOSE   Result Value Ref Range    POCT Glucose 90 74 - 99 mg/dL     *Note: Due to a large number of results and/or encounters for the requested time period, some results have not been displayed. A complete set of results can be found in Results Review.      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 He states that he has been experiencing shortness of breath symptoms over the past few month. He states to experiencing orthopnea, dyspnea on exertion, and has mild bilateral lower extremity edema. Family brought patient to the ED to be evaluated.  Patient denies chest pain, palpitations, pressure, or tightness. Patient on nasal cannula.  States that his shortness of breath has improved, but still present with exertion. Patient does not wear oxygen at home.   Vitals this morning show a heart rate of 72, blood pressure 113/62, satting 96% on 2 L nasal cannula.  On telemetry patient is a ventricular paced rhythm with PVCs, heart rates in the 70s.   Yesterday 5/18, the patient was given  40 mg IV furosemide every 8 hours for a total of 3 doses,  which equalled 120 mg total. Recorded I/O show a net loss of -1,410 mL. Admitting team reordered oral furosemide 80 mg daily to be started this morning. Will place this order on hold, and continue an additional day of IV diuresis. Patients shortness of breath has improved, but he is still on oxygen 2L. There is still some swelling on the lower extremities and the patients lower lung lobes are diminished. Will order IV furosemide 40 mg BID for 2 doses and reassess tomorrow. Continue aspirin, atorvastatin 80 mg daily, lisinopril 5 mg daily, metoprolol tartrate 25 mg twice daily, spironolactone 12.5 mg daily, and Coumadin.  Upon admission to the ED patient had a supratherapeutic INR, in which the Coumadin was being held.  Today it is 3.0, within normal range.  The admitting team is managing Coumadin administration.  Please monitor strict I/Os and daily weights. We will continue to follow along with you.     5/20: Patient laying up in the bed.  Denies chest pain, palpitations, pressure, tightness.  Also denies worsening shortness of breath.  Currently on 2 L nasal cannula, will decrease him to 1 L nasal cannula. Vitals this morning show a heart rate of 69, blood pressure 107/48, satting 94% on 2 L nasal cannula.  On telemetry patient is a ventricular paced rhythm with  heart rates in the 90s-low 100s. Labs this morning show potassium of 3.8, BUN 37, creatinine 1.22, INR 2.2, hemoglobin/hematocrit 10.7/35.8. I/O show a net output of -1200 mL.  Patient's bilateral pedal edema has improved and lung sounds are clear with diminished bases. Will restart patients home medication of oral 40 mg furosemide twice daily. Continue aspirin, atorvastatin 80 mg daily, lisinopril 5 mg daily, metoprolol tartrate 25 mg twice daily, spironolactone 12.5 mg daily, and Coumadin. The admitting team is managing Coumadin administration.  Please monitor strict I/Os and daily weights. We will continue to follow along with you for an additional day      I spent 35 minutes in the professional and overall care of this patient.      Laura Almaguer, APRN-CNP

## 2025-05-20 NOTE — CONSULTS
Nutrition Consult/Progress Note    Consult received for Nutrition assessment/recommendation. Patient was assessed by RD yesterday 5/19/25. Per Epic chat with RN, patient requested ONS. Will add Ensure high protein once daily to provide 160 kcals and 16g protein. Pt. Currently on CCD 60 g/meal, 70g fat and 2-3g Na restriction diet. No fluid restrictions noted. Will continue to assess intakes and increase ONS if indicated.

## 2025-05-21 ENCOUNTER — APPOINTMENT (OUTPATIENT)
Dept: RADIOLOGY | Facility: HOSPITAL | Age: OVER 89
DRG: 291 | End: 2025-05-21
Payer: MEDICARE

## 2025-05-21 LAB
ANION GAP SERPL CALCULATED.3IONS-SCNC: 11 MMOL/L (ref 10–20)
ATRIAL RATE: 69 BPM
BACTERIA UR CULT: NORMAL
BUN SERPL-MCNC: 40 MG/DL (ref 6–23)
CALCIUM SERPL-MCNC: 9.1 MG/DL (ref 8.6–10.3)
CHLORIDE SERPL-SCNC: 101 MMOL/L (ref 98–107)
CO2 SERPL-SCNC: 31 MMOL/L (ref 21–32)
CREAT SERPL-MCNC: 1.59 MG/DL (ref 0.5–1.3)
EGFRCR SERPLBLD CKD-EPI 2021: 41 ML/MIN/1.73M*2
ERYTHROCYTE [DISTWIDTH] IN BLOOD BY AUTOMATED COUNT: 16.3 % (ref 11.5–14.5)
GLUCOSE BLD MANUAL STRIP-MCNC: 105 MG/DL (ref 74–99)
GLUCOSE BLD MANUAL STRIP-MCNC: 139 MG/DL (ref 74–99)
GLUCOSE BLD MANUAL STRIP-MCNC: 140 MG/DL (ref 74–99)
GLUCOSE BLD MANUAL STRIP-MCNC: 143 MG/DL (ref 74–99)
GLUCOSE SERPL-MCNC: 98 MG/DL (ref 74–99)
HCT VFR BLD AUTO: 33.7 % (ref 41–52)
HGB BLD-MCNC: 10.4 G/DL (ref 13.5–17.5)
MCH RBC QN AUTO: 25.1 PG (ref 26–34)
MCHC RBC AUTO-ENTMCNC: 30.9 G/DL (ref 32–36)
MCV RBC AUTO: 81 FL (ref 80–100)
NRBC BLD-RTO: 0 /100 WBCS (ref 0–0)
PLATELET # BLD AUTO: 226 X10*3/UL (ref 150–450)
POTASSIUM SERPL-SCNC: 4.2 MMOL/L (ref 3.5–5.3)
Q ONSET: 229 MS
QRS COUNT: 14 BEATS
QRS DURATION: 154 MS
QT INTERVAL: 430 MS
QTC CALCULATION(BAZETT): 511 MS
QTC FREDERICIA: 483 MS
R AXIS: 214 DEGREES
RBC # BLD AUTO: 4.15 X10*6/UL (ref 4.5–5.9)
SODIUM SERPL-SCNC: 139 MMOL/L (ref 136–145)
T AXIS: 31 DEGREES
T OFFSET: 444 MS
VENTRICULAR RATE: 85 BPM
WBC # BLD AUTO: 10.2 X10*3/UL (ref 4.4–11.3)

## 2025-05-21 PROCEDURE — 2500000005 HC RX 250 GENERAL PHARMACY W/O HCPCS: Performed by: INTERNAL MEDICINE

## 2025-05-21 PROCEDURE — 2500000001 HC RX 250 WO HCPCS SELF ADMINISTERED DRUGS (ALT 637 FOR MEDICARE OP): Performed by: INTERNAL MEDICINE

## 2025-05-21 PROCEDURE — 71046 X-RAY EXAM CHEST 2 VIEWS: CPT | Performed by: RADIOLOGY

## 2025-05-21 PROCEDURE — 2500000001 HC RX 250 WO HCPCS SELF ADMINISTERED DRUGS (ALT 637 FOR MEDICARE OP): Performed by: NURSE PRACTITIONER

## 2025-05-21 PROCEDURE — 36415 COLL VENOUS BLD VENIPUNCTURE: CPT | Performed by: NURSE PRACTITIONER

## 2025-05-21 PROCEDURE — 2500000004 HC RX 250 GENERAL PHARMACY W/ HCPCS (ALT 636 FOR OP/ED): Performed by: INTERNAL MEDICINE

## 2025-05-21 PROCEDURE — 97124 MASSAGE THERAPY: CPT | Mod: GP

## 2025-05-21 PROCEDURE — 97535 SELF CARE MNGMENT TRAINING: CPT | Mod: GO,CO

## 2025-05-21 PROCEDURE — 85027 COMPLETE CBC AUTOMATED: CPT | Performed by: NURSE PRACTITIONER

## 2025-05-21 PROCEDURE — 82947 ASSAY GLUCOSE BLOOD QUANT: CPT

## 2025-05-21 PROCEDURE — 97116 GAIT TRAINING THERAPY: CPT | Mod: GP

## 2025-05-21 PROCEDURE — 97110 THERAPEUTIC EXERCISES: CPT | Mod: GP

## 2025-05-21 PROCEDURE — 1200000002 HC GENERAL ROOM WITH TELEMETRY DAILY

## 2025-05-21 PROCEDURE — 99232 SBSQ HOSP IP/OBS MODERATE 35: CPT | Performed by: PHYSICIAN ASSISTANT

## 2025-05-21 PROCEDURE — 2500000001 HC RX 250 WO HCPCS SELF ADMINISTERED DRUGS (ALT 637 FOR MEDICARE OP)

## 2025-05-21 PROCEDURE — 2500000002 HC RX 250 W HCPCS SELF ADMINISTERED DRUGS (ALT 637 FOR MEDICARE OP, ALT 636 FOR OP/ED): Performed by: INTERNAL MEDICINE

## 2025-05-21 PROCEDURE — 80048 BASIC METABOLIC PNL TOTAL CA: CPT | Performed by: NURSE PRACTITIONER

## 2025-05-21 PROCEDURE — 2500000002 HC RX 250 W HCPCS SELF ADMINISTERED DRUGS (ALT 637 FOR MEDICARE OP, ALT 636 FOR OP/ED): Performed by: NURSE PRACTITIONER

## 2025-05-21 PROCEDURE — 71046 X-RAY EXAM CHEST 2 VIEWS: CPT

## 2025-05-21 RX ADMIN — CIPROFLOXACIN HYDROCHLORIDE 250 MG: 250 TABLET, FILM COATED ORAL at 21:37

## 2025-05-21 RX ADMIN — MAGNESIUM HYDROXIDE 60 ML: 1200 LIQUID ORAL at 09:37

## 2025-05-21 RX ADMIN — CARBOXYMETHYLCELLULOSE SODIUM 1 DROP: 0.5 SOLUTION/ DROPS OPHTHALMIC at 11:17

## 2025-05-21 RX ADMIN — NYSTATIN 1 APPLICATION: 100000 POWDER TOPICAL at 09:41

## 2025-05-21 RX ADMIN — ASPIRIN 81 MG: 81 TABLET, COATED ORAL at 09:37

## 2025-05-21 RX ADMIN — LISINOPRIL 5 MG: 5 TABLET ORAL at 09:37

## 2025-05-21 RX ADMIN — INSULIN GLARGINE 10 UNITS: 100 INJECTION, SOLUTION SUBCUTANEOUS at 21:37

## 2025-05-21 RX ADMIN — TRIAMCINOLONE ACETONIDE: 1 CREAM TOPICAL at 21:38

## 2025-05-21 RX ADMIN — SPIRONOLACTONE 12.5 MG: 25 TABLET ORAL at 09:37

## 2025-05-21 RX ADMIN — CIPROFLOXACIN HYDROCHLORIDE 250 MG: 250 TABLET, FILM COATED ORAL at 09:37

## 2025-05-21 RX ADMIN — EMPAGLIFLOZIN 10 MG: 10 TABLET, FILM COATED ORAL at 09:40

## 2025-05-21 RX ADMIN — NYSTATIN 1 APPLICATION: 100000 POWDER TOPICAL at 21:41

## 2025-05-21 RX ADMIN — WARFARIN SODIUM 3 MG: 3 TABLET ORAL at 17:23

## 2025-05-21 RX ADMIN — INSULIN LISPRO 6 UNITS: 100 INJECTION, SOLUTION INTRAVENOUS; SUBCUTANEOUS at 12:39

## 2025-05-21 RX ADMIN — NEOMYCIN SULFATE, POLYMYXIN B SULFATE AND BACITRACIN ZINC 0.5 INCH: 3.5; 10000; 4 OINTMENT OPHTHALMIC at 21:38

## 2025-05-21 RX ADMIN — POTASSIUM CHLORIDE 20 MEQ: 1500 TABLET, EXTENDED RELEASE ORAL at 09:38

## 2025-05-21 RX ADMIN — FUROSEMIDE 40 MG: 40 TABLET ORAL at 09:44

## 2025-05-21 RX ADMIN — INSULIN LISPRO 6 UNITS: 100 INJECTION, SOLUTION INTRAVENOUS; SUBCUTANEOUS at 16:30

## 2025-05-21 RX ADMIN — CHOLECALCIFEROL TAB 25 MCG (1000 UNIT) 25 MCG: 25 TAB at 09:38

## 2025-05-21 RX ADMIN — METOPROLOL TARTRATE 25 MG: 25 TABLET, FILM COATED ORAL at 21:37

## 2025-05-21 RX ADMIN — Medication 1 TABLET: at 09:38

## 2025-05-21 RX ADMIN — METOPROLOL TARTRATE 25 MG: 25 TABLET, FILM COATED ORAL at 09:37

## 2025-05-21 RX ADMIN — ATORVASTATIN CALCIUM 80 MG: 80 TABLET, FILM COATED ORAL at 21:37

## 2025-05-21 ASSESSMENT — COGNITIVE AND FUNCTIONAL STATUS - GENERAL
DAILY ACTIVITIY SCORE: 16
MOVING TO AND FROM BED TO CHAIR: A LITTLE
STANDING UP FROM CHAIR USING ARMS: A LOT
EATING MEALS: A LITTLE
TURNING FROM BACK TO SIDE WHILE IN FLAT BAD: A LITTLE
CLIMB 3 TO 5 STEPS WITH RAILING: A LOT
MOVING FROM LYING ON BACK TO SITTING ON SIDE OF FLAT BED WITH BEDRAILS: A LITTLE
HELP NEEDED FOR BATHING: A LOT
PERSONAL GROOMING: A LITTLE
WALKING IN HOSPITAL ROOM: A LITTLE
DRESSING REGULAR LOWER BODY CLOTHING: A LOT
DRESSING REGULAR UPPER BODY CLOTHING: A LITTLE
MOBILITY SCORE: 16
TOILETING: A LITTLE

## 2025-05-21 ASSESSMENT — ACTIVITIES OF DAILY LIVING (ADL): HOME_MANAGEMENT_TIME_ENTRY: 25

## 2025-05-21 ASSESSMENT — PAIN SCALES - GENERAL
PAINLEVEL_OUTOF10: 0 - NO PAIN

## 2025-05-21 ASSESSMENT — PAIN - FUNCTIONAL ASSESSMENT
PAIN_FUNCTIONAL_ASSESSMENT: 0-10

## 2025-05-21 NOTE — CARE PLAN
The patient's goals for the shift include No Sob    The clinical goals for the shift include remain hds

## 2025-05-21 NOTE — CARE PLAN
The patient's goals for the shift include No Sob    The clinical goals for the shift include remain hds      Problem: Skin  Goal: Decreased wound size/increased tissue granulation at next dressing change  Outcome: Progressing  Goal: Promote skin healing  Outcome: Progressing     Problem: Fall/Injury  Goal: Pace activities to prevent fatigue by end of the shift  Outcome: Progressing     Problem: Discharge Planning  Goal: Discharge to home or other facility with appropriate resources  Outcome: Progressing     Problem: Chronic Conditions and Co-morbidities  Goal: Patient's chronic conditions and co-morbidity symptoms are monitored and maintained or improved  Outcome: Progressing     Problem: Nutrition  Goal: Nutrient intake appropriate for maintaining nutritional needs  Outcome: Progressing

## 2025-05-21 NOTE — PROGRESS NOTES
Physical Therapy    Physical Therapy Treatment    Patient Name: Navneet Thompson  MRN: [id number]  Department: [address]  Room: [address] [place] [address]  Today's Date: 5/21/2025  Time Calculation  Start Time: 0858  Stop Time: 0922  Time Calculation (min): 24 min         Assessment/Plan   PT Assessment  PT Assessment Results: Decreased strength, Decreased endurance, Impaired balance, Decreased mobility, Impaired judgement, Decreased safety awareness  Rehab Prognosis: Good  Barriers to Discharge Home: Caregiver assistance, Physical needs  Caregiver Assistance: Patient lives alone and/or does not have reliable caregiver assistance  Physical Needs: Stair navigation into home limited by function/safety, Intermittent mobility assistance needed  Evaluation/Treatment Tolerance: Patient limited by fatigue  Medical Staff Made Aware: Yes  Barriers to Participation: Comorbidities  End of Session Communication: Bedside nurse  Assessment Comment: pt progressing well; increased gait distance and overall mobility noted. pt continues to present with impaired activity tolerance. educated and instructed pt in pursed lip breathing  End of Session Patient Position: Up in chair, Alarm on (call button in reach)  PT Plan  Inpatient/Swing Bed or Outpatient: Inpatient    PT Plan  Treatment/Interventions: Bed mobility, Transfer training, Gait training, Stair training, Balance training, Neuromuscular re-education, Strengthening, Endurance training, Therapeutic exercise, Therapeutic activity  PT Plan: Ongoing PT  PT Frequency: 4 times per week  PT Discharge Recommendations: Moderate intensity level of continued care  Equipment Recommended upon Discharge: Wheeled walker  PT Recommended Transfer Status:  (MIN/MOD A)  PT - OK to Discharge: Yes    PT Visit Info:  PT Received On: 05/21/25     General Visit Information:   General  Reason for Referral: impaired ADL's/functional mobility d/t PATRICK  Referred By: Buddy Callejas MD  Past Medical History Relevant to Rehab:  Afib, BCC, pacemaker, CHF, COPD, HTN, HLD, aortic valve stenosis, psoriatic arthritis, PAVR, CKD, DM, DVT.  Family/Caregiver Present: No  Prior to Session Communication: Bedside nurse  Patient Position Received: On cart (coming back from test)  General Comment: Cleared by nurse prior to session; pt cooperative and willing to work with therapy    Subjective   Precautions:  Precautions  Hearing/Visual Limitations: wears glasses; [place]; deaf in R ear  Medical Precautions: Fall precautions, Oxygen therapy device and L/min  Precautions Comment: pt wearing 2L of oxygen; *educated and instructed pt in energy conservation techniques      Vital Signs Comment: wearing 2L of oxygen   SaO2 at 96%    Objective   Pain:  Pain Assessment  Pain Assessment:  (0/10)  Cognition:  Cognition  Overall Cognitive Status: Within Functional Limits  Orientation Level: Oriented X4  Safety Judgment: Decreased awareness of need for safety precautions  Insight: Mild    Coordination:  Coordination Comment: short B step length;  Postural Control:  Postural Control  Posture Comment: mild forward head, protracted shldrs; mild trunk flexion on hips during amb with FWW    Activity Tolerance:  Activity Tolerance  Endurance:  (FAIR activity tolerance. mild fatigue/PATRICK during gait training)    Treatments:  Therapeutic Exercise  Therapeutic Exercise Performed:  (B LE AROM therex: AP, GS, QS, HS, ABD, LAQ, hip flexion and HS x 15 -20 reps)         Bed Mobility  Bed Mobility:  (supine to sit with MIN A for trunk up, B LE and scooting. GOOD- sitting balance on cart; pt given seated rest break due to mild fatigue)    Ambulation/Gait Training  Ambulation/Gait Training Performed:  (pt amb 15' x 1 using RW with close MIN A. after seated rest break pt amb additional 20' x 1; pt presented with slow chuy, short shuffling steps with mild Left foot drop and forward flexed trunk. mild unsteadiness and fatigue noted)      Transfers  Transfer:  (sit to stand x 2 trials  with JOSSELIN DAY VC for safe hand placement. increased time and effort noted. FAIR eccentric control observed.)       Outcome Measures:  [facility] Basic Mobility  Turning from your back to your side while in a flat bed without using bedrails: A little  Moving from lying on your back to sitting on the side of a flat bed without using bedrails: A little  Moving to and from bed to chair (including a wheelchair): A little  Standing up from a chair using your arms (e.g. wheelchair or bedside chair): A lot  To walk in hospital room: A little  Climbing 3-5 steps with railing: A lot  Basic Mobility - Total Score: 16        Encounter Problems       Encounter Problems (Active)       PT STG Problem       Patient will transfer supine to sit and sit to supine with independent assist to facilitate mobility. (Progressing)       Start:  05/18/25    Expected End:  06/18/25            Patient will transfer sit to stand and stand to sit with independent assist to facilitate mobility. (Progressing)       Start:  05/18/25    Expected End:  06/18/25            Patient will amb 50 feet rolling walker device including two turns on even surface with independent assist to facilitate safe mobility.   (Progressing)       Start:  05/18/25    Expected End:  06/18/25            Patient will negotiate 6 stairs with two rail(s) and supervision} assist with no device for in home and community. (Progressing)       Start:  05/18/25    Expected End:  06/18/25

## 2025-05-21 NOTE — PROGRESS NOTES
05/21/25 0822   Discharge Planning   Expected Discharge Disposition Home H  ([facility])     Patient declining SNF, will go home with HC     Will need internal referral for home health upon discharge  ** do not discharge without speaking to care coordination**  Will need to secure SOC prior to discharge

## 2025-05-21 NOTE — PROGRESS NOTES
Navneet Thompson is a 89 y.o. male on day 4 of admission presenting with Dyspnea on exertion.    Subjective   Alert and oriented, sitting up in a chair drinking coffee. Patient reports his SOB is improving, denies chest pain, palpitations or feelings of fast heart rate.        Objective     Physical Exam    Appearance: Alert, oriented, cooperative, in no acute distress.     Eyes: Conjunctiva pink with no redness or exudates. Eyelids without lesions. No scleral icterus.     ENT: Hearing grossly intact. External inspection of ears without lesions or erythema. no nasal flaring. no tripoding, no drooling, no difficulty swallowing oral secretions.    Pulmonary: diminished breath sounds in all lobes. No rales, rhonchi or wheezing. No accessory muscle use or stridor. No difficulty completing a full sentence without taking a breath    Cardiac: Irregular rhythm, normal rate, no rub, gallop. No JVD.    Musculoskeletal: No cyanosis, clubbing. No lower extremity edema.  Capillary refill less than 2 seconds in lower extremities    Neurological: no focal findings identified.    Psychiatric: Appropriate mood and affect.     Last Recorded Vitals  Blood pressure 105/50, pulse 69, temperature 37.2 °C (99 °F), temperature source Oral, resp. rate 15, height 1.829 m (6'), weight 79.5 kg (175 lb 4.3 oz), SpO2 93%.  Intake/Output last 3 Shifts:  I/O last 3 completed shifts:  In: 540 (6.8 mL/kg) [P.O.:540]  Out: 3100 (39 mL/kg) [Urine:3100 (1.1 mL/kg/hr)]  Weight: 79.5 kg     Relevant Results  Results for orders placed or performed during the hospital encounter of 05/17/25 (from the past 24 hours)   POCT GLUCOSE   Result Value Ref Range    POCT Glucose 90 74 - 99 mg/dL   POCT GLUCOSE   Result Value Ref Range    POCT Glucose 134 (H) 74 - 99 mg/dL   POCT GLUCOSE   Result Value Ref Range    POCT Glucose 136 (H) 74 - 99 mg/dL   CBC   Result Value Ref Range    WBC 10.2 4.4 - 11.3 x10*3/uL    nRBC 0.0 0.0 - 0.0 /100 WBCs    RBC 4.15 (L) 4.50 -  5.90 x10*6/uL    Hemoglobin 10.4 (L) 13.5 - 17.5 g/dL    Hematocrit 33.7 (L) 41.0 - 52.0 %    MCV 81 80 - 100 fL    MCH 25.1 (L) 26.0 - 34.0 pg    MCHC 30.9 (L) 32.0 - 36.0 g/dL    RDW 16.3 (H) 11.5 - 14.5 %    Platelets 226 150 - 450 x10*3/uL   Basic Metabolic Panel   Result Value Ref Range    Glucose 98 74 - 99 mg/dL    Sodium 139 136 - 145 mmol/L    Potassium 4.2 3.5 - 5.3 mmol/L    Chloride 101 98 - 107 mmol/L    Bicarbonate 31 21 - 32 mmol/L    Anion Gap 11 10 - 20 mmol/L    Urea Nitrogen 40 (H) 6 - 23 mg/dL    Creatinine 1.59 (H) 0.50 - 1.30 mg/dL    eGFR 41 (L) >60 mL/min/1.73m*2    Calcium 9.1 8.6 - 10.3 mg/dL   POCT GLUCOSE   Result Value Ref Range    POCT Glucose 105 (H) 74 - 99 mg/dL     *Note: Due to a large number of results and/or encounters for the requested time period, some results have not been displayed. A complete set of results can be found in Results Review.     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 He states that he has been experiencing shortness of breath symptoms over the past few month. He states to experiencing orthopnea, dyspnea on exertion, and has mild bilateral lower extremity edema. Family brought patient to the ED to be evaluated.  Patient denies chest pain, palpitations, pressure, or tightness. Patient on nasal cannula.  States that his shortness of breath has improved, but still present with exertion. Patient does not wear oxygen at home.   Vitals this morning show a heart rate of 72,  blood pressure 113/62, satting 96% on 2 L nasal cannula.  On telemetry patient is a ventricular paced rhythm with PVCs, heart rates in the 70s.   Yesterday 5/18, the patient was given  40 mg IV furosemide every 8 hours for a total of 3 doses, which equalled 120 mg total. Recorded I/O show a net loss of -1,410 mL. Admitting team reordered oral furosemide 80 mg daily to be started this morning. Will place this order on hold, and continue an additional day of IV diuresis. Patients shortness of breath has improved, but he is still on oxygen 2L. There is still some swelling on the lower extremities and the patients lower lung lobes are diminished. Will order IV furosemide 40 mg BID for 2 doses and reassess tomorrow. Continue aspirin, atorvastatin 80 mg daily, lisinopril 5 mg daily, metoprolol tartrate 25 mg twice daily, spironolactone 12.5 mg daily, and Coumadin.  Upon admission to the ED patient had a supratherapeutic INR, in which the Coumadin was being held.  Today it is 3.0, within normal range.  The admitting team is managing Coumadin administration.  Please monitor strict I/Os and daily weights. We will continue to follow along with you.      5/20: Patient laying up in the bed.  Denies chest pain, palpitations, pressure, tightness.  Also denies worsening shortness of breath.  Currently on 2 L nasal cannula, will decrease him to 1 L nasal cannula. Vitals this morning show a heart rate of 69, blood pressure 107/48, satting 94% on 2 L nasal cannula.  On telemetry patient is a ventricular paced rhythm with  heart rates in the 90s-low 100s. Labs this morning show potassium of 3.8, BUN 37, creatinine 1.22, INR 2.2, hemoglobin/hematocrit 10.7/35.8. I/O show a net output of -1200 mL.  Patient's bilateral pedal edema has improved and lung sounds are clear with diminished bases. Will restart patients home medication of oral 40 mg furosemide twice daily. Continue aspirin, atorvastatin 80 mg daily, lisinopril 5 mg daily,  metoprolol tartrate 25 mg twice daily, spironolactone 12.5 mg daily, and Coumadin. The admitting team is managing Coumadin administration.  Please monitor strict I/Os and daily weights. We will continue to follow along with you for an additional day     5/21: Patient is alert and oriented this morning, sitting up in a chair drinking coffee, reports feeling improved.  Patient appears euvolemic on exam, no lower extremity edema.  Vital signs reviewed this morning /50, heart rate 69, 93% oxygen on 1 L nasal cannula.  Patient is currently not on telemetry, battery needs replaced. I/O yesterday shows a net loss of 1.36 L.  Patient was started on p.o. diuretics this morning 40 mg twice daily.  Labs reviewed showing normal sodium potassium increased creatinine 1.59, elevated from day prior, 1.22, BUN 48, GFR 41.  Patient received 1 dose of diuretics this morning, will hold patient's second dose of diuretics due to elevated creatinine.  Farxiga and lisinopril will also be held to allow room for gentle diuresis.  Chest x-ray was completed yesterday, reviewed today, pleural effusion bilateral, greatest on the right side and slightly increased hazy opacifications in right lower lobe that may represent increased pleural fluid, or fluid redistribution due to differences of positioning. Continue aspirin, atorvastatin 80 mg daily, metoprolol tartrate 25 mg twice daily, spironolactone 12.5 mg daily, and Coumadin.  Patient is stable from a cardiac perspective, suspect chest x-ray findings are due to differences of positioning, given patient's lower extremity edema has resolved, and patient feels overall improved.  Will reevaluate kidney function and dosage of diuretics tomorrow. Anticipate following this patient with you 1 more day.        I spent 35 minutes in the professional and overall care of this patient.      Joselin Soliz PA-C         [1] aspirin, 81 mg, oral, Daily  atorvastatin, 80 mg, oral,  q48h  cholecalciferol, 25 mcg, oral, Daily  ciprofloxacin, 250 mg, oral, q12h JESUS  empagliflozin, 10 mg, oral, Daily  esomeprazole, 20 mg, nasogastric tube, Daily before breakfast  furosemide, 40 mg, oral, BID  insulin glargine, 10 Units, subcutaneous, Nightly  insulin lispro, 0-15 Units, subcutaneous, TID AC  insulin lispro, 6 Units, subcutaneous, TID AC  lisinopril, 5 mg, oral, Daily  magnesium hydroxide, 60 mL, oral, Daily  magnesium oxide, 400 mg of magnesium oxide, oral, Daily  metoprolol tartrate, 25 mg, oral, BID  neomycin-bacitracin-polymyxin, 0.5 inch, Both Eyes, Nightly  nystatin, 1 Application, Topical, BID  potassium chloride CR, 20 mEq, oral, Daily  spironolactone, 12.5 mg, oral, Daily  triamcinolone, , Topical, BID  warfarin, 3 mg, oral, Daily  [2]    [3] PRN medications: acetaminophen **OR** acetaminophen **OR** acetaminophen, albuterol, benzocaine-menthol, dextromethorphan-guaifenesin, dextrose, dextrose, glucagon, glucagon, guaiFENesin, lubricating eye drops, polyethylene glycol

## 2025-05-21 NOTE — PROGRESS NOTES
Occupational Therapy    OT Treatment    Patient Name: Navneet Thompson  MRN: [id number]  Department: [address]  Room: [address] [place] [address]  Today's Date: 5/21/2025  Time Calculation  Start Time: 1417  Stop Time: 1442  Time Calculation (min): 25 min        Assessment:  OT Assessment: pt tolerated treatment fairly and is making gradual progress towards OT POC. pt displaying increased ability to complete UB ADLs and groooming tasks which displays progress towards OT goals. pt would benefit from continued OT services to increase strength, balance, endurance, and activity tolerance for ADLs  Prognosis: Good  Barriers to Discharge Home: Caregiver assistance, Physical needs  Caregiver Assistance: Patient lives alone and/or does not have reliable caregiver assistance  Physical Needs: Ambulating household distances limited by function/safety, 24hr mobility assistance needed, 24hr ADL assistance needed  Evaluation/Treatment Tolerance: Patient tolerated treatment well  Medical Staff Made Aware: Yes  End of Session Communication: Bedside nurse  End of Session Patient Position: Bed, 3 rail up, Alarm off, not on at start of session (jude llight in reach and all needs met: RN made aware of OT session; family in room upon exit)  OT Assessment Results: Decreased ADL status, Decreased upper extremity strength, Decreased safe judgment during ADL, Decreased endurance, Decreased sensation, Decreased functional mobility  Prognosis: Good  Barriers to Discharge: Decreased caregiver support  Evaluation/Treatment Tolerance: Patient tolerated treatment well  Medical Staff Made Aware: Yes  Strengths: Premorbid level of function  Barriers to Participation: Comorbidities  Plan:  Treatment Interventions: ADL retraining, Functional transfer training, UE strengthening/ROM, Endurance training, Patient/family training, Equipment evaluation/education, Neuromuscular reeducation, Compensatory technique education  OT Frequency: 4 times per week  OT Discharge  Recommendations: Moderate intensity level of continued care  Equipment Recommended upon Discharge: Wheeled walker  OT Recommended Transfer Status: Moderate assist  OT - OK to Discharge: Yes  Treatment Interventions: ADL retraining, Functional transfer training, UE strengthening/ROM, Endurance training, Patient/family training, Equipment evaluation/education, Neuromuscular reeducation, Compensatory technique education    Subjective   OT Visit Info:  OT Received On: 05/21/25  General Visit Info:  General  Reason for Referral: impaired ADL's/functional mobility d/t PATRICK  Referred By: Buddy Callejas MD  Past Medical History Relevant to Rehab: Afib, BCC, pacemaker, CHF, COPD, HTN, HLD, aortic valve stenosis, psoriatic arthritis, PAVR, CKD, DM, DVT.  Family/Caregiver Present: Yes  Caregiver Feedback: two family members  Prior to Session Communication: Bedside nurse  Patient Position Received: Bed, 3 rail up, Alarm off, not on at start of session  Preferred Learning Style: verbal  General Comment: pt cleared for OT treatment by nursing; pt pleasant and agreeable to therapy  Precautions:  Hearing/Visual Limitations: wears glasses; [place]; deaf in R ear  Medical Precautions: Fall precautions, Oxygen therapy device and L/min  Precautions Comment: 2L O2 via NC    Pain:  Pain Assessment  Pain Assessment: 0-10  0-10 (Numeric) Pain Score: 0 - No pain    Objective    Cognition:  Cognition  Overall Cognitive Status: Within Functional Limits  Orientation Level: Oriented X4  Cognition Comments: very pleasant and cooperative    Coordination:  Movements are Fluid and Coordinated: No  Upper Body Coordination: decreased rate in BUEs movements    Activities of Daily Living:    Grooming  Grooming Level of Assistance: Setup  Grooming Where Assessed: Edge of bed  Grooming Comments: pt engaged in oral hygiene tasks and washing face while seated on EOB: completed with increased time    UE Bathing  UE Bathing Level of Assistance: Minimum  assistance  UE Bathing Where Assessed: Edge of bed  UE Bathing Comments: UB bathing completed while seated on EOB with min A: completed with increased time    LE Bathing  LE Bathing Comments: pt politely declining at this time    UE Dressing  UE Dressing Level of Assistance: Setup  UE Dressing Where Assessed: Edge of bed  UE Dressing Comments: pt able to doff gown; pt able to thread BUEs through gown  while seated on EOB    LE Dressing  LE Dressing: No    Toileting  Toileting Comments: +purewick    Bed Mobility/Transfers:   Bed Mobility  Bed Mobility: Yes  Bed Mobility 1  Bed Mobility 1: Supine to sitting  Level of Assistance 1: Close supervision  Bed Mobility Comments 1: pt able to bring BLEs and trunk up into sitting with increased time and effort; use of bed rails  Bed Mobility 2  Bed Mobility  2: Sitting to supine  Level of Assistance 2: Close supervision  Bed Mobility Comments 2: pt able to transition BLEs and trunk into supine position with increased time; effortful    Transfers  Transfer: No (pt politely declining due to fatigue from PT and tests from earlier and wanting to visit with family)    Functional Mobility:  Functional Mobility  Functional Mobility Performed: No    Sitting Balance:  Dynamic Sitting Balance  Dynamic Sitting-Balance Support: No upper extremity supported, Feet supported  Dynamic Sitting-Level of Assistance: Close supervision  Dynamic Sitting-Balance: Forward lean, Reaching for objects, Reaching across midline  Dynamic Sitting-Comments: ADL tasks while seated on EOB with supervision for balance and safety; fair seated balance    Outcome Measures:  [facility] Daily Activity  Putting on and taking off regular lower body clothing: A lot  Bathing (including washing, rinsing, drying): A lot  Putting on and taking off regular upper body clothing: A little  Toileting, which includes using toilet, bedpan or urinal: A little  Taking care of personal grooming such as brushing teeth: A little  Eating  Meals: A little  Daily Activity - Total Score: 16      Education Documentation  Body Mechanics, taught by JEAN Howell at 5/21/2025  2:55 PM.  Learner: Patient  Readiness: Acceptance  Method: Explanation  Response: Verbalizes Understanding  Comment: pt educated on OT POC, body mechanics, balance/safety technqiues, and safety precautions during ADL retraining    ADL Training, taught by JEAN Howell at 5/21/2025  2:55 PM.  Learner: Patient  Readiness: Acceptance  Method: Explanation  Response: Verbalizes Understanding  Comment: pt educated on OT POC, body mechanics, balance/safety technqiues, and safety precautions during ADL retraining      Goals:  Encounter Problems       Encounter Problems (Active)       OT Goals       Pt will complete all ADL's with mod indep/indep using adaptive equipment as needed. (Progressing)       Start:  05/19/25    Expected End:  06/19/25            Pt will complete all functional transfers and mobility with mod indep using a RW. (Progressing)       Start:  05/19/25    Expected End:  06/19/25            Pt will tolerate functional mobility for a household distance using a RW with mod indep. (Progressing)       Start:  05/19/25    Expected End:  06/19/25

## 2025-05-21 NOTE — PROGRESS NOTES
Navneet Thompson is a 89 y.o. male on day 4 of admission presenting with Dyspnea on exertion.    Subjective   Patient seen and examined.  Resting in bed in no acute distress.  Awake alert oriented x 3.  Forgetful.  No new complaints.  No shortness of breath at rest.  No cough.  Voiding.  No symptoms though he is not getting out of bed.  Review of systems is limited.     Spoke with nursing, pulse ox stable on room air, no new issues.     Objective     Physical Exam  Vitals and nursing note reviewed.   Constitutional:       General: He is not in acute distress.     Appearance: Normal appearance. He is normal weight. He is not ill-appearing, toxic-appearing or diaphoretic.   HENT:      Head: Normocephalic and atraumatic.      Right Ear: External ear normal.      Left Ear: External ear normal.      Nose: Nose normal.      Mouth/Throat:      Mouth: Mucous membranes are moist.      Pharynx: Oropharynx is clear.   Eyes:      Extraocular Movements: Extraocular movements intact.      Conjunctiva/sclera: Conjunctivae normal.      Pupils: Pupils are equal, round, and reactive to light.   Cardiovascular:      Rate and Rhythm: Normal rate and regular rhythm.      Pulses: Normal pulses.      Heart sounds: Normal heart sounds. No murmur heard.  Pulmonary:      Effort: Pulmonary effort is normal. No respiratory distress.      Breath sounds: Decreased breath sounds present. No wheezing, rhonchi or rales.      Comments: Room air.  Abdominal:      General: Bowel sounds are normal. There is no distension.      Palpations: Abdomen is soft.      Tenderness: There is no abdominal tenderness. There is no guarding.   Genitourinary:     Comments: : Male pure wick catheter in place draining clear dark yellow urine. Rectal examination limited.  Musculoskeletal:         General: No swelling or tenderness. Normal range of motion.      Cervical back: Normal range of motion and neck supple.      Right lower leg: No edema.      Left lower leg: No  edema.   Skin:     General: Skin is warm and dry.      Capillary Refill: Capillary refill takes less than 2 seconds.   Neurological:      General: No focal deficit present.      Mental Status: He is alert and oriented to person, place, and time.      Comments: Hearing impaired.   Psychiatric:         Mood and Affect: Mood normal.         Behavior: Behavior normal.       Last Recorded Vitals  Blood pressure 113/60, pulse 72, temperature 36.9 °C (98.4 °F), temperature source Oral, resp. rate 16, height 1.829 m (6'), weight 79.5 kg (175 lb 4.3 oz), SpO2 93%.    Intake/Output last 3 Shifts:  I/O last 3 completed shifts:  In: 540 (6.8 mL/kg) [P.O.:540]  Out: 3100 (39 mL/kg) [Urine:3100 (1.1 mL/kg/hr)]  Weight: 79.5 kg     Relevant Results  Results for orders placed or performed during the hospital encounter of 05/17/25 (from the past 24 hours)   POCT GLUCOSE   Result Value Ref Range    POCT Glucose 136 (H) 74 - 99 mg/dL   CBC   Result Value Ref Range    WBC 10.2 4.4 - 11.3 x10*3/uL    nRBC 0.0 0.0 - 0.0 /100 WBCs    RBC 4.15 (L) 4.50 - 5.90 x10*6/uL    Hemoglobin 10.4 (L) 13.5 - 17.5 g/dL    Hematocrit 33.7 (L) 41.0 - 52.0 %    MCV 81 80 - 100 fL    MCH 25.1 (L) 26.0 - 34.0 pg    MCHC 30.9 (L) 32.0 - 36.0 g/dL    RDW 16.3 (H) 11.5 - 14.5 %    Platelets 226 150 - 450 x10*3/uL   Basic Metabolic Panel   Result Value Ref Range    Glucose 98 74 - 99 mg/dL    Sodium 139 136 - 145 mmol/L    Potassium 4.2 3.5 - 5.3 mmol/L    Chloride 101 98 - 107 mmol/L    Bicarbonate 31 21 - 32 mmol/L    Anion Gap 11 10 - 20 mmol/L    Urea Nitrogen 40 (H) 6 - 23 mg/dL    Creatinine 1.59 (H) 0.50 - 1.30 mg/dL    eGFR 41 (L) >60 mL/min/1.73m*2    Calcium 9.1 8.6 - 10.3 mg/dL   POCT GLUCOSE   Result Value Ref Range    POCT Glucose 105 (H) 74 - 99 mg/dL   POCT GLUCOSE   Result Value Ref Range    POCT Glucose 143 (H) 74 - 99 mg/dL   POCT GLUCOSE   Result Value Ref Range    POCT Glucose 140 (H) 74 - 99 mg/dL     No results found for the last 90  days.    XR chest 2 views  Result Date: 5/21/2025  Interpreted By:  Emigdio Estrada, STUDY: XR CHEST 2 VIEWS;  5/21/2025 8:52 am   INDICATION: Signs/Symptoms:right pleural effusion.     COMPARISON: 05/17/2025.   ACCESSION NUMBER(S): [id number]   ORDERING CLINICIAN: DAYANA ALLISON   FINDINGS: Right-greater-than-left pleural effusions again demonstrated. Slightly increased hazy opacification right lower lung field could represent increased pleural fluid or redistribution of fluid due to differences in positioning.. Stable prominence cardiomediastinal silhouette. Persistent bibasilar atelectasis. No pneumothorax. Implantable device present.         1. Right-greater-than-left pleural effusions again demonstrated. Slightly increased hazy opacification right lower lung field could represent increased pleural fluid or redistribution of fluid due to differences in positioning.       MACRO: None   Signed by: Emigdio Estrada 5/21/2025 9:58 AM Dictation workstation:   [id number]      Scheduled medications  Scheduled Medications[1]  Continuous medications  Continuous Medications[2]  PRN medications  PRN Medications[3]    ASSESSMENT:  Dyspnea - multifactorial  Acute hypoxic respiratory failure  Right > left pleural effusions - opacification right lower lung field  Acute on chronic systolic, diastolic congestive heart failure  History of TAVR  Hypertension  Hyperlipidemia  Atrial fibrillation  Oral anticoagulation  Supra therapeutic INR - improved  COPD  Cor pulmonale  History of tobacco use  Chronic kidney disease  Normocytic anemia  Type 2 diabetes mellitus  Chronic constipation  Urinary symptoms, frequency  Pyuria - mixed bacterial santana  Elevated PSA    PLAN:  Overall, condition is stable.  Patient denies shortness of breath, cough at this time.  Respiratory status, pulse oximetry stable on room air.  Monitor.  2 view chest x-ray radiology report reviewed + right > left pleural effusions, opacification right lower  lung field.  Creatinine increased 1.2 --> 1.5.  Status post IV Lasix.  Diuresis to po Lasix.  Second dose will be held today due to creatinine.  Labs in am.  Monitor daily weights I's and O's, electrolytes, renal function and blood pressure.  Yesterday's post void residual bladder scan 100 mL urine.  Monitor renal function on diuresis.  Stable per Urology.  Outpatient follow-up.  Monitor respiratory status and pulse oximetry. Monitor renal function and I's and O's.  Cardiology follow-up.  Urine culture grew mixed bacterial santana.  Continue Cipro at this time.  Outpatient Urology follow-up.  Bowel regimen - avoid constipation.  PT/OT as tolerated.  Fall precautions.  Up with assistance only.  Bed and chair alarm at all times.  Pressure ulcer prevention measures.  Turn and reposition every 2 hours and as needed.  Heels off bed.  Offloading.  DVT prophylaxis.  Coumadin.  Monitor PT/INR.  GI prophylaxis.  Nexium.  Supportive care.  Patient reassured.  PT/OT recommend moderate intensity level continued care.  Case management following for discharge planning.  Discharge plan with  home health care.    Spoke with daughter, Pao, over the phone.  Updated.  Questions answered.       Estrella Lewis, MINOR-CNP       [1] aspirin, 81 mg, oral, Daily  atorvastatin, 80 mg, oral, q48h  cholecalciferol, 25 mcg, oral, Daily  ciprofloxacin, 250 mg, oral, q12h JESUS  [Held by provider] empagliflozin, 10 mg, oral, Daily  esomeprazole, 20 mg, nasogastric tube, Daily before breakfast  [Held by provider] furosemide, 40 mg, oral, BID  insulin glargine, 10 Units, subcutaneous, Nightly  insulin lispro, 0-15 Units, subcutaneous, TID AC  insulin lispro, 6 Units, subcutaneous, TID AC  [Held by provider] lisinopril, 5 mg, oral, Daily  magnesium hydroxide, 60 mL, oral, Daily  magnesium oxide, 400 mg of magnesium oxide, oral, Daily  metoprolol tartrate, 25 mg, oral, BID  neomycin-bacitracin-polymyxin, 0.5 inch, Both Eyes, Nightly  nystatin, 1  Application, Topical, BID  potassium chloride CR, 20 mEq, oral, Daily  spironolactone, 12.5 mg, oral, Daily  triamcinolone, , Topical, BID  warfarin, 3 mg, oral, Daily     [2]    [3] PRN medications: acetaminophen **OR** acetaminophen **OR** acetaminophen, albuterol, benzocaine-menthol, dextromethorphan-guaifenesin, dextrose, dextrose, glucagon, glucagon, guaiFENesin, lubricating eye drops, polyethylene glycol

## 2025-05-22 LAB
ANION GAP SERPL CALCULATED.3IONS-SCNC: 11 MMOL/L (ref 10–20)
BUN SERPL-MCNC: 45 MG/DL (ref 6–23)
CALCIUM SERPL-MCNC: 9.1 MG/DL (ref 8.6–10.3)
CHLORIDE SERPL-SCNC: 103 MMOL/L (ref 98–107)
CO2 SERPL-SCNC: 27 MMOL/L (ref 21–32)
CREAT SERPL-MCNC: 1.63 MG/DL (ref 0.5–1.3)
EGFRCR SERPLBLD CKD-EPI 2021: 40 ML/MIN/1.73M*2
ERYTHROCYTE [DISTWIDTH] IN BLOOD BY AUTOMATED COUNT: 16.4 % (ref 11.5–14.5)
GLUCOSE BLD MANUAL STRIP-MCNC: 113 MG/DL (ref 74–99)
GLUCOSE BLD MANUAL STRIP-MCNC: 128 MG/DL (ref 74–99)
GLUCOSE BLD MANUAL STRIP-MCNC: 131 MG/DL (ref 74–99)
GLUCOSE BLD MANUAL STRIP-MCNC: 147 MG/DL (ref 74–99)
GLUCOSE SERPL-MCNC: 113 MG/DL (ref 74–99)
HCT VFR BLD AUTO: 35.9 % (ref 41–52)
HGB BLD-MCNC: 11 G/DL (ref 13.5–17.5)
MCH RBC QN AUTO: 25.2 PG (ref 26–34)
MCHC RBC AUTO-ENTMCNC: 30.6 G/DL (ref 32–36)
MCV RBC AUTO: 82 FL (ref 80–100)
NRBC BLD-RTO: 0 /100 WBCS (ref 0–0)
PLATELET # BLD AUTO: 235 X10*3/UL (ref 150–450)
POTASSIUM SERPL-SCNC: 4.4 MMOL/L (ref 3.5–5.3)
RBC # BLD AUTO: 4.37 X10*6/UL (ref 4.5–5.9)
SODIUM SERPL-SCNC: 137 MMOL/L (ref 136–145)
WBC # BLD AUTO: 9 X10*3/UL (ref 4.4–11.3)

## 2025-05-22 PROCEDURE — 36415 COLL VENOUS BLD VENIPUNCTURE: CPT | Performed by: NURSE PRACTITIONER

## 2025-05-22 PROCEDURE — 2500000002 HC RX 250 W HCPCS SELF ADMINISTERED DRUGS (ALT 637 FOR MEDICARE OP, ALT 636 FOR OP/ED): Performed by: NURSE PRACTITIONER

## 2025-05-22 PROCEDURE — 99232 SBSQ HOSP IP/OBS MODERATE 35: CPT | Performed by: PHYSICIAN ASSISTANT

## 2025-05-22 PROCEDURE — 2500000001 HC RX 250 WO HCPCS SELF ADMINISTERED DRUGS (ALT 637 FOR MEDICARE OP): Performed by: INTERNAL MEDICINE

## 2025-05-22 PROCEDURE — 97530 THERAPEUTIC ACTIVITIES: CPT | Mod: GP

## 2025-05-22 PROCEDURE — 82947 ASSAY GLUCOSE BLOOD QUANT: CPT

## 2025-05-22 PROCEDURE — 97110 THERAPEUTIC EXERCISES: CPT | Mod: GP

## 2025-05-22 PROCEDURE — 97116 GAIT TRAINING THERAPY: CPT | Mod: GP

## 2025-05-22 PROCEDURE — 85027 COMPLETE CBC AUTOMATED: CPT | Performed by: NURSE PRACTITIONER

## 2025-05-22 PROCEDURE — 2500000002 HC RX 250 W HCPCS SELF ADMINISTERED DRUGS (ALT 637 FOR MEDICARE OP, ALT 636 FOR OP/ED): Performed by: INTERNAL MEDICINE

## 2025-05-22 PROCEDURE — 82374 ASSAY BLOOD CARBON DIOXIDE: CPT | Performed by: NURSE PRACTITIONER

## 2025-05-22 PROCEDURE — 1200000002 HC GENERAL ROOM WITH TELEMETRY DAILY

## 2025-05-22 PROCEDURE — 2500000004 HC RX 250 GENERAL PHARMACY W/ HCPCS (ALT 636 FOR OP/ED): Performed by: INTERNAL MEDICINE

## 2025-05-22 RX ADMIN — CIPROFLOXACIN HYDROCHLORIDE 250 MG: 250 TABLET, FILM COATED ORAL at 20:51

## 2025-05-22 RX ADMIN — INSULIN LISPRO 6 UNITS: 100 INJECTION, SOLUTION INTRAVENOUS; SUBCUTANEOUS at 16:06

## 2025-05-22 RX ADMIN — MAGNESIUM HYDROXIDE 60 ML: 1200 LIQUID ORAL at 09:44

## 2025-05-22 RX ADMIN — INSULIN GLARGINE 10 UNITS: 100 INJECTION, SOLUTION SUBCUTANEOUS at 20:52

## 2025-05-22 RX ADMIN — ASPIRIN 81 MG: 81 TABLET, COATED ORAL at 09:45

## 2025-05-22 RX ADMIN — WARFARIN SODIUM 3 MG: 3 TABLET ORAL at 17:15

## 2025-05-22 RX ADMIN — INSULIN LISPRO 6 UNITS: 100 INJECTION, SOLUTION INTRAVENOUS; SUBCUTANEOUS at 09:49

## 2025-05-22 RX ADMIN — CIPROFLOXACIN HYDROCHLORIDE 250 MG: 250 TABLET, FILM COATED ORAL at 09:45

## 2025-05-22 RX ADMIN — CHOLECALCIFEROL TAB 25 MCG (1000 UNIT) 25 MCG: 25 TAB at 09:45

## 2025-05-22 RX ADMIN — NEOMYCIN SULFATE, POLYMYXIN B SULFATE AND BACITRACIN ZINC 0.5 INCH: 3.5; 10000; 4 OINTMENT OPHTHALMIC at 20:52

## 2025-05-22 RX ADMIN — METOPROLOL TARTRATE 25 MG: 25 TABLET, FILM COATED ORAL at 09:45

## 2025-05-22 RX ADMIN — INSULIN LISPRO 6 UNITS: 100 INJECTION, SOLUTION INTRAVENOUS; SUBCUTANEOUS at 11:40

## 2025-05-22 RX ADMIN — Medication 1 TABLET: at 09:45

## 2025-05-22 RX ADMIN — NYSTATIN 1 APPLICATION: 100000 POWDER TOPICAL at 20:53

## 2025-05-22 RX ADMIN — TRIAMCINOLONE ACETONIDE: 1 CREAM TOPICAL at 20:53

## 2025-05-22 RX ADMIN — METOPROLOL TARTRATE 25 MG: 25 TABLET, FILM COATED ORAL at 20:52

## 2025-05-22 ASSESSMENT — COGNITIVE AND FUNCTIONAL STATUS - GENERAL
WALKING IN HOSPITAL ROOM: A LOT
CLIMB 3 TO 5 STEPS WITH RAILING: A LOT
STANDING UP FROM CHAIR USING ARMS: A LITTLE
MOVING FROM LYING ON BACK TO SITTING ON SIDE OF FLAT BED WITH BEDRAILS: A LITTLE
PERSONAL GROOMING: A LITTLE
DRESSING REGULAR LOWER BODY CLOTHING: A LOT
MOBILITY SCORE: 20
HELP NEEDED FOR BATHING: A LOT
MOVING TO AND FROM BED TO CHAIR: A LITTLE
DAILY ACTIVITIY SCORE: 24
MOBILITY SCORE: 14
TURNING FROM BACK TO SIDE WHILE IN FLAT BAD: A LITTLE
DAILY ACTIVITIY SCORE: 17
STANDING UP FROM CHAIR USING ARMS: A LOT
CLIMB 3 TO 5 STEPS WITH RAILING: TOTAL
MOBILITY SCORE: 15
TOILETING: A LITTLE
MOVING FROM LYING ON BACK TO SITTING ON SIDE OF FLAT BED WITH BEDRAILS: A LITTLE
TURNING FROM BACK TO SIDE WHILE IN FLAT BAD: A LITTLE
MOVING TO AND FROM BED TO CHAIR: A LOT
WALKING IN HOSPITAL ROOM: A LITTLE
DRESSING REGULAR UPPER BODY CLOTHING: A LITTLE
CLIMB 3 TO 5 STEPS WITH RAILING: A LOT
WALKING IN HOSPITAL ROOM: A LOT
STANDING UP FROM CHAIR USING ARMS: A LITTLE

## 2025-05-22 ASSESSMENT — PAIN SCALES - GENERAL
PAINLEVEL_OUTOF10: 0 - NO PAIN

## 2025-05-22 ASSESSMENT — PAIN SCALES - PAIN ASSESSMENT IN ADVANCED DEMENTIA (PAINAD)
TOTALSCORE: 0
CONSOLABILITY: NO NEED TO CONSOLE
FACIALEXPRESSION: SMILING OR INEXPRESSIVE
BREATHING: NORMAL
BODYLANGUAGE: RELAXED

## 2025-05-22 ASSESSMENT — PAIN SCALES - WONG BAKER: WONGBAKER_NUMERICALRESPONSE: NO HURT

## 2025-05-22 ASSESSMENT — PAIN - FUNCTIONAL ASSESSMENT: PAIN_FUNCTIONAL_ASSESSMENT: 0-10

## 2025-05-22 NOTE — PROGRESS NOTES
05/22/25 1404   Discharge Planning   Expected Discharge Disposition Home H  ([facility])     Patient declining SNF will return home with [facility]     Will need internal referral for home health upon discharge  ** do not discharge without speaking to care coordination**  Will need to secure SOC prior to discharge

## 2025-05-22 NOTE — PROGRESS NOTES
Navneet Thompson is a 89 y.o. male on day 5 of admission presenting with Dyspnea on exertion.    Subjective   Patient seen and examined.  Resting in bed in no acute distress.  Awake alert oriented x 3.  Forgetful.  Better.  Breathing improved.  No shortness of breath, cough.  No chest pain.  Voiding.  No urinary symptoms.  + Bowel movements.  Ambulated halls with nursing and therapy.    Objective     Physical Exam  Vitals and nursing note reviewed.   Constitutional:       General: He is not in acute distress.     Appearance: Normal appearance. He is normal weight. He is not ill-appearing, toxic-appearing or diaphoretic.   HENT:      Head: Normocephalic and atraumatic.      Right Ear: External ear normal.      Left Ear: External ear normal.      Nose: Nose normal.      Mouth/Throat:      Mouth: Mucous membranes are moist.      Pharynx: Oropharynx is clear.   Eyes:      Extraocular Movements: Extraocular movements intact.      Conjunctiva/sclera: Conjunctivae normal.      Pupils: Pupils are equal, round, and reactive to light.   Cardiovascular:      Rate and Rhythm: Normal rate and regular rhythm.      Pulses: Normal pulses.      Heart sounds: Normal heart sounds. No murmur heard.  Pulmonary:      Effort: Pulmonary effort is normal. No respiratory distress.      Breath sounds: Decreased breath sounds present. No wheezing, rhonchi or rales.      Comments: Room air.  Abdominal:      General: Bowel sounds are normal. There is no distension.      Palpations: Abdomen is soft.      Tenderness: There is no abdominal tenderness. There is no guarding.   Genitourinary:     Comments: : Male pure wick catheter in place draining clear dark yellow urine. Rectal examination limited.  Musculoskeletal:         General: No swelling or tenderness. Normal range of motion.      Cervical back: Normal range of motion and neck supple.      Right lower leg: No edema.      Left lower leg: No edema.   Skin:     General: Skin is warm and dry.       Capillary Refill: Capillary refill takes less than 2 seconds.   Neurological:      General: No focal deficit present.      Mental Status: He is alert and oriented to person, place, and time.      Comments: Hearing impaired.   Psychiatric:         Mood and Affect: Mood normal.         Behavior: Behavior normal.       Last Recorded Vitals  Blood pressure 109/66, pulse 69, temperature 36.9 °C (98.4 °F), temperature source Oral, resp. rate 18, height 1.829 m (6'), weight 79.5 kg (175 lb 4.3 oz), SpO2 96%.    Intake/Output last 3 Shifts:  I/O last 3 completed shifts:  In: 720 (9.1 mL/kg) [P.O.:720]  Out: 2300 (28.9 mL/kg) [Urine:2300 (0.8 mL/kg/hr)]  Weight: 79.5 kg     Telemetry normal sinus rhythm paced rate 70's    Relevant Results  Results for orders placed or performed during the hospital encounter of 05/17/25 (from the past 24 hours)   POCT GLUCOSE   Result Value Ref Range    POCT Glucose 139 (H) 74 - 99 mg/dL   CBC   Result Value Ref Range    WBC 9.0 4.4 - 11.3 x10*3/uL    nRBC 0.0 0.0 - 0.0 /100 WBCs    RBC 4.37 (L) 4.50 - 5.90 x10*6/uL    Hemoglobin 11.0 (L) 13.5 - 17.5 g/dL    Hematocrit 35.9 (L) 41.0 - 52.0 %    MCV 82 80 - 100 fL    MCH 25.2 (L) 26.0 - 34.0 pg    MCHC 30.6 (L) 32.0 - 36.0 g/dL    RDW 16.4 (H) 11.5 - 14.5 %    Platelets 235 150 - 450 x10*3/uL   Basic Metabolic Panel   Result Value Ref Range    Glucose 113 (H) 74 - 99 mg/dL    Sodium 137 136 - 145 mmol/L    Potassium 4.4 3.5 - 5.3 mmol/L    Chloride 103 98 - 107 mmol/L    Bicarbonate 27 21 - 32 mmol/L    Anion Gap 11 10 - 20 mmol/L    Urea Nitrogen 45 (H) 6 - 23 mg/dL    Creatinine 1.63 (H) 0.50 - 1.30 mg/dL    eGFR 40 (L) >60 mL/min/1.73m*2    Calcium 9.1 8.6 - 10.3 mg/dL   POCT GLUCOSE   Result Value Ref Range    POCT Glucose 113 (H) 74 - 99 mg/dL   POCT GLUCOSE   Result Value Ref Range    POCT Glucose 128 (H) 74 - 99 mg/dL   POCT GLUCOSE   Result Value Ref Range    POCT Glucose 131 (H) 74 - 99 mg/dL     No results found for the last 90  days.    XR chest 2 views  Result Date: 5/21/2025  Interpreted By:  Emigdio Estrada, STUDY: XR CHEST 2 VIEWS;  5/21/2025 8:52 am   INDICATION: Signs/Symptoms:right pleural effusion.     COMPARISON: 05/17/2025.   ACCESSION NUMBER(S): [id number]   ORDERING CLINICIAN: DAYANA ALLISON   FINDINGS: Right-greater-than-left pleural effusions again demonstrated. Slightly increased hazy opacification right lower lung field could represent increased pleural fluid or redistribution of fluid due to differences in positioning.. Stable prominence cardiomediastinal silhouette. Persistent bibasilar atelectasis. No pneumothorax. Implantable device present.         1. Right-greater-than-left pleural effusions again demonstrated. Slightly increased hazy opacification right lower lung field could represent increased pleural fluid or redistribution of fluid due to differences in positioning.       MACRO: None   Signed by: Emigdio Estrada 5/21/2025 9:58 AM Dictation workstation:   [id number]      Scheduled medications  Scheduled Medications[1]  Continuous medications  Continuous Medications[2]  PRN medications  PRN Medications[3]    ASSESSMENT:  Dyspnea - multifactorial  Acute hypoxic respiratory failure resolved  Right > left pleural effusions - opacification right lower lung field  Acute on chronic systolic, diastolic congestive heart failure  History of TAVR  Hypertension  Hyperlipidemia  Atrial fibrillation  Oral anticoagulation  Supra therapeutic INR - improved  COPD  Cor pulmonale  History of tobacco use  Chronic kidney disease  Normocytic anemia  Type 2 diabetes mellitus  Chronic constipation  Urinary symptoms, frequency  Pyuria - mixed bacterial santana  Elevated PSA    PLAN:  Overall, condition improved, stable.  Respiratory status, pulse oximetry stable on room air.  Stable ambulatory pulse oximetry.  Continue to monitor respiratory status and pulse oximetry.  Labs reviewed.  Creatinine increased.  Secondary to diuresis.   Medication management per Cardiology.  Medications Farxiga, Lisinopril, Lasix, Spironolactone on hold per Cardiology.  Diuresis per Cardiology's recommendations.  Monitor renal function.  Labs in am.  Monitor the weights, I's and O's, electrolytes, renal function and blood pressure.  Monitor post void residual bladder scans.  Urine culture grew mixed bacterial santana.  We will continue Ciprofloxacin at this time.  Outpatient follow-up with Urology.  Continue bowel regimen.  Avoid constipation.  PT/OT as tolerated.  Fall precautions.  Up with assistance only.  Bed and chair alarm at all times.  Pressure ulcer prevention measures.  Turn and reposition every 2 hours and as needed.  Heels off bed.  Offloading.  DVT prophylaxis.  Coumadin.  Monitor PT/INR.  GI prophylaxis.  Nexium.  Supportive care.  Patient reassured.  PT/OT recommend moderate intensity level continued care.  Case management following for discharge planning.  Discharge plan home with  home health care.  Anticipate discharge after cleared by Cardiology.  Discussed with patient and nursing.  Discussed with Dr. Callejas.        Estrella Lewis, APRN-CNP         [1] aspirin, 81 mg, oral, Daily  atorvastatin, 80 mg, oral, q48h  cholecalciferol, 25 mcg, oral, Daily  ciprofloxacin, 250 mg, oral, q12h JESUS  [Held by provider] empagliflozin, 10 mg, oral, Daily  esomeprazole, 20 mg, nasogastric tube, Daily before breakfast  [Held by provider] furosemide, 40 mg, oral, BID  insulin glargine, 10 Units, subcutaneous, Nightly  insulin lispro, 0-15 Units, subcutaneous, TID AC  insulin lispro, 6 Units, subcutaneous, TID AC  [Held by provider] lisinopril, 5 mg, oral, Daily  magnesium hydroxide, 60 mL, oral, Daily  magnesium oxide, 400 mg of magnesium oxide, oral, Daily  metoprolol tartrate, 25 mg, oral, BID  neomycin-bacitracin-polymyxin, 0.5 inch, Both Eyes, Nightly  nystatin, 1 Application, Topical, BID  [Held by provider] potassium chloride CR, 20 mEq, oral,  Daily  [Held by provider] spironolactone, 12.5 mg, oral, Daily  triamcinolone, , Topical, BID  warfarin, 3 mg, oral, Daily     [2]    [3] PRN medications: acetaminophen **OR** acetaminophen **OR** acetaminophen, albuterol, benzocaine-menthol, dextromethorphan-guaifenesin, dextrose, dextrose, glucagon, glucagon, guaiFENesin, lubricating eye drops, polyethylene glycol

## 2025-05-22 NOTE — CARE PLAN
Problem: PT STG Problem  Goal: Patient will transfer supine to sit and sit to supine with independent assist to facilitate mobility.  Outcome: Progressing  Goal: Patient will transfer sit to stand and stand to sit with independent assist to facilitate mobility.  Outcome: Progressing  Goal: Patient will amb 50 feet rolling walker device including two turns on even surface with independent assist to facilitate safe mobility.    Outcome: Progressing      2

## 2025-05-22 NOTE — PROGRESS NOTES
Physical Therapy    Physical Therapy Treatment    Patient Name: Navneet Thompson  MRN: [id number]  Department: [address]  Room: [address] [place] [address]  Today's Date: 5/22/2025  Time Calculation  Start Time: 1317  Stop Time: 1357  Time Calculation (min): 40 min         Assessment/Plan   PT Assessment  Rehab Prognosis: Good  Barriers to Discharge Home: Caregiver assistance, Physical needs  Caregiver Assistance: Caregiver assistance needed per identified barriers - however, level of patient's required assistance exceeds assistance available at home  Physical Needs: 24hr mobility assistance needed  Evaluation/Treatment Tolerance: Patient limited by fatigue  Assessment Comment: Improving slowly with functional mobility;  tolerance to activity progressing slowly;  fall risk  End of Session Patient Position: Bed, 2 rail up, Alarm on  PT Plan  Inpatient/Swing Bed or Outpatient: Inpatient  PT Plan  Treatment/Interventions: Bed mobility, Transfer training, Gait training, Stair training, Balance training, Neuromuscular re-education, Strengthening, Endurance training, Therapeutic exercise, Therapeutic activity  PT Plan: Ongoing PT  PT Frequency: 4 times per week  PT Discharge Recommendations: Moderate intensity level of continued care  Equipment Recommended upon Discharge: Wheeled walker  PT Recommended Transfer Status: Assist x1  PT - OK to Discharge: Yes (with skilled physical therapy services at next level of care)    PT Visit Info:  PT Received On: 05/22/25     General Visit Information:   General  Prior to Session Communication: Bedside nurse  Patient Position Received: Bed, 3 rail up, Alarm off, not on at start of session  General Comment: Bedside nurse cleared patient for treatment.  Patient reports agreeable to treatment.    Subjective   Precautions:  Precautions  Medical Precautions: Fall precautions            Objective   Pain:  Pain Assessment  Pain Assessment: 0-10  0-10 (Numeric) Pain Score: 0 - No  "pain  Cognition:  Cognition  Overall Cognitive Status: Within Functional Limits  Coordination:  Movements are Fluid and Coordinated: No  Lower Body Coordination: Slower rate of movement varsha LE  Postural Control:  Static Sitting Balance  Static Sitting-Balance Support: No upper extremity supported  Static Sitting-Level of Assistance: Close supervision  Static Sitting-Comment/Number of Minutes: Supervision with balance while sitting on side of bed  Static Standing Balance  Static Standing-Balance Support: Bilateral upper extremity supported  Static Standing-Level of Assistance: Minimum assistance  Static Standing-Comment/Number of Minutes: Assist with trunk support and balance during static standing with rolling walker          Activity Tolerance:  Activity Tolerance  Endurance: Decreased tolerance for upright activites  Activity Tolerance Comments: Fatigue  Treatments:  Therapeutic Exercise  Therapeutic Exercise Performed: Yes  Therapeutic Exercise Activity 1: Ankle pumps (assist with left LE), heel slides, hip abduction, TKE 10-15 reps x 1 set varsha LE              Bed Mobility  Bed Mobility: Yes  Bed Mobility 1  Bed Mobility 1: Supine to sitting  Level of Assistance 1: Close supervision  Bed Mobility Comments 1: Increased time and effort required to achieve supine to sit  Bed Mobility 2  Bed Mobility  2: Sitting to supine  Level of Assistance 2: Minimum assistance  Bed Mobility Comments 2: Assist with varsha LE    Ambulation/Gait Training  Ambulation/Gait Training Performed: Yes  Ambulation/Gait Training 1  Surface 1: Level tile  Device 1: Rolling walker  Assistance 1: Moderate assistance  Comments/Distance (ft) 1: Intermittent assist with balance during ambulation with rolling walker;  patient ambulates with slow chuy, non-reciprocating gait pattern, decreased left ankle dorsiflexion during left LE swing phase \"drop foot\", decreased step length varsha LE (left greater than right), and forward flexed " posture.  Transfers  Transfer: Yes  Transfer 1  Transfer From 1: Sit to  Transfer to 1: Stand  Technique 1: Sit to stand  Transfer Device 1: Walker  Transfer Level of Assistance 1: Moderate assistance  Trials/Comments 1: Assist with trunk support and balance  Transfers 2  Transfer From 2: Stand to  Transfer to 2: Sit  Technique 2: Stand to sit  Transfer Device 2: Walker  Transfer Level of Assistance 2: Moderate assistance  Trials/Comments 2: Assist with trunk support and balance;  verbal cues for hand placement    Stairs  Stairs: No         Outcome Measures:  [facility] Basic Mobility  Turning from your back to your side while in a flat bed without using bedrails: A little  Moving from lying on your back to sitting on the side of a flat bed without using bedrails: A little  Moving to and from bed to chair (including a wheelchair): A little  Standing up from a chair using your arms (e.g. wheelchair or bedside chair): A lot  To walk in hospital room: A lot  Climbing 3-5 steps with railing: A lot  Basic Mobility - Total Score: 15    Education Documentation  No documentation found.  Education Comments  No comments found.        OP EDUCATION:       Encounter Problems       Encounter Problems (Active)       PT STG Problem       Patient will transfer supine to sit and sit to supine with independent assist to facilitate mobility. (Progressing)       Start:  05/18/25    Expected End:  06/18/25            Patient will transfer sit to stand and stand to sit with independent assist to facilitate mobility. (Progressing)       Start:  05/18/25    Expected End:  06/18/25            Patient will amb 50 feet rolling walker device including two turns on even surface with independent assist to facilitate safe mobility.   (Progressing)       Start:  05/18/25    Expected End:  06/18/25            Patient will negotiate 6 stairs with two rail(s) and supervision} assist with no device for in home and community. (Progressing)       Start:   05/18/25    Expected End:  06/18/25

## 2025-05-22 NOTE — PROGRESS NOTES
Occupational Therapy                 Therapy Communication Note    Patient Name: Navneet Thompson  MRN: [id number]  Department: [address]  Room: 423/423-B  Today's Date: 5/22/2025     Discipline: Occupational Therapy    Missed Visit:   Yes    Missed Visit Reason:  Patient refused- education and encouragement provided however patient continued to decline participation in OT session this date secondary to anticipating discharge this date.    Missed Time: Attempt at 1134    Comment:

## 2025-05-22 NOTE — PROGRESS NOTES
Navneet Thompson is a 89 y.o. male on day 5 of admission presenting with Dyspnea on exertion.    Subjective   Alert and oriented sitting up at bedside eating lunch.  Patient reports his shortness of breath has improved.       Objective     Physical Exam  Appearance: Alert, oriented, cooperative, in no acute distress.      Eyes: Conjunctiva pink with no redness or exudates. Eyelids without lesions. No scleral icterus.      ENT: Hearing grossly intact. External inspection of ears without lesions or erythema. no nasal flaring. no tripoding, no drooling, no difficulty swallowing oral secretions.     Pulmonary: diminished breath sounds in all lobes. No rales, rhonchi or wheezing. No accessory muscle use or stridor. No difficulty completing a full sentence without taking a breath     Cardiac: Irregular rhythm, normal rate, 72, no rub, gallop. No JVD.     Musculoskeletal: No cyanosis, clubbing. No lower extremity edema.  Capillary refill less than 2 seconds in lower extremities       Last Recorded Vitals  Blood pressure 115/63, pulse 71, temperature 36.8 °C (98.2 °F), temperature source Oral, resp. rate 17, height 1.829 m (6'), weight 79.5 kg (175 lb 4.3 oz), SpO2 94%.  Intake/Output last 3 Shifts:  I/O last 3 completed shifts:  In: 720 (9.1 mL/kg) [P.O.:720]  Out: 2300 (28.9 mL/kg) [Urine:2300 (0.8 mL/kg/hr)]  Weight: 79.5 kg     Relevant Results  Results for orders placed or performed during the hospital encounter of 05/17/25 (from the past 24 hours)   POCT GLUCOSE   Result Value Ref Range    POCT Glucose 143 (H) 74 - 99 mg/dL   POCT GLUCOSE   Result Value Ref Range    POCT Glucose 140 (H) 74 - 99 mg/dL   POCT GLUCOSE   Result Value Ref Range    POCT Glucose 139 (H) 74 - 99 mg/dL   CBC   Result Value Ref Range    WBC 9.0 4.4 - 11.3 x10*3/uL    nRBC 0.0 0.0 - 0.0 /100 WBCs    RBC 4.37 (L) 4.50 - 5.90 x10*6/uL    Hemoglobin 11.0 (L) 13.5 - 17.5 g/dL    Hematocrit 35.9 (L) 41.0 - 52.0 %    MCV 82 80 - 100 fL    MCH 25.2 (L)  26.0 - 34.0 pg    MCHC 30.6 (L) 32.0 - 36.0 g/dL    RDW 16.4 (H) 11.5 - 14.5 %    Platelets 235 150 - 450 x10*3/uL   Basic Metabolic Panel   Result Value Ref Range    Glucose 113 (H) 74 - 99 mg/dL    Sodium 137 136 - 145 mmol/L    Potassium 4.4 3.5 - 5.3 mmol/L    Chloride 103 98 - 107 mmol/L    Bicarbonate 27 21 - 32 mmol/L    Anion Gap 11 10 - 20 mmol/L    Urea Nitrogen 45 (H) 6 - 23 mg/dL    Creatinine 1.63 (H) 0.50 - 1.30 mg/dL    eGFR 40 (L) >60 mL/min/1.73m*2    Calcium 9.1 8.6 - 10.3 mg/dL   POCT GLUCOSE   Result Value Ref Range    POCT Glucose 113 (H) 74 - 99 mg/dL   POCT GLUCOSE   Result Value Ref Range    POCT Glucose 128 (H) 74 - 99 mg/dL     *Note: Due to a large number of results and/or encounters for the requested time period, some results have not been displayed. A complete set of results can be found in Results Review.     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 He states that he has been experiencing shortness of breath symptoms over the past few month. He states to experiencing orthopnea, dyspnea on exertion, and has mild bilateral lower extremity edema. Family brought patient to the ED to be evaluated.  Patient denies chest pain, palpitations, pressure, or tightness. Patient on nasal cannula.  States that his shortness of breath has improved, but still present with exertion. Patient does not wear oxygen at home.   Vitals this morning show a heart rate of 72, blood pressure 113/62, satting 96% on 2 L  nasal cannula.  On telemetry patient is a ventricular paced rhythm with PVCs, heart rates in the 70s.   Yesterday 5/18, the patient was given  40 mg IV furosemide every 8 hours for a total of 3 doses, which equalled 120 mg total. Recorded I/O show a net loss of -1,410 mL. Admitting team reordered oral furosemide 80 mg daily to be started this morning. Will place this order on hold, and continue an additional day of IV diuresis. Patients shortness of breath has improved, but he is still on oxygen 2L. There is still some swelling on the lower extremities and the patients lower lung lobes are diminished. Will order IV furosemide 40 mg BID for 2 doses and reassess tomorrow. Continue aspirin, atorvastatin 80 mg daily, lisinopril 5 mg daily, metoprolol tartrate 25 mg twice daily, spironolactone 12.5 mg daily, and Coumadin.  Upon admission to the ED patient had a supratherapeutic INR, in which the Coumadin was being held.  Today it is 3.0, within normal range.  The admitting team is managing Coumadin administration.  Please monitor strict I/Os and daily weights. We will continue to follow along with you.      5/20: Patient laying up in the bed.  Denies chest pain, palpitations, pressure, tightness.  Also denies worsening shortness of breath.  Currently on 2 L nasal cannula, will decrease him to 1 L nasal cannula. Vitals this morning show a heart rate of 69, blood pressure 107/48, satting 94% on 2 L nasal cannula.  On telemetry patient is a ventricular paced rhythm with  heart rates in the 90s-low 100s. Labs this morning show potassium of 3.8, BUN 37, creatinine 1.22, INR 2.2, hemoglobin/hematocrit 10.7/35.8. I/O show a net output of -1200 mL.  Patient's bilateral pedal edema has improved and lung sounds are clear with diminished bases. Will restart patients home medication of oral 40 mg furosemide twice daily. Continue aspirin, atorvastatin 80 mg daily, lisinopril 5 mg daily, metoprolol tartrate 25 mg twice daily,  spironolactone 12.5 mg daily, and Coumadin. The admitting team is managing Coumadin administration.  Please monitor strict I/Os and daily weights. We will continue to follow along with you for an additional day      5/21: Patient is alert and oriented this morning, sitting up in a chair drinking coffee, reports feeling improved.  Patient appears euvolemic on exam, no lower extremity edema.  Vital signs reviewed this morning /50, heart rate 69, 93% oxygen on 1 L nasal cannula.  Patient is currently not on telemetry, battery needs replaced. I/O yesterday shows a net loss of 1.36 L.  Patient was started on p.o. diuretics this morning 40 mg twice daily.  Labs reviewed showing normal sodium potassium increased creatinine 1.59, elevated from day prior, 1.22, BUN 48, GFR 41.  Patient received 1 dose of diuretics this morning, will hold patient's second dose of diuretics due to elevated creatinine.  Farxiga and lisinopril will also be held to allow room for gentle diuresis.  Chest x-ray was completed yesterday, reviewed today, pleural effusion bilateral, greatest on the right side and slightly increased hazy opacifications in right lower lobe that may represent increased pleural fluid, or fluid redistribution due to differences of positioning. Continue aspirin, atorvastatin 80 mg daily, metoprolol tartrate 25 mg twice daily, spironolactone 12.5 mg daily, and Coumadin.  Patient is stable from a cardiac perspective, suspect chest x-ray findings are due to differences of positioning, given patient's lower extremity edema has resolved, and patient feels overall improved.  Will reevaluate kidney function and dosage of diuretics tomorrow. Anticipate following this patient with you 1 more day.    5/22: This morning patient's vital signs and labs reviewed, blood pressure 115/63, heart rate 71, 94% on room air, sodium 137, potassium 4.4, creatinine increased from day prior 1.63, BUN 45, creatinine 1.63, GFR 40, hemoglobin  11.0, hematocrit 35.9, I/O's yesterday show a net loss of 480 mL.  Given ratio of BUN to creatinine, will continue to hold diuretics.  Yesterday Farxiga, and lisinopril, are held due to BETH.  Spironolactone was held this morning.  Consider nephrology consult.  Patient will have pulse ox check with ambulation today. Patient is overall stable, this morning patient reports his shortness of breath has improved, denies any complaints at this time and appears euvolemic on exam.  Will reevaluate kidney function tomorrow.  We will continue to follow this patient with you      I spent 35 minutes in the professional and overall care of this patient.      Joselin Soliz PA-C         [1] aspirin, 81 mg, oral, Daily  atorvastatin, 80 mg, oral, q48h  cholecalciferol, 25 mcg, oral, Daily  ciprofloxacin, 250 mg, oral, q12h JESUS  [Held by provider] empagliflozin, 10 mg, oral, Daily  esomeprazole, 20 mg, nasogastric tube, Daily before breakfast  [Held by provider] furosemide, 40 mg, oral, BID  insulin glargine, 10 Units, subcutaneous, Nightly  insulin lispro, 0-15 Units, subcutaneous, TID AC  insulin lispro, 6 Units, subcutaneous, TID AC  [Held by provider] lisinopril, 5 mg, oral, Daily  magnesium hydroxide, 60 mL, oral, Daily  magnesium oxide, 400 mg of magnesium oxide, oral, Daily  metoprolol tartrate, 25 mg, oral, BID  neomycin-bacitracin-polymyxin, 0.5 inch, Both Eyes, Nightly  nystatin, 1 Application, Topical, BID  [Held by provider] potassium chloride CR, 20 mEq, oral, Daily  [Held by provider] spironolactone, 12.5 mg, oral, Daily  triamcinolone, , Topical, BID  warfarin, 3 mg, oral, Daily  [2]    [3] PRN medications: acetaminophen **OR** acetaminophen **OR** acetaminophen, albuterol, benzocaine-menthol, dextromethorphan-guaifenesin, dextrose, dextrose, glucagon, glucagon, guaiFENesin, lubricating eye drops, polyethylene glycol

## 2025-05-23 ENCOUNTER — PHARMACY VISIT (OUTPATIENT)
Dept: PHARMACY | Facility: CLINIC | Age: OVER 89
End: 2025-05-23
Payer: MEDICARE

## 2025-05-23 VITALS
HEART RATE: 71 BPM | OXYGEN SATURATION: 94 % | DIASTOLIC BLOOD PRESSURE: 66 MMHG | SYSTOLIC BLOOD PRESSURE: 116 MMHG | WEIGHT: 175.27 LBS | TEMPERATURE: 97.5 F | RESPIRATION RATE: 18 BRPM | BODY MASS INDEX: 23.74 KG/M2 | HEIGHT: 72 IN

## 2025-05-23 PROBLEM — R06.09 DYSPNEA ON EXERTION: Status: RESOLVED | Noted: 2025-05-17 | Resolved: 2025-05-23

## 2025-05-23 LAB
ANION GAP SERPL CALCULATED.3IONS-SCNC: 11 MMOL/L (ref 10–20)
BUN SERPL-MCNC: 42 MG/DL (ref 6–23)
CALCIUM SERPL-MCNC: 9 MG/DL (ref 8.6–10.3)
CHLORIDE SERPL-SCNC: 104 MMOL/L (ref 98–107)
CO2 SERPL-SCNC: 26 MMOL/L (ref 21–32)
CREAT SERPL-MCNC: 1.27 MG/DL (ref 0.5–1.3)
EGFRCR SERPLBLD CKD-EPI 2021: 53 ML/MIN/1.73M*2
ERYTHROCYTE [DISTWIDTH] IN BLOOD BY AUTOMATED COUNT: 16.3 % (ref 11.5–14.5)
GLUCOSE BLD MANUAL STRIP-MCNC: 115 MG/DL (ref 74–99)
GLUCOSE BLD MANUAL STRIP-MCNC: 118 MG/DL (ref 74–99)
GLUCOSE SERPL-MCNC: 103 MG/DL (ref 74–99)
HCT VFR BLD AUTO: 35.2 % (ref 41–52)
HGB BLD-MCNC: 10.6 G/DL (ref 13.5–17.5)
INR PPP: 1.7 (ref 0.9–1.2)
MCH RBC QN AUTO: 25.1 PG (ref 26–34)
MCHC RBC AUTO-ENTMCNC: 30.1 G/DL (ref 32–36)
MCV RBC AUTO: 83 FL (ref 80–100)
NRBC BLD-RTO: 0 /100 WBCS (ref 0–0)
PLATELET # BLD AUTO: 230 X10*3/UL (ref 150–450)
POTASSIUM SERPL-SCNC: 4.3 MMOL/L (ref 3.5–5.3)
PROTHROMBIN TIME: 17.7 SECONDS (ref 9.3–12.7)
RBC # BLD AUTO: 4.23 X10*6/UL (ref 4.5–5.9)
SODIUM SERPL-SCNC: 137 MMOL/L (ref 136–145)
WBC # BLD AUTO: 7.8 X10*3/UL (ref 4.4–11.3)

## 2025-05-23 PROCEDURE — 36415 COLL VENOUS BLD VENIPUNCTURE: CPT | Performed by: NURSE PRACTITIONER

## 2025-05-23 PROCEDURE — RXMED WILLOW AMBULATORY MEDICATION CHARGE

## 2025-05-23 PROCEDURE — 2500000001 HC RX 250 WO HCPCS SELF ADMINISTERED DRUGS (ALT 637 FOR MEDICARE OP): Performed by: INTERNAL MEDICINE

## 2025-05-23 PROCEDURE — 85610 PROTHROMBIN TIME: CPT | Performed by: NURSE PRACTITIONER

## 2025-05-23 PROCEDURE — 85027 COMPLETE CBC AUTOMATED: CPT | Performed by: NURSE PRACTITIONER

## 2025-05-23 PROCEDURE — 99232 SBSQ HOSP IP/OBS MODERATE 35: CPT | Performed by: PHYSICIAN ASSISTANT

## 2025-05-23 PROCEDURE — 82947 ASSAY GLUCOSE BLOOD QUANT: CPT

## 2025-05-23 PROCEDURE — 2500000002 HC RX 250 W HCPCS SELF ADMINISTERED DRUGS (ALT 637 FOR MEDICARE OP, ALT 636 FOR OP/ED): Performed by: NURSE PRACTITIONER

## 2025-05-23 PROCEDURE — 2500000004 HC RX 250 GENERAL PHARMACY W/ HCPCS (ALT 636 FOR OP/ED): Performed by: INTERNAL MEDICINE

## 2025-05-23 PROCEDURE — 80048 BASIC METABOLIC PNL TOTAL CA: CPT | Performed by: NURSE PRACTITIONER

## 2025-05-23 PROCEDURE — 2500000002 HC RX 250 W HCPCS SELF ADMINISTERED DRUGS (ALT 637 FOR MEDICARE OP, ALT 636 FOR OP/ED): Performed by: INTERNAL MEDICINE

## 2025-05-23 RX ORDER — INSULIN ASPART 100 [IU]/ML
6 INJECTION, SOLUTION INTRAVENOUS; SUBCUTANEOUS 2 TIMES DAILY
Status: CANCELLED
Start: 2025-05-23

## 2025-05-23 RX ORDER — ATORVASTATIN CALCIUM 80 MG/1
80 TABLET, FILM COATED ORAL
Start: 2025-05-23

## 2025-05-23 RX ORDER — CIPROFLOXACIN 250 MG/1
250 TABLET, FILM COATED ORAL EVERY 12 HOURS SCHEDULED
Qty: 8 TABLET | Refills: 0 | Status: SHIPPED | OUTPATIENT
Start: 2025-05-23 | End: 2025-05-27

## 2025-05-23 RX ADMIN — INSULIN LISPRO 6 UNITS: 100 INJECTION, SOLUTION INTRAVENOUS; SUBCUTANEOUS at 11:40

## 2025-05-23 RX ADMIN — ESOMEPRAZOLE MAGNESIUM 20 MG: 20 GRANULE, DELAYED RELEASE ORAL at 06:40

## 2025-05-23 RX ADMIN — CIPROFLOXACIN HYDROCHLORIDE 250 MG: 250 TABLET, FILM COATED ORAL at 08:44

## 2025-05-23 RX ADMIN — Medication 1 TABLET: at 08:44

## 2025-05-23 RX ADMIN — METOPROLOL TARTRATE 25 MG: 25 TABLET, FILM COATED ORAL at 08:44

## 2025-05-23 RX ADMIN — CHOLECALCIFEROL TAB 25 MCG (1000 UNIT) 25 MCG: 25 TAB at 08:44

## 2025-05-23 RX ADMIN — ASPIRIN 81 MG: 81 TABLET, COATED ORAL at 08:44

## 2025-05-23 ASSESSMENT — COGNITIVE AND FUNCTIONAL STATUS - GENERAL
MOBILITY SCORE: 14
DRESSING REGULAR UPPER BODY CLOTHING: A LITTLE
TOILETING: A LITTLE
HELP NEEDED FOR BATHING: A LOT
STANDING UP FROM CHAIR USING ARMS: A LITTLE
WALKING IN HOSPITAL ROOM: A LOT
MOVING TO AND FROM BED TO CHAIR: A LOT
TURNING FROM BACK TO SIDE WHILE IN FLAT BAD: A LITTLE
PERSONAL GROOMING: A LITTLE
CLIMB 3 TO 5 STEPS WITH RAILING: TOTAL
DAILY ACTIVITIY SCORE: 17
DRESSING REGULAR LOWER BODY CLOTHING: A LOT
MOVING FROM LYING ON BACK TO SITTING ON SIDE OF FLAT BED WITH BEDRAILS: A LITTLE

## 2025-05-23 ASSESSMENT — PAIN SCALES - GENERAL: PAINLEVEL_OUTOF10: 0 - NO PAIN

## 2025-05-23 NOTE — CARE PLAN
The patient's goals for the shift include No Sob    The clinical goals for the shift include Wean off 02

## 2025-05-23 NOTE — PROGRESS NOTES
Navneet Thompson is a 89 y.o. male on day 6 of admission presenting with Dyspnea on exertion.    Subjective   Alert and oriented. Patient denies any physical complaints. Patient would like to be discharged home.        Objective     Physical Exam  Appearance: Alert, oriented, cooperative, in no acute distress.      Eyes: Conjunctiva pink with no redness or exudates. Eyelids without lesions. No scleral icterus.      ENT: Hearing grossly intact. External inspection of ears without lesions or erythema. no nasal flaring. no tripoding, no drooling, no difficulty swallowing oral secretions.     Pulmonary: diminished breath sounds in all lobes. No rales, rhonchi or wheezing. No accessory muscle use or stridor. No difficulty completing a full sentence without taking a breath     Cardiac: Irregular rhythm, normal rate, 72, no rub, gallop. No JVD.     Musculoskeletal: No cyanosis, clubbing. No lower extremity edema.  Capillary refill less than 2 seconds in lower extremities    Last Recorded Vitals  Blood pressure 116/66, pulse 71, temperature 36.4 °C (97.5 °F), temperature source Oral, resp. rate 18, height 1.829 m (6'), weight 79.5 kg (175 lb 4.3 oz), SpO2 94%.  Intake/Output last 3 Shifts:  I/O last 3 completed shifts:  In: 816 (10.3 mL/kg) [P.O.:816]  Out: 1700 (21.4 mL/kg) [Urine:1700 (0.6 mL/kg/hr)]  Weight: 79.5 kg     Relevant Results  Results for orders placed or performed during the hospital encounter of 05/17/25 (from the past 24 hours)   POCT GLUCOSE   Result Value Ref Range    POCT Glucose 128 (H) 74 - 99 mg/dL   POCT GLUCOSE   Result Value Ref Range    POCT Glucose 131 (H) 74 - 99 mg/dL   POCT GLUCOSE   Result Value Ref Range    POCT Glucose 147 (H) 74 - 99 mg/dL   CBC   Result Value Ref Range    WBC 7.8 4.4 - 11.3 x10*3/uL    nRBC 0.0 0.0 - 0.0 /100 WBCs    RBC 4.23 (L) 4.50 - 5.90 x10*6/uL    Hemoglobin 10.6 (L) 13.5 - 17.5 g/dL    Hematocrit 35.2 (L) 41.0 - 52.0 %    MCV 83 80 - 100 fL    MCH 25.1 (L) 26.0 -  34.0 pg    MCHC 30.1 (L) 32.0 - 36.0 g/dL    RDW 16.3 (H) 11.5 - 14.5 %    Platelets 230 150 - 450 x10*3/uL   Basic Metabolic Panel   Result Value Ref Range    Glucose 103 (H) 74 - 99 mg/dL    Sodium 137 136 - 145 mmol/L    Potassium 4.3 3.5 - 5.3 mmol/L    Chloride 104 98 - 107 mmol/L    Bicarbonate 26 21 - 32 mmol/L    Anion Gap 11 10 - 20 mmol/L    Urea Nitrogen 42 (H) 6 - 23 mg/dL    Creatinine 1.27 0.50 - 1.30 mg/dL    eGFR 53 (L) >60 mL/min/1.73m*2    Calcium 9.0 8.6 - 10.3 mg/dL   POCT GLUCOSE   Result Value Ref Range    POCT Glucose 115 (H) 74 - 99 mg/dL     *Note: Due to a large number of results and/or encounters for the requested time period, some results have not been displayed. A complete set of results can be found in Results Review.     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 He states that he has been experiencing shortness of breath symptoms over the past few month. He states to experiencing orthopnea, dyspnea on exertion, and has mild bilateral lower extremity edema. Family brought patient to the ED to be evaluated.  Patient denies chest pain, palpitations, pressure, or tightness. Patient on nasal cannula.  States that his shortness of breath has improved, but still present with exertion. Patient does not wear oxygen at home.   Vitals this morning show a heart rate of 72, blood pressure 113/62, satting 96% on 2 L nasal cannula.  On telemetry patient is a ventricular paced rhythm with PVCs, heart rates in  the 70s.   Yesterday 5/18, the patient was given  40 mg IV furosemide every 8 hours for a total of 3 doses, which equalled 120 mg total. Recorded I/O show a net loss of -1,410 mL. Admitting team reordered oral furosemide 80 mg daily to be started this morning. Will place this order on hold, and continue an additional day of IV diuresis. Patients shortness of breath has improved, but he is still on oxygen 2L. There is still some swelling on the lower extremities and the patients lower lung lobes are diminished. Will order IV furosemide 40 mg BID for 2 doses and reassess tomorrow. Continue aspirin, atorvastatin 80 mg daily, lisinopril 5 mg daily, metoprolol tartrate 25 mg twice daily, spironolactone 12.5 mg daily, and Coumadin.  Upon admission to the ED patient had a supratherapeutic INR, in which the Coumadin was being held.  Today it is 3.0, within normal range.  The admitting team is managing Coumadin administration.  Please monitor strict I/Os and daily weights. We will continue to follow along with you.      5/20: Patient laying up in the bed.  Denies chest pain, palpitations, pressure, tightness.  Also denies worsening shortness of breath.  Currently on 2 L nasal cannula, will decrease him to 1 L nasal cannula. Vitals this morning show a heart rate of 69, blood pressure 107/48, satting 94% on 2 L nasal cannula.  On telemetry patient is a ventricular paced rhythm with  heart rates in the 90s-low 100s. Labs this morning show potassium of 3.8, BUN 37, creatinine 1.22, INR 2.2, hemoglobin/hematocrit 10.7/35.8. I/O show a net output of -1200 mL.  Patient's bilateral pedal edema has improved and lung sounds are clear with diminished bases. Will restart patients home medication of oral 40 mg furosemide twice daily. Continue aspirin, atorvastatin 80 mg daily, lisinopril 5 mg daily, metoprolol tartrate 25 mg twice daily, spironolactone 12.5 mg daily, and Coumadin. The admitting team is managing Coumadin administration.   Please monitor strict I/Os and daily weights. We will continue to follow along with you for an additional day      5/21: Patient is alert and oriented this morning, sitting up in a chair drinking coffee, reports feeling improved.  Patient appears euvolemic on exam, no lower extremity edema.  Vital signs reviewed this morning /50, heart rate 69, 93% oxygen on 1 L nasal cannula.  Patient is currently not on telemetry, battery needs replaced. I/O yesterday shows a net loss of 1.36 L.  Patient was started on p.o. diuretics this morning 40 mg twice daily.  Labs reviewed showing normal sodium potassium increased creatinine 1.59, elevated from day prior, 1.22, BUN 48, GFR 41.  Patient received 1 dose of diuretics this morning, will hold patient's second dose of diuretics due to elevated creatinine.  Farxiga and lisinopril will also be held to allow room for gentle diuresis.  Chest x-ray was completed yesterday, reviewed today, pleural effusion bilateral, greatest on the right side and slightly increased hazy opacifications in right lower lobe that may represent increased pleural fluid, or fluid redistribution due to differences of positioning. Continue aspirin, atorvastatin 80 mg daily, metoprolol tartrate 25 mg twice daily, spironolactone 12.5 mg daily, and Coumadin.  Patient is stable from a cardiac perspective, suspect chest x-ray findings are due to differences of positioning, given patient's lower extremity edema has resolved, and patient feels overall improved.  Will reevaluate kidney function and dosage of diuretics tomorrow. Anticipate following this patient with you 1 more day.     5/22: This morning patient's vital signs and labs reviewed, blood pressure 115/63, heart rate 71, 94% on room air, sodium 137, potassium 4.4, creatinine increased from day prior 1.63, BUN 45, creatinine 1.63, GFR 40, hemoglobin 11.0, hematocrit 35.9, I/O's yesterday show a net loss of 480 mL.  Given ratio of BUN to creatinine,  will continue to hold diuretics.  Yesterday Farxiga, and lisinopril, are held due to BETH.  Spironolactone was held this morning.  Consider nephrology consult.  Patient will have pulse ox check with ambulation today. Patient is overall stable, this morning patient reports his shortness of breath has improved, denies any complaints at this time and appears euvolemic on exam.  Will reevaluate kidney function tomorrow.  We will continue to follow this patient with you.    5/23: Patient's blood pressure remains stable 116/66, heart rate 71, 94% on room air labs also reviewed this morning showing a normal sodium 137, potassium 4.3, bicarb 26, BUN 42, and a creatinine normalized to 1.27, GFR 53, hemoglobin 10.6, hematocrit 35.2.  Patient was evaluated for home O2 requirement, patient did not meet the criteria, 91% oxygen on room air.  Patient's BETH has resolved.  Patient is requesting discharge with home health care. Patient is overall stable, this morning patient reports his shortness of breath has improved, denies any complaints at this time and appears euvolemic on exam.  Patient's blood pressures have been normotensive, to soft, would not restart lisinopril at this time, would recommend following up in the outpatient setting with his cardiologist Dr. Melvin Bahena in 2 weeks for reevaluation.  Will also hold Farxiga, and spironolactone, at this time to allow room for continued diuresis at home with furosemide 40 mg BID to prevent fluid overload.  Will continue patient on his guideline directed therapy with atorvastatin, metoprolol tartrate, and warfarin for stroke risk reduction in the setting of atrial fibrillation, defer dosing to admitting.  Patient is considered stable from a cardiac perspective given his underlying cardiac chronic conditions, none of which are further modifiable at this time.  We will sign off at this time.  Please not hesitate to reach out with any further concerns.      I spent 35 minutes  in the professional and overall care of this patient.      Joselin Soliz PA-C         [1] aspirin, 81 mg, oral, Daily  atorvastatin, 80 mg, oral, q48h  cholecalciferol, 25 mcg, oral, Daily  ciprofloxacin, 250 mg, oral, q12h JESUS  [Held by provider] empagliflozin, 10 mg, oral, Daily  esomeprazole, 20 mg, nasogastric tube, Daily before breakfast  [Held by provider] furosemide, 40 mg, oral, BID  insulin glargine, 10 Units, subcutaneous, Nightly  insulin lispro, 0-15 Units, subcutaneous, TID AC  insulin lispro, 6 Units, subcutaneous, TID AC  [Held by provider] lisinopril, 5 mg, oral, Daily  magnesium hydroxide, 60 mL, oral, Daily  magnesium oxide, 400 mg of magnesium oxide, oral, Daily  metoprolol tartrate, 25 mg, oral, BID  neomycin-bacitracin-polymyxin, 0.5 inch, Both Eyes, Nightly  nystatin, 1 Application, Topical, BID  [Held by provider] potassium chloride CR, 20 mEq, oral, Daily  [Held by provider] spironolactone, 12.5 mg, oral, Daily  triamcinolone, , Topical, BID  warfarin, 3 mg, oral, Daily  [2]    [3] PRN medications: acetaminophen **OR** acetaminophen **OR** acetaminophen, albuterol, benzocaine-menthol, dextromethorphan-guaifenesin, dextrose, dextrose, glucagon, glucagon, guaiFENesin, lubricating eye drops, polyethylene glycol

## 2025-05-23 NOTE — CARE PLAN
The patient's goals for the shift include No Sob    The clinical goals for the shift include Wean off 02    Over the shift, the patient did not make progress toward the following goals. Barriers to progression include . Recommendations to address these barriers include .

## 2025-05-23 NOTE — PROGRESS NOTES
Spiritual Care Visit  Spiritual Care Request    Reason for Visit:  Routine Visit: Introduction     Request Received From:       Focus of Care:  Visited With: Patient         Refer to :          Spiritual Care Assessment    Spiritual Assessment:                      Care Provided:       Sense of Community and or Oriental orthodox Affiliation:  None         Addressed Needs/Concerns and/or [place]be[place] Through:          Outcome:  Outcome of Spiritual Care Visit: Identifying spiritual/emotional issues, Comfort/healing presence, Identifying patient's strengths/source of hope, Support system identified     Advance Directives:         Spiritual Care Annotation        Annotation:  provided patient support while rounding the Unit.  introduced  services of [facility].   explained the role of the  in providing emotional and spiritual support for patient's and family while in admitted to the hospital.      greeted the patient who was slightly inclined in his hospital bed, appears comfortable and pain free. Patient has good family support.  Patient talked about his many careers through the years. Patient appears to be in good spirits.  provided socialization to prevent the patient from being isolated.    No spiritual or Mandaeism needs were expressed. Spiritual care will remain available for support as requested.

## 2025-05-23 NOTE — PROGRESS NOTES
Navneet Thompson is a 89 y.o. male on day 6 of admission presenting with Dyspnea on exertion.    Subjective   Patient seen and examined.  Resting in bed in no acute distress.  Awake alert oriented x 3.  No complaints.  No shortness of breath, cough, chest pain.  Ready to go home today.    Spoke with nursing, no new issues.    Objective     Physical Exam  Vitals and nursing note reviewed.   Constitutional:       General: He is not in acute distress.     Appearance: Normal appearance. He is normal weight. He is not ill-appearing, toxic-appearing or diaphoretic.   HENT:      Head: Normocephalic and atraumatic.      Right Ear: External ear normal.      Left Ear: External ear normal.      Nose: Nose normal.      Mouth/Throat:      Mouth: Mucous membranes are moist.      Pharynx: Oropharynx is clear.   Eyes:      Extraocular Movements: Extraocular movements intact.      Conjunctiva/sclera: Conjunctivae normal.      Pupils: Pupils are equal, round, and reactive to light.   Cardiovascular:      Rate and Rhythm: Normal rate and regular rhythm.      Pulses: Normal pulses.      Heart sounds: Normal heart sounds. No murmur heard.  Pulmonary:      Effort: Pulmonary effort is normal. No respiratory distress.      Breath sounds: Decreased breath sounds present. No wheezing, rhonchi or rales.      Comments: Room air.  Abdominal:      General: Bowel sounds are normal. There is no distension.      Palpations: Abdomen is soft.      Tenderness: There is no abdominal tenderness. There is no guarding.   Genitourinary:     Comments: : Male pure wick catheter in place draining clear light yellow urine. Rectal examination limited.  Musculoskeletal:         General: No swelling or tenderness. Normal range of motion.      Cervical back: Normal range of motion and neck supple.      Right lower leg: No edema.      Left lower leg: No edema.   Skin:     General: Skin is warm and dry.      Capillary Refill: Capillary refill takes less than 2  seconds.   Neurological:      General: No focal deficit present.      Mental Status: He is alert and oriented to person, place, and time.      Comments: Hearing impaired.   Psychiatric:         Mood and Affect: Mood normal.         Behavior: Behavior normal.       Last Recorded Vitals  Blood pressure 116/66, pulse 71, temperature 36.4 °C (97.5 °F), temperature source Oral, resp. rate 18, height 1.829 m (6'), weight 79.5 kg (175 lb 4.3 oz), SpO2 94%.    Intake/Output last 3 Shifts:  I/O last 3 completed shifts:  In: 816 (10.3 mL/kg) [P.O.:816]  Out: 1700 (21.4 mL/kg) [Urine:1700 (0.6 mL/kg/hr)]  Weight: 79.5 kg     Telemetry -     Relevant Results  Results for orders placed or performed during the hospital encounter of 05/17/25 (from the past 24 hours)   POCT GLUCOSE   Result Value Ref Range    POCT Glucose 131 (H) 74 - 99 mg/dL   POCT GLUCOSE   Result Value Ref Range    POCT Glucose 147 (H) 74 - 99 mg/dL   CBC   Result Value Ref Range    WBC 7.8 4.4 - 11.3 x10*3/uL    nRBC 0.0 0.0 - 0.0 /100 WBCs    RBC 4.23 (L) 4.50 - 5.90 x10*6/uL    Hemoglobin 10.6 (L) 13.5 - 17.5 g/dL    Hematocrit 35.2 (L) 41.0 - 52.0 %    MCV 83 80 - 100 fL    MCH 25.1 (L) 26.0 - 34.0 pg    MCHC 30.1 (L) 32.0 - 36.0 g/dL    RDW 16.3 (H) 11.5 - 14.5 %    Platelets 230 150 - 450 x10*3/uL   Basic Metabolic Panel   Result Value Ref Range    Glucose 103 (H) 74 - 99 mg/dL    Sodium 137 136 - 145 mmol/L    Potassium 4.3 3.5 - 5.3 mmol/L    Chloride 104 98 - 107 mmol/L    Bicarbonate 26 21 - 32 mmol/L    Anion Gap 11 10 - 20 mmol/L    Urea Nitrogen 42 (H) 6 - 23 mg/dL    Creatinine 1.27 0.50 - 1.30 mg/dL    eGFR 53 (L) >60 mL/min/1.73m*2    Calcium 9.0 8.6 - 10.3 mg/dL   POCT GLUCOSE   Result Value Ref Range    POCT Glucose 115 (H) 74 - 99 mg/dL     No results found for the last 90 days.    No results found.    Scheduled medications  Scheduled Medications[1]  Continuous medications  Continuous Medications[2]  PRN medications  PRN  Medications[3]    ASSESSMENT:  Dyspnea - multifactorial  Acute hypoxic respiratory failure resolved  Right > left pleural effusions - opacification right lower lung field  Acute on chronic systolic, diastolic congestive heart failure  History of TAVR  Hypertension  Hyperlipidemia  Atrial fibrillation  Oral anticoagulation  Supra therapeutic INR - improved  COPD  Cor pulmonale  History of tobacco use  Chronic kidney disease  Normocytic anemia  Type 2 diabetes mellitus  Chronic constipation  Urinary symptoms, frequency  Pyuria - mixed bacterial santana  Elevated PSA    PLAN:  No new issues.  Overall, condition is stable.  Respiratory status, pulse oximetry stable on room air.  No shortness of breath.  Labs reviewed.  Creatinine improved.  Cardiology following.  Input appreciated.  Medical management per Cardiology.  Discharge medications per Cardiology.  Stable for discharge.  Outpatient follow-up is advised.  Voiding.  Continue p.o. Ciprofloxacin.  Outpatient follow-up with Urology.  Continue bowel regimen.  Avoid constipation.  Outpatient PT/INR monitoring.  PT/OT as tolerated.  Fall precautions.  Up with assistance only.  Bed and chair alarm at all times.  Pressure ulcer prevention measures.  Turn reposition every 2 hours and as needed.  Heels off bed.  Offloading.  DVT prophylaxis.  Coumadin.  Monitor PT/INR.  GI prophylaxis.  Nexium.  Supportive care.  Patient reassured.  PT/OT recommend moderate intensity level of continued care.  Case management following for discharge planning.  Discharge plan home with home health care.  Discussed with Dr. Callejas.  Ida to discharge home when arrangements are made by case management.      MINOR Cisneros-CNP       [1] aspirin, 81 mg, oral, Daily  atorvastatin, 80 mg, oral, q48h  cholecalciferol, 25 mcg, oral, Daily  ciprofloxacin, 250 mg, oral, q12h JESUS  [Held by provider] empagliflozin, 10 mg, oral, Daily  esomeprazole, 20 mg, nasogastric tube, Daily before  breakfast  [Held by provider] furosemide, 40 mg, oral, BID  insulin glargine, 10 Units, subcutaneous, Nightly  insulin lispro, 0-15 Units, subcutaneous, TID AC  insulin lispro, 6 Units, subcutaneous, TID AC  [Held by provider] lisinopril, 5 mg, oral, Daily  magnesium hydroxide, 60 mL, oral, Daily  magnesium oxide, 400 mg of magnesium oxide, oral, Daily  metoprolol tartrate, 25 mg, oral, BID  neomycin-bacitracin-polymyxin, 0.5 inch, Both Eyes, Nightly  nystatin, 1 Application, Topical, BID  [Held by provider] potassium chloride CR, 20 mEq, oral, Daily  [Held by provider] spironolactone, 12.5 mg, oral, Daily  triamcinolone, , Topical, BID  warfarin, 3 mg, oral, Daily     [2]    [3] PRN medications: acetaminophen **OR** acetaminophen **OR** acetaminophen, albuterol, benzocaine-menthol, dextromethorphan-guaifenesin, dextrose, dextrose, glucagon, glucagon, guaiFENesin, lubricating eye drops, polyethylene glycol

## 2025-05-23 NOTE — PROGRESS NOTES
05/23/25 1230   Discharge Planning   Living Arrangements Alone   Support Systems Family members   Type of Residence Private residence   Who is requesting discharge planning? Provider   Home or Post Acute Services In home services   Type of Home Care Services DME or oxygen;Home PT;Home OT;Home nursing visits  (accepted by    Christine Russell/Teresa ([facility])    [phone], Start of care in 24-48 hours. Bedside RN aware)   Expected Discharge Disposition Home H   Does the patient need discharge transport arranged? No   RoundTrip coordination needed? No   Has discharge transport been arranged? No     Cleared for dc by [facility].   Nursing aware.   SOC with     Christine Russell/Teresa ([facility])    [phone]   Within 24-48 hours.

## 2025-05-23 NOTE — PROGRESS NOTES
Had a discussion with Mr. Thompson's daughter regarding the elevated PSA.  Discussed with her that he most likely, given his age and the PSA value, has prostate cancer.  However, he is not symptomatic from it.    We discussed the prospects of doing further evaluation vs. pursuing a pattern of watchful waiting.    We both agreed the a watchful waiting approach would be more prudent right now.

## 2025-05-27 DIAGNOSIS — I48.91 ATRIAL FIBRILLATION, UNSPECIFIED TYPE (MULTI): ICD-10-CM

## 2025-05-28 ENCOUNTER — TELEPHONE (OUTPATIENT)
Dept: CARDIOLOGY | Facility: CLINIC | Age: OVER 89
End: 2025-05-28
Payer: MEDICARE

## 2025-05-28 NOTE — TELEPHONE ENCOUNTER
Received VM from pt's daughter Pao and returned call.  Pt has appointment with Dr. Bahena on Monday.  Anticoagulation monitoring appointment scheduled to coincide with cardiology appointment.

## 2025-06-02 ENCOUNTER — OFFICE VISIT (OUTPATIENT)
Dept: CARDIOLOGY | Facility: CLINIC | Age: OVER 89
End: 2025-06-02
Payer: MEDICARE

## 2025-06-02 ENCOUNTER — ANTICOAGULATION - WARFARIN VISIT (OUTPATIENT)
Dept: CARDIOLOGY | Facility: CLINIC | Age: OVER 89
End: 2025-06-02
Payer: MEDICARE

## 2025-06-02 VITALS — SYSTOLIC BLOOD PRESSURE: 115 MMHG | HEART RATE: 92 BPM | DIASTOLIC BLOOD PRESSURE: 65 MMHG

## 2025-06-02 DIAGNOSIS — I48.91 ATRIAL FIBRILLATION, UNSPECIFIED TYPE (MULTI): Primary | ICD-10-CM

## 2025-06-02 DIAGNOSIS — I48.20 CHRONIC ATRIAL FIBRILLATION (MULTI): ICD-10-CM

## 2025-06-02 LAB
ATRIAL RATE: 117 BPM
POC INR: 1.9 (ref 2–3)
POC PROTHROMBIN TIME: ABNORMAL (ref 9.3–12.5)
Q ONSET: 228 MS
QRS COUNT: 16 BEATS
QRS DURATION: 150 MS
QT INTERVAL: 410 MS
QTC CALCULATION(BAZETT): 507 MS
QTC FREDERICIA: 472 MS
R AXIS: 219 DEGREES
T AXIS: 61 DEGREES
T OFFSET: 433 MS
VENTRICULAR RATE: 92 BPM

## 2025-06-02 PROCEDURE — 93010 ELECTROCARDIOGRAM REPORT: CPT | Performed by: INTERNAL MEDICINE

## 2025-06-02 PROCEDURE — 1159F MED LIST DOCD IN RCRD: CPT | Performed by: INTERNAL MEDICINE

## 2025-06-02 PROCEDURE — 93005 ELECTROCARDIOGRAM TRACING: CPT | Performed by: INTERNAL MEDICINE

## 2025-06-02 PROCEDURE — 85610 PROTHROMBIN TIME: CPT | Mod: QW

## 2025-06-02 PROCEDURE — 3078F DIAST BP <80 MM HG: CPT | Performed by: INTERNAL MEDICINE

## 2025-06-02 PROCEDURE — 99214 OFFICE O/P EST MOD 30 MIN: CPT | Performed by: INTERNAL MEDICINE

## 2025-06-02 PROCEDURE — 1160F RVW MEDS BY RX/DR IN RCRD: CPT | Performed by: INTERNAL MEDICINE

## 2025-06-02 PROCEDURE — 1036F TOBACCO NON-USER: CPT | Performed by: INTERNAL MEDICINE

## 2025-06-02 PROCEDURE — 3074F SYST BP LT 130 MM HG: CPT | Performed by: INTERNAL MEDICINE

## 2025-06-02 PROCEDURE — 1111F DSCHRG MED/CURRENT MED MERGE: CPT | Performed by: INTERNAL MEDICINE

## 2025-06-02 RX ORDER — ISOSORBIDE MONONITRATE 30 MG/1
30 TABLET, EXTENDED RELEASE ORAL DAILY
COMMUNITY
Start: 2025-05-28

## 2025-06-02 ASSESSMENT — ENCOUNTER SYMPTOMS
OCCASIONAL FEELINGS OF UNSTEADINESS: 1
LOSS OF SENSATION IN FEET: 0
DEPRESSION: 0

## 2025-06-02 NOTE — PROGRESS NOTES
Subjective:  Patient returns for a 5-week follow-up as well as a posthospitalization follow-up.    He is a pleasant but frail 89-year-old gentleman.  He has known coronary disease as well as an ischemic cardiomyopathy.  He is status post remote CABG.  He also has a BiV ICD in place and also has a TAVR in place.  He also has chronic atrial fibrillation.  He unfortunately required hospitalization in mid May for about 5 days.  He was struggling with minimal urine output and was markedly dyspneic.  He underwent extensive intravenous diuresis.  He is scheduled to see urology in follow up in the near future to assess for any potential obstructive uropathy.    He generally is feeling better at this time.  His breathing is significantly better and his peripheral edema is down a great deal.  His weight appears to be plateaued in the low 160 range.  He is generally happy and comfortable with how he is doing at this time.  He denies any palpitations or ICD discharges.  He denies any presyncope or syncope.  He denies any stroke or TIA symptomatology.    Objective:  General: Alert pleasant but frail elderly gentleman.  HEENT: Unchanged.  Lungs: Improved air exchange without kirit crackles or wheezing.  Cardiac: Distant heart tones with soft systolic murmur.  JVP was normal.  Abdomen: Nontender.  Extremities: Minimal edema.  Skin: No rash.  Neuro: Unchanged.    EKG: Atrial fibrillation with ventricular paced rhythm.    Impression/plan:  Patient appears to be doing a bit better at this time and appears to be close to euvolemic.  He and his daughter know to watch closely to monitor his daily weights and alert me should they go up significantly in which case we will need to reescalate his daily furosemide regimen.    His most recent echo from earlier this year showed that his TAVR valve was working fine.    He remains appropriately anticoagulated for his PAF and continues to be followed closely at the [facility].    Given his recent clinical instability, I will plan on seeing him back for follow-up in 2 weeks.  They did not need any prescriptions new.    Patient instructions:    Continue current medications unchanged.    Continue to follow his daily weights very closely and call for any problematic increase in his weights.    Return to clinic in 2 weeks.

## 2025-06-09 ENCOUNTER — ANTICOAGULATION - WARFARIN VISIT (OUTPATIENT)
Dept: CARDIOLOGY | Facility: CLINIC | Age: OVER 89
End: 2025-06-09
Payer: MEDICARE

## 2025-06-09 DIAGNOSIS — I48.91 ATRIAL FIBRILLATION, UNSPECIFIED TYPE (MULTI): Primary | ICD-10-CM

## 2025-06-09 DIAGNOSIS — I48.20 CHRONIC ATRIAL FIBRILLATION (MULTI): ICD-10-CM

## 2025-06-09 LAB
POC INR: 2.6 (ref 2–3)
POC PROTHROMBIN TIME: ABNORMAL (ref 9.3–12.5)

## 2025-06-09 PROCEDURE — 99211 OFF/OP EST MAY X REQ PHY/QHP: CPT

## 2025-06-09 PROCEDURE — 85610 PROTHROMBIN TIME: CPT | Mod: QW

## 2025-06-09 NOTE — PROGRESS NOTES
Patient identification verified with 2 identifiers.    Location: [facility] - [address] [address] [address] [place], [place] [address] [phone]     Referring Physician: Dr. Melvin Bahena  Enrollment/ Re-enrollment date:  26  INR Goal: 2.0-3.0  INR monitoring is per [facility] protocol.  Anticoagulation Medication: warfarin  Indication: Atrial Fibrillation/Atrial Flutter    Subjective   Bleeding signs/symptoms: No    Bruising: No   Major bleeding event: No  Thrombosis signs/symptoms: No  Thromboembolic event: No  Missed doses: No  Extra doses: No  Medication changes: No  Dietary changes: No  Change in health: No  Change in activity: No  Alcohol: No  Other concerns: No    Upcoming Procedures:  Does the Patient Have any upcoming procedures that require interruption in anticoagulation therapy? no  Does the patient require bridging? no      Anticoagulation Summary  As of 2025      INR goal:  2.0-3.0   TTR:  71.8% (1.5 y)   INR used for dosin.60 (2025)   Weekly warfarin total:  40.5 mg               Assessment/Plan   Therapeutic     1. New dose: maintain  2. Next INR: 1 week      Education provided to patient during the visit:  Patient instructed to call in interim with questions, concerns and changes.   Patient educated on interactions between medications and warfarin.   Patient educated on dietary consistency in vitamin k consumption.   Patient educated on signs of bleeding/clotting.   Patient educated on compliance with dosing, follow up appointments, and prescribed plan of care.

## 2025-06-16 ENCOUNTER — OFFICE VISIT (OUTPATIENT)
Dept: CARDIOLOGY | Facility: CLINIC | Age: OVER 89
End: 2025-06-16
Payer: MEDICARE

## 2025-06-16 ENCOUNTER — APPOINTMENT (OUTPATIENT)
Dept: CARDIOLOGY | Facility: CLINIC | Age: OVER 89
End: 2025-06-16
Payer: MEDICARE

## 2025-06-16 VITALS
BODY MASS INDEX: 23.44 KG/M2 | OXYGEN SATURATION: 94 % | WEIGHT: 167.4 LBS | SYSTOLIC BLOOD PRESSURE: 125 MMHG | DIASTOLIC BLOOD PRESSURE: 70 MMHG | HEART RATE: 88 BPM | RESPIRATION RATE: 20 BRPM | HEIGHT: 71 IN

## 2025-06-16 DIAGNOSIS — I48.20 CHRONIC ATRIAL FIBRILLATION (MULTI): Primary | ICD-10-CM

## 2025-06-16 DIAGNOSIS — I48.91 ATRIAL FIBRILLATION, UNSPECIFIED TYPE (MULTI): ICD-10-CM

## 2025-06-16 DIAGNOSIS — I25.10 ATHEROSCLEROSIS OF NATIVE CORONARY ARTERY OF NATIVE HEART WITHOUT ANGINA PECTORIS: Primary | ICD-10-CM

## 2025-06-16 LAB
POC INR: 3.1 (ref 0.9–1.1)
POC PROTHROMBIN TIME: ABNORMAL (ref 9.3–12.5)

## 2025-06-16 PROCEDURE — 99211 OFF/OP EST MAY X REQ PHY/QHP: CPT

## 2025-06-16 PROCEDURE — 3078F DIAST BP <80 MM HG: CPT | Performed by: INTERNAL MEDICINE

## 2025-06-16 PROCEDURE — 1111F DSCHRG MED/CURRENT MED MERGE: CPT | Performed by: INTERNAL MEDICINE

## 2025-06-16 PROCEDURE — 1160F RVW MEDS BY RX/DR IN RCRD: CPT | Performed by: INTERNAL MEDICINE

## 2025-06-16 PROCEDURE — 85610 PROTHROMBIN TIME: CPT | Mod: QW

## 2025-06-16 PROCEDURE — 3074F SYST BP LT 130 MM HG: CPT | Performed by: INTERNAL MEDICINE

## 2025-06-16 PROCEDURE — 1126F AMNT PAIN NOTED NONE PRSNT: CPT | Performed by: INTERNAL MEDICINE

## 2025-06-16 PROCEDURE — 1036F TOBACCO NON-USER: CPT | Performed by: INTERNAL MEDICINE

## 2025-06-16 PROCEDURE — 1159F MED LIST DOCD IN RCRD: CPT | Performed by: INTERNAL MEDICINE

## 2025-06-16 PROCEDURE — 99213 OFFICE O/P EST LOW 20 MIN: CPT

## 2025-06-16 PROCEDURE — 99213 OFFICE O/P EST LOW 20 MIN: CPT | Performed by: INTERNAL MEDICINE

## 2025-06-16 ASSESSMENT — PAIN SCALES - GENERAL: PAINLEVEL_OUTOF10: 0-NO PAIN

## 2025-06-16 ASSESSMENT — ENCOUNTER SYMPTOMS: OCCASIONAL FEELINGS OF UNSTEADINESS: 1

## 2025-06-16 NOTE — PROGRESS NOTES
Patient identification verified with 2 identifiers.    Location: [facility] - [address] [address] [address] [place], [place] [address] [phone]     Referring Physician: Dr. Melvin Bahena  Enrollment/ Re-enrollment date:  5/5/2026  INR Goal: 2.0-3.0  INR monitoring is per [facility] protocol.  Anticoagulation Medication: warfarin  Indication: Atrial Fibrillation/Atrial Flutter    Subjective   Bleeding signs/symptoms: No    Bruising: No   Major bleeding event: No  Thrombosis signs/symptoms: No  Thromboembolic event: No  Missed doses: No  Extra doses: No  Medication changes: No  Dietary changes: No  Change in health: No  Change in activity: No  Alcohol: No  Other concerns: No    Upcoming Procedures:  Does the Patient Have any upcoming procedures that require interruption in anticoagulation therapy? no  Does the patient require bridging? no      Anticoagulation Summary  As of 6/16/2025      INR goal:  2.0-3.0   TTR:  71.9% (1.6 y)   INR used for dosing:  3.10 (6/16/2025)   Weekly warfarin total:  39 mg               Assessment/Plan   Supratherapeutic     1. New dose: dose change   2. Next INR: 1 week patient can rtc in 2 weeks d/t conflict with appointments and scheduling      Education provided to patient during the visit:  Patient instructed to call in interim with questions, concerns and changes.   Patient educated on dietary consistency in vitamin k consumption.   Patient educated on signs of bleeding/clotting.   Patient educated on compliance with dosing, follow up appointments, and prescribed plan of care.

## 2025-06-16 NOTE — PROGRESS NOTES
Subjective:  Patient returns for a routine 2-week follow-up.  I was pleased to see that he appears to have stabilized.  He is accompanied again by his supportive daughter.  His weight appears to have stabilized at around 162.  His breathing remains improved and his peripheral edema remains under good control.  He continues to get his warfarin dosing adjusted to keep his INR therapeutic.    He unfortunately had to put off his follow-up urology appointment.    Objective:  General: Alert and unchanged.  HEENT: No thyromegaly.  Lungs: Reasonable air exchange without crackles.  Cardiac: Normal S1 and S2 with soft systolic murmur.  Abdomen: Nontender.  Extremities: Minimal bilateral edema.    Impression/plan:  Navneet appears to be reasonably stable at this time and appears to be essentially euvolemic with some resolution of his heart failure symptomatology.  We will continue his furosemide dosing at 80 mg daily.  He and his daughter will let me know should his weights bump up or down several pounds over the course of a day or 2.  Will adjust his diuretic should that occur.  I will plan on checking a BMP with magnesium his CBC today and we will review the results of these with his daughter by phone.    They did not need any prescriptions renewed.    Patient instructions:    Continue current medications unchanged.    Obtain blood work today and call for results.    Continue to monitor your daily weights as you are doing.    Return to clinic in 6 weeks.

## 2025-06-18 ENCOUNTER — APPOINTMENT (OUTPATIENT)
Dept: CARDIOLOGY | Facility: CLINIC | Age: OVER 89
End: 2025-06-18
Payer: MEDICARE

## 2025-06-30 ENCOUNTER — ANTICOAGULATION - WARFARIN VISIT (OUTPATIENT)
Dept: CARDIOLOGY | Facility: CLINIC | Age: OVER 89
End: 2025-06-30
Payer: MEDICARE

## 2025-06-30 DIAGNOSIS — I48.91 ATRIAL FIBRILLATION, UNSPECIFIED TYPE (MULTI): Primary | ICD-10-CM

## 2025-06-30 DIAGNOSIS — I48.20 CHRONIC ATRIAL FIBRILLATION (MULTI): ICD-10-CM

## 2025-06-30 LAB
POC INR: 2.7 (ref 0.9–1.1)
POC PROTHROMBIN TIME: ABNORMAL (ref 9.3–12.5)

## 2025-06-30 PROCEDURE — 85610 PROTHROMBIN TIME: CPT | Mod: QW

## 2025-06-30 PROCEDURE — 99211 OFF/OP EST MAY X REQ PHY/QHP: CPT

## 2025-06-30 NOTE — PROGRESS NOTES
Patient identification verified with 2 identifiers.    Location: [facility] - [address] [address] [address]. [place], [place] [address] [phone]     Referring Physician: DR. GARDNER  Enrollment/ Re-enrollment date: 26   INR Goal: 2.0-3.0  INR monitoring is per [facility] protocol.  Anticoagulation Medication: warfarin  Indication: Atrial Fibrillation/Atrial Flutter    Subjective   Bleeding signs/symptoms: No    Bruising: No   Major bleeding event: No  Thrombosis signs/symptoms: No  Thromboembolic event: No  Missed doses: No  Extra doses: No  Medication changes: No  Dietary changes: No  PT CONTINUES TO DRINK ENSURE JUST ABOUT EVERY DAY.  Change in health: No  Change in activity: No  Alcohol: No  Other concerns: No    Upcoming Procedures:  Does the Patient Have any upcoming procedures that require interruption in anticoagulation therapy? no  Does the patient require bridging? no      Anticoagulation Summary  As of 2025      INR goal:  2.0-3.0   TTR:  72.0% (1.6 y)   INR used for dosin.70 (2025)   Weekly warfarin total:  39 mg               Assessment/Plan   Therapeutic     1. New dose: no change    2. Next INR: 2 weeks DUE TO TRANSPORTATION FROM HIS DAUGHTER.       Education provided to patient during the visit:  Patient instructed to call in interim with questions, concerns and changes.   Patient educated on dietary consistency in vitamin k consumption.   Patient educated on signs of bleeding/clotting.

## 2025-07-01 LAB
ANION GAP SERPL CALCULATED.4IONS-SCNC: 11 MMOL/L (CALC) (ref 7–17)
BUN SERPL-MCNC: 44 MG/DL (ref 7–25)
BUN/CREAT SERPL: 34 (CALC) (ref 6–22)
CALCIUM SERPL-MCNC: 8.9 MG/DL (ref 8.6–10.3)
CHLORIDE SERPL-SCNC: 106 MMOL/L (ref 98–110)
CO2 SERPL-SCNC: 26 MMOL/L (ref 20–32)
CREAT SERPL-MCNC: 1.29 MG/DL (ref 0.7–1.22)
EGFRCR SERPLBLD CKD-EPI 2021: 52 ML/MIN/1.73M2
ERYTHROCYTE [DISTWIDTH] IN BLOOD BY AUTOMATED COUNT: 16.9 % (ref 11–15)
GLUCOSE SERPL-MCNC: 131 MG/DL (ref 65–99)
HCT VFR BLD AUTO: 36.5 % (ref 38.5–50)
HGB BLD-MCNC: 10.5 G/DL (ref 13.2–17.1)
MAGNESIUM SERPL-MCNC: 2.3 MG/DL (ref 1.5–2.5)
MCH RBC QN AUTO: 24.8 PG (ref 27–33)
MCHC RBC AUTO-ENTMCNC: 28.8 G/DL (ref 32–36)
MCV RBC AUTO: 86.3 FL (ref 80–100)
PLATELET # BLD AUTO: 180 THOUSAND/UL (ref 140–400)
PMV BLD REES-ECKER: 11 FL (ref 7.5–12.5)
POTASSIUM SERPL-SCNC: 4 MMOL/L (ref 3.5–5.3)
RBC # BLD AUTO: 4.23 MILLION/UL (ref 4.2–5.8)
SODIUM SERPL-SCNC: 143 MMOL/L (ref 135–146)
WBC # BLD AUTO: 8.6 THOUSAND/UL (ref 3.8–10.8)

## 2025-07-03 ENCOUNTER — APPOINTMENT (OUTPATIENT)
Dept: CARDIOLOGY | Facility: CLINIC | Age: OVER 89
End: 2025-07-03
Payer: MEDICARE

## 2025-07-14 ENCOUNTER — ANTICOAGULATION - WARFARIN VISIT (OUTPATIENT)
Dept: CARDIOLOGY | Facility: CLINIC | Age: OVER 89
End: 2025-07-14
Payer: MEDICARE

## 2025-07-14 DIAGNOSIS — I48.91 ATRIAL FIBRILLATION, UNSPECIFIED TYPE (MULTI): Primary | ICD-10-CM

## 2025-07-14 DIAGNOSIS — I48.20 CHRONIC ATRIAL FIBRILLATION (MULTI): ICD-10-CM

## 2025-07-14 LAB
POC INR: 3 (ref 2–3)
POC PROTHROMBIN TIME: ABNORMAL (ref 9.3–12.5)

## 2025-07-14 PROCEDURE — 99211 OFF/OP EST MAY X REQ PHY/QHP: CPT

## 2025-07-14 PROCEDURE — 85610 PROTHROMBIN TIME: CPT | Mod: QW

## 2025-07-14 NOTE — PROGRESS NOTES
Patient identification verified with 2 identifiers.    Location: [facility] - [address] [address] [place], [place] [address] [phone]     Referring Physician: DR. GARDNER  Enrollment/ Re-enrollment date: 5/5/26   INR Goal: 2.0-3.0  INR monitoring is per [facility] protocol.  Anticoagulation Medication: warfarin  Indication: Atrial Fibrillation/Atrial Flutter    Subjective   Bleeding signs/symptoms: No    Bruising: No   Major bleeding event: No  Thrombosis signs/symptoms: No  Thromboembolic event: No  Missed doses: No  Extra doses: No  Medication changes: No  Dietary changes: No  Change in health: No  Change in activity: No  Alcohol: No  Other concerns: No    Upcoming Procedures:  Does the Patient Have any upcoming procedures that require interruption in anticoagulation therapy? no  Does the patient require bridging? no      Anticoagulation Summary  As of 7/14/2025      INR goal:  2.0-3.0   TTR:  72.6% (1.6 y)   INR used for dosing:  3.00 (7/14/2025)   Weekly warfarin total:  39 mg               Assessment/Plan   Therapeutic     1. New dose: no change    2. Next INR: 3 weeks to correspond with appointment with Dr. Gardner       Education provided to patient during the visit:  Patient instructed to call in interim with questions, concerns and changes.   Patient educated on interactions between medications and warfarin.   Patient educated on dietary consistency in vitamin k consumption.   Patient educated on signs of bleeding/clotting.   Patient educated on compliance with dosing, follow up appointments, and prescribed plan of care.

## 2025-07-15 NOTE — ASSESSMENT & PLAN NOTE
VASCULAR SURGERY SERVICE  CONSULT NOTE      Date: 7/15/2025    Referring Provider: KYLIE Malone    Consulting Physician: Jeannette Canales MD - [facility]     -------------------------------------------------------------------------------------------------    Reason for consultation:  Leg swelling and pain.    HPI:  This is a 64 y.o. female who has been having problem with bilateral lower extremity swelling and pain for a number of years.  Patient has been wearing compression socks but still has problem with pain and swelling.  She underwent left greater saphenous vein ablation in the past by another surgeon.  Recent venous duplex showed no evidence of deep venous thrombosis.  Lower extremity arterial study was essentially normal with ABIs of 1.02 on the right and 1.06 on the left.  Patient is referred to vascular service.  She is is a longtime smoker.  She is trying to stop smoking.      Past Medical History[1]    Past Surgical History[2]    Current Medications[3]    Social History     Socioeconomic History    Marital status:      Spouse name: Not on file    Number of children: Not on file    Years of education: Not on file    Highest education level: Not on file   Occupational History    Not on file   Tobacco Use    Smoking status: Some Days     Current packs/day: 0.50     Average packs/day: 0.5 packs/day for 40.0 years (20.0 ttl pk-yrs)     Types: Cigarettes    Smokeless tobacco: Never   Vaping Use    Vaping status: Never Used   Substance and Sexual Activity    Alcohol use: Not Currently     Alcohol/week: 0.6 oz     Types: 1 Standard drinks or equivalent per week    Drug use: No    Sexual activity: Not Currently     Comment:    Other Topics Concern    Not on file   Social History Narrative    Not on file     Social Drivers of Health     Financial Resource Strain: Medium Risk (9/29/2020)    Overall Financial Resource Strain (CARDIA)     Difficulty of Paying Living Expenses: Somewhat  Continue low-dose daily prednisone   PRN aerosol treatments    hard   Food Insecurity: No Food Insecurity (1/1/2025)    Hunger Vital Sign     Worried About Running Out of Food in the Last Year: Never true     Ran Out of Food in the Last Year: Never true   Transportation Needs: No Transportation Needs (1/1/2025)    PRAPARE - Transportation     Lack of Transportation (Medical): No     Lack of Transportation (Non-Medical): No   Physical Activity: Unknown (9/29/2020)    Exercise Vital Sign     Days of Exercise per Week: Patient declined     Minutes of Exercise per Session: Patient declined   Stress: Stress Concern Present (9/29/2020)    [employer] [place] [employer] - Occupational Stress Questionnaire     Feeling of Stress : Very much   Social Connections: Unknown (9/29/2020)    Social Connection and Isolation Panel [NHANES]     Frequency of Communication with Friends and Family: Patient declined     Frequency of Social Gatherings with Friends and Family: Patient declined     Attends [employer] Services: Patient declined     Active Member of Clubs or Organizations: Patient declined     Attends Club or Organization Meetings: Patient declined     Marital Status: Patient declined   Intimate Partner Violence: Not At Risk (1/1/2025)    Humiliation, Afraid, Rape, and Kick questionnaire     Fear of Current or Ex-Partner: No     Emotionally Abused: No     Physically Abused: No     Sexually Abused: No   Housing Stability: Low Risk  (1/1/2025)    Housing Stability Vital Sign     Unable to Pay for Housing in the Last Year: No     Number of Times Moved in the Last Year: 0     Homeless in the Last Year: No       Family History   Problem Relation Age of Onset    Cancer Mother         Breast/Liver    Diabetes Mother     Hypertension Mother     Hyperlipidemia Mother     Heart Disease Mother     Lung Disease Father         Emphysema    Psychiatric Illness Father         Paranoid schitzophenia    Psychiatric Illness Sister         Paronoid Schitzo-disorder, Bipolar    Arthritis Sister          RA, Fibromyalgia    Cancer Maternal Aunt         Breast    Cancer Paternal Aunt         Bone    Arthritis Paternal Aunt     Genetic Disorder Paternal Aunt         SLE    Heart Disease Maternal Grandmother     Hypertension Maternal Grandmother     Hyperlipidemia Maternal Grandmother     Genetic Disorder Maternal Grandmother         Brights disease    Stroke Maternal Grandmother     Heart Disease Maternal Grandfather     Hypertension Maternal Grandfather     Hyperlipidemia Maternal Grandfather     Stroke Paternal Grandmother     Hyperlipidemia Paternal Grandmother     Hypertension Paternal Grandmother     Heart Disease Paternal Grandfather     Hypertension Paternal Grandfather     Hyperlipidemia Paternal Grandfather        Allergies:  Macrodantin [nitrofurantoin macrocrystal], Pcn [penicillins], Doxycycline, Bactrim, Clarithromycin, Hydrochlorothiazide, and Omnicef    Review of Systems:    Constitutional: Negative for fever, chills, weight loss,   HENT:   Negative for hearing loss or tinnitus    Eyes:    Negative for blurred vision, double vision, or loss of vision  Respiratory:  Negative for cough, hemoptysis, or wheezing    Cardiac:  Negative for chest pain or palpitations or orthopnea  Vascular:  Negative for claudication or rest pain   Gastrointestinal: Negative for nausea, vomiting, or abdominal pain     Negative for hematochezia or melena   Genitourinary: Negative for dysuria, frequency, or hematuria   Musculoskeletal: Positive for lower extremity swelling.    Skin:   Negative for itching or rash  Neurological:  Negative for dizziness, headaches, or tremors     Negative for speech disturbance     Negative for extremity weakness or paresthesias  Endo/Heme:  Negative for easy bruising or bleeding  Psychiatric:  Negative for depression, suicidal ideas, or hallucinations    Physical Exam:  /62 (BP Location: Right arm, Patient Position: Sitting, BP Cuff Size: Adult)   Pulse 60   Temp 36.1 °C (97 °F)  "(Temporal)   Ht 1.613 m (5' 3.5\")   Wt 88.8 kg (195 lb 12.3 oz)   SpO2 95%     Constitutional: Alert, oriented, no acute distress  HEENT:  Normocephalic and atraumatic, EOMI  Neck:   Supple, no JVD, loud right bruits.  Cardiovascular: Regular rate and rhythm  Pulmonary:  Good air entry bilaterally  Abdominal:  Soft, non-tender, non-distended     Aortic impulse not widened  Musculoskeletal: No edema, no tenderness  Neurological:  CN II-XII grossly intact, no focal deficits  Skin:   Skin is warm and dry. No rash noted.  Psychiatric:  Normal mood and affect.  Lower extremities: Scattered small varicosities and reticular veins in both lower legs.  No ulceration.  2+ bilateral edema.  Feet are warm, well-perfused.  Multiphasic Doppler flow signals are obtained over bilateral pedal arteries.    Labs:  Reviewed.      Radiology:  Reviewed.    Assessment:  - Bilateral lower extremity venous stasis disease and varicosities.  - Right carotid bruits.  - Tobacco use.  - Hypertension.  - Type 2 diabetes mellitus.  - Dyslipidemia.    Plan:  I had a long discussion with patient.  She does not have arterial insufficiency of the lower extremities.  The etiology of her lower extremity problem is venous stasis disease and varicosities.  The main treatment for this condition would be compression socks and leg elevation.  With patient having carotid bruits, she would be at risk for having coronary artery disease and therefore, I would not recommend that she has the right greater saphenous vein ablated as she may need it later for heart bypass.  She already had the left greater saphenous vein ablated.    I ordered bilateral carotid duplex for the right bruits detected on physical exam.  I asked patient to see me after the study is done.  I also asked patient to take aspirin 81 mg once a day.  I recommended that she be taken to the emergency room if she developed any neurological symptom at any time.    Patient indicated understanding " of the conversation and agreed with plan.  I answered all her questions.      I strongly counseled patient to stop smoking.          Jeannette Canales MD  [facility]   [place] preferred or call my office [phone]  __________________________________________________________________  Patient:Kelly Oakley   MRN:[id number]   CSN:[id number]           [1]   Past Medical History:  Diagnosis Date    Acute cystitis with hematuria 02/14/2022    Acute respiratory failure with hypoxia (HCC) 12/31/2024    Adrenal nodule 2012 2015 / nonfunctional / benign    Anemia     Anesthesia     Anxiety     Panic    Arrhythmia     palpatations    Arthritis     Morning stiffness    Atherosclerosis of arteries 2013    seen on abdominal CT    Briseno's  esophagus     Bronchitis 2019    Chest pain at rest 09/11/2015    Chronic obstructive pulmonary disease (HCC) 11/30/2021    Cigarette smoker one half pack a day or less 05/28/2015    Depression     Fatigue 09/11/2015    GERD (gastroesophageal reflux disease)     Headache(784.0)     sinus headache    Heart burn     Heart murmur     Dr. Krause-Stress trend    High cholesterol     History of HPV infection     HLD (hyperlipidemia) 09/11/2015    HTN (hypertension) 09/11/2015    IBD (inflammatory bowel disease)     Dr. Cunningham    Indigestion     MRSA carrier 2008    nose cellulitis    Nocturnal hypoxemia 10/15/2015    OSTEOPOROSIS     Osteopenia    Pneumonia 2005    PONV (postoperative nausea and vomiting)     S/P appendectomy     S/P cholecystectomy     S/P hysterectomy with oophorectomy     endometriosis    Snoring     Ulcer     Barretts esophogitis    Urinary incontinence     Urinary tract infection, site not specified    [2]   Past Surgical History:  Procedure Laterality Date    PB KNEE SCOPE,DIAGNOSTIC Right 2/3/2021    Procedure: ARTHROSCOPY, KNEE;  Surgeon: Guillermo Wyman M.D.;  Location: SURGERY [facility];  Service: Orthopedics    MENISCECTOMY, KNEE, MEDIAL Right 2/3/2021     Procedure: MENISCECTOMY, KNEE, MEDIAL - FOR PARTIAL LATERAL;  Surgeon: Guillermo Wyman M.D.;  Location: SURGERY [facility];  Service: Orthopedics    CHONDROPLASTY Right 2/3/2021    Procedure: CHONDROPLASTY - AND WILBURN;  Surgeon: Guillermo Wyman M.D.;  Location: SURGERY [facility];  Service: Orthopedics    BREAST BIOPSY  1980's    benign left breast 35 years ago    ABDOMINAL HYSTERECTOMY TOTAL      mennorrogia    APPENDECTOMY      CHOLECYSTECTOMY      COLON RESECTION      Polyp removal    ENDOMETRIAL ABLATION      PRIMARY C SECTION      TONSILLECTOMY      TUBAL COAGULATION LAPAROSCOPIC BILATERAL      US-NEEDLE CORE BX-BREAST PANEL     [3]   Current Outpatient Medications   Medication Sig Dispense Refill    hydrALAZINE (APRESOLINE) 25 MG Tab Take 1 Tablet by mouth 2 times a day as needed (only if bp is 160 or above). 60 Tablet 3    dicyclomine (BENTYL) 20 MG Tab Take 1 Tablet by mouth every 6 hours as needed (abdominal cramping). 30 Tablet 0    sucralfate (CARAFATE) 1 GM Tab Take 1 Tablet by mouth 4 Times a Day,Before Meals and at Bedtime. 30 Tablet 0    ondansetron (ZOFRAN ODT) 4 MG TABLET DISPERSIBLE Take 1 Tablet by mouth every 8 hours as needed for Nausea/Vomiting. Dissolve in mouth. 10 Tablet 0    FLUoxetine (PROZAC) 10 MG Cap TAKE 1 CAPSULE BY MOUTH EVERY DAY 90 Capsule 3    spironolactone (ALDACTONE) 50 MG Tab TAKE 1 TABLET BY MOUTH EVERY DAY 90 Tablet 3    pravastatin (PRAVACHOL) 20 MG Tab TAKE 1 TABLET BY MOUTH EVERY DAY 90 Tablet 3    propranolol (INDERAL) 10 MG Tab TAKE 1 TABLET BY MOUTH TWICE A  Tablet 3    hydrOXYzine HCl (ATARAX) 25 MG Tab TAKE 1 TABLET BY MOUTH TWICE A DAY AS NEEDED FOR ANXIETY 180 Tablet 3    tiotropium (SPIRIVA RESPIMAT) 2.5 mcg/Act Aero Soln INHALE 2 INHALATIONS EVERY DAY. 4 g 3    buPROPion SR (WELLBUTRIN-SR) 150 MG TABLET SR 12 HR sustained-release tablet Take 1 Tablet by mouth 2 times a day. 60 Tablet 1    mometasone-formoterol (DULERA) 200-5 MCG/ACT Aerosol Inhale 2  Puffs 2 times a day. 8.8 g 0    nicotine (NICODERM) 14 MG/24HR PATCH 24 HR Place 1 Patch on the skin every 24 hours. 30 Patch 1    irbesartan (AVAPRO) 300 MG Tab TAKE 1 TABLET BY MOUTH EVERY DAY 90 Tablet 3    fluocinonide (LIDEX) 0.05 % Cream AAA twice a day for 2-3 weeks at a time with 1-2 week break in between 120 g 3    tacrolimus (PROTOPIC) 0.1 % Ointment AAA twice a day 30 g 3    busPIRone (BUSPAR) 15 MG tablet TAKE 1 TABLET BY MOUTH TWICE A  Tablet 3    fluticasone (FLONASE) 50 MCG/ACT nasal spray ADMINISTER 2 SPRAYS INTO AFFECTED NOSTRIL(S) AT BEDTIME. 48 mL 2    esomeprazole (NEXIUM) 40 MG delayed-release capsule Take 1 Capsule by mouth every morning before breakfast. 90 Capsule 3    triamcinolone acetonide (KENALOG) 0.1 % Ointment Apply 1 Application  topically 2 times a day. 30 g 1    estradiol (YUVAFEM) 10 MCG Tab INSERT 1 TABLET INTO THE VAGINA TWO TIMES A WEEK. 24 Tablet 3    acetaminophen (TYLENOL) 325 MG Tab Take 650 mg by mouth every 6 hours as needed for Mild Pain or Fever.      amLODIPine (NORVASC) 5 MG Tab TAKE 1 TABLET BY MOUTH EVERY DAY (Patient not taking: Reported on 7/15/2025) 90 Tablet 3     No current facility-administered medications for this visit.

## 2025-07-28 ENCOUNTER — HOSPITAL ENCOUNTER (OUTPATIENT)
Dept: CARDIOLOGY | Facility: CLINIC | Age: OVER 89
Discharge: HOME | End: 2025-07-28
Payer: MEDICARE

## 2025-07-28 DIAGNOSIS — I42.9 CARDIOMYOPATHY, UNSPECIFIED: ICD-10-CM

## 2025-07-28 DIAGNOSIS — I42.0 DILATED CARDIOMYOPATHY (MULTI): ICD-10-CM

## 2025-07-28 DIAGNOSIS — I44.2 ATRIOVENTRICULAR BLOCK, COMPLETE (MULTI): ICD-10-CM

## 2025-07-28 DIAGNOSIS — Z95.0 PRESENCE OF CARDIAC PACEMAKER: ICD-10-CM

## 2025-07-28 PROCEDURE — 93296 REM INTERROG EVL PM/IDS: CPT

## 2025-07-28 PROCEDURE — 93294 REM INTERROG EVL PM/LDLS PM: CPT | Performed by: INTERNAL MEDICINE

## 2025-08-04 ENCOUNTER — OFFICE VISIT (OUTPATIENT)
Dept: CARDIOLOGY | Facility: CLINIC | Age: OVER 89
End: 2025-08-04
Payer: MEDICARE

## 2025-08-04 ENCOUNTER — ANTICOAGULATION - WARFARIN VISIT (OUTPATIENT)
Dept: CARDIOLOGY | Facility: CLINIC | Age: OVER 89
End: 2025-08-04
Payer: MEDICARE

## 2025-08-04 ENCOUNTER — HOSPITAL ENCOUNTER (OUTPATIENT)
Dept: CARDIOLOGY | Facility: CLINIC | Age: OVER 89
Discharge: HOME | End: 2025-08-04
Payer: MEDICARE

## 2025-08-04 VITALS
HEIGHT: 70 IN | SYSTOLIC BLOOD PRESSURE: 115 MMHG | WEIGHT: 170.1 LBS | BODY MASS INDEX: 24.35 KG/M2 | DIASTOLIC BLOOD PRESSURE: 75 MMHG | OXYGEN SATURATION: 95 % | HEART RATE: 87 BPM

## 2025-08-04 DIAGNOSIS — I48.20 CHRONIC ATRIAL FIBRILLATION (MULTI): ICD-10-CM

## 2025-08-04 DIAGNOSIS — Z95.0 PRESENCE OF CARDIAC PACEMAKER: ICD-10-CM

## 2025-08-04 DIAGNOSIS — I48.91 ATRIAL FIBRILLATION, UNSPECIFIED TYPE (MULTI): Primary | ICD-10-CM

## 2025-08-04 DIAGNOSIS — I44.2 ATRIOVENTRICULAR BLOCK, COMPLETE (MULTI): ICD-10-CM

## 2025-08-04 DIAGNOSIS — I42.9 CARDIOMYOPATHY, UNSPECIFIED: ICD-10-CM

## 2025-08-04 DIAGNOSIS — I42.0 DILATED CARDIOMYOPATHY (MULTI): ICD-10-CM

## 2025-08-04 LAB
ATRIAL RATE: 89 BPM
POC INR: 2.3 (ref 0.9–1.1)
POC PROTHROMBIN TIME: NORMAL (ref 9.3–12.5)
Q ONSET: 228 MS
QRS COUNT: 14 BEATS
QRS DURATION: 148 MS
QT INTERVAL: 428 MS
QTC CALCULATION(BAZETT): 515 MS
QTC FREDERICIA: 484 MS
R AXIS: 37 DEGREES
T AXIS: 2 DEGREES
T OFFSET: 442 MS
VENTRICULAR RATE: 87 BPM

## 2025-08-04 PROCEDURE — 1159F MED LIST DOCD IN RCRD: CPT | Performed by: INTERNAL MEDICINE

## 2025-08-04 PROCEDURE — 85610 PROTHROMBIN TIME: CPT | Mod: QW | Performed by: INTERNAL MEDICINE

## 2025-08-04 PROCEDURE — 99211 OFF/OP EST MAY X REQ PHY/QHP: CPT | Performed by: INTERNAL MEDICINE

## 2025-08-04 PROCEDURE — 3074F SYST BP LT 130 MM HG: CPT | Performed by: INTERNAL MEDICINE

## 2025-08-04 PROCEDURE — 1036F TOBACCO NON-USER: CPT | Performed by: INTERNAL MEDICINE

## 2025-08-04 PROCEDURE — 99214 OFFICE O/P EST MOD 30 MIN: CPT | Performed by: INTERNAL MEDICINE

## 2025-08-04 PROCEDURE — 99212 OFFICE O/P EST SF 10 MIN: CPT

## 2025-08-04 PROCEDURE — 3078F DIAST BP <80 MM HG: CPT | Performed by: INTERNAL MEDICINE

## 2025-08-04 PROCEDURE — 93005 ELECTROCARDIOGRAM TRACING: CPT | Performed by: INTERNAL MEDICINE

## 2025-08-04 PROCEDURE — 1160F RVW MEDS BY RX/DR IN RCRD: CPT | Performed by: INTERNAL MEDICINE

## 2025-08-04 PROCEDURE — 1126F AMNT PAIN NOTED NONE PRSNT: CPT | Performed by: INTERNAL MEDICINE

## 2025-08-04 RX ORDER — TAMSULOSIN HYDROCHLORIDE 0.4 MG/1
0.4 CAPSULE ORAL NIGHTLY
COMMUNITY
Start: 2025-07-22

## 2025-08-04 ASSESSMENT — PAIN SCALES - GENERAL: PAINLEVEL_OUTOF10: 0-NO PAIN

## 2025-08-04 ASSESSMENT — ENCOUNTER SYMPTOMS
OCCASIONAL FEELINGS OF UNSTEADINESS: 0
DEPRESSION: 0
LOSS OF SENSATION IN FEET: 0

## 2025-08-04 NOTE — PROGRESS NOTES
Patient identification verified with 2 identifiers.    Location: [facility] - [address] [id number] [place], [place] [address] [phone]     Referring Physician: Dr. Bahena  Enrollment/ Re-enrollment date: 2026   INR Goal: 2.0-3.0  INR monitoring is per [facility] protocol.  Anticoagulation Medication: warfarin  Indication: Atrial Fibrillation/Atrial Flutter    Subjective   Bleeding signs/symptoms: No    Bruising: No   Major bleeding event: No  Thrombosis signs/symptoms: No  Thromboembolic event: No  Missed doses: No  Extra doses: No  Medication changes: No  Dietary changes: No  Change in health: No  Change in activity: No  Alcohol: No  Other concerns: No    Upcoming Procedures:  Does the Patient Have any upcoming procedures that require interruption in anticoagulation therapy? no  Does the patient require bridging? no      Anticoagulation Summary  As of 2025      INR goal:  2.0-3.0   TTR:  73.5% (1.7 y)   INR used for dosin.30 (2025)   Weekly warfarin total:  39 mg               Assessment/Plan   Therapeutic     1. New dose: no change    2. Next INR: 1 month      Education provided to patient during the visit:  Patient instructed to call in interim with questions, concerns and changes.   Patient educated on interactions between medications and warfarin.   Patient educated on dietary consistency in vitamin k consumption.   Patient educated on signs of bleeding/clotting.   Patient educated on compliance with dosing, follow up appointments, and prescribed plan of care.

## 2025-08-04 NOTE — PROGRESS NOTES
SUBJECTIVE:  Patient returns for a routine 6-week follow-up.  He is a pleasant but frail 89-year-old gentleman.  He has had a remote CABG surgery as well as a TAVR.  He also has chronic atrial fibrillation and also has a BiV ICD in place.    I was pleased to see that he has been reasonably stable clinically over the past 6 weeks.  We have determined that his optimal dry weight is probably around 162.  He has been able to maintain this weight reasonably well since his last visit.  He has required upping his dose to 120 mg of furosemide a day on occasion compared to his usual dose of 80 mg.  He is accompanied by his supportive daughter who keeps a close eye on him.  His recent BiV ICD check was reportedly good.  He continues to have his INRs monitored at the [facility].  His BMPs and CBCs have been relatively stable.    OBJECTIVE:    GENERAL:   Alert, usual pleasant but frail elderly male  HEENT: Unchanged  LUNGS: Mildly diminished air movement at the bases  CARDIAC: Normal S1 and S2 with soft systolic murmur  ABDOMEN: Nontender  EXTREMITIES: Lessened edema  SKIN: Scattered ecchymoses  NEURO: No acute changes    EKG: Electronic ventricular pacer    IMPRESSION/PLAN:  Navneet is generally doing reasonably well at this time.  I pleased to see that we are keeping him euvolemic.  His daughter knows to adjust his furosemide dosing as needed to maintain his weight where it is.    His blood pressure remains in good order, so we will continue his metoprolol and isosorbide unchanged.    He remains appropriately anticoagulated with a therapeutic INR for his chronic atrial fibrillation.     His ICD checks appear to be in good order.    I will see him back for routine follow-up in 3 months, but he and his daughter know to call for any intercurrent concerns.  [facility] can get a [facility] appointment there.    PATIENT INSTRUCTIONS:    Continue current  medications unchanged.    Return to clinic in 3 months.

## 2025-08-27 DIAGNOSIS — I50.9 ACUTE CONGESTIVE HEART FAILURE, UNSPECIFIED HEART FAILURE TYPE: Primary | ICD-10-CM

## 2025-08-27 RX ORDER — FUROSEMIDE 20 MG/1
20 TABLET ORAL
Qty: 60 TABLET | Refills: 0 | Status: SHIPPED | OUTPATIENT
Start: 2025-08-27

## 2025-09-02 DIAGNOSIS — Z95.0 PACEMAKER: Primary | ICD-10-CM

## 2025-09-02 DIAGNOSIS — I42.9 CARDIOMYOPATHY, UNSPECIFIED TYPE (MULTI): ICD-10-CM

## 2025-09-04 ENCOUNTER — ANTICOAGULATION - WARFARIN VISIT (OUTPATIENT)
Dept: CARDIOLOGY | Facility: CLINIC | Age: OVER 89
End: 2025-09-04
Payer: MEDICARE

## 2025-09-04 ENCOUNTER — HOSPITAL ENCOUNTER (OUTPATIENT)
Dept: CARDIOLOGY | Facility: CLINIC | Age: OVER 89
Discharge: HOME | End: 2025-09-04
Payer: MEDICARE

## 2025-09-04 DIAGNOSIS — I42.0 DILATED CARDIOMYOPATHY (MULTI): ICD-10-CM

## 2025-09-04 DIAGNOSIS — I42.9 CARDIOMYOPATHY, UNSPECIFIED: ICD-10-CM

## 2025-09-04 DIAGNOSIS — I48.91 ATRIAL FIBRILLATION, UNSPECIFIED TYPE (MULTI): ICD-10-CM

## 2025-09-04 DIAGNOSIS — Z95.0 PRESENCE OF CARDIAC PACEMAKER: ICD-10-CM

## 2025-09-04 DIAGNOSIS — I48.20 CHRONIC ATRIAL FIBRILLATION (MULTI): Primary | ICD-10-CM

## 2025-09-04 DIAGNOSIS — I44.2 ATRIOVENTRICULAR BLOCK, COMPLETE (MULTI): ICD-10-CM

## 2025-09-04 LAB
POC INR: 3.9 (ref 0.9–1.1)
POC PROTHROMBIN TIME: ABNORMAL (ref 9.3–12.5)

## 2025-09-04 PROCEDURE — 85610 PROTHROMBIN TIME: CPT | Mod: QW | Performed by: INTERNAL MEDICINE

## 2025-09-04 PROCEDURE — 93281 PM DEVICE PROGR EVAL MULTI: CPT | Performed by: INTERNAL MEDICINE

## 2025-09-04 PROCEDURE — 93281 PM DEVICE PROGR EVAL MULTI: CPT

## 2025-09-04 PROCEDURE — 99211 OFF/OP EST MAY X REQ PHY/QHP: CPT

## (undated) DEVICE — CABLE, SURGICAL, DISP

## (undated) DEVICE — ENVELOPE, ANTIBACTERIAL, AIGIS RX TYRX, ABSORBABLE, LRG

## (undated) DEVICE — GUIDEWIRE, J-TIP, AMPLATZ SUPER STIFF, 0.035 IN X 180 CM

## (undated) DEVICE — PHOTONBLADE, WITH ADAPTIVE SMOKE EVAC

## (undated) DEVICE — CATHETER, ACUMEN C315, FIXED, HIS SHAPE

## (undated) DEVICE — Device

## (undated) DEVICE — MONITOR, MYCARELINK, PATIENT MONITOR, 24960

## (undated) DEVICE — ELECTRODE, QUICK-COMBO, EDGE SYSTEM, REDI PACK

## (undated) DEVICE — KIT, WRENCH, STERILE, F/LEADS